# Patient Record
Sex: MALE | Race: WHITE | Employment: OTHER | URBAN - METROPOLITAN AREA
[De-identification: names, ages, dates, MRNs, and addresses within clinical notes are randomized per-mention and may not be internally consistent; named-entity substitution may affect disease eponyms.]

---

## 2017-01-23 ENCOUNTER — HOSPITAL ENCOUNTER (OUTPATIENT)
Dept: CT IMAGING | Age: 62
Discharge: HOME | End: 2017-01-23
Payer: MEDICARE

## 2017-01-23 DIAGNOSIS — R19.7 DIARRHEA: ICD-10-CM

## 2017-01-23 DIAGNOSIS — R10.84 ABDOMINAL PAIN, GENERALIZED: ICD-10-CM

## 2017-01-23 PROCEDURE — 74011000258 HC RX REV CODE- 258

## 2017-01-23 PROCEDURE — 74011636320 HC RX REV CODE- 636/320

## 2017-01-23 PROCEDURE — 74177 CT ABD & PELVIS W/CONTRAST: CPT

## 2017-01-23 RX ORDER — SODIUM CHLORIDE 0.9 % (FLUSH) 0.9 %
10 SYRINGE (ML) INJECTION
Status: COMPLETED | OUTPATIENT
Start: 2017-01-23 | End: 2017-01-23

## 2017-01-23 RX ADMIN — Medication 10 ML: at 16:07

## 2017-01-23 RX ADMIN — DIATRIZOATE MEGLUMINE AND DIATRIZOATE SODIUM 15 ML: 660; 100 LIQUID ORAL; RECTAL at 16:07

## 2017-01-23 RX ADMIN — IOPAMIDOL 100 ML: 755 INJECTION, SOLUTION INTRAVENOUS at 16:07

## 2017-01-23 RX ADMIN — SODIUM CHLORIDE 100 ML: 900 INJECTION, SOLUTION INTRAVENOUS at 16:07

## 2017-08-28 PROBLEM — J44.9 CHRONIC OBSTRUCTIVE PULMONARY DISEASE (HCC): Status: ACTIVE | Noted: 2017-08-28

## 2017-08-28 PROBLEM — G89.29 CHRONIC MIDLINE LOW BACK PAIN WITHOUT SCIATICA: Status: ACTIVE | Noted: 2017-08-28

## 2017-08-28 PROBLEM — M54.50 CHRONIC MIDLINE LOW BACK PAIN WITHOUT SCIATICA: Status: ACTIVE | Noted: 2017-08-28

## 2017-12-14 ENCOUNTER — HOSPITAL ENCOUNTER (OUTPATIENT)
Dept: LAB | Age: 62
Discharge: HOME | End: 2017-12-14
Payer: MEDICARE

## 2017-12-14 DIAGNOSIS — I25.118 ATHEROSCLEROSIS OF NATIVE CORONARY ARTERY OF NATIVE HEART WITH STABLE ANGINA PECTORIS (HCC): ICD-10-CM

## 2017-12-14 LAB
ANION GAP SERPL CALC-SCNC: 6 MMOL/L
BASOPHILS # BLD: 0.2 K/UL (ref 0–0.2)
BASOPHILS NFR BLD: 1 % (ref 0–2)
BUN SERPL-MCNC: 11 MG/DL (ref 8–23)
CALCIUM SERPL-MCNC: 9.2 MG/DL (ref 8.3–10.4)
CHLORIDE SERPL-SCNC: 106 MMOL/L (ref 98–107)
CO2 SERPL-SCNC: 29 MMOL/L (ref 21–32)
CREAT SERPL-MCNC: 0.9 MG/DL (ref 0.8–1.5)
DIFFERENTIAL METHOD BLD: ABNORMAL
EOSINOPHIL # BLD: 0.2 K/UL (ref 0–0.8)
EOSINOPHIL NFR BLD: 1 % (ref 0.5–7.8)
ERYTHROCYTE [DISTWIDTH] IN BLOOD BY AUTOMATED COUNT: 13 % (ref 11.9–14.6)
GLUCOSE SERPL-MCNC: 83 MG/DL (ref 65–100)
HCT VFR BLD AUTO: 46.6 % (ref 41.1–50.3)
HGB BLD-MCNC: 16.1 G/DL (ref 13.6–17.2)
INR PPP: 0.9
LYMPHOCYTES # BLD: 11.5 K/UL (ref 0.5–4.6)
LYMPHOCYTES NFR BLD: 67 % (ref 13–44)
MCH RBC QN AUTO: 31.1 PG (ref 26.1–32.9)
MCHC RBC AUTO-ENTMCNC: 34.5 G/DL (ref 31.4–35)
MCV RBC AUTO: 90.1 FL (ref 79.6–97.8)
MONOCYTES # BLD: 1.2 K/UL (ref 0.1–1.3)
MONOCYTES NFR BLD: 7 % (ref 4–12)
NEUTS SEG # BLD: 4.2 K/UL (ref 1.7–8.2)
NEUTS SEG NFR BLD: 24 % (ref 43–78)
PLATELET # BLD AUTO: 242 K/UL (ref 150–450)
PLATELET COMMENTS,PCOM: ADEQUATE
PMV BLD AUTO: 9.7 FL (ref 10.8–14.1)
POTASSIUM SERPL-SCNC: 3.9 MMOL/L (ref 3.5–5.1)
PROTHROMBIN TIME: 12.4 SEC (ref 11.5–14.5)
RBC # BLD AUTO: 5.17 M/UL (ref 4.23–5.67)
RBC MORPH BLD: ABNORMAL
SODIUM SERPL-SCNC: 141 MMOL/L (ref 136–145)
WBC # BLD AUTO: 17.3 K/UL (ref 4.3–11.1)
WBC MORPH BLD: ABNORMAL

## 2017-12-14 PROCEDURE — 85025 COMPLETE CBC W/AUTO DIFF WBC: CPT | Performed by: INTERNAL MEDICINE

## 2017-12-14 PROCEDURE — 80048 BASIC METABOLIC PNL TOTAL CA: CPT | Performed by: INTERNAL MEDICINE

## 2017-12-14 PROCEDURE — 36415 COLL VENOUS BLD VENIPUNCTURE: CPT | Performed by: INTERNAL MEDICINE

## 2017-12-14 PROCEDURE — 85610 PROTHROMBIN TIME: CPT | Performed by: INTERNAL MEDICINE

## 2017-12-19 RX ORDER — DICYCLOMINE HYDROCHLORIDE 10 MG/1
10 CAPSULE ORAL 2 TIMES DAILY
COMMUNITY
End: 2018-07-24

## 2017-12-20 ENCOUNTER — HOSPITAL ENCOUNTER (OUTPATIENT)
Dept: CARDIAC CATH/INVASIVE PROCEDURES | Age: 62
Discharge: HOME | End: 2017-12-21
Attending: INTERNAL MEDICINE | Admitting: INTERNAL MEDICINE
Payer: MEDICARE

## 2017-12-20 LAB
ATRIAL RATE: 65 BPM
CALCULATED P AXIS, ECG09: 77 DEGREES
CALCULATED R AXIS, ECG10: 52 DEGREES
CALCULATED T AXIS, ECG11: 72 DEGREES
DIAGNOSIS, 93000: NORMAL
P-R INTERVAL, ECG05: 164 MS
Q-T INTERVAL, ECG07: 466 MS
QRS DURATION, ECG06: 72 MS
QTC CALCULATION (BEZET), ECG08: 484 MS
VENTRICULAR RATE, ECG03: 65 BPM

## 2017-12-20 PROCEDURE — 74011250636 HC RX REV CODE- 250/636: Performed by: PHYSICIAN ASSISTANT

## 2017-12-20 PROCEDURE — 74011250636 HC RX REV CODE- 250/636

## 2017-12-20 PROCEDURE — C1769 GUIDE WIRE: HCPCS

## 2017-12-20 PROCEDURE — 77030027138 HC INCENT SPIROMETER -A

## 2017-12-20 PROCEDURE — 74011000250 HC RX REV CODE- 250: Performed by: INTERNAL MEDICINE

## 2017-12-20 PROCEDURE — 77030015766

## 2017-12-20 PROCEDURE — 74011250637 HC RX REV CODE- 250/637: Performed by: INTERNAL MEDICINE

## 2017-12-20 PROCEDURE — 74011000258 HC RX REV CODE- 258: Performed by: INTERNAL MEDICINE

## 2017-12-20 PROCEDURE — 92928 PRQ TCAT PLMT NTRAC ST 1 LES: CPT

## 2017-12-20 PROCEDURE — C1874 STENT, COATED/COV W/DEL SYS: HCPCS

## 2017-12-20 PROCEDURE — 99153 MOD SED SAME PHYS/QHP EA: CPT

## 2017-12-20 PROCEDURE — 99152 MOD SED SAME PHYS/QHP 5/>YRS: CPT

## 2017-12-20 PROCEDURE — 74011250636 HC RX REV CODE- 250/636: Performed by: INTERNAL MEDICINE

## 2017-12-20 PROCEDURE — 93458 L HRT ARTERY/VENTRICLE ANGIO: CPT

## 2017-12-20 PROCEDURE — C1894 INTRO/SHEATH, NON-LASER: HCPCS

## 2017-12-20 PROCEDURE — 74011636320 HC RX REV CODE- 636/320: Performed by: INTERNAL MEDICINE

## 2017-12-20 PROCEDURE — C1725 CATH, TRANSLUMIN NON-LASER: HCPCS

## 2017-12-20 PROCEDURE — 93005 ELECTROCARDIOGRAM TRACING: CPT | Performed by: INTERNAL MEDICINE

## 2017-12-20 PROCEDURE — 77030019569 HC BND COMPR RAD TERU -B

## 2017-12-20 PROCEDURE — C1887 CATHETER, GUIDING: HCPCS

## 2017-12-20 RX ORDER — LORAZEPAM 1 MG/1
1 TABLET ORAL
Status: DISCONTINUED | OUTPATIENT
Start: 2017-12-20 | End: 2017-12-21 | Stop reason: HOSPADM

## 2017-12-20 RX ORDER — SODIUM CHLORIDE 0.9 % (FLUSH) 0.9 %
5-10 SYRINGE (ML) INJECTION EVERY 8 HOURS
Status: DISCONTINUED | OUTPATIENT
Start: 2017-12-20 | End: 2017-12-21 | Stop reason: HOSPADM

## 2017-12-20 RX ORDER — SODIUM CHLORIDE 9 MG/ML
75 INJECTION, SOLUTION INTRAVENOUS CONTINUOUS
Status: DISCONTINUED | OUTPATIENT
Start: 2017-12-20 | End: 2017-12-21 | Stop reason: HOSPADM

## 2017-12-20 RX ORDER — ACETAMINOPHEN 325 MG/1
650 TABLET ORAL
Status: DISCONTINUED | OUTPATIENT
Start: 2017-12-20 | End: 2017-12-21 | Stop reason: HOSPADM

## 2017-12-20 RX ORDER — LIDOCAINE HYDROCHLORIDE 20 MG/ML
60 INJECTION, SOLUTION INFILTRATION; PERINEURAL ONCE
Status: COMPLETED | OUTPATIENT
Start: 2017-12-20 | End: 2017-12-20

## 2017-12-20 RX ORDER — GUAIFENESIN 100 MG/5ML
81-324 LIQUID (ML) ORAL
Status: DISCONTINUED | OUTPATIENT
Start: 2017-12-20 | End: 2017-12-20 | Stop reason: HOSPADM

## 2017-12-20 RX ORDER — GUAIFENESIN 100 MG/5ML
81 LIQUID (ML) ORAL DAILY
Status: DISCONTINUED | OUTPATIENT
Start: 2017-12-21 | End: 2017-12-21 | Stop reason: HOSPADM

## 2017-12-20 RX ORDER — SOTALOL HYDROCHLORIDE 80 MG/1
80 TABLET ORAL EVERY 12 HOURS
Status: DISCONTINUED | OUTPATIENT
Start: 2017-12-20 | End: 2017-12-21 | Stop reason: HOSPADM

## 2017-12-20 RX ORDER — SODIUM CHLORIDE 0.9 % (FLUSH) 0.9 %
5-10 SYRINGE (ML) INJECTION AS NEEDED
Status: DISCONTINUED | OUTPATIENT
Start: 2017-12-20 | End: 2017-12-21 | Stop reason: HOSPADM

## 2017-12-20 RX ORDER — HEPARIN SODIUM 200 [USP'U]/100ML
3 INJECTION, SOLUTION INTRAVENOUS CONTINUOUS
Status: DISCONTINUED | OUTPATIENT
Start: 2017-12-20 | End: 2017-12-20 | Stop reason: HOSPADM

## 2017-12-20 RX ORDER — MORPHINE SULFATE 10 MG/ML
2 INJECTION, SOLUTION INTRAMUSCULAR; INTRAVENOUS
Status: DISCONTINUED | OUTPATIENT
Start: 2017-12-20 | End: 2017-12-21 | Stop reason: HOSPADM

## 2017-12-20 RX ORDER — DIAZEPAM 5 MG/1
5 TABLET ORAL ONCE
Status: COMPLETED | OUTPATIENT
Start: 2017-12-20 | End: 2017-12-20

## 2017-12-20 RX ORDER — HYDRALAZINE HYDROCHLORIDE 20 MG/ML
10 INJECTION INTRAMUSCULAR; INTRAVENOUS
Status: DISCONTINUED | OUTPATIENT
Start: 2017-12-20 | End: 2017-12-21 | Stop reason: HOSPADM

## 2017-12-20 RX ORDER — NITROGLYCERIN 0.4 MG/1
0.4 TABLET SUBLINGUAL
Status: DISCONTINUED | OUTPATIENT
Start: 2017-12-20 | End: 2017-12-21 | Stop reason: HOSPADM

## 2017-12-20 RX ORDER — MIDAZOLAM HYDROCHLORIDE 1 MG/ML
.5-2 INJECTION, SOLUTION INTRAMUSCULAR; INTRAVENOUS
Status: DISCONTINUED | OUTPATIENT
Start: 2017-12-20 | End: 2017-12-20 | Stop reason: HOSPADM

## 2017-12-20 RX ADMIN — MIDAZOLAM HYDROCHLORIDE 1 MG: 1 INJECTION, SOLUTION INTRAMUSCULAR; INTRAVENOUS at 14:41

## 2017-12-20 RX ADMIN — HYDRALAZINE HYDROCHLORIDE 10 MG: 20 INJECTION INTRAMUSCULAR; INTRAVENOUS at 17:39

## 2017-12-20 RX ADMIN — SOTALOL HYDROCHLORIDE 80 MG: 80 TABLET ORAL at 22:13

## 2017-12-20 RX ADMIN — HEPARIN 3 ML/HR: 100 SYRINGE at 14:37

## 2017-12-20 RX ADMIN — HEPARIN SODIUM 2 ML: 10000 INJECTION, SOLUTION INTRAVENOUS; SUBCUTANEOUS at 14:28

## 2017-12-20 RX ADMIN — MIDAZOLAM HYDROCHLORIDE 2 MG: 1 INJECTION, SOLUTION INTRAMUSCULAR; INTRAVENOUS at 14:20

## 2017-12-20 RX ADMIN — DIAZEPAM 5 MG: 5 TABLET ORAL at 13:13

## 2017-12-20 RX ADMIN — TICAGRELOR 180 MG: 90 TABLET ORAL at 14:53

## 2017-12-20 RX ADMIN — IOPAMIDOL 135 ML: 755 INJECTION, SOLUTION INTRAVENOUS at 14:55

## 2017-12-20 RX ADMIN — BIVALIRUDIN 1.75 MG/KG/HR: 250 INJECTION, POWDER, LYOPHILIZED, FOR SOLUTION INTRAVENOUS at 14:34

## 2017-12-20 RX ADMIN — MIDAZOLAM HYDROCHLORIDE 2 MG: 1 INJECTION, SOLUTION INTRAMUSCULAR; INTRAVENOUS at 14:29

## 2017-12-20 RX ADMIN — SODIUM CHLORIDE 75 ML/HR: 900 INJECTION, SOLUTION INTRAVENOUS at 13:00

## 2017-12-20 RX ADMIN — LIDOCAINE HYDROCHLORIDE 60 MG: 20 INJECTION, SOLUTION INFILTRATION; PERINEURAL at 14:30

## 2017-12-20 NOTE — PROGRESS NOTES
Patient received to [address] [place] [address] room # 9  Ambulatory from [facility]. Patient scheduled for [facility] today with Dr Doretha Cuellar. Procedure reviewed & questions answered, voiced good understanding consent obtained & placed on chart. All medications and medical history reviewed. Will prep patient per orders. Patient & family updated on plan of care.

## 2017-12-20 NOTE — IP AVS SNAPSHOT
Jam Barnes 
 
 
 [address] [facility] [address] [facility] 
[phone] Patient: Luis Antonio Yee MRN: [id number] [facility]:[birth year] My Medications STOP taking these medications   
 aspirin 325 mg tablet Commonly known as:  ASPIRIN Replaced by:  aspirin 81 mg chewable tablet TAKE these medications as instructed Instructions Each Dose to Equal  
 Morning Noon Evening Bedtime  
 albuterol 90 mcg/actuation inhaler Commonly known as:  PROVENTIL HFA, VENTOLIN HFA, PROAIR HFA Take 2 Puffs by inhalation every four (4) hours as needed. 2 Puff  
    
   
   
   
  
 aspirin 81 mg chewable tablet Take 1 Tab by mouth daily. 81 mg  
    
  
   
   
   
  
 cetirizine 10 mg tablet Commonly known as:  ZYRTEC Take 1 Tab by mouth nightly. 10 mg  
    
   
   
   
  
  
 COLESTID 1 gram tablet Generic drug:  colestipol Take 1 g by mouth daily. 1 g  
    
  
   
   
   
  
 dicyclomine 10 mg capsule Commonly known as:  BENTYL Take 10 mg by mouth two (2) times a day. 10 mg  
    
  
   
   
  
   
  
 lisinopril 40 mg tablet Commonly known as:  Rafat Shutters Take 1 Tab by mouth daily. 40 mg  
    
  
   
   
   
  
 multivitamin tablet Commonly known as:  ONE A DAY Take 1 Tab by mouth daily. 1 Tab NIACIN PO Take 1,000 mg by mouth daily. 1000 mg  
    
  
   
   
   
  
 nitroglycerin 0.4 mg SL tablet Commonly known as:  NITROSTAT  
   
 1 Tab by SubLINGual route every five (5) minutes as needed for Chest Pain. 0.4 mg  
    
   
   
   
  
 pantoprazole 40 mg tablet Commonly known as:  PROTONIX Take 1 Tab by mouth daily. 40 mg  
    
  
   
   
   
  
 sertraline 100 mg tablet Commonly known as:  ZOLOFT  
   
 TAKE 1 TABLET EVERY DAY  
     
  
   
   
   
  
 sotalol 80 mg tablet Commonly known as:  Adrienne Tucker  
   
 TAKE 1 TABLET TWICE DAILY. ticagrelor 90 mg tablet Commonly known as:  [employer] Take 1 Tab by mouth every twelve (12) hours every twelve (12) hours. 90 mg  
    
  
   
   
  
   
  
 VITAMIN D3 1,000 unit tablet Generic drug:  cholecalciferol Take 1,000 Units by mouth daily. 1000 Units Where to Get Your Medications These medications were sent to [facility] Pamela Lopez, [address] [place] [address] [address] [facility]. [place] [id number] Phone:  [phone]  
  ticagrelor 90 mg tablet

## 2017-12-20 NOTE — PROGRESS NOTES
Applied TR-band to right wrist with 12 mL of air in band. Site without bleeding or hematoma. Capillary refill distal to the site was less than 2 seconds. Patient instructed to limit movement of affected wrist. Patient verbalized understanding.

## 2017-12-20 NOTE — PROGRESS NOTES
TRANSFER - IN REPORT:    [facility] being received from cath lab for routine progression of care      Report consisted of patients Situation, Background, Assessment and Recommendations(SBAR). Information from the following report(s) SBAR, Kardex and Procedure Summary was reviewed with the receiving nurse. Opportunity for questions and clarification was provided. Assessment completed upon patients arrival to unit and care assumed. Telemetry monitor removed. VS taken. Right radial site puncture site assessed and WDL. Pt verbalizes understanding of limited movement of RUE. Bed low and locked. Side rails x 2. Call light within reach. Pt verbalizes understanding to call for assistance.

## 2017-12-20 NOTE — PROCEDURES
Brief Cardiac Procedure Note    Patient: Wiliam Cortez MRN: [id number]  SSN: [id number]    YOB: 1955  Age: 60 y.o. Sex: male      Date of Procedure: 12/20/2017     Pre-procedure Diagnosis: Chest pain CCS Class IV    Post-procedure Diagnosis: Coronary Artery Disease    Procedure: Left Heart Catheterization with Percutaneous Coronary Intervention    Brief Description of Procedure: lhc via right radial, tr, pci om    Performed By: Chavez Ambrocio MD     Assistants: none    Anesthesia: Moderate Sedation    Estimated Blood Loss: Less than 10 mL      Specimens: None    Implants: None    Findings: synergy om, nl lvef    Complications: None    Recommendations: Continue medical therapy.     Signed By: Chavez Ambrocio MD     December 20, 2017

## 2017-12-20 NOTE — PROGRESS NOTES
TRANSFER - OUT REPORT:    Verbal report given to alexander(name) on Ardith Saint  being transferred to cpru(unit) for routine progression of care       Report consisted of patients Situation, Background, Assessment and   Recommendations(SBAR). Information from the following report(s) SBAR was reviewed with the receiving nurse. Opportunity for questions and clarification was provided. Procedure: [facility]   Finding Summary: stent x 1 to circ(cath/pci/pacer settings)  Location: rwrist    Closure Device: trband, 12 ml(yes/no/description)  Post Site Assessment: no bleeding no hematoma         Intra Procedure Meds:    Versed: 5mg    Angiomax Stop Time: 1501    Antiplatelet: 259PD Brilinta             Peripheral IV 12/20/17 Right Antecubital (Active)        Post-Procedure Site Assessment (1)  Wound Type: Catheter entry/exit  Location: Wrist  Orientation : Right  Hemostasis : TR Band (12ml)  Site Assessment: No bleeding, No hematoma                       is allergic to fentanyl; lovastatin; niacin; and advicor [niacin-lovastatin].     Past Medical History:   Diagnosis Date    Arrhythmia     A Fib twice since 7/2010-- ablation no problems since    Arthritis     CAD (coronary artery disease) 11/2009    stent x 1-- ablation 2010-- no problems since    Cancer [facility])     skin cancer    Chest pain     Chronic midline low back pain without sciatica 8/28/2017    COPD     prn inhalers    Coronary atherosclerosis of native coronary artery 12/30/2015    Depression     Dyspnea on exertion     with any activity; associated with nausea    GERD (gastroesophageal reflux disease) 7/22/2010    Heart failure ([address]     Hematochezia     HTN (hypertension) x 1 month    started on med--- controlled    Hypercholesteremia     controlled by medication    Other ill-defined conditions(799.89)     dyslipidemia/hyperlipidemia    Palpitations 12/30/2015    Paroxysmal atrial fibrillation ([address]     Psychiatric disorder depression    PUD (peptic ulcer disease) 2010    hx-- r/t coumadin--    Tobacco use disorder 12/30/2015    Unspecified adverse effect of anesthesia 1978    quit breathing     Visit Vitals    BP (!) 161/92 (BP Patient Position: Supine)    Pulse 70    Temp 98 °F (36.7 °C)    Resp 16    Ht 6' 1\" (1.854 m)    Wt 81.6 kg (180 lb)    SpO2 99%    BMI 23.75 kg/m2

## 2017-12-20 NOTE — PROGRESS NOTES
TRANSFER - OUT REPORT:    Verbal report given to Bri Hawk RN(name) on [facility]  being transferred to telemetry(unit) for routine progression of care       Report consisted of patients Situation, Background, Assessment and   Recommendations(SBAR). Information from the following report(s) Kardex and Procedure Summary was reviewed with the receiving nurse. Lines:   Peripheral IV 12/20/17 Left Antecubital (Active)        Opportunity for questions and clarification was provided.       Patient transported with:   Monitor  Registered Nurse

## 2017-12-20 NOTE — PROGRESS NOTES
Skin assessment completed with secondary RN. Right radial puncture site s/p heart cath. Site WDL. Sacrum and heels intact with no breakdown noted.

## 2017-12-20 NOTE — PROGRESS NOTES
Patient pre-assessment complete for [facility] poss with DR Rodney Brown scheduled for 17 at 12:30pm, arrival time 10:30am. Patient verified using . Patient instructed to bring all home medications in labeled bottles on the day of procedure. NPO status reinforced. Patient informed to take a full dose aspirin 325mg  or 81 mg x 4 on the day of procedure. Instructed they can take all other medications excluding vitamins & supplements. Patient verbalizes understanding of all instructions & denies any questions at this time.

## 2017-12-20 NOTE — IP AVS SNAPSHOT
[address] [facility], [place] 
 
 
 [address] [facility] [address] [facility] 
[phone] Patient: Marty Jason MRN: [id number] YLT:[id number] About your hospitalization You were admitted on:  December 20, 2017 You last received care in the:  [facility] 3 TELEMETRY You were discharged on:  December 21, 2017 Why you were hospitalized Your primary diagnosis was:  Coronary Atherosclerosis Of Native Coronary Artery Your diagnoses also included:  Palpitations, Htn (Hypertension), Dyslipidemia, Cad (Coronary Artery Disease) Things You Need To Do (next 8 weeks) Follow up with Luis Vergara DO Phone:  [phone] Where:  [address] [facility] [address] [facility] [address] Follow up with Ladi Morgan MD  
Jan 22 at [place] [address] office Phone:  [phone] Where:  [address] [place] [id number] Follow up with O CARDIOPULM REHAB  
you may call to schedule Orientation if you wish to participate in Cardiac Rehab. Phone:  [phone] Where:  [address] [facility], [address] Monday Jan 22, 2018 HOSPITAL FOLLOW-UP with Ladi Morgan MD at  8:00 AM  
Where:  [facility] OFFICE ([address] [facility]) Discharge Orders Procedure Order Date Status Priority Quantity Spec Type Associated Dx REFERRAL TO CARDIAC [facility] 12/21/17 0709 Normal Routine 1 A check kalina indicates which time of day the medication should be taken. My Medications STOP taking these medications   
 aspirin 325 mg tablet Commonly known as:  ASPIRIN Replaced by:  aspirin 81 mg chewable tablet TAKE these medications as instructed Instructions Each Dose to Equal  
 Morning Noon Evening Bedtime  
 albuterol 90 mcg/actuation inhaler Commonly known as:  PROVENTIL HFA, VENTOLIN HFA, PROAIR HFA Take 2 Puffs by inhalation every four (4) hours as needed. 2 Puff  
    
   
   
   
  
 aspirin 81 mg chewable tablet Take 1 Tab by mouth daily. 81 mg  
    
  
   
   
   
  
 cetirizine 10 mg tablet Commonly known as:  ZYRTEC Take 1 Tab by mouth nightly. 10 mg  
    
   
   
   
  
  
 COLESTID 1 gram tablet Generic drug:  colestipol Take 1 g by mouth daily. 1 g  
    
  
   
   
   
  
 dicyclomine 10 mg capsule Commonly known as:  BENTYL Take 10 mg by mouth two (2) times a day. 10 mg  
    
  
   
   
  
   
  
 lisinopril 40 mg tablet Commonly known as:  Jordan Edman Take 1 Tab by mouth daily. 40 mg  
    
  
   
   
   
  
 multivitamin tablet Commonly known as:  ONE A DAY Take 1 Tab by mouth daily. 1 Tab NIACIN PO Take 1,000 mg by mouth daily. 1000 mg  
    
  
   
   
   
  
 nitroglycerin 0.4 mg SL tablet Commonly known as:  NITROSTAT  
   
 1 Tab by SubLINGual route every five (5) minutes as needed for Chest Pain. 0.4 mg  
    
   
   
   
  
 pantoprazole 40 mg tablet Commonly known as:  PROTONIX Take 1 Tab by mouth daily. 40 mg  
    
  
   
   
   
  
 sertraline 100 mg tablet Commonly known as:  ZOLOFT  
   
 TAKE 1 TABLET EVERY DAY  
     
  
   
   
   
  
 sotalol 80 mg tablet Commonly known as:  BETAPACE  
   
 TAKE 1 TABLET TWICE DAILY. ticagrelor 90 mg tablet Commonly known as:  [employer] Take 1 Tab by mouth every twelve (12) hours every twelve (12) hours. 90 mg  
    
  
   
   
  
   
  
 VITAMIN D3 1,000 unit tablet Generic drug:  cholecalciferol Take 1,000 Units by mouth daily. 1000 Units Where to Get Your Medications These medications were sent to [facility] Pamela Lopez, [address] [place] [address] Drive [address] [facility]. [place] [id number] Phone:  [phone]  
  ticagrelor 90 mg tablet Discharge Instructions Cardiac Catheterization/Angiography Discharge Instructions *Check the puncture site frequently for swelling or bleeding. If you see any bleeding, lie down and apply pressure over the area with a clean town or washcloth. Notify your doctor for any redness, swelling, drainage or oozing from the puncture site. Notify your doctor for any fever or chills. *If the leg or arm with the puncture becomes cold, numb or painful, call [facility] Cardiology at  [phone]. *Activity should be limited for the next 48 hours. Climb stairs as little as possible and avoid any stooping, bending or strenuous activity for 48 hours. No heavy lifting (anything over 10 pounds) for three days. *Do not drive for 48 hours. *You may resume your usual diet. Drink more fluids than usual. 
 
*Have a responsible person drive you home and stay with you for at least 24 hours after your heart catheterization/angiography. *You may remove the bandage from your Right Arm in 24 hours. You may shower in 24 hours. No tub baths, hot tubs or swimming for one week. Do not place any lotions, creams, powders, ointments over the puncture site for one week. You may place a clean band-aid over the puncture site each day for 5 days. Change this daily. Percutaneous Coronary Intervention: What to Expect at [facility] Your Recovery Percutaneous coronary intervention (PCI) is the name for procedures that are used to open a narrowed or blocked coronary artery. The two most common PCI procedures are coronary angioplasty and coronary stent placement. Your groin or arm may have a bruise and feel sore for a day or two after a percutaneous coronary intervention (PCI). You can do light activities around the house, but nothing strenuous for several days. This care sheet gives you a general idea about how long it will take for you to recover. But each person recovers at a different pace.  Follow the steps below to get better as quickly as possible. How can you care for yourself at home? Activity ? · If the doctor gave you a sedative: ¨ For 24 hours, don't do anything that requires attention to detail. It takes time for the medicine's effects to completely wear off. ¨ For your safety, do not drive or operate any machinery that could be dangerous. Wait until the medicine wears off and you can think clearly and react easily. ? · Do not do strenuous exercise and do not lift, pull, or push anything heavy until your doctor says it is okay. This may be for a day or two. You can walk around the house and do light activity, such as cooking. ? · If the catheter was placed in your groin, try not to walk up stairs for the first couple of days. ? · If the catheter was placed in your arm near your wrist, do not bend your wrist deeply for the first couple of days. Be careful using your hand to get into and out of a chair or bed. ? · Carry your stent identification card with you at all times. ? · If your doctor recommends it, get more exercise. Walking is a good choice. Bit by bit, increase the amount you walk every day. Try for at least 30 minutes on most days of the week. Diet ? · Drink plenty of fluids to help your body flush out the dye. If you have kidney, heart, or liver disease and have to limit fluids, talk with your doctor before you increase the amount of fluids you drink. ? · Keep eating a heart-healthy diet that has lots of fruits, vegetables, and whole grains. If you have not been eating this way, talk to your doctor. You also may want to talk to a dietitian. This expert can help you to learn about healthy foods and plan meals. Medicines ? · Your doctor will tell you if and when you can restart your medicines. He or she will also give you instructions about taking any new medicines.   
? · If you take blood thinners, such as warfarin (Coumadin), clopidogrel (Plavix), or aspirin, be sure to talk to your doctor. He or she will tell you if and when to start taking those medicines again. Make sure that you understand exactly what your doctor wants you to do.  
? · Your doctor will prescribe blood-thinning medicines. You will likely take aspirin plus another antiplatelet, such as clopidogrel (Plavix). It is very important that you take these medicines exactly as directed. These medicines help keep the coronary artery open and reduce your risk of a heart attack. ? · Call your doctor if you think you are having a problem with your medicine. ?Care of the catheter site ? · For 1 or 2 days, keep a bandage over the spot where the catheter was inserted. The bandage probably will fall off in this time. ? · Put ice or a cold pack on the area for 10 to 20 minutes at a time to help with soreness or swelling. Put a thin cloth between the ice and your skin. ? · You may shower 24 to 48 hours after the procedure, if your doctor okays it. Pat the incision dry. ? · Do not soak the catheter site until it is healed. Don't take a bath for 1 week, or until your doctor tells you it isokay. Follow-up care is a key part of your treatment and safety. Be sure to make and go to all appointments, and call your doctor if you are having problems. It's also a good idea to know your test results and keep a list of the medicines you take. When should you call for help? Call 911 anytime you think you may need emergency care. For example, call if: 
? · You passed out (lost consciousness). ? · You have severe trouble breathing. ? · You have sudden chest pain and shortness of breath, or you cough up blood. ? · You have symptoms of a heart attack, such as: ¨ Chest pain or pressure. ¨ Sweating. ¨ Shortness of breath. ¨ Nausea or vomiting. ¨ Pain that spreads from the chest to the neck, jaw, or one or both shoulders or arms. ¨ Dizziness or lightheadedness. ¨ A fast or uneven pulse. After calling 911, chew 1 adult-strength aspirin. Wait for an ambulance. Do not try to drive yourself. ? · You have been diagnosed with angina, and you have angina symptoms that do not go away with rest or are not getting better within 5 minutes after you take one dose of nitroglycerin. ?Call your doctor now or seek immediate medical care if: 
? · You are bleeding from the area where the catheter was put in your artery. ? · You have a fast-growing, painful lump at the catheter site. ? · You have signs of infection, such as: 
¨ Increased pain, swelling, warmth, or redness. ¨ Red streaks leading from the catheter site. ¨ Pus draining from the catheter site. ¨ A fever. ? · Your leg or arm looks blue or feels cold, numb, or tingly. ? Watch closely for changes in your health, and be sure to contact your doctor if you have any problems. Where can you learn more? Go to http://mary kate-ramón.info/. Enter A592 in the search box to learn more about \"Percutaneous Coronary Intervention: What to Expect at Home. \" Current as of: September 21, 2016 Content Version: 11.4 © 5789-0525 [employer]. Care instructions adapted under license by [employer] (which disclaims liability or warranty for this information). If you have questions about a medical condition or this instruction, always ask your healthcare professional. [place] [address] any warranty or liability for your use of this information. DISCHARGE SUMMARY from Nurse PATIENT INSTRUCTIONS: 
 
 
F-face looks uneven A-arms unable to move or move unevenly S-speech slurred or non-existent T-time-call 911 as soon as signs and symptoms begin-DO NOT go Back to bed or wait to see if you get better-TIME IS BRAIN.  
 
Warning Signs of HEART ATTACK  
 
 Call 911 if you have these symptoms: 
? Chest discomfort. Most heart attacks involve discomfort in the center of the chest that lasts more than a few minutes, or that goes away and comes back. It can feel like uncomfortable pressure, squeezing, fullness, or pain. ? Discomfort in other areas of the upper body. Symptoms can include pain or discomfort in one or both arms, the back, neck, jaw, or stomach. ? Shortness of breath with or without chest discomfort. ? Other signs may include breaking out in a cold sweat, nausea, or lightheadedness. Don't wait more than five minutes to call [address]! Fast action can save your life. Calling 911 is almost always the fastest way to get lifesaving treatment. Emergency Medical Services staff can begin treatment when they arrive  up to an hour sooner than if someone gets to the hospital by car. The discharge information has been reviewed with the patient. The patient verbalized understanding. Discharge medications reviewed with the patient and appropriate educational materials and side effects teaching were provided. ___________________________________________________________________________________________________________________________________ [facility] Announcement We are excited to announce that we are making your provider's discharge notes available to you in [employer]k. You will see these notes when they are completed and signed by the physician that discharged you from your recent hospital stay. If you have any questions or concerns about any information you see in [facility], please call the Health Information Department where you were seen or reach out to your Primary Care Provider for more information about your plan of care. Introducing \[facility]\"" & HEALTH SERVICES! Dear Matt Hodges: Thank you for requesting a Nomadesk account. Our records indicate that you already have an active Nomadesk account.   You can access your account anytime at [email or web address] Did you know that you can access your hospital and ER discharge instructions at any time in LogicSource? You can also review all of your test results from your hospital stay or ER visit. Additional Information If you have questions, please visit the Frequently Asked Questions section of the LogicSource website at [email or web address] Remember, LogicSource is NOT to be used for urgent needs. For medical emergencies, dial 911. Now available from your iPhone and Android! Providers Seen During Your Hospitalization Provider Specialty Primary office phone Sunni Fsiher MD Cardiology [phone] Immunizations Administered for This Admission Name Date Influenza Vaccine (Quad) PF 12/21/2017 Your Primary Care Physician (PCP) Primary Care Physician Office Phone Office Fax Mikhail Crow [phone] You are allergic to the following Allergen Reactions Fentanyl Other (comments) States had through a PCA after shoulder surgery in 2000, and \"throat swelled shut\" and that he \"quit breathing\". Update 11/16/10--Patient thinks the PCA gave him to much and it wasn't an allergy to drug itself. Lovastatin Other (comments) Hullucinations Niacin Other (comments) Denies allery Advicor (Niacin-Lovastatin) Anaphylaxis Itching Recent Documentation Height Weight BMI Smoking Status 1.854 m 80.3 kg 23.35 kg/m2 Former Smoker Emergency Contacts Name Discharge Info Relation Home Work Mobile IsaiasLadi DISCHARGE CAREGIVER [3] Spouse [3] [phone] Patient Belongings The following personal items are in your possession at time of discharge: 
  Dental Appliances: Uppers, With patient  Visual Aid: Glasses, With patient      Home Medications: Locked   Jewelry: Ring  Clothing:  Footwear, Pajamas, Pants, Shirt, Undergarments, With patient    Other Valuables: Cell Phone, With patient Discharge Instructions Attachments/References CARDIAC REHABILITATION (ENGLISH) Patient Handouts Cardiac Rehabilitation: Care Instructions Your Care Instructions Cardiac rehabilitation is a program for people who have a heart problem, such as a heart attack, heart failure, or a heart valve disease. The program includes exercise, lifestyle changes, education, and emotional support. Cardiac rehab can help you improve the quality of your life through better overall health. It can help you lose weight and feel better about yourself. On your cardiac rehab team, you may have your doctor, a nurse specialist, a dietitian, and a physical therapist. They will design your cardiac rehab program specifically for you. You will learn how to reduce your risk for heart problems, how to manage stress, and how to eat a heart-healthy diet. By the end of the program, you will be ready to maintain a healthier lifestyle on your own. Follow-up care is a key part of your treatment and safety. Be sure to make and go to all appointments, and call your doctor if you are having problems. It's also a good idea to know your test results and keep a list of the medicines you take. How can you care for yourself at home? · Take your medicines exactly as prescribed. Call your doctor if you think you are having a problem with your medicine. You will get more details on the specific medicines your doctor prescribes. · Weigh yourself every day if your doctor tells you to. Watch for sudden weight gain. Weigh yourself on the same scale with the same amount of clothing at the same time of day. · Plan your meals so that you are eating heart-healthy foods. ¨ Eat a variety of foods daily. Fresh fruits and vegetables and whole-grains are good choices. ¨ Limit your fat intake, especially saturated and trans fat. ¨ Limit salt (sodium). ¨ Increase fiber in your diet. ¨ Limit alcohol. · Learn how to take your pulse so that you can track your heart rate during exercise. · Always check with your doctor before you begin a new exercise program. 
· Warm up before you exercise and cool down afterward for at least 15 minutes each. This will help your heart gradually prepare for and recover from exercise and avoid pushing your heart too hard. · Stop exercising if you have any unusual discomfort, such as chest pain. · Do not smoke. Smoking can make heart problems worse. If you need help quitting, talk to your doctor about stop-smoking programs and medicines. These can increase your chances of quitting for good. When should you call for help? Call 911 anytime you think you may need emergency care. For example, call if: 
? · You have severe trouble breathing. ? · You cough up pink, foamy mucus and you have trouble breathing. ? · You have symptoms of a heart attack. These may include: ¨ Chest pain or pressure, or a strange feeling in the chest. 
¨ Sweating. ¨ Shortness of breath. ¨ Nausea or vomiting. ¨ Pain, pressure, or a strange feeling in the back, neck, jaw, or upper belly or in one or both shoulders or arms. ¨ Lightheadedness or sudden weakness. ¨ A fast or irregular heartbeat. After you call 911, the  may tell you to chew 1 adult-strength or 2 to 4 low-dose aspirin. Wait for an ambulance. Do not try to drive yourself. ? · You have angina symptoms (such as chest pain or pressure) that do not go away with rest or are not getting better within 5 minutes after you take a dose of nitroglycerin. ? · You have symptoms of a stroke. These may include: 
¨ Sudden numbness, tingling, weakness, or loss of movement in your face, arm, or leg, especially on only one side of your body. ¨ Sudden vision changes. ¨ Sudden trouble speaking. ¨ Sudden confusion or trouble understanding simple statements. ¨ Sudden problems with walking or balance. ¨ A sudden, severe headache that is different from past headaches. ? · You passed out (lost consciousness). ?Call your doctor now or seek immediate medical care if: 
? · You have new or increased shortness of breath. ? · You are dizzy or lightheaded, or you feel like you may faint. ? · You gain weight suddenly, such as more than 2 to 3 pounds in a day or 5 pounds in a week. (Your doctor may suggest a different range of weight gain.) ? · You have increased swelling in your legs, ankles, or feet. ? Watch closely for changes in your health, and be sure to contact your doctor if you have any problems. Where can you learn more? Go to http://mary kate-ramón.info/. Enter W122 in the search box to learn more about \"Cardiac Rehabilitation: Care Instructions. \" Current as of: September 21, 2016 Content Version: 11.4 © 4714-9375 [employer]old[employer]. Care instructions adapted under license by [employer] (which disclaims liability or warranty for this information). If you have questions about a medical condition or this instruction, always ask your healthcare professional. [address] any warranty or liability for your use of this information. Please provide this summary of care documentation to your next provider. Signatures-by signing, you are acknowledging that this After Visit Summary has been reviewed with you and you have received a copy. Patient Signature:  ____________________________________________________________ Date:  ____________________________________________________________  
  
Heber Shelby Provider Signature:  ____________________________________________________________ Date:  ____________________________________________________________

## 2017-12-21 VITALS
HEART RATE: 77 BPM | BODY MASS INDEX: 23.46 KG/M2 | HEIGHT: 73 IN | TEMPERATURE: 98.8 F | WEIGHT: 177 LBS | DIASTOLIC BLOOD PRESSURE: 88 MMHG | OXYGEN SATURATION: 96 % | SYSTOLIC BLOOD PRESSURE: 168 MMHG | RESPIRATION RATE: 18 BRPM

## 2017-12-21 LAB
ANION GAP SERPL CALC-SCNC: 10 MMOL/L (ref 7–16)
ATRIAL RATE: 73 BPM
BASOPHILS # BLD: 0 K/UL (ref 0–0.2)
BASOPHILS NFR BLD: 0 % (ref 0–2)
BUN SERPL-MCNC: 12 MG/DL (ref 8–23)
CALCIUM SERPL-MCNC: 9.1 MG/DL (ref 8.3–10.4)
CALCULATED P AXIS, ECG09: 77 DEGREES
CALCULATED R AXIS, ECG10: 70 DEGREES
CALCULATED T AXIS, ECG11: 63 DEGREES
CHLORIDE SERPL-SCNC: 109 MMOL/L (ref 98–107)
CHOLEST SERPL-MCNC: 139 MG/DL
CO2 SERPL-SCNC: 26 MMOL/L (ref 21–32)
CREAT SERPL-MCNC: 0.75 MG/DL (ref 0.8–1.5)
DIAGNOSIS, 93000: NORMAL
DIFFERENTIAL METHOD BLD: ABNORMAL
EOSINOPHIL # BLD: 0.1 K/UL (ref 0–0.8)
EOSINOPHIL NFR BLD: 1 % (ref 0.5–7.8)
ERYTHROCYTE [DISTWIDTH] IN BLOOD BY AUTOMATED COUNT: 13.2 % (ref 11.9–14.6)
GLUCOSE SERPL-MCNC: 92 MG/DL (ref 65–100)
HCT VFR BLD AUTO: 42.8 % (ref 41.1–50.3)
HDLC SERPL-MCNC: 44 MG/DL (ref 40–60)
HDLC SERPL: 3.2 {RATIO}
HGB BLD-MCNC: 14.3 G/DL (ref 13.6–17.2)
IMM GRANULOCYTES # BLD: 0 K/UL (ref 0–0.5)
IMM GRANULOCYTES NFR BLD AUTO: 0 % (ref 0–5)
LDLC SERPL CALC-MCNC: 69.4 MG/DL
LIPID PROFILE,FLP: ABNORMAL
LYMPHOCYTES # BLD: 8.6 K/UL (ref 0.5–4.6)
LYMPHOCYTES NFR BLD: 60 % (ref 13–44)
MCH RBC QN AUTO: 30.6 PG (ref 26.1–32.9)
MCHC RBC AUTO-ENTMCNC: 33.4 G/DL (ref 31.4–35)
MCV RBC AUTO: 91.6 FL (ref 79.6–97.8)
MONOCYTES # BLD: 0.8 K/UL (ref 0.1–1.3)
MONOCYTES NFR BLD: 6 % (ref 4–12)
NEUTS SEG # BLD: 4.7 K/UL (ref 1.7–8.2)
NEUTS SEG NFR BLD: 33 % (ref 43–78)
P-R INTERVAL, ECG05: 160 MS
PLATELET # BLD AUTO: 211 K/UL (ref 150–450)
PMV BLD AUTO: 10.4 FL (ref 10.8–14.1)
POTASSIUM SERPL-SCNC: 3.4 MMOL/L (ref 3.5–5.1)
Q-T INTERVAL, ECG07: 450 MS
QRS DURATION, ECG06: 74 MS
QTC CALCULATION (BEZET), ECG08: 495 MS
RBC # BLD AUTO: 4.67 M/UL (ref 4.23–5.67)
SODIUM SERPL-SCNC: 145 MMOL/L (ref 136–145)
TRIGL SERPL-MCNC: 128 MG/DL (ref 35–150)
VENTRICULAR RATE, ECG03: 73 BPM
VLDLC SERPL CALC-MCNC: 25.6 MG/DL (ref 6–23)
WBC # BLD AUTO: 14.3 K/UL (ref 4.3–11.1)

## 2017-12-21 PROCEDURE — 74011250637 HC RX REV CODE- 250/637: Performed by: PHYSICIAN ASSISTANT

## 2017-12-21 PROCEDURE — 74011250637 HC RX REV CODE- 250/637: Performed by: INTERNAL MEDICINE

## 2017-12-21 PROCEDURE — 85025 COMPLETE CBC W/AUTO DIFF WBC: CPT | Performed by: INTERNAL MEDICINE

## 2017-12-21 PROCEDURE — 74011250636 HC RX REV CODE- 250/636: Performed by: INTERNAL MEDICINE

## 2017-12-21 PROCEDURE — 93005 ELECTROCARDIOGRAM TRACING: CPT | Performed by: INTERNAL MEDICINE

## 2017-12-21 PROCEDURE — 36415 COLL VENOUS BLD VENIPUNCTURE: CPT | Performed by: INTERNAL MEDICINE

## 2017-12-21 PROCEDURE — 80048 BASIC METABOLIC PNL TOTAL CA: CPT | Performed by: INTERNAL MEDICINE

## 2017-12-21 PROCEDURE — 80061 LIPID PANEL: CPT | Performed by: INTERNAL MEDICINE

## 2017-12-21 PROCEDURE — 90471 IMMUNIZATION ADMIN: CPT

## 2017-12-21 PROCEDURE — 90686 IIV4 VACC NO PRSV 0.5 ML IM: CPT | Performed by: INTERNAL MEDICINE

## 2017-12-21 RX ORDER — GUAIFENESIN 100 MG/5ML
81 LIQUID (ML) ORAL DAILY
Status: SHIPPED | COMMUNITY
Start: 2017-12-21

## 2017-12-21 RX ORDER — SERTRALINE HYDROCHLORIDE 100 MG/1
100 TABLET, FILM COATED ORAL DAILY
Status: DISCONTINUED | OUTPATIENT
Start: 2017-12-21 | End: 2017-12-21 | Stop reason: SDUPTHER

## 2017-12-21 RX ORDER — LISINOPRIL 40 MG/1
40 TABLET ORAL DAILY
Status: DISCONTINUED | OUTPATIENT
Start: 2017-12-21 | End: 2017-12-21 | Stop reason: HOSPADM

## 2017-12-21 RX ORDER — SERTRALINE HYDROCHLORIDE 100 MG/1
100 TABLET, FILM COATED ORAL DAILY
Status: DISCONTINUED | OUTPATIENT
Start: 2017-12-21 | End: 2017-12-21 | Stop reason: HOSPADM

## 2017-12-21 RX ORDER — LISINOPRIL 20 MG/1
40 TABLET ORAL DAILY
Status: DISCONTINUED | OUTPATIENT
Start: 2017-12-21 | End: 2017-12-21 | Stop reason: SDUPTHER

## 2017-12-21 RX ORDER — DICYCLOMINE HYDROCHLORIDE 10 MG/1
10 CAPSULE ORAL 2 TIMES DAILY
Status: DISCONTINUED | OUTPATIENT
Start: 2017-12-21 | End: 2017-12-21 | Stop reason: HOSPADM

## 2017-12-21 RX ORDER — POTASSIUM CHLORIDE 20 MEQ/1
40 TABLET, EXTENDED RELEASE ORAL
Status: COMPLETED | OUTPATIENT
Start: 2017-12-21 | End: 2017-12-21

## 2017-12-21 RX ORDER — PANTOPRAZOLE SODIUM 40 MG/1
40 TABLET, DELAYED RELEASE ORAL
Status: DISCONTINUED | OUTPATIENT
Start: 2017-12-21 | End: 2017-12-21 | Stop reason: HOSPADM

## 2017-12-21 RX ADMIN — TICAGRELOR 90 MG: 90 TABLET ORAL at 05:52

## 2017-12-21 RX ADMIN — SOTALOL HYDROCHLORIDE 80 MG: 80 TABLET ORAL at 08:18

## 2017-12-21 RX ADMIN — POTASSIUM CHLORIDE 40 MEQ: 20 TABLET, EXTENDED RELEASE ORAL at 08:16

## 2017-12-21 RX ADMIN — LISINOPRIL 40 MG: 40 TABLET ORAL at 08:14

## 2017-12-21 RX ADMIN — Medication 10 ML: at 05:52

## 2017-12-21 RX ADMIN — DICYCLOMINE HYDROCHLORIDE 10 MG: 10 CAPSULE ORAL at 08:13

## 2017-12-21 RX ADMIN — PANTOPRAZOLE SODIUM 40 MG: 40 TABLET, DELAYED RELEASE ORAL at 08:16

## 2017-12-21 RX ADMIN — ASPIRIN 81 MG 81 MG: 81 TABLET ORAL at 08:13

## 2017-12-21 RX ADMIN — SERTRALINE HYDROCHLORIDE 100 MG: 100 TABLET, FILM COATED ORAL at 08:17

## 2017-12-21 RX ADMIN — INFLUENZA VIRUS VACCINE 0.5 ML: 15; 15; 15; 15 SUSPENSION INTRAMUSCULAR at 09:12

## 2017-12-21 NOTE — PROGRESS NOTES
Bedside and Verbal shift change report given to Isak Carranza RN (oncoming nurse) by self Gato soto). Report included the following information SBAR, Kardex, MAR and Recent Results. Right radial site assessed and WDL. 2mL of air taken out of band.

## 2017-12-21 NOTE — PROGRESS NOTES
Cardiac Rehab:  Spoke with patient regarding referral to cardiac rehab. Patient meets admission criteria based on PCI(date12/20/17). Discussed lifestyle modifications to promote cardiac wellness. Patient indicated that he does not want to participate in cardiac rehab program.  We will not contact patient after discharge. Patient states he is retired and is not interested in exercise. Cardiologist is Dr Alejandro Ibanez.

## 2017-12-21 NOTE — PROGRESS NOTES
Discharge instructions reviewed with patient and spouse. Prescriptions given for brilinta and med info sheets provided for all new medications. Opportunity for questions provided. Patient and spouse voiced understanding of all discharge instructions. Telemetry and IV removed by primary RN. All home medications returned to patient.

## 2017-12-21 NOTE — PROGRESS NOTES
Verbal bedside report received from Lauren Dick [facility]. Patient's situation, background, assessment and recommendations were provided. Kardex, Mar, and recent results also reviewed. Opportunity for questions provided. Assumed care of patient.

## 2017-12-21 NOTE — PROGRESS NOTES
Radial compression band removed at 2113 after slowly reducing air to zero as per hospital protocol. No bleeding or hematoma noted. 2 x 2 gauze with tegaderm placed over puncture site. The affected extremity is warm and dry to the touch. Patient instructed to call if any bleeding noted on gauze. Patient verbalized understanding the nursing instructions.

## 2017-12-21 NOTE — PROGRESS NOTES
Bedside and Verbal shift change report given to Emil Lake RN (oncoming nurse) by self Jere [facility] ). Report included the following information SBAR, Kardex, Procedure Summary, MAR, Recent Results and Cardiac Rhythm SR. Right radial site visualized with on-coming RN.

## 2017-12-21 NOTE — PROGRESS NOTES
Problem: Cath Lab Procedures: Post-Cath Day of Procedure (Initiate SCIP Measures for Post-Op Care)  Goal: *Procedure site is without bleeding and signs of infection six hours post sheath removal  Outcome: Resolved/Met Date Met: 12/20/17  Right radial/groin site is covered with gauze and tegaderm which is clean, dry, and intact and without signs or symptoms of bleeding or infection. Problem: Falls - Risk of  Goal: *Absence of Falls  Document Shalom Fall Risk and appropriate interventions in the flowsheet. Outcome: Progressing Towards Goal  Fall Risk Interventions:            Medication Interventions: Patient to call before getting OOB, Teach patient to arise slowly       Patient progressing towards goal with no falls on current admission. Patient without confusion, agitation, or sensory perception deficits. Patient has steady gait on ambulation. Personal belongings are within reach. Bed is in the low and locked position with side rails up x2. Yellow gripper socks to bilateral feet. Call light within reach and patient verbalizes understanding of use.

## 2017-12-21 NOTE — PROCEDURES
[address] [facility] CATH    Yann Mendosa  MR#: [id number]  : 1955  ACCOUNT #: [de-identified]   DATE OF SERVICE: 2017    CLINICAL INFORMATION:  The patient with progressively increasing chest pain, shortness of breath, moderate size, moderate severity inferolateral defect that is ischemic ST. PROCEDURE DETAILS:  After informed consent was obtained, a 6-Swazi sheath was placed in the right radial artery. Radial cocktail was given by their protocol. A 5 Dilip Rad Brunswick catheter, angled pigtail were used for diagnostic angiography. Angioplasty and stenting of the obtuse marginal branch was performed with a 6-Swazi XB 3.0 guide, a Prowater wire and Angiomax for anticoagulation. Tr band placed at the end of the procedure. The patient was loaded with Brilinta at the end of the procedure. Conscious sedation was given under my supervision by Alexis Dickens RN with a total of 5 mg Versed beginning at 14:15 and concluding at 14:52. Vital signs, saturation was stable throughout. FINDINGS:  Left main coronary artery in a normal anatomic position. Mild tapering in the distal end to 10% to 20%, bifurcates LAD and circumflex system. The LAD has a stent in the proximal segment. There is mild in-stent restenosis up to 10% to 20% within the stented segment. There is 30% narrowing distal to the stent. The apical LAD is free of significant disease. The diagonal branch is a small vessel with a 90% focal narrowing in the mid portion of the vessel. This is jailed by the LAD and not felt to be an interventional target. Circumflex is a large proximal obtuse marginal branch that courses to the apex. This vessel is diffusely diseased in the proximal segment up to 95%. The AV groove circumflex gives rise to a distal left PDA that is free of any high-grade narrowing. Right coronary artery is a codominant vessel.   There is a 30% proximal narrowing and a 30% to 40% more distal narrowing. There is a small PDA. The RV marginal branch is diffusely diseased to 90% and this is unchanged from previous catheterization films. Left ventriculogram reveals normal systolic function, ejection fraction 60%. Left ventricular end diastolic pressure was 14 mmHg. Opening aortic pressure 128/71 with no gradient across the aortic valve. After Angiomax was given, a Prowater wire was placed in the distal obtuse marginal branch. The lesion was dilated with a 2.5 x 12 NC Trek stented with a 2.5 x 32 Synergy drug-eluting stent. The stent was then postdilated with 2.5 noncompliant balloon with inflations to 22 atmospheres. There is 0% residual MATT 3 flow. CONCLUSION:    1. Successful angioplasty and stenting of the first obtuse marginal branch. 2.  Moderate in-stent restenosis in the left anterior descending. 3.  Small vessel disease in the right coronary artery system. 4.  Preserved left ventricular systolic function.       Keke Hernadez MD       [facility] / Charli Rodney  D: 12/20/2017 16:51     T: 12/21/2017 07:42  JOB #: [id number]

## 2017-12-21 NOTE — DISCHARGE SUMMARY
[facility] Cardiology Discharge Summary     Patient ID:  Zaina Dick  [id number]  25 y.o.  1955    Admit date: 12/20/2017    Discharge date:  12/21/2017    Admitting Physician: Delphine Alba MD     Discharge Physician: TONE Soliman/Dr. Jeanie Rogers    Admission Diagnoses: Abnormal nuclear stress test [R94.39]    Discharge Diagnoses:    Diagnosis    Chronic midline low back pain without sciatica    Chronic obstructive pulmonary disease (HCC)    Duodenal ulcer    Recurrent major depressive disorder, in full remission ([facility] [address]    Dizziness    Tobacco use disorder    Palpitations    Coronary atherosclerosis of native coronary artery    Skin lesion    Atrial fibrillation ([facility] [address]    S/P coronary artery stent placement (2.75 x 28 Xience drug-eluting stent to mLAD 11/20/09)    HTN (hypertension)    GERD (gastroesophageal reflux disease)    CAD (coronary artery disease)    Dyslipidemia       Cardiology Procedures this admission:  Left heart catheterization with PCI  Consults: None    Hospital Course: Patient was seen at the office of [facility] Cardiology by Dr. Miya Bruno for complaints of CP w LEWIS and nuke showing inferior ischemia was subsequently scheduled for a LHC at [facility] on 12/20/17. Patient underwent cardiac catheterization by Dr. Miya Bruno. Patient was found to have a significant stenosis of the OM that was stented with a 2.5x 32 mm Synergy MAGEN with 0% residual stenosis. (Final cath dictation pending). Patient tolerated the procedure well and was taken to the telemetry floor for recovery. The following morning patient was up feeling well without any complaints of chest pain or shortness of breath. Patient's right radial cath site was clean, dry and intact without hematoma or bruit. Patient's labs were WNL (K 3.4 replaced prior to discharge). Patient was seen and examined by Dr. Jeanie Rogers and determined stable and ready for discharge.  Patient was instructed on the importance of medication compliance including taking aspirin and Brilinta everyday without missing a dose. After receiving drug eluting stents, the patient will remain on dual anti-platelet therapy for 1 year. Indication for treatment and risk of dual antiplatelet therapy was discussed with the patient and he understands to seek medical care immediately if any signs of bleeding. For maximized medical therapy for CAD, patient will continue BB, ACE-I but no STATIN due to intolerance. The patient will follow up with [facility] Dr. Dr Dave Estevez 22 at 8:00[facility] office  and has been referred to cardiac rehab. Discussed with the patient importance of diet and exercise to prevent further cardiovascular disease. He had some SOB w brilinta but agreed to try it for one week- he understands if he continues to have SOB that he needs to call us to switch to plavix and that he is not to stop brilinta without notifying us and switching to another antiplatelet. DISPOSITION: The patient is being discharged home in stable condition on a low saturated fat, low cholesterol and low salt diet. The patient is instructed to advance activities as tolerated to the limit of fatigue or shortness of breath. The patient is instructed to avoid all heavy lifting for 5 days. The patient is instructed to watch the cath site for bleeding/oozing; if seen, the patient is instructed to apply firm pressure with a clean cloth and call [facility] at [phone]. The patient is instructed to watch for signs of infection which include: increasing area of redness, fever/hot to touch or purulent drainage at the catheterization site. The patient is instructed not to soak in a bathtub for 7-10 days, but is cleared to shower. The patient is instructed to call the office or return to the ER for immediate evaluation for any shortness of breath or chest pain not relieved by NTG.         Discharge Exam:   Visit Vitals    /78 (BP 1 Location: Right arm, BP Patient Position: Supine)    Pulse 73    Temp 97.4 °F (36.3 °C)    Resp 19    Ht 6' 1\" (1.854 m)    Wt 80.3 kg (177 lb)    SpO2 96%    BMI 23.35 kg/m2     Patient has been seen by Dr. Salinas Number: see his progress note for exam details.     Recent Results (from the past 24 hour(s))   EKG, 12 LEAD, INITIAL    Collection Time: 12/20/17  3:21 PM   Result Value Ref Range    Ventricular Rate 65 BPM    Atrial Rate 65 BPM    P-R Interval 164 ms    QRS Duration 72 ms    Q-T Interval 466 ms    QTC Calculation (Bezet) 484 ms    Calculated P Axis 77 degrees    Calculated R Axis 52 degrees    Calculated T Axis 72 degrees    Diagnosis       Normal sinus rhythm  Prolonged QT  Abnormal ECG  When compared with ECG of 17-MAR-2016 10:24,  No significant change was found  Confirmed by ELE COOPER (), Erica Santoyo ([id number]) on 12/20/2017 0:82:00 PM     METABOLIC PANEL, BASIC    Collection Time: 12/21/17  4:22 AM   Result Value Ref Range    Sodium 145 136 - 145 mmol/L    Potassium 3.4 (L) 3.5 - 5.1 mmol/L    Chloride 109 (H) 98 - 107 mmol/L    CO2 26 21 - 32 mmol/L    Anion gap 10 7 - 16 mmol/L    Glucose 92 65 - 100 mg/dL    BUN 12 8 - 23 MG/DL    Creatinine 0.75 (L) 0.8 - 1.5 MG/DL    GFR est AA >60 >60 ml/min/1.73m2    GFR est non-AA >60 >60 ml/min/1.73m2    Calcium 9.1 8.3 - 10.4 MG/DL   LIPID PANEL    Collection Time: 12/21/17  4:22 AM   Result Value Ref Range    LIPID PROFILE          Cholesterol, total 139 <200 MG/DL    Triglyceride 128 35 - 150 MG/DL    HDL Cholesterol 44 40 - 60 MG/DL    LDL, calculated 69.4 <100 MG/DL    VLDL, calculated 25.6 (H) 6.0 - 23.0 MG/DL    CHOL/HDL Ratio 3.2     CBC WITH AUTOMATED DIFF    Collection Time: 12/21/17  4:22 AM   Result Value Ref Range    WBC 14.3 (H) 4.3 - 11.1 K/uL    RBC 4.67 4.23 - 5.67 M/uL    HGB 14.3 13.6 - 17.2 g/dL    HCT 42.8 41.1 - 50.3 %    MCV 91.6 79.6 - 97.8 FL    MCH 30.6 26.1 - 32.9 PG    MCHC 33.4 31.4 - 35.0 g/dL    RDW 13.2 11.9 - 14.6 %    PLATELET 929 336 - 806 K/uL    MPV 10.4 (L) 10.8 - 14.1 FL    DF AUTOMATED      NEUTROPHILS 33 (L) 43 - 78 %    LYMPHOCYTES 60 (H) 13 - 44 %    MONOCYTES 6 4.0 - 12.0 %    EOSINOPHILS 1 0.5 - 7.8 %    BASOPHILS 0 0.0 - 2.0 %    IMMATURE GRANULOCYTES 0 0.0 - 5.0 %    ABS. NEUTROPHILS 4.7 1.7 - 8.2 K/UL    ABS. LYMPHOCYTES 8.6 (H) 0.5 - 4.6 K/UL    ABS. MONOCYTES 0.8 0.1 - 1.3 K/UL    ABS. EOSINOPHILS 0.1 0.0 - 0.8 K/UL    ABS. BASOPHILS 0.0 0.0 - 0.2 K/UL    ABS. IMM. GRANS. 0.0 0.0 - 0.5 K/UL   EKG, 12 LEAD, INITIAL    Collection Time: 12/21/17  6:34 AM   Result Value Ref Range    Ventricular Rate 73 BPM    Atrial Rate 73 BPM    P-R Interval 160 ms    QRS Duration 74 ms    Q-T Interval 450 ms    QTC Calculation (Bezet) 495 ms    Calculated P Axis 77 degrees    Calculated R Axis 70 degrees    Calculated T Axis 63 degrees    Diagnosis       Normal sinus rhythm  Septal infarct , age undetermined  Abnormal ECG  When compared with ECG of 20-DEC-2017 15:21,  Septal infarct is now Present  Confirmed by Oksana Sifuentes MD (), KEYON AREVALO ([id number]) on 12/21/2017 6:52:11 AM           Patient Instructions:   Current Discharge Medication List      START taking these medications    Details   aspirin 81 mg chewable tablet Take 1 Tab by mouth daily. ticagrelor (BRILINTA) 90 mg tablet Take 1 Tab by mouth every twelve (12) hours every twelve (12) hours. Qty: 60 Tab, Refills: 11         CONTINUE these medications which have NOT CHANGED    Details   dicyclomine (BENTYL) 10 mg capsule Take 10 mg by mouth two (2) times a day. sotalol (BETAPACE) 80 mg tablet TAKE 1 TABLET TWICE DAILY. Qty: 90 Tab, Refills: 3      colestipol (COLESTID) 1 gram tablet Take 1 g by mouth daily. lisinopril (PRINIVIL, ZESTRIL) 40 mg tablet Take 1 Tab by mouth daily.   Qty: 90 Tab, Refills: 3    Associated Diagnoses: Essential hypertension      sertraline (ZOLOFT) 100 mg tablet TAKE 1 TABLET EVERY DAY  Qty: 90 Tab, Refills: 3    Associated Diagnoses: Recurrent major depressive disorder, in full remission (HCC)      pantoprazole (PROTONIX) 40 mg tablet Take 1 Tab by mouth daily. Qty: 90 Tab, Refills: 3    Associated Diagnoses: Gastroesophageal reflux disease with esophagitis      cholecalciferol (VITAMIN D3) 1,000 unit tablet Take 1,000 Units by mouth daily. multivitamin (ONE A DAY) tablet Take 1 Tab by mouth daily. NIACIN PO Take 1,000 mg by mouth daily. albuterol (PROVENTIL HFA, VENTOLIN HFA, PROAIR HFA) 90 mcg/actuation inhaler Take 2 Puffs by inhalation every four (4) hours as needed. cetirizine (ZYRTEC) 10 mg tablet Take 1 Tab by mouth nightly. Qty: 30 Tab, Refills: 2    Associated Diagnoses: Seasonal allergic rhinitis, unspecified allergic rhinitis trigger      nitroglycerin (NITROSTAT) 0.4 mg SL tablet 1 Tab by SubLINGual route every five (5) minutes as needed for Chest Pain.   Qty: 25 Tab, Refills: 11         STOP taking these medications       aspirin (ASPIRIN) 325 mg tablet Comments:   Reason for Stopping:                 Signed:  Rashmi Roman PA-C  12/21/2017  7:17 AM

## 2017-12-21 NOTE — PROGRESS NOTES
Bedside and Verbal shift change report given to self (oncoming nurse) by Hettie Kehr, RN (offgoing nurse). Report included the following information SBAR, Kardex, Procedure Summary, MAR, Recent Results and Cardiac Rhythm SR. Right radial site visualized and off-going RN removed 2ml air from TR band. No active bleeding and no hematoma.

## 2017-12-21 NOTE — DISCHARGE INSTRUCTIONS
Cardiac Catheterization/Angiography Discharge Instructions    *Check the puncture site frequently for swelling or bleeding. If you see any bleeding, lie down and apply pressure over the area with a clean town or washcloth. Notify your doctor for any redness, swelling, drainage or oozing from the puncture site. Notify your doctor for any fever or chills. *If the leg or arm with the puncture becomes cold, numb or painful, call [address] Cardiology at  [phone]. *Activity should be limited for the next 48 hours. Climb stairs as little as possible and avoid any stooping, bending or strenuous activity for 48 hours. No heavy lifting (anything over 10 pounds) for three days. *Do not drive for 48 hours. *You may resume your usual diet. Drink more fluids than usual.    *Have a responsible person drive you home and stay with you for at least 24 hours after your heart catheterization/angiography. *You may remove the bandage from your Right Arm in 24 hours. You may shower in 24 hours. No tub baths, hot tubs or swimming for one week. Do not place any lotions, creams, powders, ointments over the puncture site for one week. You may place a clean band-aid over the puncture site each day for 5 days. Change this daily. Percutaneous Coronary Intervention: What to Expect at [facility]    Percutaneous coronary intervention (PCI) is the name for procedures that are used to open a narrowed or blocked coronary artery. The two most common PCI procedures are coronary angioplasty and coronary stent placement. Your groin or arm may have a bruise and feel sore for a day or two after a percutaneous coronary intervention (PCI). You can do light activities around the house, but nothing strenuous for several days. This care sheet gives you a general idea about how long it will take for you to recover. But each person recovers at a different pace.  Follow the steps below to get better as quickly as possible. How can you care for yourself at home? Activity  ? · If the doctor gave you a sedative:  ¨ For 24 hours, don't do anything that requires attention to detail. It takes time for the medicine's effects to completely wear off. ¨ For your safety, do not drive or operate any machinery that could be dangerous. Wait until the medicine wears off and you can think clearly and react easily. ? · Do not do strenuous exercise and do not lift, pull, or push anything heavy until your doctor says it is okay. This may be for a day or two. You can walk around the house and do light activity, such as cooking. ? · If the catheter was placed in your groin, try not to walk up stairs for the first couple of days. ? · If the catheter was placed in your arm near your wrist, do not bend your wrist deeply for the first couple of days. Be careful using your hand to get into and out of a chair or bed. ? · Carry your stent identification card with you at all times. ? · If your doctor recommends it, get more exercise. Walking is a good choice. Bit by bit, increase the amount you walk every day. Try for at least 30 minutes on most days of the week. Diet  ? · Drink plenty of fluids to help your body flush out the dye. If you have kidney, heart, or liver disease and have to limit fluids, talk with your doctor before you increase the amount of fluids you drink. ? · Keep eating a heart-healthy diet that has lots of fruits, vegetables, and whole grains. If you have not been eating this way, talk to your doctor. You also may want to talk to a dietitian. This expert can help you to learn about healthy foods and plan meals. Medicines  ? · Your doctor will tell you if and when you can restart your medicines. He or she will also give you instructions about taking any new medicines. ? · If you take blood thinners, such as warfarin (Coumadin), clopidogrel (Plavix), or aspirin, be sure to talk to your doctor.  He or she will tell you if and when to start taking those medicines again. Make sure that you understand exactly what your doctor wants you to do.   ? · Your doctor will prescribe blood-thinning medicines. You will likely take aspirin plus another antiplatelet, such as clopidogrel (Plavix). It is very important that you take these medicines exactly as directed. These medicines help keep the coronary artery open and reduce your risk of a heart attack. ? · Call your doctor if you think you are having a problem with your medicine. ?Care of the catheter site  ? · For 1 or 2 days, keep a bandage over the spot where the catheter was inserted. The bandage probably will fall off in this time. ? · Put ice or a cold pack on the area for 10 to 20 minutes at a time to help with soreness or swelling. Put a thin cloth between the ice and your skin. ? · You may shower 24 to 48 hours after the procedure, if your doctor okays it. Pat the incision dry. ? · Do not soak the catheter site until it is healed. Don't take a bath for 1 week, or until your doctor tells you it isokay. Follow-up care is a key part of your treatment and safety. Be sure to make and go to all appointments, and call your doctor if you are having problems. It's also a good idea to know your test results and keep a list of the medicines you take. When should you call for help? Call 911 anytime you think you may need emergency care. For example, call if:  ? · You passed out (lost consciousness). ? · You have severe trouble breathing. ? · You have sudden chest pain and shortness of breath, or you cough up blood. ? · You have symptoms of a heart attack, such as:  ¨ Chest pain or pressure. ¨ Sweating. ¨ Shortness of breath. ¨ Nausea or vomiting. ¨ Pain that spreads from the chest to the neck, jaw, or one or both shoulders or arms. ¨ Dizziness or lightheadedness. ¨ A fast or uneven pulse. After calling 911, chew 1 adult-strength aspirin.  Wait for an ambulance. Do not try to drive yourself. ? · You have been diagnosed with angina, and you have angina symptoms that do not go away with rest or are not getting better within 5 minutes after you take one dose of nitroglycerin. ?Call your doctor now or seek immediate medical care if:  ? · You are bleeding from the area where the catheter was put in your artery. ? · You have a fast-growing, painful lump at the catheter site. ? · You have signs of infection, such as:  ¨ Increased pain, swelling, warmth, or redness. ¨ Red streaks leading from the catheter site. ¨ Pus draining from the catheter site. ¨ A fever. ? · Your leg or arm looks blue or feels cold, numb, or tingly. ? Watch closely for changes in your health, and be sure to contact your doctor if you have any problems. Where can you learn more? Go to http://mary kate-ramón.info/. Enter O482 in the search box to learn more about \"Percutaneous Coronary Intervention: What to Expect at Home. \"  Current as of: September 21, 2016  Content Version: 11.4  © 5564-3056 [employer]. Care instructions adapted under license by [employer] (which disclaims liability or warranty for this information). If you have questions about a medical condition or this instruction, always ask your healthcare professional. [place] [address] any warranty or liability for your use of this information. DISCHARGE SUMMARY from Nurse    PATIENT INSTRUCTIONS:    After general anesthesia or intravenous sedation, for 24 hours or while taking prescription Narcotics:  · Limit your activities  · Do not drive and operate hazardous machinery  · Do not make important personal or business decisions  · Do  not drink alcoholic beverages  · If you have not urinated within 8 hours after discharge, please contact your surgeon on call.     Report the following to your surgeon:  · Excessive pain, swelling, redness or odor of or around the surgical area  · Temperature over 100.5  · Nausea and vomiting lasting longer than 4 hours or if unable to take medications  · Any signs of decreased circulation or nerve impairment to extremity: change in color, persistent  numbness, tingling, coldness or increase pain  · Any questions    What to do at Home:  Recommended activity: No lifting, Driving, or Strenuous exercise for 3-5 days. *  Please give a list of your current medications to your Primary Care Provider. *  Please update this list whenever your medications are discontinued, doses are      changed, or new medications (including over-the-counter products) are added. *  Please carry medication information at all times in case of emergency situations. These are general instructions for a healthy lifestyle:    No smoking/ No tobacco products/ Avoid exposure to second hand smoke  Surgeon General's Warning:  Quitting smoking now greatly reduces serious risk to your health. Obesity, smoking, and sedentary lifestyle greatly increases your risk for illness    A healthy diet, regular physical exercise & weight monitoring are important for maintaining a healthy lifestyle    You may be retaining fluid if you have a history of heart failure or if you experience any of the following symptoms:  Weight gain of 3 pounds or more overnight or 5 pounds in a week, increased swelling in our hands or feet or shortness of breath while lying flat in bed. Please call your doctor as soon as you notice any of these symptoms; do not wait until your next office visit. Recognize signs and symptoms of STROKE:    F-face looks uneven    A-arms unable to move or move unevenly    S-speech slurred or non-existent    T-time-call 911 as soon as signs and symptoms begin-DO NOT go       Back to bed or wait to see if you get better-TIME IS BRAIN. Warning Signs of HEART ATTACK     Call 911 if you have these symptoms:   Chest discomfort.  Most heart attacks involve discomfort in the center of the chest that lasts more than a few minutes, or that goes away and comes back. It can feel like uncomfortable pressure, squeezing, fullness, or pain.  Discomfort in other areas of the upper body. Symptoms can include pain or discomfort in one or both arms, the back, neck, jaw, or stomach.  Shortness of breath with or without chest discomfort.  Other signs may include breaking out in a cold sweat, nausea, or lightheadedness. Don't wait more than five minutes to call 911 - MINUTES MATTER! Fast action can save your life. Calling 911 is almost always the fastest way to get lifesaving treatment. Emergency Medical Services staff can begin treatment when they arrive -- up to an hour sooner than if someone gets to the hospital by car. The discharge information has been reviewed with the patient. The patient verbalized understanding. Discharge medications reviewed with the patient and appropriate educational materials and side effects teaching were provided.   ___________________________________________________________________________________________________________________________________

## 2017-12-21 NOTE — PROGRESS NOTES
12/21/2017 6:24 AM    Admit Date: 12/20/2017    Admit Diagnosis: Abnormal nuclear stress test [R94.39]      Subjective:    Patient sp pci. Home today.  Will see if he tolerates brilinta    Objective:      Visit Vitals    /78 (BP 1 Location: Right arm, BP Patient Position: Supine)    Pulse 73    Temp 97.4 °F (36.3 °C)    Resp 19    Ht 6' 1\" (1.854 m)    Wt 80.3 kg (177 lb)    SpO2 96%    BMI 23.35 kg/m2       ROS:  General ROS: negative for - chills  Hematological and Lymphatic ROS: negative for - blood clots or jaundice  Respiratory ROS: no cough, shortness of breath, or wheezing  Cardiovascular ROS: no chest pain or dyspnea on exertion  Gastrointestinal ROS: no abdominal pain, change in bowel habits, or black or bloody stools  Neurological ROS: no TIA or stroke symptoms    Physical Exam:    Physical Examination: General appearance - alert, well appearing, and in no distress  Mental status - alert, oriented to person, place, and time  Eyes - pupils equal and reactive, extraocular eye movements intact  Neck/lymph - supple, no significant adenopathy  Chest/CV - clear to auscultation, no wheezes, rales or rhonchi, symmetric air entry  Heart - normal rate, regular rhythm, normal S1, S2, no murmurs, rubs, clicks or gallops  Abdomen/GI - soft, nontender, nondistended, no masses or organomegaly  Musculoskeletal - no joint tenderness, deformity or swelling  Extremities - peripheral pulses normal, no pedal edema, no clubbing or cyanosis  Skin - normal coloration and turgor, no rashes, no suspicious skin lesions noted    Current Facility-Administered Medications   Medication Dose Route Frequency    pantoprazole (PROTONIX) tablet 40 mg (Patient Supplied)  40 mg Oral ACB    dicyclomine (BENTYL) capsule 10 mg (Patient Supplied)  10 mg Oral BID    lisinopril (PRINIVIL, ZESTRIL) tablet 40 mg (Patient Supplied)  40 mg Oral DAILY    sertraline (ZOLOFT) tablet 100 mg (Patient Supplied)  100 mg Oral DAILY    0.9% sodium chloride infusion  75 mL/hr IntraVENous CONTINUOUS    bivalirudin (ANGIOMAX) 250 mg in 0.9% sodium chloride (MBP/ADV) 50 mL infusion  1.75 mg/kg/hr IntraVENous CONTINUOUS    0.9% sodium chloride infusion  75 mL/hr IntraVENous CONTINUOUS    sodium chloride (NS) flush 5-10 mL  5-10 mL IntraVENous Q8H    sodium chloride (NS) flush 5-10 mL  5-10 mL IntraVENous PRN    acetaminophen (TYLENOL) tablet 650 mg  650 mg Oral Q4H PRN    morphine 10 mg/mL injection 2 mg  2 mg IntraVENous Q4H PRN    nitroglycerin (NITROSTAT) tablet 0.4 mg  0.4 mg SubLINGual Q5MIN PRN    aspirin chewable tablet 81 mg  81 mg Oral DAILY    LORazepam (ATIVAN) tablet 1 mg  1 mg Oral Q6H PRN    ticagrelor (BRILINTA) tablet 90 mg  90 mg Oral Q12H    hydrALAZINE (APRESOLINE) 20 mg/mL injection 10 mg  10 mg IntraVENous Q6H PRN    influenza vaccine 2017-18 (3 yrs+)(PF) (FLUZONE QUAD/FLUARIX QUAD) injection 0.5 mL  0.5 mL IntraMUSCular PRIOR TO DISCHARGE    sotalol (BETAPACE) tablet 80 mg (Patient Supplied)  80 mg Oral Q12H       Data Review:   @LABRCNT(Na,K,BUN,CREA,WBC,HGB,HCT,PLT,INR,TRP,TCHOL*,Triglyceride*,LDL*,LDLCPOC HDL*,HDL])@    TELEMETRY: nsr    Assessment/Plan:     Principal Problem:    Coronary atherosclerosis of native coronary artery (12/30/2015) The current medical regimen is effective;  continue present plan and medications. Active Problems:    CAD (coronary artery disease) (12/1/2009)      Overview: pci to lad 11/19 2.75x28 Xience drug eluting stent. Dyslipidemia (12/1/2009)The current medical regimen is effective;  continue present plan and medications. HTN (hypertension) (7/22/2010)The current medical regimen is effective;  continue present plan and medications.         Palpitations (12/30/2015)          Wilbert Gilmore MD

## 2018-02-06 PROBLEM — D72.829 LEUKOCYTOSIS: Status: ACTIVE | Noted: 2018-02-06

## 2018-07-24 PROBLEM — C91.10 CLL (CHRONIC LYMPHOCYTIC LEUKEMIA) (HCC): Status: ACTIVE | Noted: 2018-07-24

## 2018-08-08 ENCOUNTER — HOSPITAL ENCOUNTER (OUTPATIENT)
Dept: LAB | Age: 63
Discharge: HOME | End: 2018-08-08
Payer: MEDICARE

## 2018-08-08 DIAGNOSIS — I48.0 PAROXYSMAL ATRIAL FIBRILLATION (HCC): ICD-10-CM

## 2018-08-08 LAB
ANION GAP SERPL CALC-SCNC: 7 MMOL/L
BASOPHILS # BLD: 0 K/UL
BASOPHILS NFR BLD: 0 % (ref 0–2)
BUN SERPL-MCNC: 10 MG/DL (ref 8–23)
CALCIUM SERPL-MCNC: 8.9 MG/DL (ref 8.3–10.4)
CHLORIDE SERPL-SCNC: 107 MMOL/L (ref 98–107)
CO2 SERPL-SCNC: 29 MMOL/L (ref 21–32)
CREAT SERPL-MCNC: 1 MG/DL (ref 0.8–1.5)
DIFFERENTIAL METHOD BLD: ABNORMAL
EOSINOPHIL # BLD: 0.2 K/UL
EOSINOPHIL NFR BLD: 1 % (ref 0.5–7.8)
ERYTHROCYTE [DISTWIDTH] IN BLOOD BY AUTOMATED COUNT: 13.1 %
GLUCOSE SERPL-MCNC: 97 MG/DL (ref 65–100)
HCT VFR BLD AUTO: 42.9 % (ref 41.1–50.3)
HGB BLD-MCNC: 14.6 G/DL (ref 13.6–17.2)
IMM GRANULOCYTES # BLD: 0 K/UL
IMM GRANULOCYTES NFR BLD AUTO: 0 % (ref 0–5)
LYMPHOCYTES # BLD: 9.5 K/UL
LYMPHOCYTES NFR BLD: 68 % (ref 13–44)
MAGNESIUM SERPL-MCNC: 2.1 MG/DL (ref 1.8–2.4)
MCH RBC QN AUTO: 30.2 PG (ref 26.1–32.9)
MCHC RBC AUTO-ENTMCNC: 34 G/DL (ref 31.4–35)
MCV RBC AUTO: 88.8 FL (ref 79.6–97.8)
MONOCYTES # BLD: 0.7 K/UL
MONOCYTES NFR BLD: 5 % (ref 4–12)
NEUTS SEG # BLD: 3.6 K/UL
NEUTS SEG NFR BLD: 25 % (ref 43–78)
NRBC # BLD: 0 K/UL (ref 0–0.2)
PLATELET # BLD AUTO: 237 K/UL (ref 150–450)
PMV BLD AUTO: 9.7 FL (ref 9.4–12.3)
POTASSIUM SERPL-SCNC: 3.6 MMOL/L (ref 3.5–5.1)
RBC # BLD AUTO: 4.83 M/UL (ref 4.23–5.6)
SODIUM SERPL-SCNC: 143 MMOL/L (ref 136–145)
WBC # BLD AUTO: 14 K/UL (ref 4.3–11.1)

## 2018-08-08 PROCEDURE — 83735 ASSAY OF MAGNESIUM: CPT

## 2018-08-08 PROCEDURE — 80048 BASIC METABOLIC PNL TOTAL CA: CPT

## 2018-08-08 PROCEDURE — 36415 COLL VENOUS BLD VENIPUNCTURE: CPT

## 2018-08-08 PROCEDURE — 85025 COMPLETE CBC W/AUTO DIFF WBC: CPT

## 2018-08-10 NOTE — PROGRESS NOTES
Patient pre-assessment complete for KEN with Dr Marquise Thakur scheduled for 18 at 10am, arrival time 8am. Patient verified using . Patient instructed to bring all home medications in labeled bottles on the day of procedure. NPO status reinforced. Patient instructed to HOLD lisinopril on Monday am. Instructed they can take all other medications excluding vitamins & supplements. Patient verbalizes understanding of all instructions & denies any questions at this time.

## 2018-08-13 ENCOUNTER — HOSPITAL ENCOUNTER (OUTPATIENT)
Age: 63
Setting detail: OUTPATIENT SURGERY
Discharge: HOME | End: 2018-08-13
Attending: INTERNAL MEDICINE | Admitting: INTERNAL MEDICINE
Payer: MEDICARE

## 2018-08-13 ENCOUNTER — HOSPITAL ENCOUNTER (OUTPATIENT)
Dept: CARDIAC CATH/INVASIVE PROCEDURES | Age: 63
Discharge: HOME | End: 2018-08-13
Payer: MEDICARE

## 2018-08-13 VITALS
WEIGHT: 182 LBS | SYSTOLIC BLOOD PRESSURE: 174 MMHG | DIASTOLIC BLOOD PRESSURE: 106 MMHG | HEIGHT: 73 IN | OXYGEN SATURATION: 95 % | HEART RATE: 57 BPM | BODY MASS INDEX: 24.12 KG/M2 | RESPIRATION RATE: 13 BRPM

## 2018-08-13 PROCEDURE — 74011000250 HC RX REV CODE- 250: Performed by: INTERNAL MEDICINE

## 2018-08-13 PROCEDURE — 99152 MOD SED SAME PHYS/QHP 5/>YRS: CPT

## 2018-08-13 PROCEDURE — 74011250636 HC RX REV CODE- 250/636

## 2018-08-13 PROCEDURE — 93312 ECHO TRANSESOPHAGEAL: CPT

## 2018-08-13 RX ORDER — OXYCODONE HYDROCHLORIDE 5 MG/1
5 TABLET ORAL
Status: CANCELLED | OUTPATIENT
Start: 2018-08-13 | End: 2018-08-14

## 2018-08-13 RX ORDER — LIDOCAINE HYDROCHLORIDE 20 MG/ML
15 SOLUTION OROPHARYNGEAL AS NEEDED
Status: DISCONTINUED | OUTPATIENT
Start: 2018-08-13 | End: 2018-08-13 | Stop reason: HOSPADM

## 2018-08-13 RX ORDER — SODIUM CHLORIDE 0.9 % (FLUSH) 0.9 %
5-10 SYRINGE (ML) INJECTION AS NEEDED
Status: CANCELLED | OUTPATIENT
Start: 2018-08-13

## 2018-08-13 RX ORDER — HYDROMORPHONE HYDROCHLORIDE 2 MG/ML
0.5 INJECTION, SOLUTION INTRAMUSCULAR; INTRAVENOUS; SUBCUTANEOUS
Status: CANCELLED | OUTPATIENT
Start: 2018-08-13

## 2018-08-13 RX ORDER — MIDAZOLAM HYDROCHLORIDE 1 MG/ML
2 INJECTION, SOLUTION INTRAMUSCULAR; INTRAVENOUS
Status: CANCELLED | OUTPATIENT
Start: 2018-08-13 | End: 2018-08-14

## 2018-08-13 RX ORDER — SODIUM CHLORIDE, SODIUM LACTATE, POTASSIUM CHLORIDE, CALCIUM CHLORIDE 600; 310; 30; 20 MG/100ML; MG/100ML; MG/100ML; MG/100ML
75 INJECTION, SOLUTION INTRAVENOUS CONTINUOUS
Status: CANCELLED | OUTPATIENT
Start: 2018-08-13 | End: 2018-08-14

## 2018-08-13 RX ORDER — OXYCODONE AND ACETAMINOPHEN 10; 325 MG/1; MG/1
1 TABLET ORAL AS NEEDED
Status: CANCELLED | OUTPATIENT
Start: 2018-08-13

## 2018-08-13 RX ORDER — MIDAZOLAM HYDROCHLORIDE 1 MG/ML
.5-2 INJECTION, SOLUTION INTRAMUSCULAR; INTRAVENOUS
Status: DISCONTINUED | OUTPATIENT
Start: 2018-08-13 | End: 2018-08-13 | Stop reason: HOSPADM

## 2018-08-13 RX ADMIN — MIDAZOLAM HYDROCHLORIDE 2 MG: 1 INJECTION, SOLUTION INTRAMUSCULAR; INTRAVENOUS at 11:15

## 2018-08-13 RX ADMIN — MIDAZOLAM HYDROCHLORIDE 2 MG: 1 INJECTION, SOLUTION INTRAMUSCULAR; INTRAVENOUS at 11:20

## 2018-08-13 RX ADMIN — LIDOCAINE HYDROCHLORIDE 15 ML: 20 SOLUTION ORAL; TOPICAL at 11:00

## 2018-08-13 NOTE — PROGRESS NOTES
TRANSFER - OUT REPORT:    Verbal report given to Jordy Burrows RN(name) on Mariam Saint  being transferred to CPRU(unit) for routine progression of care       Report consisted of patients Situation, Background, Assessment and   Recommendations(SBAR). Information from the following report(s) SBAR and Procedure Summary was reviewed with the receiving nurse. Lines:   Peripheral IV 08/13/18 Right Hand (Active)        Opportunity for questions and clarification was provided. Pt underwent KEN with Dr Venice Keyes. Pt received a total of 4 mg of Versed, tolerated well. Pt is alert to voice and denies any complaints. VSS.

## 2018-08-13 NOTE — PROGRESS NOTES
Patient received to [address] [place] [address] room # 10  Ambulatory from [facility]. Patient scheduled for KEN today with Dr Luci Kennedy. Procedure reviewed & questions answered, voiced good understanding consent obtained & placed on chart. All medications and medical history reviewed. Will prep patient per orders. Patient & family updated on plan of care. The patient has a fraility score of 3-MANAGING WELL, based on patient A&Ox3, patient able to ambulate to room without difficulty, patient able to perform most ADL's independently.

## 2018-08-13 NOTE — IP AVS SNAPSHOT
[address] 
[phone] Patient: Unique Bardales MRN: [id number] [facility]:[birth year]/90/[birth year]01 Discharge Summary 8/13/2018 Unique Bardales MRN[de-identified]  [id number] Admission Information Provider Pager Service Admission Date Expected D/C Date Ana Santiago MD  SURGERY 8/13/2018 8/13/2018 Actual LOS Patient Class 0 days OUTPATIENT SURGERY Follow-up Information Follow up With Details Comments Contact Info Ana Santiago MD  The office will call you with an appointment Joe  [address] [place] [address] 
[phone] My Medications ASK your physician about these medications Instructions Each Dose to Equal  
 Morning Noon Evening Bedtime  
 acetaminophen-codeine 300-30 mg per tablet Commonly known as:  TYLENOL #3 Your last dose was: Your next dose is: Take 1 Tab by mouth every four (4) hours as needed for Pain. Max Daily Amount: 6 Tabs. Indications: Pain 1 Tab * albuterol 90 mcg/actuation inhaler Commonly known as:  PROVENTIL HFA, VENTOLIN HFA, PROAIR HFA Your last dose was: Your next dose is: Take 2 Puffs by inhalation every four (4) hours as needed. 2 Puff * albuterol 2.5 mg /3 mL (0.083 %) nebulizer solution Commonly known as:  PROVENTIL VENTOLIN Your last dose was: Your next dose is:    
   
   
 3 mL by Nebulization route every four (4) hours as needed for Wheezing. 2.5 mg  
    
   
   
   
  
 ANORO ELLIPTA 62.5-25 mcg/actuation inhaler Generic drug:  umeclidinium-vilanterol Your last dose was: Your next dose is:    
   
   
 INHALE 1 PUFF DAILY  
     
   
   
   
  
 aspirin 81 mg chewable tablet Your last dose was: Your next dose is: Take 1 Tab by mouth daily.   
 81 mg  
    
   
 cetirizine 10 mg tablet Commonly known as:  ZYRTEC Your last dose was: Your next dose is: Take 1 Tab by mouth nightly. 10 mg  
    
   
   
   
  
 clopidogrel 75 mg Tab Commonly known as:  PLAVIX Your last dose was: Your next dose is: Take 1 Tab by mouth daily. 75 mg COLESTID 1 gram tablet Generic drug:  colestipol Your last dose was: Your next dose is: Take 1 g by mouth daily. 1 g  
    
   
   
   
  
 lisinopril 40 mg tablet Commonly known as:  Rafat Shutters Your last dose was: Your next dose is: Take 1 Tab by mouth daily. 40 mg  
    
   
   
   
  
 meclizine 25 mg tablet Commonly known as:  ANTIVERT Your last dose was: Your next dose is: Take 1 Tab by mouth three (3) times daily as needed. Indications: Vertigo 25 mg  
    
   
   
   
  
 multivitamin tablet Commonly known as:  ONE A DAY Your last dose was: Your next dose is: Take 1 Tab by mouth daily. 1 Tab  
    
   
   
   
  
 nitroglycerin 0.4 mg SL tablet Commonly known as:  NITROSTAT Your last dose was: Your next dose is:    
   
   
 1 Tab by SubLINGual route every five (5) minutes as needed for Chest Pain. 0.4 mg  
    
   
   
   
  
 pantoprazole 40 mg tablet Commonly known as:  PROTONIX Your last dose was: Your next dose is: Take 1 Tab by mouth daily. 40 mg  
    
   
   
   
  
 sertraline 100 mg tablet Commonly known as:  ZOLOFT Your last dose was: Your next dose is: TAKE 1 TABLET EVERY DAY  
     
   
   
   
  
 sotalol 80 mg tablet Commonly known as:  Adrienne Tucker Your last dose was: Your next dose is: TAKE 1 TABLET TWICE DAILY. * Notice: This list has 2 medication(s) that are the same as other medications prescribed for you. Read the directions carefully, and ask your doctor or other care provider to review them with you. General Information Please provide this summary of care documentation to your next provider. Allergies High:  Fentanyl; Lovastatin;  Niacin Unspecified:  Advicor [Niacin-lovastatin] Current Immunizations  Never Reviewed Name Date Influenza Vaccine (Quad) PF 12/21/2017 Influenza Vaccine PF 2/15/2017 Pneumococcal Polysaccharide (PPSV-23) 7/1/2010 Td 1/1/2003 ZZZ-RETIRED (DO NOT USE) Pneumococcal Vaccine (Unspecified Type) 7/23/2010 Discharge Instructions Discharge Instructions AFTER YOUR TRANSESOPHAGEAL ECHOCARDIOGRAM 
 
Be sure someone else drives you home. You may feel drowsy for several hours. Do not eat or drink for at least two hours after your procedure. Your throat will be numb and there is a risk you might have difficulty swallowing for a while. Be careful when you do eat or drink for the first time especially with hot fluids since you could easily burn your throat. Call your doctor if: 
 
· You are bleeding from your throat or mouth. · You have trouble breathing all of a sudden. · You have chest pain or any pain that spreads to your neck, jaw, or arms. · You have questions or concerns. · You have a fever greater than 101°F. 
 
Doctor: Luci Kennedy [phone] Special Instructions: 
 
No driving for 24 hours. Discharge Orders None  
  
` Patient Signature:  ____________________________________________________________ Date:  ____________________________________________________________  
  
 Madina Molina Provider Signature:  ____________________________________________________________ Date:  ____________________________________________________________

## 2018-08-13 NOTE — DISCHARGE INSTRUCTIONS
AFTER YOUR TRANSESOPHAGEAL ECHOCARDIOGRAM    Be sure someone else drives you home. You may feel drowsy for several hours. Do not eat or drink for at least two hours after your procedure. Your throat will be numb and there is a risk you might have difficulty swallowing for a while. Be careful when you do eat or drink for the first time especially with hot fluids since you could easily burn your throat. Call your doctor if:    · You are bleeding from your throat or mouth. · You have trouble breathing all of a sudden. · You have chest pain or any pain that spreads to your neck, jaw, or arms. · You have questions or concerns. · You have a fever greater than 101°F.    Doctor: France Carrizales [phone]    Special Instructions:    No driving for 24 hours.

## 2018-09-05 ENCOUNTER — HOSPITAL ENCOUNTER (INPATIENT)
Age: 63
LOS: 6 days | Discharge: HOME | DRG: 871 | End: 2018-09-11
Attending: EMERGENCY MEDICINE | Admitting: INTERNAL MEDICINE
Payer: MEDICARE

## 2018-09-05 ENCOUNTER — APPOINTMENT (OUTPATIENT)
Dept: GENERAL RADIOLOGY | Age: 63
DRG: 871 | End: 2018-09-05
Attending: EMERGENCY MEDICINE
Payer: MEDICARE

## 2018-09-05 ENCOUNTER — APPOINTMENT (OUTPATIENT)
Dept: CT IMAGING | Age: 63
DRG: 871 | End: 2018-09-05
Attending: EMERGENCY MEDICINE
Payer: MEDICARE

## 2018-09-05 DIAGNOSIS — K52.9 ACUTE COLITIS: Primary | ICD-10-CM

## 2018-09-05 DIAGNOSIS — I48.0 PAROXYSMAL ATRIAL FIBRILLATION (HCC): Chronic | ICD-10-CM

## 2018-09-05 DIAGNOSIS — J69.0 ASPIRATION PNEUMONIA OF RIGHT LOWER LOBE, UNSPECIFIED ASPIRATION PNEUMONIA TYPE (HCC): ICD-10-CM

## 2018-09-05 DIAGNOSIS — J96.01 ACUTE RESPIRATORY FAILURE WITH HYPOXIA (HCC): ICD-10-CM

## 2018-09-05 DIAGNOSIS — R11.2 NON-INTRACTABLE VOMITING WITH NAUSEA, UNSPECIFIED VOMITING TYPE: ICD-10-CM

## 2018-09-05 DIAGNOSIS — C91.10 CLL (CHRONIC LYMPHOCYTIC LEUKEMIA) (HCC): Chronic | ICD-10-CM

## 2018-09-05 DIAGNOSIS — K20.90 ACUTE ESOPHAGITIS: ICD-10-CM

## 2018-09-05 DIAGNOSIS — G89.29 CHRONIC MIDLINE LOW BACK PAIN WITHOUT SCIATICA: ICD-10-CM

## 2018-09-05 DIAGNOSIS — M54.50 CHRONIC MIDLINE LOW BACK PAIN WITHOUT SCIATICA: ICD-10-CM

## 2018-09-05 DIAGNOSIS — D72.829 LEUKOCYTOSIS, UNSPECIFIED TYPE: ICD-10-CM

## 2018-09-05 PROBLEM — E87.6 HYPOKALEMIA: Status: ACTIVE | Noted: 2018-09-05

## 2018-09-05 PROBLEM — B37.0 THRUSH: Chronic | Status: ACTIVE | Noted: 2018-09-05

## 2018-09-05 PROBLEM — A41.9 SEPSIS (HCC): Status: ACTIVE | Noted: 2018-09-05

## 2018-09-05 LAB
ALBUMIN SERPL-MCNC: 4.3 G/DL (ref 3.2–4.6)
ALBUMIN/GLOB SERPL: 1.2 {RATIO} (ref 1.2–3.5)
ALP SERPL-CCNC: 99 U/L (ref 50–136)
ALT SERPL-CCNC: 20 U/L (ref 12–65)
ANION GAP SERPL CALC-SCNC: 11 MMOL/L (ref 7–16)
ARTERIAL PATENCY WRIST A: POSITIVE
AST SERPL-CCNC: 22 U/L (ref 15–37)
ATRIAL RATE: 112 BPM
BASE DEFICIT BLDA-SCNC: 1.8 MMOL/L (ref 0–2)
BASOPHILS # BLD: 0 K/UL (ref 0–0.2)
BASOPHILS NFR BLD: 0 % (ref 0–2)
BDY SITE: ABNORMAL
BILIRUB SERPL-MCNC: 0.7 MG/DL (ref 0.2–1.1)
BUN SERPL-MCNC: 10 MG/DL (ref 8–23)
CALCIUM SERPL-MCNC: 9.6 MG/DL (ref 8.3–10.4)
CALCULATED P AXIS, ECG09: 72 DEGREES
CALCULATED R AXIS, ECG10: 11 DEGREES
CALCULATED T AXIS, ECG11: 49 DEGREES
CHLORIDE SERPL-SCNC: 106 MMOL/L (ref 98–107)
CO2 SERPL-SCNC: 24 MMOL/L (ref 21–32)
COHGB MFR BLD: 1.1 % (ref 0.5–1.5)
CREAT SERPL-MCNC: 1.12 MG/DL (ref 0.8–1.5)
DIAGNOSIS, 93000: NORMAL
DIFFERENTIAL METHOD BLD: ABNORMAL
DO-HGB BLD-MCNC: 46 % (ref 0–5)
EOSINOPHIL # BLD: 0 K/UL (ref 0–0.8)
EOSINOPHIL NFR BLD: 0 % (ref 0.5–7.8)
ERYTHROCYTE [DISTWIDTH] IN BLOOD BY AUTOMATED COUNT: 12.8 %
FIO2 ON VENT: 21 %
GLOBULIN SER CALC-MCNC: 3.6 G/DL (ref 2.3–3.5)
GLUCOSE SERPL-MCNC: 146 MG/DL (ref 65–100)
HCO3 BLDA-SCNC: 24 MMOL/L (ref 22–26)
HCT VFR BLD AUTO: 49.4 % (ref 41.1–50.3)
HGB BLD-MCNC: 16.4 G/DL (ref 13.6–17.2)
HGB BLDMV-MCNC: 15.9 GM/DL (ref 11.7–15)
IMM GRANULOCYTES # BLD: 0 K/UL (ref 0–0.5)
IMM GRANULOCYTES NFR BLD AUTO: 0 % (ref 0–5)
LACTATE BLD-SCNC: 1.2 MMOL/L (ref 0.5–1.9)
LACTATE BLD-SCNC: 2.4 MMOL/L (ref 0.5–1.9)
LIPASE SERPL-CCNC: 91 U/L (ref 73–393)
LYMPHOCYTES # BLD: 9.2 K/UL (ref 0.5–4.6)
LYMPHOCYTES NFR BLD: 37 % (ref 13–44)
MCH RBC QN AUTO: 30.1 PG (ref 26.1–32.9)
MCHC RBC AUTO-ENTMCNC: 33.2 G/DL (ref 31.4–35)
MCV RBC AUTO: 90.6 FL (ref 79.6–97.8)
METHGB MFR BLD: 0.4 % (ref 0–1.5)
MONOCYTES # BLD: 1.7 K/UL (ref 0.1–1.3)
MONOCYTES NFR BLD: 7 % (ref 4–12)
NEUTS SEG # BLD: 13.9 K/UL (ref 1.7–8.2)
NEUTS SEG NFR BLD: 56 % (ref 43–78)
NRBC # BLD: 0 K/UL (ref 0–0.2)
OXYHGB MFR BLDA: 52.9 % (ref 94–97)
P-R INTERVAL, ECG05: 134 MS
PCO2 BLDA: 46 MMHG (ref 35–45)
PH BLDA: 7.34 [PH] (ref 7.35–7.45)
PLATELET # BLD AUTO: 288 K/UL (ref 150–450)
PLATELET COMMENTS,PCOM: ADEQUATE
PMV BLD AUTO: 10.2 FL (ref 9.4–12.3)
PO2 BLDA: <34 MMHG (ref 80–105)
POTASSIUM SERPL-SCNC: 3.3 MMOL/L (ref 3.5–5.1)
PROT SERPL-MCNC: 7.9 G/DL (ref 6.3–8.2)
Q-T INTERVAL, ECG07: 346 MS
QRS DURATION, ECG06: 74 MS
QTC CALCULATION (BEZET), ECG08: 472 MS
RBC # BLD AUTO: 5.45 M/UL (ref 4.23–5.6)
RBC MORPH BLD: ABNORMAL
SAO2 % BLD: 54 % (ref 92–98.5)
SERVICE CMNT-IMP: ABNORMAL
SODIUM SERPL-SCNC: 141 MMOL/L (ref 136–145)
VENTILATION MODE VENT: ABNORMAL
VENTRICULAR RATE, ECG03: 112 BPM
WBC # BLD AUTO: 24.8 K/UL (ref 4.3–11.1)
WBC MORPH BLD: ABNORMAL

## 2018-09-05 PROCEDURE — 74011000258 HC RX REV CODE- 258: Performed by: EMERGENCY MEDICINE

## 2018-09-05 PROCEDURE — 74011636320 HC RX REV CODE- 636/320: Performed by: EMERGENCY MEDICINE

## 2018-09-05 PROCEDURE — 81003 URINALYSIS AUTO W/O SCOPE: CPT | Performed by: EMERGENCY MEDICINE

## 2018-09-05 PROCEDURE — 65270000029 HC RM PRIVATE

## 2018-09-05 PROCEDURE — 83735 ASSAY OF MAGNESIUM: CPT

## 2018-09-05 PROCEDURE — 83690 ASSAY OF LIPASE: CPT

## 2018-09-05 PROCEDURE — 96376 TX/PRO/DX INJ SAME DRUG ADON: CPT | Performed by: EMERGENCY MEDICINE

## 2018-09-05 PROCEDURE — 96375 TX/PRO/DX INJ NEW DRUG ADDON: CPT | Performed by: EMERGENCY MEDICINE

## 2018-09-05 PROCEDURE — 99285 EMERGENCY DEPT VISIT HI MDM: CPT | Performed by: EMERGENCY MEDICINE

## 2018-09-05 PROCEDURE — 85025 COMPLETE CBC W/AUTO DIFF WBC: CPT

## 2018-09-05 PROCEDURE — C9113 INJ PANTOPRAZOLE SODIUM, VIA: HCPCS | Performed by: INTERNAL MEDICINE

## 2018-09-05 PROCEDURE — 74011250636 HC RX REV CODE- 250/636: Performed by: EMERGENCY MEDICINE

## 2018-09-05 PROCEDURE — 74011000250 HC RX REV CODE- 250: Performed by: INTERNAL MEDICINE

## 2018-09-05 PROCEDURE — 74177 CT ABD & PELVIS W/CONTRAST: CPT

## 2018-09-05 PROCEDURE — 96365 THER/PROPH/DIAG IV INF INIT: CPT | Performed by: EMERGENCY MEDICINE

## 2018-09-05 PROCEDURE — 83605 ASSAY OF LACTIC ACID: CPT

## 2018-09-05 PROCEDURE — 71046 X-RAY EXAM CHEST 2 VIEWS: CPT

## 2018-09-05 PROCEDURE — 80053 COMPREHEN METABOLIC PANEL: CPT

## 2018-09-05 PROCEDURE — 82803 BLOOD GASES ANY COMBINATION: CPT

## 2018-09-05 PROCEDURE — 74011250636 HC RX REV CODE- 250/636: Performed by: INTERNAL MEDICINE

## 2018-09-05 PROCEDURE — 36600 WITHDRAWAL OF ARTERIAL BLOOD: CPT

## 2018-09-05 PROCEDURE — 87040 BLOOD CULTURE FOR BACTERIA: CPT

## 2018-09-05 PROCEDURE — 93005 ELECTROCARDIOGRAM TRACING: CPT | Performed by: EMERGENCY MEDICINE

## 2018-09-05 RX ORDER — VANCOMYCIN 2 GRAM/500 ML IN 0.9 % SODIUM CHLORIDE INTRAVENOUS
2000 ONCE
Status: COMPLETED | OUTPATIENT
Start: 2018-09-05 | End: 2018-09-05

## 2018-09-05 RX ORDER — PROMETHAZINE HYDROCHLORIDE 25 MG/1
25 SUPPOSITORY RECTAL
Qty: 11 SUPPOSITORY | Refills: 0 | Status: SHIPPED | OUTPATIENT
Start: 2018-09-05 | End: 2018-09-11

## 2018-09-05 RX ORDER — SODIUM CHLORIDE 9 MG/ML
500 INJECTION, SOLUTION INTRAVENOUS ONCE
Status: COMPLETED | OUTPATIENT
Start: 2018-09-05 | End: 2018-09-05

## 2018-09-05 RX ORDER — LEVOFLOXACIN 750 MG/1
750 TABLET ORAL DAILY
Qty: 10 TAB | Refills: 0 | Status: SHIPPED | OUTPATIENT
Start: 2018-09-05 | End: 2018-09-11

## 2018-09-05 RX ORDER — VANCOMYCIN/0.9 % SOD CHLORIDE 1.5G/250ML
1500 PLASTIC BAG, INJECTION (ML) INTRAVENOUS EVERY 12 HOURS
Status: DISCONTINUED | OUTPATIENT
Start: 2018-09-06 | End: 2018-09-06

## 2018-09-05 RX ORDER — SUCRALFATE 1 G/1
1 TABLET ORAL 4 TIMES DAILY
Qty: 40 TAB | Refills: 0 | Status: SHIPPED | OUTPATIENT
Start: 2018-09-05 | End: 2018-09-11

## 2018-09-05 RX ORDER — OXYCODONE HYDROCHLORIDE 5 MG/1
5 TABLET ORAL
Qty: 19 TAB | Refills: 0 | Status: SHIPPED | OUTPATIENT
Start: 2018-09-05 | End: 2018-09-11

## 2018-09-05 RX ORDER — SODIUM CHLORIDE 9 MG/ML
1000 INJECTION, SOLUTION INTRAVENOUS ONCE
Status: COMPLETED | OUTPATIENT
Start: 2018-09-05 | End: 2018-09-05

## 2018-09-05 RX ORDER — ONDANSETRON 2 MG/ML
8 INJECTION INTRAMUSCULAR; INTRAVENOUS
Status: COMPLETED | OUTPATIENT
Start: 2018-09-05 | End: 2018-09-05

## 2018-09-05 RX ORDER — ONDANSETRON 2 MG/ML
4 INJECTION INTRAMUSCULAR; INTRAVENOUS
Status: COMPLETED | OUTPATIENT
Start: 2018-09-05 | End: 2018-09-05

## 2018-09-05 RX ORDER — METRONIDAZOLE 500 MG/1
500 TABLET ORAL 2 TIMES DAILY
Qty: 20 TAB | Refills: 0 | Status: SHIPPED | OUTPATIENT
Start: 2018-09-05 | End: 2018-09-11

## 2018-09-05 RX ORDER — POTASSIUM CHLORIDE 14.9 MG/ML
20 INJECTION INTRAVENOUS
Status: COMPLETED | OUTPATIENT
Start: 2018-09-05 | End: 2018-09-06

## 2018-09-05 RX ORDER — PROMETHAZINE HYDROCHLORIDE 25 MG/1
25 TABLET ORAL
Qty: 11 TAB | Refills: 0 | Status: SHIPPED | OUTPATIENT
Start: 2018-09-05 | End: 2018-09-11

## 2018-09-05 RX ORDER — ONDANSETRON 2 MG/ML
4 INJECTION INTRAMUSCULAR; INTRAVENOUS
Status: DISCONTINUED | OUTPATIENT
Start: 2018-09-05 | End: 2018-09-11 | Stop reason: HOSPADM

## 2018-09-05 RX ORDER — NYSTATIN 100000 [USP'U]/ML
500000 SUSPENSION ORAL 4 TIMES DAILY
Status: DISCONTINUED | OUTPATIENT
Start: 2018-09-05 | End: 2018-09-11 | Stop reason: HOSPADM

## 2018-09-05 RX ORDER — SODIUM CHLORIDE 0.9 % (FLUSH) 0.9 %
10 SYRINGE (ML) INJECTION
Status: COMPLETED | OUTPATIENT
Start: 2018-09-05 | End: 2018-09-05

## 2018-09-05 RX ORDER — ACETAMINOPHEN 325 MG/1
650 TABLET ORAL
Status: DISCONTINUED | OUTPATIENT
Start: 2018-09-05 | End: 2018-09-11 | Stop reason: HOSPADM

## 2018-09-05 RX ADMIN — SODIUM CHLORIDE 500 ML: 900 INJECTION, SOLUTION INTRAVENOUS at 15:21

## 2018-09-05 RX ADMIN — SODIUM CHLORIDE 100 ML: 900 INJECTION, SOLUTION INTRAVENOUS at 17:24

## 2018-09-05 RX ADMIN — DIATRIZOATE MEGLUMINE AND DIATRIZOATE SODIUM 15 ML: 600; 100 SOLUTION ORAL; RECTAL at 14:46

## 2018-09-05 RX ADMIN — VANCOMYCIN HYDROCHLORIDE 2000 MG: 10 INJECTION, POWDER, LYOPHILIZED, FOR SOLUTION INTRAVENOUS at 15:24

## 2018-09-05 RX ADMIN — IOPAMIDOL 100 ML: 755 INJECTION, SOLUTION INTRAVENOUS at 17:24

## 2018-09-05 RX ADMIN — SODIUM CHLORIDE 40 MG: 9 INJECTION INTRAMUSCULAR; INTRAVENOUS; SUBCUTANEOUS at 22:16

## 2018-09-05 RX ADMIN — Medication 10 ML: at 17:24

## 2018-09-05 RX ADMIN — SODIUM CHLORIDE 1000 ML: 900 INJECTION, SOLUTION INTRAVENOUS at 15:27

## 2018-09-05 RX ADMIN — POTASSIUM CHLORIDE 20 MEQ: 14.9 INJECTION, SOLUTION INTRAVENOUS at 22:16

## 2018-09-05 RX ADMIN — POTASSIUM CHLORIDE 20 MEQ: 14.9 INJECTION, SOLUTION INTRAVENOUS at 00:30

## 2018-09-05 RX ADMIN — ONDANSETRON 8 MG: 2 INJECTION INTRAMUSCULAR; INTRAVENOUS at 18:27

## 2018-09-05 RX ADMIN — ONDANSETRON 4 MG: 2 INJECTION INTRAMUSCULAR; INTRAVENOUS at 11:55

## 2018-09-05 RX ADMIN — PIPERACILLIN SODIUM,TAZOBACTAM SODIUM 4.5 G: 4; .5 INJECTION, POWDER, FOR SOLUTION INTRAVENOUS at 19:29

## 2018-09-05 NOTE — ED TRIAGE NOTES
Pt sent from doctors office for further GI workup, ernst since Monday, reports dark stools. Pt takes plavix

## 2018-09-05 NOTE — ED PROVIDER NOTES
HPI: HPI Comments: 59-year-old male presents to the emergency department with several days of nausea vomiting diarrhea. Black stools Saw his primary care doctor today noted to have dark stools that were heme positive on exam 
 
Deferred to the ED for further evaluation Patient complains diffuse abdominal pain fever unable to tolerate oral  Liquids No alleviating. Worse with eating or drinking. Worse with time Review of Systems: 
Review of Systems Constitutional: Positive for chills, fever and malaise/fatigue. Respiratory: Negative. Cardiovascular: Negative. Gastrointestinal: Positive for abdominal pain, diarrhea, nausea and vomiting. Genitourinary: Negative. Musculoskeletal: Negative. Skin: Negative. Neurological: Negative. Negative for weakness. Psychiatric/Behavioral: Negative. Past Medical History:    
Primary Care Doctor: Gypsy Greer DO Past Medical History:  
Diagnosis Date  Arrhythmia A Fib twice since 7/2010-- ablation no problems since  Arthritis  CAD (coronary artery disease) 11/2009  
 stent x 1-- ablation 2010-- no problems since  Cancer ([address] skin cancer  Chest pain  Chronic midline low back pain without sciatica 8/28/2017  CLL (chronic lymphocytic leukemia) ([address] 7/24/2018  COPD   
 prn inhalers  Coronary atherosclerosis of native coronary artery 12/30/2015  Depression  Dyspnea on exertion   
 with any activity; associated with nausea  GERD (gastroesophageal reflux disease) 7/22/2010  
 Heart failure ([address]  Hematochezia   
 HTN (hypertension) x 1 month  
 started on med--- controlled  Hypercholesteremia   
 controlled by medication  Other ill-defined conditions(799.89)   
 dyslipidemia/hyperlipidemia  Palpitations 12/30/2015  Paroxysmal atrial fibrillation (HCC)  Psychiatric disorder   
 depression  PUD (peptic ulcer disease) 2010  
 hx-- r/t coumadin--  
  Recurrent depression ([facility] 8/8/2016  Tobacco use disorder 12/30/2015  Unspecified adverse effect of anesthesia 1978  
 quit breathing Past Surgical History:  
Procedure Laterality Date  HX CHOLECYSTECTOMY  HX COLONOSCOPY  2012  HX HEART CATHETERIZATION  11/2009 PCI x 1 to LAD  ----ablation 2010  HX HEART CATHETERIZATION  3/2016  HX HEENT  1977 and 1978  
 eardrum repair-- right [address] Right inguinal  
 HX LAP CHOLECYSTECTOMY  1995  HX ORTHOPAEDIC    
 left shoulder surg x1  
 HX ORTHOPAEDIC Left 2012  
 hand  HX OTHER SURGICAL    
 EGD  HX SHOULDER ARTHROSCOPY  2000  
 x 3 on right Social History Social History  Marital status:  Spouse name: N/A  
 Number of children: N/A  
 Years of education: N/A Social History Main Topics  Smoking status: Former Smoker Packs/day: 1.00 Years: 45.00 Types: Cigarettes Quit date: 3/4/2016  Smokeless tobacco: Never Used Comment: smoked more than 40 years  Alcohol use No  
 Drug use: No  
 Sexual activity: Not on file Other Topics Concern  Not on file Social History Narrative Previous Medications ACETAMINOPHEN-CODEINE (TYLENOL #3) 300-30 MG PER TABLET    Take 1 Tab by mouth every four (4) hours as needed for Pain. Max Daily Amount: 6 Tabs. Indications: Pain ALBUTEROL (PROVENTIL HFA, VENTOLIN HFA, PROAIR HFA) 90 MCG/ACTUATION INHALER    Take 2 Puffs by inhalation every four (4) hours as needed. ALBUTEROL (PROVENTIL VENTOLIN) 2.5 MG /3 ML (0.083 %) NEBULIZER SOLUTION    3 mL by Nebulization route every four (4) hours as needed for Wheezing. AMLODIPINE (NORVASC) 5 MG TABLET    Take 1 Tab by mouth every evening. ANORO ELLIPTA 62.5-25 MCG/ACTUATION INHALER    INHALE 1 PUFF DAILY  
 ASPIRIN 81 MG CHEWABLE TABLET    Take 1 Tab by mouth daily. CETIRIZINE (ZYRTEC) 10 MG TABLET    Take 1 Tab by mouth nightly. CLOPIDOGREL (PLAVIX) 75 MG TAB    Take 1 Tab by mouth daily. COLESTIPOL (COLESTID) 1 GRAM TABLET    Take 1 g by mouth daily. LISINOPRIL (PRINIVIL, ZESTRIL) 40 MG TABLET    Take 1 Tab by mouth daily. MECLIZINE (ANTIVERT) 25 MG TABLET    Take 1 Tab by mouth three (3) times daily as needed. Indications: Vertigo MULTIVITAMIN (ONE A DAY) TABLET    Take 1 Tab by mouth daily. NITROGLYCERIN (NITROSTAT) 0.4 MG SL TABLET    1 Tab by SubLINGual route every five (5) minutes as needed for Chest Pain. PANTOPRAZOLE (PROTONIX) 40 MG TABLET    Take 1 Tab by mouth daily. SERTRALINE (ZOLOFT) 100 MG TABLET    TAKE 1 TABLET EVERY DAY  
 SOTALOL (BETAPACE) 80 MG TABLET    TAKE 1 TABLET TWICE DAILY. Allergies Allergen Reactions  Fentanyl Other (comments) States had through a PCA after shoulder surgery in 2000, and \"throat swelled shut\" and that he \"quit breathing\". Update 11/16/10--Patient thinks the PCA gave him to much and it wasn't an allergy to drug itself.  Lovastatin Other (comments) Hullucinations  Niacin Other (comments) Denies allery  Advicor [Niacin-Lovastatin] Anaphylaxis and Itching Physical Exam:   
Vitals:  
 09/05/18 1152 BP: 117/83 Pulse: 79 Resp: 18 Temp: (!) 100.8 °F (38.2 °C) SpO2: 98% Vital signs were reviewed. Pulse oximetry interpretation: normal 
Nursing documentation reviewed. Physical Exam  
Constitutional: He is oriented to person, place, and time. He appears well-developed and well-nourished. He appears distressed. HENT:  
Head: Normocephalic and atraumatic. Mm dry Eyes: Pupils are equal, round, and reactive to light. Cardiovascular: Normal rate and regular rhythm. Pulmonary/Chest: Effort normal and breath sounds normal. No respiratory distress. He has no wheezes. He has no rales. Abdominal: Soft. He exhibits no distension. There is tenderness. There is no rebound and no guarding. Musculoskeletal: Normal range of motion. Neurological: He is alert and oriented to person, place, and time. Skin: Skin is warm and dry. Psychiatric: He has a normal mood and affect. His behavior is normal.  
Nursing note and vitals reviewed. ____________________________________________________________________ Medical Decision Making: MDM Number of Diagnoses or Management Options Acute colitis: new, needed workup Acute esophagitis: new, needed workup Acute respiratory failure with hypoxia [facility]): new, needed workup Leukocytosis, unspecified type: established, worsening Non-intractable vomiting with nausea, unspecified vomiting type: new, needed workup Diagnosis management comments: 67-year-old male presents to the emergency Department nauseavomiting diarrhea some abdominal pain and black stools. Fever. Decreased by mouth intake Abdomen is soft. Mild tenderness without rebound masses or guarding Given the patient's  Elevated white count fever and abdominal symptoms we'll proceed with CT of abdomen and pelvis Dilcia Ha MD; 9/5/2018 @2:49 PM=========================================== 
 
CT positive for esophagitis and colitis. No evidence of abscess or perforation Patient completed IV antibiotics The patientbe discharged on Carafate antibiotics and anti-medics Also start Roxicodone for better pain control. Signed By: Maynor Charles MD  
 September 5, 2018  
 
7:10 PM 
Patient found to be tachycardic and hypoxic. EKG was reviewed sinus rhythm with single PVCcame in changes. ABG is performed which revealed a pH of 7.34 a PCO2 of 46 and a PO2 of 27. Patient was placed on 5 L nasal Oxygen and maintaining a sat between 94 and 95% Chest x-ray pending Patient will be admitted 8:08 PM 
No acute findings on the chest x-ray Patient reports that he had a vomiting episode while laying flat and CAT scan which led to a choking spell. Suspicious for aspiration Amount and/or Complexity of Data Reviewed Clinical lab tests: ordered and reviewed Tests in the radiology section of CPT®: ordered and reviewed Tests in the medicine section of CPT®: ordered and reviewed Review and summarize past medical records: yes Risk of Complications, Morbidity, and/or Mortality Presenting problems: high Diagnostic procedures: high Management options: moderate General comments: Elements of this note have been dictated via voice recognition software. Text and phrases may be limited by the accuracy of the software. The chart has been reviewed, but errors may still be present. Patient Progress Patient progress: improved 
 
______________________________________________________________________ 
ED Evaluation Labs:  
Recent Results (from the past 12 hour(s)) CBC WITH AUTOMATED DIFF Collection Time: 09/05/18 11:58 AM  
Result Value Ref Range WBC 24.8 (H) 4.3 - 11.1 K/uL  
 RBC 5.45 4.23 - 5.6 M/uL  
 HGB 16.4 13.6 - 17.2 g/dL HCT 49.4 41.1 - 50.3 % MCV 90.6 79.6 - 97.8 FL  
 MCH 30.1 26.1 - 32.9 PG  
 MCHC 33.2 31.4 - 35.0 g/dL  
 RDW 12.8 % PLATELET 439 241 - 640 K/uL MPV 10.2 9.4 - 12.3 FL ABSOLUTE NRBC 0.00 0.0 - 0.2 K/uL NEUTROPHILS 56 43 - 78 % LYMPHOCYTES 37 13 - 44 % MONOCYTES 7 4.0 - 12.0 % EOSINOPHILS 0 (L) 0.5 - 7.8 % BASOPHILS 0 0.0 - 2.0 % IMMATURE GRANULOCYTES 0 0.0 - 5.0 %  
 ABS. NEUTROPHILS 13.9 (H) 1.7 - 8.2 K/UL  
 ABS. LYMPHOCYTES 9.2 (H) 0.5 - 4.6 K/UL  
 ABS. MONOCYTES 1.7 (H) 0.1 - 1.3 K/UL  
 ABS. EOSINOPHILS 0.0 0.0 - 0.8 K/UL  
 ABS. BASOPHILS 0.0 0.0 - 0.2 K/UL  
 ABS. IMM. GRANS. 0.0 0.0 - 0.5 K/UL  
 RBC COMMENTS NORMOCYTIC/NORMOCHROMIC    
 WBC COMMENTS ATYPICAL LYMPHOCYTES PRESENT    
 PLATELET COMMENTS ADEQUATE    
 DF AUTOMATED METABOLIC PANEL, COMPREHENSIVE Collection Time: 09/05/18 11:58 AM  
Result Value Ref Range Sodium 141 136 - 145 mmol/L Potassium 3.3 (L) 3.5 - 5.1 mmol/L  Chloride 106 98 - 107 mmol/L  
 CO2 24 21 - 32 mmol/L  
 Anion gap 11 7 - 16 mmol/L Glucose 146 (H) 65 - 100 mg/dL BUN 10 8 - 23 MG/DL Creatinine 1.12 0.8 - 1.5 MG/DL  
 GFR est AA >60 >60 ml/min/1.73m2 GFR est non-AA >60 >60 ml/min/1.73m2 Calcium 9.6 8.3 - 10.4 MG/DL Bilirubin, total 0.7 0.2 - 1.1 MG/DL  
 ALT (SGPT) 20 12 - 65 U/L  
 AST (SGOT) 22 15 - 37 U/L Alk. phosphatase 99 50 - 136 U/L Protein, total 7.9 6.3 - 8.2 g/dL Albumin 4.3 3.2 - 4.6 g/dL Globulin 3.6 (H) 2.3 - 3.5 g/dL A-G Ratio 1.2 1.2 - 3.5 LIPASE Collection Time: 09/05/18 11:58 AM  
Result Value Ref Range Lipase 91 73 - 393 U/L  
POC LACTIC ACID Collection Time: 09/05/18 12:00 PM  
Result Value Ref Range Lactic Acid (POC) 2.4 (H) 0.5 - 1.9 mmol/L Labs were reviewed and interpreted by me. Radiology studies performed: No orders to display Radiographs were visualized by me Current Facility-Administered Medications Medication Dose Route Frequency  piperacillin-tazobactam (ZOSYN) 4.5 g in 0.9% sodium chloride (MBP/ADV) 100 mL  4.5 g IntraVENous ONCE  
 0.9% sodium chloride infusion 500 mL  500 mL IntraVENous ONCE  
 vancomycin (VANCOCIN) 2000 mg in  ml infusion  2,000 mg IntraVENous ONCE Current Outpatient Prescriptions Medication Sig  
 meclizine (ANTIVERT) 25 mg tablet Take 1 Tab by mouth three (3) times daily as needed. Indications: Vertigo  pantoprazole (PROTONIX) 40 mg tablet Take 1 Tab by mouth daily.  amLODIPine (NORVASC) 5 mg tablet Take 1 Tab by mouth every evening.  acetaminophen-codeine (TYLENOL #3) 300-30 mg per tablet Take 1 Tab by mouth every four (4) hours as needed for Pain. Max Daily Amount: 6 Tabs. Indications: Pain  sertraline (ZOLOFT) 100 mg tablet TAKE 1 TABLET EVERY DAY  lisinopril (PRINIVIL, ZESTRIL) 40 mg tablet Take 1 Tab by mouth daily.  ANORO ELLIPTA 62.5-25 mcg/actuation inhaler INHALE 1 PUFF DAILY  sotalol (BETAPACE) 80 mg tablet TAKE 1 TABLET TWICE DAILY.  albuterol (PROVENTIL VENTOLIN) 2.5 mg /3 mL (0.083 %) nebulizer solution 3 mL by Nebulization route every four (4) hours as needed for Wheezing.  clopidogrel (PLAVIX) 75 mg tab Take 1 Tab by mouth daily.  aspirin 81 mg chewable tablet Take 1 Tab by mouth daily.  albuterol (PROVENTIL HFA, VENTOLIN HFA, PROAIR HFA) 90 mcg/actuation inhaler Take 2 Puffs by inhalation every four (4) hours as needed.  cetirizine (ZYRTEC) 10 mg tablet Take 1 Tab by mouth nightly.  colestipol (COLESTID) 1 gram tablet Take 1 g by mouth daily.  nitroglycerin (NITROSTAT) 0.4 mg SL tablet 1 Tab by SubLINGual route every five (5) minutes as needed for Chest Pain.  multivitamin (ONE A DAY) tablet Take 1 Tab by mouth daily.  
 
 
================================================================== 
ASSESSMENT: 59 yo male- with nausea, vomiting, diarrhea; mild lower abd pain PLAN: IVF, repeat lactate, abx, CT a/p 
_____________________________________________________________________ Condition:  improved Disposition:  pending Diagnosis:  Acute febrile illness, nausea, vomiting Marvin Rey MD; 9/5/2018 @2:17 PM=========================================== 
 
ED Course Value Comment By Time WBC: (!) 24.8 (Reviewed) Marvin Rey MD 09/05 9119 Lactic Acid (POC): (!) 2.4 (Reviewed) Marvin Rey MD 09/05 8055 Temp: (!) 100.8 °F (38.2 °C) (Reviewed) Marvin Rey MD 09/05 6007 CT pending Marvin Rey MD 09/05 2194

## 2018-09-05 NOTE — ED NOTES
Patient noted to have SpO2 75% on RA. Patient adjusted, probe changed. MD made aware, orders entered.  Patient to be placed on O2 after ABG obtained by RT

## 2018-09-05 NOTE — IP AVS SNAPSHOT
[address] 
[phone] Patient: Zulma Mckoy MRN: [id number] TQK:1/70/3603 A check kalina indicates which time of day the medication should be taken. My Medications START taking these medications Instructions Each Dose to Equal  
 Morning Noon Evening Bedtime  
 albuterol-ipratropium 2.5 mg-0.5 mg/3 ml Nebu Commonly known as:  Wyn Layer Your next dose is:  Per as needed schedule 3 mL by Nebulization route every four (4) hours as needed. 3 mL  
    
   
   
   
  
 dextromethorphan-guaiFENesin  mg/5 mL Liqd syrup Commonly known as:  Stevestad DM Your next dose is:  Per as needed schedule Take 5 mL by mouth every six (6) hours as needed for up to 7 days. 5 mL  
    
   
   
   
  
 guaiFENesin  mg ER tablet Commonly known as:  [address] Your next dose is:  Per as needed schedule Take 1 Tab by mouth two (2) times daily as needed for Congestion. 600 mg  
    
   
   
   
  
 levoFLOXacin 500 mg tablet Commonly known as:  Thersia Bring Start taking on:  9/12/2018 Your last dose was:  09/11/18 am  
Your next dose is:  09/12/18 am  
   
 Take 1 Tab by mouth every twenty-four (24) hours. 500 mg  
    
  
   
   
   
  
 loperamide 2 mg capsule Commonly known as:  IMODIUM Your next dose is:  Per as needed schedule Take 2 Caps by mouth every six (6) hours as needed for Diarrhea for up to 5 days. 4 mg CHANGE how you take these medications Instructions Each Dose to Equal  
 Morning Noon Evening Bedtime  
 albuterol 90 mcg/actuation inhaler Commonly known as:  PROVENTIL HFA, VENTOLIN HFA, PROAIR HFA What changed:  Another medication with the same name was removed. Continue taking this medication, and follow the directions you see here. Your next dose is:  Per as needed schedule Take 2 Puffs by inhalation every four (4) hours as needed. 2 Puff  
    
   
   
   
  
 amLODIPine 10 mg tablet Commonly known as:  Tyler Quijano What changed:   
- medication strength 
- how much to take Your last dose was:  9/10/2018 5:30pm  
Your next dose is:  09/11/18 5pm  
   
 Take 1 Tab by mouth every evening. 10 mg CONTINUE taking these medications Instructions Each Dose to Equal  
 Morning Noon Evening Bedtime ANORO ELLIPTA 62.5-25 mcg/actuation inhaler Generic drug:  umeclidinium-vilanterol Your next dose is:  Resume home schedule INHALE 1 PUFF DAILY  
     
  
   
   
   
  
 aspirin 81 mg chewable tablet Your next dose is:  09/12/18 am  
   
 Take 1 Tab by mouth daily. 81 mg  
    
  
   
   
   
  
 clopidogrel 75 mg Tab Commonly known as:  PLAVIX Your next dose is:  Resume home schedule Take 1 Tab by mouth daily. 75 mg  
    
  
   
   
   
  
 meclizine 25 mg tablet Commonly known as:  ANTIVERT Your next dose is:  Per as needed schedule Take 1 Tab by mouth three (3) times daily as needed. Indications: Vertigo 25 mg  
    
   
   
   
  
 multivitamin tablet Commonly known as:  ONE A DAY Your next dose is:  Resume home schedule Take 1 Tab by mouth daily. 1 Tab  
    
  
   
   
   
  
 nitroglycerin 0.4 mg SL tablet Commonly known as:  NITROSTAT Your next dose is:  Per as needed schedule 1 Tab by SubLINGual route every five (5) minutes as needed for Chest Pain. 0.4 mg  
    
   
   
   
  
 pantoprazole 40 mg tablet Commonly known as:  PROTONIX Your last dose was:  09/11/18 am  
Your next dose is:  09/12/18 am  
   
 Take 1 Tab by mouth daily. 40 mg  
    
  
   
   
   
  
 sertraline 100 mg tablet Commonly known as:  ZOLOFT Your last dose was:  09/11/18 am  
Your next dose is:  09/12/18 am  
   
 TAKE 1 TABLET EVERY DAY  
     
  
   
   
   
  
 sotalol 80 mg tablet Commonly known as:  Netta Elliott Your last dose was:  09/11/18 am  
Your next dose is:  09/11/18 pm  
   
 TAKE 1 TABLET TWICE DAILY. STOP taking these medications   
 acetaminophen-codeine 300-30 mg per tablet Commonly known as:  TYLENOL #3  
   
  
 cetirizine 10 mg tablet Commonly known as:  ZYRTEC  
   
  
 COLESTID 1 gram tablet Generic drug:  colestipol  
   
  
 lisinopril 40 mg tablet Commonly known as:  Mitra November Where to Get Your Medications These medications were sent to [facility] Lopez, [address] [facility]. [place] [address] Phone:  [phone]  
  albuterol-ipratropium 2.5 mg-0.5 mg/3 ml Nebu  
 amLODIPine 10 mg tablet  
 dextromethorphan-guaiFENesin  mg/5 mL Liqd syrup  
 guaiFENesin  mg ER tablet  
 levoFLOXacin 500 mg tablet  
 loperamide 2 mg capsule

## 2018-09-05 NOTE — PROGRESS NOTES
Please call [phone] when patient finishes drinking oral contrast so we may proceed with scan accordingly.

## 2018-09-05 NOTE — IP AVS SNAPSHOT
[address] 
[phone] Patient: Amber Rios MRN: [id number]:[id number] About your hospitalization You were admitted on:  September 5, 2018 You last received care in the:  [facility] 8 MED SURG You were discharged on:  September 11, 2018 Why you were hospitalized Your primary diagnosis was:  Sepsis (Hcc) Your diagnoses also included:  Colitis, Atrial Fibrillation (Hcc), Cad (Coronary Artery Disease), Chronic Obstructive Pulmonary Disease (Hcc), Cll (Chronic Lymphocytic Leukemia) (Hcc), Htn (Hypertension), Hypokalemia, Acute Respiratory Failure With Hypoxia (Hcc), Esophagitis, Thrush, Diarrhea Of Infectious Origin, Aspiration Pneumonia Of Right Lower Lobe (Hcc), Gastroenteritis, Hypoxia Follow-up Information Follow up With Details Comments Contact Info Chico Baca DO In 1 week Had to leave message, office will call patient at home with 1 week follow up appointment [address] [facility] [address] 
[phone] Jeronimo Meza NP On 10/23/2018 CXR 9:10 AM. Office 10:10 AM. [facility] [address] [facility], [place], operated [facility] [address] 
[phone] Your Scheduled Appointments Thursday September 20, 2018  9:30 AM EDT Office Visit with Cam Gallardo DO  
[address] ([address] [place] [address]) [address] [facility] [address] [address] [facility] [address] [phone] Thursday September 20, 2018  4:15 PM EDT Office Visit with Kamille Waddell, [address] [facility] ([address] [place] [address]) [address] 
[address]  
[phone] Thursday September 27, 2018  3:15 PM EDT Office Visit with Cam Gallardo DO  
[address] ([address] [place] [address]) [address] [facility] [address] [facility] [address] [phone] Tuesday October 23, 2018 10:40 AM EDT  
(Arrive by 10:10 AM) HOSPITAL with Jewel Stauffer NP [facility] ([facility]) [address] [place] [facility]  
[phone] Discharge Orders None A check kalina indicates which time of day the medication should be taken. My Medications START taking these medications Instructions Each Dose to Equal  
 Morning Noon Evening Bedtime  
 albuterol-ipratropium 2.5 mg-0.5 mg/3 ml Nebu Commonly known as:  Zoë Flatten Your next dose is:  Per as needed schedule 3 mL by Nebulization route every four (4) hours as needed. 3 mL  
    
   
   
   
  
 dextromethorphan-guaiFENesin  mg/5 mL Liqd syrup Commonly known as:  Stevestad DM Your next dose is:  Per as needed schedule Take 5 mL by mouth every six (6) hours as needed for up to 7 days. 5 mL  
    
   
   
   
  
 guaiFENesin  mg ER tablet Commonly known as:  Duncan [employer] Duncan Your next dose is:  Per as needed schedule Take 1 Tab by mouth two (2) times daily as needed for Congestion. 600 mg  
    
   
   
   
  
 levoFLOXacin 500 mg tablet Commonly known as:  Astrid Mcdonald Start taking on:  9/12/2018 Your last dose was:  09/11/18 am  
Your next dose is:  09/12/18 am  
   
 Take 1 Tab by mouth every twenty-four (24) hours. 500 mg  
    
  
   
   
   
  
 loperamide 2 mg capsule Commonly known as:  IMODIUM Your next dose is:  Per as needed schedule Take 2 Caps by mouth every six (6) hours as needed for Diarrhea for up to 5 days. 4 mg CHANGE how you take these medications Instructions Each Dose to Equal  
 Morning Noon Evening Bedtime  
 albuterol 90 mcg/actuation inhaler Commonly known as:  PROVENTIL HFA, VENTOLIN HFA, PROAIR HFA What changed:  Another medication with the same name was removed. Continue taking this medication, and follow the directions you see here. Your next dose is:  Per as needed schedule Take 2 Puffs by inhalation every four (4) hours as needed. 2 Puff  
    
   
   
   
  
 amLODIPine 10 mg tablet Commonly known as:  Raz Macdonald What changed:   
- medication strength 
- how much to take Your last dose was:  9/10/2018 5:30pm  
Your next dose is:  09/11/18 5pm  
   
 Take 1 Tab by mouth every evening. 10 mg CONTINUE taking these medications Instructions Each Dose to Equal  
 Morning Noon Evening Bedtime ANORO ELLIPTA 62.5-25 mcg/actuation inhaler Generic drug:  umeclidinium-vilanterol Your next dose is:  Resume home schedule INHALE 1 PUFF DAILY  
     
  
   
   
   
  
 aspirin 81 mg chewable tablet Your next dose is:  09/12/18 am  
   
 Take 1 Tab by mouth daily. 81 mg  
    
  
   
   
   
  
 clopidogrel 75 mg Tab Commonly known as:  PLAVIX Your next dose is:  Resume home schedule Take 1 Tab by mouth daily. 75 mg  
    
  
   
   
   
  
 meclizine 25 mg tablet Commonly known as:  ANTIVERT Your next dose is:  Per as needed schedule Take 1 Tab by mouth three (3) times daily as needed. Indications: Vertigo 25 mg  
    
   
   
   
  
 multivitamin tablet Commonly known as:  ONE A DAY Your next dose is:  Resume home schedule Take 1 Tab by mouth daily. 1 Tab  
    
  
   
   
   
  
 nitroglycerin 0.4 mg SL tablet Commonly known as:  NITROSTAT Your next dose is:  Per as needed schedule 1 Tab by SubLINGual route every five (5) minutes as needed for Chest Pain. 0.4 mg  
    
   
   
   
  
 pantoprazole 40 mg tablet Commonly known as:  PROTONIX Your last dose was:  09/11/18 am  
Your next dose is:  09/12/18 am  
   
 Take 1 Tab by mouth daily. 40 mg  
    
  
   
   
   
  
 sertraline 100 mg tablet Commonly known as:  ZOLOFT Your last dose was:  09/11/18 am  
Your next dose is:  09/12/18 am  
   
 TAKE 1 TABLET EVERY DAY  
 sotalol 80 mg tablet Commonly known as:  Mary Perkins Your last dose was:  09/11/18 am  
Your next dose is:  09/11/18 pm  
   
 TAKE 1 TABLET TWICE DAILY. STOP taking these medications   
 acetaminophen-codeine 300-30 mg per tablet Commonly known as:  TYLENOL #3  
   
  
 cetirizine 10 mg tablet Commonly known as:  ZYRTEC  
   
  
 COLESTID 1 gram tablet Generic drug:  colestipol  
   
  
 lisinopril 40 mg tablet Commonly known as:  Sergio Mejia Where to Get Your Medications These medications were sent to [address], [address] [facility]. [place] [address] [address] Phone:  [phone]  
  albuterol-ipratropium 2.5 mg-0.5 mg/3 ml Nebu  
 amLODIPine 10 mg tablet  
 dextromethorphan-guaiFENesin  mg/5 mL Liqd syrup  
 guaiFENesin  mg ER tablet  
 levoFLOXacin 500 mg tablet  
 loperamide 2 mg capsule Discharge Instructions DISCHARGE SUMMARY from Nurse PATIENT INSTRUCTIONS: 
 
After general anesthesia or intravenous sedation, for 24 hours or while taking prescription Narcotics: · Limit your activities · Do not drive and operate hazardous machinery · Do not make important personal or business decisions · Do  not drink alcoholic beverages · If you have not urinated within 8 hours after discharge, please contact your surgeon on call. What to do at Home: 
Recommended activity: Activity as tolerated. If you experience any of the following symptoms temp > 101.5, unrelieved pain, nausea or vomiting, shortness of breath or fatigue not relieved with rest, please follow up with MD. 
 
*  Please give a list of your current medications to your Primary Care Provider. *  Please update this list whenever your medications are discontinued, doses are 
    changed, or new medications (including over-the-counter products) are added. *  Please carry medication information at all times in case of emergency situations. These are general instructions for a healthy lifestyle: No smoking/ No tobacco products/ Avoid exposure to second hand smoke Surgeon General's Warning:  Quitting smoking now greatly reduces serious risk to your health. Obesity, smoking, and sedentary lifestyle greatly increases your risk for illness A healthy diet, regular physical exercise & weight monitoring are important for maintaining a healthy lifestyle You may be retaining fluid if you have a history of heart failure or if you experience any of the following symptoms:  Weight gain of 3 pounds or more overnight or 5 pounds in a week, increased swelling in our hands or feet or shortness of breath while lying flat in bed. Please call your doctor as soon as you notice any of these symptoms; do not wait until your next office visit. Recognize signs and symptoms of STROKE: 
 
F-face looks uneven A-arms unable to move or move unevenly S-speech slurred or non-existent T-time-call 911 as soon as signs and symptoms begin-DO NOT go Back to bed or wait to see if you get better-TIME IS BRAIN. Warning Signs of HEART ATTACK Call 911 if you have these symptoms: 
? Chest discomfort. Most heart attacks involve discomfort in the center of the chest that lasts more than a few minutes, or that goes away and comes back. It can feel like uncomfortable pressure, squeezing, fullness, or pain. ? Discomfort in other areas of the upper body. Symptoms can include pain or discomfort in one or both arms, the back, neck, jaw, or stomach. ? Shortness of breath with or without chest discomfort. ? Other signs may include breaking out in a cold sweat, nausea, or lightheadedness. Don't wait more than five minutes to call [address]! Fast action can save your life.  Calling 911 is almost always the fastest way to get lifesaving treatment. Emergency Medical Services staff can begin treatment when they arrive  up to an hour sooner than if someone gets to the hospital by car. The discharge information has been reviewed with the patient. The patient verbalized understanding. Discharge medications reviewed with the patient and appropriate educational materials and side effects teaching were provided. ___________________________________________________________________________________________________________________________________ Colitis: Care Instructions Your Care Instructions Colitis is the medical term for swelling (inflammation) of the intestine. It can be caused by different things, such as an infection or loss of blood flow in the intestine. Other causes are problems like Crohn's disease or ulcerative colitis. Symptoms may include fever, diarrhea that may be bloody, or belly pain. Sometimes symptoms go away without treatment. But you may need treatment or more tests, such as blood tests or a stool test. Or you may need imaging tests like a CT scan or a colonoscopy. In some cases, the doctor may want to test a sample of tissue from the intestine. This test is called a biopsy. The doctor has checked you carefully, but problems can develop later. If you notice any problems or new symptoms, get medical treatment right away. Follow-up care is a key part of your treatment and safety. Be sure to make and go to all appointments, and call your doctor if you are having problems. It's also a good idea to know your test results and keep a list of the medicines you take. How can you care for yourself at home? · Rest until you feel better. · Your doctor may recommend that you eat bland foods. These include rice, dry toast or crackers, bananas, and applesauce. · To prevent dehydration, drink plenty of fluids. Choose water and other caffeine-free clear liquids until you feel better.  If you have kidney, heart, or liver disease and have to limit fluids, talk with your doctor before you increase the amount of fluids you drink. · Be safe with medicines. Take your medicines exactly as prescribed. Call your doctor if you think you are having a problem with your medicine. You will get more details on the specific medicines your doctor prescribes. When should you call for help? Call 911 anytime you think you may need emergency care. For example, call if: 
· You passed out (lost consciousness). · You vomit blood or what looks like coffee grounds. · Your stools are maroon or very bloody. Call your doctor now or seek immediate medical care if: 
· You have new or worse pain. · You have a new or higher fever. · You have new or worse symptoms. · You cannot keep fluids or medicines down. Watch closely for changes in your health, and be sure to contact your doctor if: 
· You do not get better as expected. Where can you learn more? Go to Nukona.be Samantha Rafat in the search box to learn more about \"Colitis: Care Instructions. \"  
© 3436-9032 Healthwise, Incorporated. Care instructions adapted under license by [employer] (which disclaims liability or warranty for this information). This care instruction is for use with your licensed healthcare professional. If you have questions about a medical condition or this instruction, always ask your healthcare professional. [address] any warranty or liability for your use of this information. Content Version: 50.6.006662; Current as of: November 20, 2015 Esophagitis: Care Instructions Your Care Instructions Esophagitis (say \"ih-sof-uh-JY-tus\") is irritation of the esophagus, the tube that carries food from your throat to your stomach. Acid reflux is the most common cause of this condition. When you have reflux, stomach acid and juices flow upward.  This can cause pain or a burning feeling in your chest. You may have a sore throat. It may be hard to swallow. Other causes of this condition include some medicines and supplements. Allergies or an infection can also cause it. Your doctor will ask about your symptoms and past health. He or she might do tests to find the cause of your symptoms. Treatment depends on what is causing the problem. Treatment might include changing your diet or taking medicine to relieve your symptoms. It might also include changing a medicine that is causing your symptoms. If you have reflux, medicine that reduces the stomach acid helps your body heal. It might take 1 to 3 weeks to heal. 
Follow-up care is a key part of your treatment and safety. Be sure to make and go to all appointments, and call your doctor if you are having problems. It's also a good idea to know your test results and keep a list of the medicines you take. How can you care for yourself at home? · If you have acid reflux, your doctor may recommend that you: 
¨ Eat several small meals instead of two or three large meals. After you eat, wait 2 to 3 hours before you lie down. ¨ Avoid chocolate, mint, alcohol, and spicy foods. ¨ Don't smoke or use smokeless tobacco. Smoking can make this condition worse. If you need help quitting, talk to your doctor about stop-smoking programs and medicines. These can increase your chances of quitting for good. ¨ Raise the head of your bed 6 to 8 inches if you have symptoms at night. ¨ Lose weight if you are overweight. ¨ Take an over-the-counter antacid, such as Maalox, Mylanta, or Tums. Be careful when you take over-the-counter antacid medicines. Many of these medicines have aspirin in them. Read the label to make sure that you are not taking more than the recommended dose. Too much aspirin can be harmful. ¨ Take stronger acid reducers. Examples are famotidine (such as Pepcid), omeprazole (such as Prilosec), and ranitidine (such as Zantac). · If your condition is caused by infection, allergy, or other problems, use the medicine or treatments that your doctor recommends. · Be safe with medicines. Take your medicines exactly as prescribed. Call your doctor if you think you are having a problem with your medicine. When should you call for help? Call your doctor now or seek immediate medical care if: 
  · You have new or worse belly pain.  
  · You are vomiting.  
 Watch closely for changes in your health, and be sure to contact your doctor if: 
  · You have new or worse symptoms of reflux.  
  · You have trouble or pain swallowing.  
  · You are losing weight.  
  · You do not get better as expected. Where can you learn more? Go to http://mary kate-ramón.info/. Enter T649 in the search box to learn more about \"Esophagitis: Care Instructions. \" Current as of: May 12, 2017 Content Version: 11.7 © 4353-2642 [employer]. Care instructions adapted under license by [employer] (which disclaims liability or warranty for this information). If you have questions about a medical condition or this instruction, always ask your healthcare professional. [address] any warranty or liability for your use of this information. Pneumonia: Care Instructions Your Care Instructions Pneumonia is an infection of the lungs. Most cases are caused by infections from bacteria or viruses. Pneumonia may be mild or very severe. If it is caused by bacteria, you will be treated with antibiotics. It may take a few weeks to a few months to recover fully from pneumonia, depending on how sick you were and whether your overall health is good. Follow-up care is a key part of your treatment and safety. Be sure to make and go to all appointments, and call your doctor if you are having problems. It's also a good idea to know your test results and keep a list of the medicines you take. How can you care for yourself at home? · Take your antibiotics exactly as directed. Do not stop taking the medicine just because you are feeling better. You need to take the full course of antibiotics. · Take your medicines exactly as prescribed. Call your doctor if you think you are having a problem with your medicine. · Get plenty of rest and sleep. You may feel weak and tired for a while, but your energy level will improve with time. · To prevent dehydration, drink plenty of fluids, enough so that your urine is light yellow or clear like water. Choose water and other caffeine-free clear liquids until you feel better. If you have kidney, heart, or liver disease and have to limit fluids, talk with your doctor before you increase the amount of fluids you drink. · Take care of your cough so you can rest. A cough that brings up mucus from your lungs is common with pneumonia. It is one way your body gets rid of the infection. But if coughing keeps you from resting or causes severe fatigue and chest-wall pain, talk to your doctor. He or she may suggest that you take a medicine to reduce the cough. · Use a vaporizer or humidifier to add moisture to your bedroom. Follow the directions for cleaning the machine. · Do not smoke or allow others to smoke around you. Smoke will make your cough last longer. If you need help quitting, talk to your doctor about stop-smoking programs and medicines. These can increase your chances of quitting for good. · Take an over-the-counter pain medicine, such as acetaminophen (Tylenol), ibuprofen (Advil, Motrin), or naproxen (Aleve). Read and follow all instructions on the label. · Do not take two or more pain medicines at the same time unless the doctor told you to. Many pain medicines have acetaminophen, which is Tylenol. Too much acetaminophen (Tylenol) can be harmful.  
· If you were given a spirometer to measure how well your lungs are working, use it as instructed. This can help your doctor tell how your recovery is going. · To prevent pneumonia in the future, talk to your doctor about getting a flu vaccine (once a year) and a pneumococcal vaccine (one time only for most people). When should you call for help? Call 911 anytime you think you may need emergency care. For example, call if: 
  · You have severe trouble breathing.  
 Call your doctor now or seek immediate medical care if: 
  · You cough up dark brown or bloody mucus (sputum).  
  · You have new or worse trouble breathing.  
  · You are dizzy or lightheaded, or you feel like you may faint.  
 Watch closely for changes in your health, and be sure to contact your doctor if: 
  · You have a new or higher fever.  
  · You are coughing more deeply or more often.  
  · You are not getting better after 2 days (48 hours).  
  · You do not get better as expected. Where can you learn more? Go to http://mary kate-ramón.info/. Enter [id number] in the search box to learn more about \"Pneumonia: Care Instructions. \" Current as of: December 6, 2017 Content Version: 11.7 © 5744-2591 [employer]. Care instructions adapted under license by [employer] (which disclaims liability or warranty for this information). If you have questions about a medical condition or this instruction, always ask your healthcare professional. [place] [address] any warranty or liability for your use of this information. [employer] Announcement We are excited to announce that we are making your provider's discharge notes available to you in [employer]. You will see these notes when they are completed and signed by the physician that discharged you from your recent hospital stay.   If you have any questions or concerns about any information you see in [employer], please call the PS[facility] Department where you were seen or reach out to your Primary Care Provider for more information about your plan of care. Introducing [facility]! Dear Pauline Apgar: Thank you for requesting a Chumen Wenwen account. Our records indicate that you already have an active Chumen Wenwen account. You can access your account anytime at [email or web address] Did you know that you can access your hospital and ER discharge instructions at any time in Chumen Wenwen? You can also review all of your test results from your hospital stay or ER visit. Additional Information If you have questions, please visit the Frequently Asked Questions section of the [employer] website at [email or web address] Remember, Chumen Wenwen is NOT to be used for urgent needs. For medical emergencies, dial 911. Now available from your iPhone and Android! Introducing Sorin Frausto As a [facility] patient, I wanted to make you aware of our electronic visit tool called Sorin Frausto. [facility]  allows you to connect within minutes with a medical provider 24 hours a day, seven days a week via a mobile device or tablet or logging into a secure website from your computer. You can access Sorni Chrisfin from anywhere in the [place]. A virtual visit might be right for you when you have a simple condition and feel like you just dont want to get out of bed, or cant get away from work for an appointment, when your regular [facility] provider is not available (evenings, weekends or holidays), or when youre out of town and need minor care. Electronic visits cost only $49 and if the [facility]  provider determines a prescription is needed to treat your condition, one can be electronically transmitted to a nearby pharmacy*. Please take a moment to enroll today if you have not already done so. The enrollment process is free and takes just a few minutes.   To enroll, please download the Kenton Rodríguez 24/7 sandoval to your tablet or phone, or visit [email or web address] to enroll on your computer. And, as an [address] patient with a [employer] account, the results of your visits will be scanned into your electronic medical record and your primary care provider will be able to view the scanned results. We urge you to continue to see your regular Kenton Rodríguez provider for your ongoing medical care. And while your primary care provider may not be the one available when you seek a Sorin "[employer]delilahfin virtual visit, the peace of mind you get from getting a real diagnosis real time can be priceless. For more information on [facility], view our Frequently Asked Questions (FAQs) at [email or web address]. Sincerely, 
 
Evan Romero MD 
Chief Medical Officer Bret Hanley *:  certain medications cannot be prescribed via [facility] Unresulted Labs-Please follow up with your PCP about these lab tests Order Current Status CULTURE, RESPIRATORY/SPUTUM/BRONCH W GRAM STAIN Preliminary result CULTURE, STOOL Preliminary result Providers Seen During Your Hospitalization Provider Specialty Primary office phone Gordy Pena MD Emergency Medicine [phone] Oksana Jolly MD Emergency Medicine [phone] Lennox Aye, MD Internal Medicine [phone] Immunizations Administered for This Admission Name Date Influenza Vaccine (Quad) PF 9/11/2018 Your Primary Care Physician (PCP) Primary Care Physician Office Phone Office Fax [address], [address] [facility] [phone] You are allergic to the following Allergen Reactions Fentanyl Other (comments) States had through a PCA after shoulder surgery in 2000, and \"throat swelled shut\" and that he \"quit breathing\". Update 11/16/10--Patient thinks the PCA gave him to much and it wasn't an allergy to drug itself. Lovastatin Other (comments) Hullucinations Niacin Other (comments) Denies allery Advicor (Niacin-Lovastatin) Anaphylaxis Itching Recent Documentation Height Weight BMI Smoking Status 1.854 m 81.4 kg 23.67 kg/m2 Former Smoker Emergency Contacts Name Discharge Info Relation Home Work Mobile Ladi Esparza DISCHARGE CAREGIVER [3] Spouse [3] [phone] Patient Belongings The following personal items are in your possession at time of discharge: 
  Dental Appliances: Uppers         Home Medications: None      Clothing: Footwear, Shirt, Belt, Pants    Other Valuables: [employer] Please provide this summary of care documentation to your next provider. Signatures-by signing, you are acknowledging that this After Visit Summary has been reviewed with you and you have received a copy. Patient Signature:  ____________________________________________________________ Date:  ____________________________________________________________  
  
Alfonzo José Provider Signature:  ____________________________________________________________ Date:  ____________________________________________________________

## 2018-09-06 ENCOUNTER — APPOINTMENT (OUTPATIENT)
Dept: GENERAL RADIOLOGY | Age: 63
DRG: 871 | End: 2018-09-06
Attending: INTERNAL MEDICINE
Payer: MEDICARE

## 2018-09-06 PROBLEM — J69.0 ASPIRATION PNEUMONIA OF RIGHT LOWER LOBE (HCC): Status: ACTIVE | Noted: 2018-09-06

## 2018-09-06 PROBLEM — J18.9 PNEUMONIA OF RIGHT LOWER LOBE DUE TO INFECTIOUS ORGANISM: Status: ACTIVE | Noted: 2018-09-06

## 2018-09-06 PROBLEM — A09 DIARRHEA OF INFECTIOUS ORIGIN: Status: ACTIVE | Noted: 2018-09-06

## 2018-09-06 PROBLEM — J96.01 ACUTE RESPIRATORY FAILURE WITH HYPOXIA (HCC): Status: RESOLVED | Noted: 2018-09-05 | Resolved: 2018-09-06

## 2018-09-06 PROBLEM — K52.9 GASTROENTERITIS: Status: ACTIVE | Noted: 2018-09-06

## 2018-09-06 PROBLEM — J44.9 CHRONIC OBSTRUCTIVE PULMONARY DISEASE (HCC): Chronic | Status: ACTIVE | Noted: 2017-08-28

## 2018-09-06 PROBLEM — C91.10 CLL (CHRONIC LYMPHOCYTIC LEUKEMIA) (HCC): Chronic | Status: ACTIVE | Noted: 2018-07-24

## 2018-09-06 LAB
ALBUMIN SERPL-MCNC: 3.3 G/DL (ref 3.2–4.6)
ALBUMIN/GLOB SERPL: 1.2 {RATIO} (ref 1.2–3.5)
ALP SERPL-CCNC: 68 U/L (ref 50–136)
ALT SERPL-CCNC: 19 U/L (ref 12–65)
ANION GAP SERPL CALC-SCNC: 9 MMOL/L (ref 7–16)
AST SERPL-CCNC: 27 U/L (ref 15–37)
BACTERIA URNS QL MICRO: 0 /HPF
BASOPHILS # BLD: 0 K/UL (ref 0–0.2)
BASOPHILS NFR BLD: 0 % (ref 0–2)
BILIRUB SERPL-MCNC: 0.5 MG/DL (ref 0.2–1.1)
BUN SERPL-MCNC: 6 MG/DL (ref 8–23)
CALCIUM SERPL-MCNC: 8 MG/DL (ref 8.3–10.4)
CASTS URNS QL MICRO: NORMAL /LPF
CHLORIDE SERPL-SCNC: 112 MMOL/L (ref 98–107)
CO2 SERPL-SCNC: 23 MMOL/L (ref 21–32)
CREAT SERPL-MCNC: 0.74 MG/DL (ref 0.8–1.5)
DIFFERENTIAL METHOD BLD: ABNORMAL
EOSINOPHIL # BLD: 0 K/UL (ref 0–0.8)
EOSINOPHIL NFR BLD: 0 % (ref 0.5–7.8)
EPI CELLS #/AREA URNS HPF: NORMAL /HPF
ERYTHROCYTE [DISTWIDTH] IN BLOOD BY AUTOMATED COUNT: 13 %
GLOBULIN SER CALC-MCNC: 2.8 G/DL (ref 2.3–3.5)
GLUCOSE SERPL-MCNC: 109 MG/DL (ref 65–100)
HCT VFR BLD AUTO: 42 % (ref 41.1–50.3)
HGB BLD-MCNC: 13.8 G/DL (ref 13.6–17.2)
IMM GRANULOCYTES # BLD: 0 K/UL (ref 0–0.5)
IMM GRANULOCYTES NFR BLD AUTO: 0 % (ref 0–5)
LYMPHOCYTES # BLD: 8.6 K/UL (ref 0.5–4.6)
LYMPHOCYTES NFR BLD: 50 % (ref 13–44)
MAGNESIUM SERPL-MCNC: 2.1 MG/DL (ref 1.8–2.4)
MCH RBC QN AUTO: 30.5 PG (ref 26.1–32.9)
MCHC RBC AUTO-ENTMCNC: 32.9 G/DL (ref 31.4–35)
MCV RBC AUTO: 92.7 FL (ref 79.6–97.8)
MONOCYTES # BLD: 1.7 K/UL (ref 0.1–1.3)
MONOCYTES NFR BLD: 10 % (ref 4–12)
NEUTS SEG # BLD: 6.9 K/UL (ref 1.7–8.2)
NEUTS SEG NFR BLD: 40 % (ref 43–78)
NRBC # BLD: 0.02 K/UL (ref 0–0.2)
PLATELET # BLD AUTO: 204 K/UL (ref 150–450)
PLATELET COMMENTS,PCOM: ADEQUATE
PMV BLD AUTO: 9.7 FL (ref 9.4–12.3)
POTASSIUM SERPL-SCNC: 3.1 MMOL/L (ref 3.5–5.1)
PROT SERPL-MCNC: 6.1 G/DL (ref 6.3–8.2)
RBC # BLD AUTO: 4.53 M/UL (ref 4.23–5.6)
RBC #/AREA URNS HPF: NORMAL /HPF
SODIUM SERPL-SCNC: 144 MMOL/L (ref 136–145)
TROPONIN I SERPL-MCNC: 0.02 NG/ML (ref 0.02–0.05)
TROPONIN I SERPL-MCNC: 0.02 NG/ML (ref 0.02–0.05)
TROPONIN I SERPL-MCNC: 0.03 NG/ML (ref 0.02–0.05)
WBC # BLD AUTO: 17.2 K/UL (ref 4.3–11.1)
WBC MORPH BLD: ABNORMAL
WBC URNS QL MICRO: NORMAL /HPF

## 2018-09-06 PROCEDURE — 36415 COLL VENOUS BLD VENIPUNCTURE: CPT

## 2018-09-06 PROCEDURE — 65270000029 HC RM PRIVATE

## 2018-09-06 PROCEDURE — 87186 SC STD MICRODIL/AGAR DIL: CPT

## 2018-09-06 PROCEDURE — 85025 COMPLETE CBC W/AUTO DIFF WBC: CPT

## 2018-09-06 PROCEDURE — 74011000258 HC RX REV CODE- 258: Performed by: INTERNAL MEDICINE

## 2018-09-06 PROCEDURE — 87449 NOS EACH ORGANISM AG IA: CPT

## 2018-09-06 PROCEDURE — 77030013140 HC MSK NEB VYRM -A

## 2018-09-06 PROCEDURE — 94640 AIRWAY INHALATION TREATMENT: CPT

## 2018-09-06 PROCEDURE — 74011250637 HC RX REV CODE- 250/637: Performed by: INTERNAL MEDICINE

## 2018-09-06 PROCEDURE — 77030021668 HC NEB PREFIL KT VYRM -A

## 2018-09-06 PROCEDURE — 94760 N-INVAS EAR/PLS OXIMETRY 1: CPT

## 2018-09-06 PROCEDURE — 74011000250 HC RX REV CODE- 250: Performed by: INTERNAL MEDICINE

## 2018-09-06 PROCEDURE — 77010033678 HC OXYGEN DAILY

## 2018-09-06 PROCEDURE — 74011250636 HC RX REV CODE- 250/636: Performed by: EMERGENCY MEDICINE

## 2018-09-06 PROCEDURE — 81015 MICROSCOPIC EXAM OF URINE: CPT

## 2018-09-06 PROCEDURE — C9113 INJ PANTOPRAZOLE SODIUM, VIA: HCPCS | Performed by: INTERNAL MEDICINE

## 2018-09-06 PROCEDURE — 84484 ASSAY OF TROPONIN QUANT: CPT

## 2018-09-06 PROCEDURE — 74011250636 HC RX REV CODE- 250/636: Performed by: INTERNAL MEDICINE

## 2018-09-06 PROCEDURE — 87086 URINE CULTURE/COLONY COUNT: CPT

## 2018-09-06 PROCEDURE — 71045 X-RAY EXAM CHEST 1 VIEW: CPT

## 2018-09-06 PROCEDURE — 87045 FECES CULTURE AEROBIC BACT: CPT

## 2018-09-06 PROCEDURE — 80053 COMPREHEN METABOLIC PANEL: CPT

## 2018-09-06 PROCEDURE — 77030020263 HC SOL INJ SOD CL0.9% LFCR 1000ML

## 2018-09-06 PROCEDURE — 74011000250 HC RX REV CODE- 250: Performed by: HOSPITALIST

## 2018-09-06 RX ORDER — SERTRALINE HYDROCHLORIDE 100 MG/1
100 TABLET, FILM COATED ORAL DAILY
Status: DISCONTINUED | OUTPATIENT
Start: 2018-09-06 | End: 2018-09-11 | Stop reason: HOSPADM

## 2018-09-06 RX ORDER — ALBUTEROL SULFATE 0.83 MG/ML
2.5 SOLUTION RESPIRATORY (INHALATION)
Status: DISCONTINUED | OUTPATIENT
Start: 2018-09-06 | End: 2018-09-09

## 2018-09-06 RX ORDER — AMLODIPINE BESYLATE 5 MG/1
5 TABLET ORAL EVERY EVENING
Status: DISCONTINUED | OUTPATIENT
Start: 2018-09-06 | End: 2018-09-09

## 2018-09-06 RX ORDER — SOTALOL HYDROCHLORIDE 80 MG/1
80 TABLET ORAL EVERY 12 HOURS
Status: DISCONTINUED | OUTPATIENT
Start: 2018-09-06 | End: 2018-09-11 | Stop reason: HOSPADM

## 2018-09-06 RX ORDER — POTASSIUM CHLORIDE 14.9 MG/ML
20 INJECTION INTRAVENOUS
Status: COMPLETED | OUTPATIENT
Start: 2018-09-06 | End: 2018-09-06

## 2018-09-06 RX ORDER — ZOLPIDEM TARTRATE 5 MG/1
5 TABLET ORAL
Status: DISCONTINUED | OUTPATIENT
Start: 2018-09-06 | End: 2018-09-11 | Stop reason: HOSPADM

## 2018-09-06 RX ORDER — SODIUM CHLORIDE 0.9 % (FLUSH) 0.9 %
5-10 SYRINGE (ML) INJECTION AS NEEDED
Status: DISCONTINUED | OUTPATIENT
Start: 2018-09-06 | End: 2018-09-11 | Stop reason: HOSPADM

## 2018-09-06 RX ORDER — HYDROCODONE BITARTRATE AND ACETAMINOPHEN 5; 325 MG/1; MG/1
1 TABLET ORAL
Status: DISCONTINUED | OUTPATIENT
Start: 2018-09-06 | End: 2018-09-11 | Stop reason: HOSPADM

## 2018-09-06 RX ORDER — HYDRALAZINE HYDROCHLORIDE 20 MG/ML
20 INJECTION INTRAMUSCULAR; INTRAVENOUS
Status: DISCONTINUED | OUTPATIENT
Start: 2018-09-06 | End: 2018-09-11 | Stop reason: HOSPADM

## 2018-09-06 RX ORDER — MAG HYDROX/ALUMINUM HYD/SIMETH 200-200-20
30 SUSPENSION, ORAL (FINAL DOSE FORM) ORAL
Status: DISCONTINUED | OUTPATIENT
Start: 2018-09-06 | End: 2018-09-11 | Stop reason: HOSPADM

## 2018-09-06 RX ORDER — POTASSIUM CHLORIDE 14.9 MG/ML
20 INJECTION INTRAVENOUS
Status: DISPENSED | OUTPATIENT
Start: 2018-09-06 | End: 2018-09-06

## 2018-09-06 RX ORDER — GUAIFENESIN/DEXTROMETHORPHAN 100-10MG/5
10 SYRUP ORAL
Status: DISCONTINUED | OUTPATIENT
Start: 2018-09-06 | End: 2018-09-11 | Stop reason: HOSPADM

## 2018-09-06 RX ORDER — SODIUM CHLORIDE 9 MG/ML
75 INJECTION, SOLUTION INTRAVENOUS CONTINUOUS
Status: DISCONTINUED | OUTPATIENT
Start: 2018-09-06 | End: 2018-09-07

## 2018-09-06 RX ORDER — ALBUTEROL SULFATE 0.83 MG/ML
2.5 SOLUTION RESPIRATORY (INHALATION)
Status: DISCONTINUED | OUTPATIENT
Start: 2018-09-06 | End: 2018-09-06 | Stop reason: SDUPTHER

## 2018-09-06 RX ADMIN — SOTALOL HYDROCHLORIDE 80 MG: 80 TABLET ORAL at 12:50

## 2018-09-06 RX ADMIN — NYSTATIN 500000 UNITS: 100000 SUSPENSION ORAL at 22:11

## 2018-09-06 RX ADMIN — NYSTATIN 500000 UNITS: 100000 SUSPENSION ORAL at 15:53

## 2018-09-06 RX ADMIN — SODIUM CHLORIDE 125 ML/HR: 900 INJECTION, SOLUTION INTRAVENOUS at 17:27

## 2018-09-06 RX ADMIN — SOTALOL HYDROCHLORIDE 80 MG: 80 TABLET ORAL at 22:11

## 2018-09-06 RX ADMIN — NYSTATIN 500000 UNITS: 100000 SUSPENSION ORAL at 11:15

## 2018-09-06 RX ADMIN — POTASSIUM CHLORIDE 20 MEQ: 14.9 INJECTION, SOLUTION INTRAVENOUS at 17:30

## 2018-09-06 RX ADMIN — POTASSIUM CHLORIDE 20 MEQ: 14.9 INJECTION, SOLUTION INTRAVENOUS at 15:54

## 2018-09-06 RX ADMIN — VANCOMYCIN HYDROCHLORIDE 1500 MG: 10 INJECTION, POWDER, LYOPHILIZED, FOR SOLUTION INTRAVENOUS at 03:15

## 2018-09-06 RX ADMIN — AMLODIPINE BESYLATE 5 MG: 5 TABLET ORAL at 17:39

## 2018-09-06 RX ADMIN — POTASSIUM CHLORIDE 20 MEQ: 14.9 INJECTION, SOLUTION INTRAVENOUS at 20:40

## 2018-09-06 RX ADMIN — SODIUM CHLORIDE 40 MG: 9 INJECTION INTRAMUSCULAR; INTRAVENOUS; SUBCUTANEOUS at 20:39

## 2018-09-06 RX ADMIN — PIPERACILLIN SODIUM,TAZOBACTAM SODIUM 3.38 G: 3; .375 INJECTION, POWDER, FOR SOLUTION INTRAVENOUS at 16:26

## 2018-09-06 RX ADMIN — SODIUM CHLORIDE 40 MG: 9 INJECTION INTRAMUSCULAR; INTRAVENOUS; SUBCUTANEOUS at 09:00

## 2018-09-06 RX ADMIN — ALBUTEROL SULFATE 2.5 MG: 2.5 SOLUTION RESPIRATORY (INHALATION) at 14:44

## 2018-09-06 RX ADMIN — ALBUTEROL SULFATE 2.5 MG: 2.5 SOLUTION RESPIRATORY (INHALATION) at 20:55

## 2018-09-06 RX ADMIN — Medication 5 ML: at 13:34

## 2018-09-06 RX ADMIN — SODIUM CHLORIDE 125 ML/HR: 900 INJECTION, SOLUTION INTRAVENOUS at 06:57

## 2018-09-06 RX ADMIN — SERTRALINE HYDROCHLORIDE 100 MG: 25 TABLET ORAL at 11:16

## 2018-09-06 RX ADMIN — ALBUTEROL SULFATE 2.5 MG: 2.5 SOLUTION RESPIRATORY (INHALATION) at 08:11

## 2018-09-06 RX ADMIN — POTASSIUM CHLORIDE 20 MEQ: 14.9 INJECTION, SOLUTION INTRAVENOUS at 00:30

## 2018-09-06 RX ADMIN — NYSTATIN 500000 UNITS: 100000 SUSPENSION ORAL at 17:37

## 2018-09-06 NOTE — PROGRESS NOTES
TRANSFER - IN REPORT: 
 
Verbal report received from [place], RN(name) on [facility]  being received from ER(unit) for routine progression of care Report consisted of patients Situation, Background, Assessment and  
Recommendations(SBAR). Information from the following report(s) SBAR, Kardex, [place] and Recent Results was reviewed with the receiving nurse. Opportunity for questions and clarification was provided. Report given to Darby Fox RN, who will be assuming primary care on arrival to floor.

## 2018-09-06 NOTE — PROGRESS NOTES
Hospitalist Progress Note Admit Date:  2018  2:13 PM  
Name:  Stoney Nick Age:  58 y.o. 
:  1955 MRN:  [id number] PCP:  Koko Carson DO Treatment Team: Consulting Provider: Malina Zabala MD; Primary Nurse: Carlito Gil RN; Utilization Review: Hilda Silva RN Subjective:  
Mr. Antwan Sarkar is a 57 yo male with PMH of AFIB (no anticoagulation), CAD on asa/ plavix, COPD, HTN,  CLL 9 followed [facility])  Evaluated with 3 days of nausea/ emesis, loose BM with dark stools. He was seen by PCP, told had blood in stool and recommended ED workup. Denied NSIAD or ETOH use. Endorses anorexia, diffuse ABD pain, fever. pending WATCHMAN device [facility] [place] and is on asa/ plavix and will need further anticoagulation. Was thought to possible aspirate with emesis while in xray and developed hypoxia afterwards. Follow up CXR showed RLL PNA/aspiration. Met sepsis criteria. CTAP showed distal esophageal wall thickening and descending colon wall thickening. Admitted and started on broad abx, IVF. GI was consulted for assistance with GI issues.  - pt says he is still having loose stools. No blood. Feeling better overall than he was yesterday. No fevers/chills/n/v. Off oxygen Objective:  
 
Patient Vitals for the past 24 hrs: 
 Temp Pulse Resp BP SpO2  
18 0915 - (!) 105 16 135/79 93 % 18 0811 - - - - 98 % 18 0348 98.9 °F (37.2 °C) 95 16 120/59 97 % 18 2227 - (!) 101 18 (!) 160/109 94 % 18 2157 - (!) 108 18 (!) 158/93 96 % 18 2027 - (!) 104 - 163/87 96 % 18 1927 - (!) 117 - 161/86 96 % 18 1857 - (!) 113 - 145/88 96 % 18 1833 - (!) 118 18 139/75 (!) 74 % 18 1827 - - - 139/75 -  
09/05/18 1152 (!) 100.8 °F (38.2 °C) 79 18 117/83 98 % Oxygen Therapy O2 Sat (%): 93 % (18) Pulse via Oximetry: 87 beats per minute (18) O2 Device: Room air (18) O2 Flow Rate (L/min): 4 l/min (decreased to 2L) (09/06/18 7043) No intake or output data in the 24 hours ending 09/06/18 1113 General:    Well nourished. Alert. CV:   Tachy, reg. No murmur, rub, or gallop. Lungs:   CTAB. No wheezing, rhonchi, or rales. Abdomen:   Soft, mild TTP left sided, nondistended. Extremities: Warm and dry. No cyanosis or edema. Skin:     No rashes or jaundice. Neuro:  No gross focal deficits Data Review: 
I have reviewed all labs, meds, telemetry events, and studies from the last 24 hours: 
 
Recent Results (from the past 24 hour(s)) CBC WITH AUTOMATED DIFF Collection Time: 09/05/18 11:58 AM  
Result Value Ref Range WBC 24.8 (H) 4.3 - 11.1 K/uL  
 RBC 5.45 4.23 - 5.6 M/uL  
 HGB 16.4 13.6 - 17.2 g/dL HCT 49.4 41.1 - 50.3 % MCV 90.6 79.6 - 97.8 FL  
 MCH 30.1 26.1 - 32.9 PG  
 MCHC 33.2 31.4 - 35.0 g/dL  
 RDW 12.8 % PLATELET 901 364 - 886 K/uL MPV 10.2 9.4 - 12.3 FL ABSOLUTE NRBC 0.00 0.0 - 0.2 K/uL NEUTROPHILS 56 43 - 78 % LYMPHOCYTES 37 13 - 44 % MONOCYTES 7 4.0 - 12.0 % EOSINOPHILS 0 (L) 0.5 - 7.8 % BASOPHILS 0 0.0 - 2.0 % IMMATURE GRANULOCYTES 0 0.0 - 5.0 %  
 ABS. NEUTROPHILS 13.9 (H) 1.7 - 8.2 K/UL  
 ABS. LYMPHOCYTES 9.2 (H) 0.5 - 4.6 K/UL  
 ABS. MONOCYTES 1.7 (H) 0.1 - 1.3 K/UL  
 ABS. EOSINOPHILS 0.0 0.0 - 0.8 K/UL  
 ABS. BASOPHILS 0.0 0.0 - 0.2 K/UL  
 ABS. IMM. GRANS. 0.0 0.0 - 0.5 K/UL  
 RBC COMMENTS NORMOCYTIC/NORMOCHROMIC    
 WBC COMMENTS ATYPICAL LYMPHOCYTES PRESENT    
 PLATELET COMMENTS ADEQUATE    
 DF AUTOMATED METABOLIC PANEL, COMPREHENSIVE Collection Time: 09/05/18 11:58 AM  
Result Value Ref Range Sodium 141 136 - 145 mmol/L Potassium 3.3 (L) 3.5 - 5.1 mmol/L Chloride 106 98 - 107 mmol/L  
 CO2 24 21 - 32 mmol/L Anion gap 11 7 - 16 mmol/L Glucose 146 (H) 65 - 100 mg/dL BUN 10 8 - 23 MG/DL Creatinine 1.12 0.8 - 1.5 MG/DL  
 GFR est AA >60 >60 ml/min/1.73m2 GFR est non-AA >60 >60 ml/min/1.73m2 Calcium 9.6 8.3 - 10.4 MG/DL Bilirubin, total 0.7 0.2 - 1.1 MG/DL  
 ALT (SGPT) 20 12 - 65 U/L  
 AST (SGOT) 22 15 - 37 U/L Alk. phosphatase 99 50 - 136 U/L Protein, total 7.9 6.3 - 8.2 g/dL Albumin 4.3 3.2 - 4.6 g/dL Globulin 3.6 (H) 2.3 - 3.5 g/dL A-G Ratio 1.2 1.2 - 3.5 LIPASE Collection Time: 09/05/18 11:58 AM  
Result Value Ref Range Lipase 91 73 - 393 U/L  
POC LACTIC ACID Collection Time: 09/05/18 12:00 PM  
Result Value Ref Range Lactic Acid (POC) 2.4 (H) 0.5 - 1.9 mmol/L POC LACTIC ACID Collection Time: 09/05/18  2:56 PM  
Result Value Ref Range Lactic Acid (POC) 1.2 0.5 - 1.9 mmol/L  
CULTURE, BLOOD Collection Time: 09/05/18  3:05 PM  
Result Value Ref Range Special Requests: LEFT 
HAND Culture result: NO GROWTH AFTER 18 HOURS    
CULTURE, BLOOD Collection Time: 09/05/18  3:06 PM  
Result Value Ref Range Special Requests: RIGHT Antecubital 
    
 Culture result: NO GROWTH AFTER 18 HOURS    
BLOOD GAS, ARTERIAL Collection Time: 09/05/18  6:42 PM  
Result Value Ref Range pH 7.34 (L) 7.35 - 7.45    
 PCO2 46 (H) 35 - 45 mmHg PO2 <34 (L) 80 - 105 mmHg BICARBONATE 24 22 - 26 mmol/L  
 BASE DEFICIT 1.8 0 - 2 mmol/L  
 TOTAL HEMOGLOBIN 15.9 (H) 11.7 - 15.0 GM/DL  
 O2 SAT 54 (L) 92 - 98.5 % Arterial O2 Hgb 52.9 (L) 94 - 97 % CARBOXYHEMOGLOBIN 1.1 0.5 - 1.5 % METHEMOGLOBIN 0.4 0.0 - 1.5 % DEOXYHEMOGLOBIN 46 (H) 0.0 - 5.0 % SITE RR   
 ALLENS TEST POSITIVE    
 MODE RA   
 FIO2 21.0 % Respiratory comment:    
  Margaux COOPER at 9 5 2018 6 48 03 PM. Read back. O2 saturation 55% on monitor. EKG, 12 LEAD, INITIAL Collection Time: 09/05/18  6:57 PM  
Result Value Ref Range Ventricular Rate 112 BPM  
 Atrial Rate 112 BPM  
 P-R Interval 134 ms QRS Duration 74 ms Q-T Interval 346 ms  
 QTC Calculation (Bezet) 472 ms Calculated P Axis 72 degrees Calculated R Axis 11 degrees Calculated T Axis 49 degrees Diagnosis    
  !! AGE AND GENDER SPECIFIC ECG ANALYSIS !! Sinus tachycardia with occasional Premature ventricular complexes Nonspecific ST and T wave abnormality Abnormal ECG When compared with ECG of 05-SEP-2018 18:51, 
Premature ventricular complexes are now Present Confirmed by SINA COOPER (), MARK DUMAS ([id number]) on 9/5/2018 7:57:26 PM 
  
MAGNESIUM Collection Time: 09/05/18 11:55 PM  
Result Value Ref Range Magnesium 2.1 1.8 - 2.4 mg/dL TROPONIN I Collection Time: 09/05/18 11:55 PM  
Result Value Ref Range Troponin-I, Qt. 0.03 0.02 - 0.05 NG/ML  
TROPONIN I Collection Time: 09/06/18  8:35 AM  
Result Value Ref Range Troponin-I, Qt. 0.02 0.02 - 1.73 NG/ML  
METABOLIC PANEL, COMPREHENSIVE Collection Time: 09/06/18  8:35 AM  
Result Value Ref Range Sodium 144 136 - 145 mmol/L Potassium 3.1 (L) 3.5 - 5.1 mmol/L Chloride 112 (H) 98 - 107 mmol/L  
 CO2 23 21 - 32 mmol/L Anion gap 9 7 - 16 mmol/L Glucose 109 (H) 65 - 100 mg/dL BUN 6 (L) 8 - 23 MG/DL Creatinine 0.74 (L) 0.8 - 1.5 MG/DL  
 GFR est AA >60 >60 ml/min/1.73m2 GFR est non-AA >60 >60 ml/min/1.73m2 Calcium 8.0 (L) 8.3 - 10.4 MG/DL Bilirubin, total 0.5 0.2 - 1.1 MG/DL  
 ALT (SGPT) 19 12 - 65 U/L  
 AST (SGOT) 27 15 - 37 U/L Alk. phosphatase 68 50 - 136 U/L Protein, total 6.1 (L) 6.3 - 8.2 g/dL Albumin 3.3 3.2 - 4.6 g/dL Globulin 2.8 2.3 - 3.5 g/dL A-G Ratio 1.2 1.2 - 3.5    
CBC WITH AUTOMATED DIFF Collection Time: 09/06/18  8:35 AM  
Result Value Ref Range WBC 17.2 (H) 4.3 - 11.1 K/uL  
 RBC 4.53 4.23 - 5.6 M/uL  
 HGB 13.8 13.6 - 17.2 g/dL HCT 42.0 41.1 - 50.3 % MCV 92.7 79.6 - 97.8 FL  
 MCH 30.5 26.1 - 32.9 PG  
 MCHC 32.9 31.4 - 35.0 g/dL  
 RDW 13.0 % PLATELET 529 321 - 863 K/uL MPV 9.7 9.4 - 12.3 FL ABSOLUTE NRBC 0.02 0.0 - 0.2 K/uL  
 DF AUTOMATED NEUTROPHILS 40 (L) 43 - 78 % LYMPHOCYTES 50 (H) 13 - 44 % MONOCYTES 10 4.0 - 12.0 % EOSINOPHILS 0 (L) 0.5 - 7.8 % BASOPHILS 0 0.0 - 2.0 % IMMATURE GRANULOCYTES 0 0.0 - 5.0 %  
 ABS. NEUTROPHILS 6.9 1.7 - 8.2 K/UL  
 ABS. LYMPHOCYTES 8.6 (H) 0.5 - 4.6 K/UL  
 ABS. MONOCYTES 1.7 (H) 0.1 - 1.3 K/UL  
 ABS. EOSINOPHILS 0.0 0.0 - 0.8 K/UL  
 ABS. BASOPHILS 0.0 0.0 - 0.2 K/UL  
 ABS. IMM. GRANS. 0.0 0.0 - 0.5 K/UL  
 WBC COMMENTS Result Confirmed By Smear PLATELET COMMENTS ADEQUATE All Micro Results Procedure Component Value Units Date/Time CULTURE, BLOOD [[id number]] Collected:  09/05/18 1505 Order Status:  Completed Specimen:  Blood from Blood Updated:  09/06/18 1030 Special Requests: --     
  LEFT 
HAND Culture result: NO GROWTH AFTER 18 HOURS     
 CULTURE, BLOOD [[id number]] Collected:  09/05/18 1506 Order Status:  Completed Specimen:  Blood from Blood Updated:  09/06/18 1030 Special Requests: --     
  RIGHT Antecubital 
  
  Culture result: NO GROWTH AFTER 18 HOURS     
 CULTURE, URINE [[id number]] Order Status:  Sent Specimen:  Urine from Clean catch Hal Hanley [[id number]] Order Status:  Sent Specimen:  Stool C. DIFFICILE AG & TOXIN A/B [[id number]] Order Status:  Sent Specimen:  Stool from Stool No results found for this visit on 09/05/18. Current Meds: 
Current Facility-Administered Medications Medication Dose Route Frequency  amLODIPine (NORVASC) tablet 5 mg  5 mg Oral QPM  
 sertraline (ZOLOFT) tablet 100 mg  100 mg Oral DAILY  sotalol (BETAPACE) tablet 80 mg  80 mg Oral Q12H  
 sodium chloride (NS) flush 5-10 mL  5-10 mL IntraVENous PRN  piperacillin-tazobactam (ZOSYN) 3.375 g in 0.9% sodium chloride (MBP/ADV) 100 mL  3.375 g IntraVENous Q8H  
 0.9% sodium chloride infusion  125 mL/hr IntraVENous CONTINUOUS  
 tiotropium (SPIRIVA) inhalation capsule 18 mcg  1 Cap Inhalation DAILY  And  
  albuterol (PROVENTIL VENTOLIN) nebulizer solution 2.5 mg  2.5 mg Nebulization Q6HWA RT  
 [START ON 9/7/2018] vanc trough reminder   Other ONCE  potassium chloride 20 mEq in 100 ml IVPB  20 mEq IntraVENous Q2H  
 alum-mag hydroxide-simeth (MYLANTA) oral suspension 30 mL  30 mL Oral Q4H PRN  
 guaiFENesin-dextromethorphan (ROBITUSSIN DM) 100-10 mg/5 mL syrup 10 mL  10 mL Oral Q4H PRN  
 hydrALAZINE (APRESOLINE) 20 mg/mL injection 20 mg  20 mg IntraVENous Q4H PRN  
 HYDROcodone-acetaminophen (NORCO) 5-325 mg per tablet 1 Tab  1 Tab Oral Q4H PRN  
 zolpidem (AMBIEN) tablet 5 mg  5 mg Oral QHS PRN  pantoprazole (PROTONIX) 40 mg in sodium chloride 0.9% 10 mL injection  40 mg IntraVENous Q12H  nystatin (MYCOSTATIN) 100,000 unit/mL oral suspension 500,000 Units  500,000 Units Oral QID  acetaminophen (TYLENOL) tablet 650 mg  650 mg Oral Q6H PRN  
 ondansetron (ZOFRAN) injection 4 mg  4 mg IntraVENous Q6H PRN Current Outpatient Prescriptions Medication Sig  
 sucralfate (CARAFATE) 1 gram tablet Take 1 Tab by mouth four (4) times daily.  promethazine (PHENERGAN) 25 mg tablet Take 1 Tab by mouth every six (6) hours as needed for Nausea.  promethazine (PHENERGAN) 25 mg suppository Insert 1 Suppository into rectum every six (6) hours as needed for Nausea for up to 7 days.  metroNIDAZOLE (FLAGYL) 500 mg tablet Take 1 Tab by mouth two (2) times a day for 10 days.  oxyCODONE IR (ROXICODONE) 5 mg immediate release tablet Take 1 Tab by mouth every four (4) hours as needed for Pain. Max Daily Amount: 30 mg.  
 levoFLOXacin (LEVAQUIN) 750 mg tablet Take 1 Tab by mouth daily.  meclizine (ANTIVERT) 25 mg tablet Take 1 Tab by mouth three (3) times daily as needed. Indications: Vertigo  pantoprazole (PROTONIX) 40 mg tablet Take 1 Tab by mouth daily.  amLODIPine (NORVASC) 5 mg tablet Take 1 Tab by mouth every evening.   
 sertraline (ZOLOFT) 100 mg tablet TAKE 1 TABLET EVERY DAY  
  lisinopril (PRINIVIL, ZESTRIL) 40 mg tablet Take 1 Tab by mouth daily.  ANORO ELLIPTA 62.5-25 mcg/actuation inhaler INHALE 1 PUFF DAILY  sotalol (BETAPACE) 80 mg tablet TAKE 1 TABLET TWICE DAILY.  albuterol (PROVENTIL VENTOLIN) 2.5 mg /3 mL (0.083 %) nebulizer solution 3 mL by Nebulization route every four (4) hours as needed for Wheezing.  clopidogrel (PLAVIX) 75 mg tab Take 1 Tab by mouth daily.  aspirin 81 mg chewable tablet Take 1 Tab by mouth daily.  albuterol (PROVENTIL HFA, VENTOLIN HFA, PROAIR HFA) 90 mcg/actuation inhaler Take 2 Puffs by inhalation every four (4) hours as needed.  cetirizine (ZYRTEC) 10 mg tablet Take 1 Tab by mouth nightly.  colestipol (COLESTID) 1 gram tablet Take 1 g by mouth daily.  nitroglycerin (NITROSTAT) 0.4 mg SL tablet 1 Tab by SubLINGual route every five (5) minutes as needed for Chest Pain.  multivitamin (ONE A DAY) tablet Take 1 Tab by mouth daily. Other Studies (last 24 hours): Xr Chest Pa Lat Result Date: 9/5/2018 Two view chest History: Chest Pain. Fever Comparison: 03/07/2013 Findings: The heart and mediastinal silhouette are normal in size and configuration. The lungs and pleural spaces are clear aside from minor scarring at the lung bases. The pulmonary vascularity is within normal limits. The visualized osseous structures are unremarkable. Impression: No active disease in the chest.  
 
Ct Abd Pelv W Cont Result Date: 9/5/2018 CT abdomen and pelvis with contrast: 09/05/2018 History: Nausea, vomiting and diarrhea x2 days. Dark stools. Technique: Helically acquired images were obtained from the domes of the diaphragms to the ischial tuberosities reconstructed at 5mm intervals after the uneventful administration of oral contrast for bowel opacification and 100 cc's of intravenous Isovue-370 to evaluate the solid abdominal viscera and vascular structures.  Coronal reformatted images were submitted. Radiation dose reduction techniques were used for this study:  Our CT scanners use one or all of the following: Automated exposure control, adjustment of the mA and/or kVp according to patient's size, iterative reconstruction. Comparison: 01/23/2017 CT ABDOMEN: There is circumferential wall thickening of the distal esophagus which represents an interval change. The liver is unremarkable. Few scattered splenic calcifications suggest prior granulomatous disease. No adenopathy or ascites is present. The pancreas, adrenal glands and kidneys are unremarkable. The patient status post cholecystectomy. There is suspicion for mild circumferential wall thickening of the descending colon versus collapse. CT PELVIS: There are scattered sigmoid diverticula without inflammatory change. No adenopathy or ascites is present. There are postoperative changes of right herniorrhaphy. IMPRESSION: 1. Partially imaged circumferential wall thickening of the distal esophagus. Consider direct inspection for further evaluation as this could be infectious/inflammatory versus neoplasm. 2. Mild circumferential thickening suspected of the descending colon concerning for a nonspecific colitis. 3. Sigmoid diverticula. Xr Chest [facility] Result Date: 9/6/2018 EXAM:  XR CHEST PORT INDICATION:  Sepsis COMPARISON:  9/5/2018 FINDINGS: A portable AP radiograph of the chest was obtained at 0617 hours. The patient is on a cardiac monitor. Right lower lobe airspace disease. The cardiac and mediastinal contours and pulmonary vascularity are normal.  The bones and soft tissues are grossly within normal limits. IMPRESSION: Right lower lobe atelectasis or pneumonia. Assessment and Plan:  
 
Hospital Problems as of 9/6/2018  Date Reviewed: 9/5/2018 Codes Class Noted - Resolved POA Diarrhea of infectious origin ICD-10-CM: A09 ICD-9-CM: 009.3  9/6/2018 - Present Yes Aspiration pneumonia of right lower lobe (HCC) ICD-10-CM: J69.0 ICD-9-CM: 507.0  9/6/2018 - Present Yes Gastroenteritis ICD-10-CM: K52.9 ICD-9-CM: 558.9  9/6/2018 - Present Yes Colitis ICD-10-CM: K52.9 ICD-9-CM: 558.9  9/5/2018 - Present Yes * (Principal)Sepsis ([facility] ICD-10-CM: A41.9 ICD-9-CM: 038.9, 995.91  9/5/2018 - Present Yes Hypokalemia ICD-10-CM: E87.6 ICD-9-CM: 276.8  9/5/2018 - Present Yes Esophagitis ICD-10-CM: K20.9 ICD-9-CM: 530.10  9/5/2018 - Present Yes Thrush (Chronic) ICD-10-CM: B37.0 ICD-9-CM: 112.0  9/5/2018 - Present Yes CLL (chronic lymphocytic leukemia) (HCC) (Chronic) ICD-10-CM: C91.90 ICD-9-CM: 204.10  7/24/2018 - Present Yes Chronic obstructive pulmonary disease (HCC) (Chronic) ICD-10-CM: J44.9 ICD-9-CM: 445  8/28/2017 - Present Yes Atrial fibrillation (HCC) (Chronic) ICD-10-CM: I48.91 
ICD-9-CM: 427.31  7/22/2010 - Present Yes HTN (hypertension) (Chronic) ICD-10-CM: I10 
ICD-9-CM: 401.9  7/22/2010 - Present Yes CAD (coronary artery disease) (Chronic) ICD-10-CM: I25.10 ICD-9-CM: 414.00  12/1/2009 - Present Yes Overview Signed 12/1/2009  8:28 AM by Nikko Montalvo  
  pci to lad 11/19 2.75x28 Xience drug eluting stent. RESOLVED: Acute respiratory failure with hypoxia ([facility] ICD-10-CM: J96.01 
ICD-9-CM: 518.81  9/5/2018 - 9/6/2018 Yes Plan: · Diarrhea, RLL PNA, Sepsis - Agree with zosyn. Will stop vanc. Suspect aspiration PNA related to vomiting, as well as enteritis. WBC improved. Lactate now wnl. Stool studies and pancultures pending. Cont NS IVF. GI following, appreciate, considering EGD. · Hypoxia - resolved. Was on 4L NC last night · HypoK - Replace K 60meq. Check Mg. Probably GI losses · HTN- Cont norvasc. PRN hydralazine DC planning/Dispo:  Home when improved Diet:  DIET CLEAR LIQUID 
DVT ppx:  SCDs Signed: 
Claudio Gomez MD

## 2018-09-06 NOTE — PROGRESS NOTES
Patient remains in stable condition with respirations even/unlabored. No acute distress noted. No needs noted or voiced at this time. Safety measures in place. Call light remains within reach. Preparing to give report to oncoming shift.

## 2018-09-06 NOTE — PROGRESS NOTES
Patient arrived to floor in stable condition. No acute distress noted. Respirations even/unlabored. Patient ambulated to the bed x 1 assist with no incident noted. VSS. Temp: 98.3 °F (36.8 °C) (09/06/18 1323) Pulse (Heart Rate): 84 (09/06/18 1323) Resp Rate: 18 (09/06/18 1323) BP: 139/81 (09/06/18 1323) O2 Sat (%): 92 % (09/06/18 1323) Weight: 81.6 kg (180 lb) (09/06/18 1331) Skin assessment completed with Los Roper RN. Blisters noted to left shoulder blade. Patient states, \"I have shingles. \" Blisters appear to be dry will institute precautions as necessary. All other skin remains within defined limits. Will continue to monitor.

## 2018-09-06 NOTE — ROUTINE PROCESS
TRANSFER - OUT REPORT: 
 
Verbal report given to Antonio Hayward Faster  being transferred to room 23-14-20-09 for routine progression of care Report consisted of patients Situation, Background, Assessment and  
Recommendations(SBAR). Information from the following report(s) ED Summary was reviewed with the receiving nurse. Lines:  
Peripheral IV 09/05/18 Left Hand (Active) Site Assessment Clean, dry, & intact 9/5/2018 11:59 AM  
Phlebitis Assessment 0 9/5/2018 11:59 AM  
Infiltration Assessment 0 9/5/2018 11:59 AM  
Dressing Status Clean, dry, & intact 9/5/2018 11:59 AM  
   
Peripheral IV 09/05/18 Right Forearm (Active) Opportunity for questions and clarification was provided. Patient transported with: 
 [place] O2 at 4 liters

## 2018-09-06 NOTE — PROGRESS NOTES
Pharmacokinetic Consult to Pharmacist 
 
Jr Saldana is a 58 y.o. male being treated for sepsis with vancomycin. Height: 6' 1\" (185.4 cm)  Weight: 80.7 kg (178 lb) Lab Results Component Value Date/Time BUN 10 09/05/2018 11:58 AM  
 Creatinine 1.12 09/05/2018 11:58 AM  
 WBC 24.8 (H) 09/05/2018 11:58 AM  
 Lactic Acid (POC) 1.2 09/05/2018 02:56 PM  
  
Estimated Creatinine Clearance: 75.3 mL/min (based on Cr of 1.12). CULTURES: 
All Micro Results Procedure Component Value Units Date/Time Crow Harper [[id number]] Order Status:  Sent Specimen:  Stool C. DIFFICILE AG & TOXIN A/B [[id number]] Order Status:  Sent Specimen:  Stool from Stool CULTURE, BLOOD [[id number]] Collected:  09/05/18 1506 Order Status:  Completed Specimen:  Blood Updated:  09/05/18 1559 CULTURE, BLOOD [[id number]] Collected:  09/05/18 1505 Order Status:  Completed Specimen:  Blood Updated:  09/05/18 1559 Day 1 of vancomycin. Goal trough is 15-20. Vancomycin dose initiated at 2,000mg load, followed by 1,500 mg q12h. Predicted trough of 16.0. Will continue to follow patient. Thank you, Jake Ponce, PharmD

## 2018-09-06 NOTE — CONSULTS
[facility] Consult Note       Primary GI Physician: Dr. Hadley Montgomery    Referring Physician:  Dr. Mahogany Reese    Consult Date:  9/6/2018    Admit Date:  9/5/2018    Chief Complaint:  Nausea, vomiting, abdominal pain, diarrhea; abnormal CT    Subjective:     History of Present Illness:  Patient is a 58 y.o. male with PMH of a. Fib, CAD s/p stent most recently 12/2017 (on asa/plavix), COPD, HTN, CLL, who is seen in consultation at the request of Dr. Mahogany Reese for nausea, vomitnig, abdominal pain, diarrhea, abnormal CT. Three days ago, patient developed chills, vomiting followed by onset of diarrhea and abdominal pain the following day. Initially, the emesis was yellow and diarrhea was brown. However, two days ago, both the emesis and stool became black in color. He saw his PCP yesterday and was referred to the ED where he was found to have hgb 16.4, white count 24.8K with left shift, platelet count 536J, BUN 10, creatinine 1.12, normal LFTs and lipase. CT of the abdomen/pelvis with contrast 9/5/18 revealed circumferential wall thickening of the distal esophagus, s/p tayo and mild circumferential wall thickening of the descending colon versus collapse and sigmoid diverticulosis. He is being admitted by hospitalists with blood cultures pending and stool for c. Diff and c&s ordered. He is on Protonix 40 mg IV q12 hours, zosyn and vanc. He has been having multiple liquid stools, most recently about an hour ago. Last episode of vomiting was last night. He denies any recent abx use, nor travel. His daughter was recently diagnosed with c. Diff two weeks ago and he has been in contact with her while she was on abx. No other exposures to sick contacts. He takes Protonix once daily at home and denies reflux and dysphagia. Reports 8 pound weight loss with this acute illness. His abdominal pain has radiated from the upper abdomen down the left side.  BMs are typically eratic since his cholecystectomy--may just have 1 BM one day and then may have multiple stools the next day. His last EGD was 4/2017 by Dr. Christ Garnica showing only [facility]. His last colo was 5/2016 with adenomatous colon polyps removed and IH and diverticulosis noted. His last aspirin and plavix was September 3. He is scheduled to have WATCHMAN device October 16. Cardiologist is Dr. Yvette Contreras. KEN on 8/13/18 revealed normal LV systolic function.     PMH:  Past Medical History:   Diagnosis Date    Arrhythmia     A Fib twice since 7/2010-- ablation no problems since    Arthritis     CAD (coronary artery disease) 11/2009    stent x 2-- last stent 12/2017    Cancer ([address]     skin cancer    Chest pain     Chronic midline low back pain without sciatica 8/28/2017    CLL (chronic lymphocytic leukemia) ([address] 7/24/2018    COPD     prn inhalers    Coronary atherosclerosis of native coronary artery 12/30/2015    Depression     Dyspnea on exertion     with any activity; associated with nausea    GERD (gastroesophageal reflux disease) 7/22/2010    Heart failure ([address]     Hematochezia     HTN (hypertension) x 1 month    started on med--- controlled    Hypercholesteremia     controlled by medication    Other ill-defined conditions(799.89)     dyslipidemia/hyperlipidemia    Palpitations 12/30/2015    Paroxysmal atrial fibrillation ([address]     Psychiatric disorder     depression    PUD (peptic ulcer disease) 2010    hx-- r/t coumadin--    Recurrent depression ([address] 8/8/2016    Tobacco use disorder 12/30/2015    Unspecified adverse effect of anesthesia 1978    quit breathing       PSH:  Past Surgical History:   Procedure Laterality Date    HX CHOLECYSTECTOMY      HX COLONOSCOPY  2012    HX HEART CATHETERIZATION  11/2009    PCI x 1 to LAD  ----ablation 2010    HX HEART CATHETERIZATION  3/2016    HX HEENT  1977 and 1978    eardrum repair-- right    HX HERNIA REPAIR  1990    Right inguinal    HX LAP CHOLECYSTECTOMY  1995  HX ORTHOPAEDIC      left shoulder surg x1    HX ORTHOPAEDIC Left 2012    hand    HX OTHER SURGICAL      EGD    HX SHOULDER ARTHROSCOPY  2000    x 3 on right       Allergies: Allergies   Allergen Reactions    Fentanyl Other (comments)     States had through a PCA after shoulder surgery in 2000, and \"throat swelled shut\" and that he \"quit breathing\". Update 11/16/10--Patient thinks the PCA gave him to much and it wasn't an allergy to drug itself.  Lovastatin Other (comments)     Hullucinations    Niacin Other (comments)     Denies allery    Advicor [Niacin-Lovastatin] Anaphylaxis and Itching       Home Medications:  Prior to Admission medications    Medication Sig Start Date End Date Taking? Authorizing Provider   sucralfate (CARAFATE) 1 gram tablet Take 1 Tab by mouth four (4) times daily. 9/5/18  Yes Annette Saravia MD   promethazine (PHENERGAN) 25 mg tablet Take 1 Tab by mouth every six (6) hours as needed for Nausea. 9/5/18  Yes Annette Saravia MD   promethazine (PHENERGAN) 25 mg suppository Insert 1 Suppository into rectum every six (6) hours as needed for Nausea for up to 7 days. 9/5/18 9/12/18 Yes Annette Saravia MD   metroNIDAZOLE (FLAGYL) 500 mg tablet Take 1 Tab by mouth two (2) times a day for 10 days. 9/5/18 9/15/18 Yes Annette Saravia MD   oxyCODONE IR (ROXICODONE) 5 mg immediate release tablet Take 1 Tab by mouth every four (4) hours as needed for Pain. Max Daily Amount: 30 mg. 9/5/18  Yes Annette Saravia MD   levoFLOXacin (LEVAQUIN) 750 mg tablet Take 1 Tab by mouth daily. 9/5/18  Yes Annette Saravia MD   meclizine (ANTIVERT) 25 mg tablet Take 1 Tab by mouth three (3) times daily as needed. Indications: Vertigo 8/27/18   Casandra Baca, DO   pantoprazole (PROTONIX) 40 mg tablet Take 1 Tab by mouth daily.  8/27/18   Casandra Baca, DO   amLODIPine (NORVASC) 5 mg tablet Take 1 Tab by mouth every evening. 8/27/18   Casandra Baca, DO   sertraline (ZOLOFT) 100 mg tablet TAKE 1 TABLET EVERY DAY 6/25/18   Martha Baca DO   lisinopril (PRINIVIL, ZESTRIL) 40 mg tablet Take 1 Tab by mouth daily. 6/25/18   Merritt Ding DO   ANORO ELLIPTA 62.5-25 mcg/actuation inhaler INHALE 1 PUFF DAILY 6/25/18   Martha Baca DO   sotalol (BETAPACE) 80 mg tablet TAKE 1 TABLET TWICE DAILY. 5/18/18   Adalgisa Linares MD   albuterol (PROVENTIL VENTOLIN) 2.5 mg /3 mL (0.083 %) nebulizer solution 3 mL by Nebulization route every four (4) hours as needed for Wheezing. 2/14/18   Radha Myers PA-C   clopidogrel (PLAVIX) 75 mg tab Take 1 Tab by mouth daily. 2/7/18   Adalgisa Linares MD   aspirin 81 mg chewable tablet Take 1 Tab by mouth daily. 12/21/17   TONE Whalen   albuterol (PROVENTIL HFA, VENTOLIN HFA, PROAIR HFA) 90 mcg/actuation inhaler Take 2 Puffs by inhalation every four (4) hours as needed. Historical Provider   cetirizine (ZYRTEC) 10 mg tablet Take 1 Tab by mouth nightly. 9/18/17   Radha Myers PA-C   colestipol (COLESTID) 1 gram tablet Take 1 g by mouth daily. Historical Provider   nitroglycerin (NITROSTAT) 0.4 mg SL tablet 1 Tab by SubLINGual route every five (5) minutes as needed for Chest Pain. 3/10/16   Adalgisa Linares MD   multivitamin (ONE A DAY) tablet Take 1 Tab by mouth daily.     Historical Provider       Hospital Medications:  Current Facility-Administered Medications   Medication Dose Route Frequency    amLODIPine (NORVASC) tablet 5 mg  5 mg Oral QPM    sertraline (ZOLOFT) tablet 100 mg  100 mg Oral DAILY    sotalol (BETAPACE) tablet 80 mg  80 mg Oral Q12H    sodium chloride (NS) flush 5-10 mL  5-10 mL IntraVENous PRN    piperacillin-tazobactam (ZOSYN) 3.375 g in 0.9% sodium chloride (MBP/ADV) 100 mL  3.375 g IntraVENous Q8H    0.9% sodium chloride infusion  125 mL/hr IntraVENous CONTINUOUS    tiotropium (SPIRIVA) inhalation capsule 18 mcg  1 Cap Inhalation DAILY    And    albuterol (PROVENTIL VENTOLIN) nebulizer solution 2.5 mg  2.5 mg Nebulization Q6HWA RT    pantoprazole (PROTONIX) 40 mg in sodium chloride 0.9% 10 mL injection  40 mg IntraVENous Q12H    nystatin (MYCOSTATIN) 100,000 unit/mL oral suspension 500,000 Units  500,000 Units Oral QID    acetaminophen (TYLENOL) tablet 650 mg  650 mg Oral Q6H PRN    ondansetron (ZOFRAN) injection 4 mg  4 mg IntraVENous Q6H PRN    vancomycin (VANCOCIN) 1500 mg in  ml infusion  1,500 mg IntraVENous Q12H     Current Outpatient Prescriptions   Medication Sig    sucralfate (CARAFATE) 1 gram tablet Take 1 Tab by mouth four (4) times daily.  promethazine (PHENERGAN) 25 mg tablet Take 1 Tab by mouth every six (6) hours as needed for Nausea.  promethazine (PHENERGAN) 25 mg suppository Insert 1 Suppository into rectum every six (6) hours as needed for Nausea for up to 7 days.  metroNIDAZOLE (FLAGYL) 500 mg tablet Take 1 Tab by mouth two (2) times a day for 10 days.  oxyCODONE IR (ROXICODONE) 5 mg immediate release tablet Take 1 Tab by mouth every four (4) hours as needed for Pain. Max Daily Amount: 30 mg.    levoFLOXacin (LEVAQUIN) 750 mg tablet Take 1 Tab by mouth daily.  meclizine (ANTIVERT) 25 mg tablet Take 1 Tab by mouth three (3) times daily as needed. Indications: Vertigo    pantoprazole (PROTONIX) 40 mg tablet Take 1 Tab by mouth daily.  amLODIPine (NORVASC) 5 mg tablet Take 1 Tab by mouth every evening.  sertraline (ZOLOFT) 100 mg tablet TAKE 1 TABLET EVERY DAY    lisinopril (PRINIVIL, ZESTRIL) 40 mg tablet Take 1 Tab by mouth daily.  ANORO ELLIPTA 62.5-25 mcg/actuation inhaler INHALE 1 PUFF DAILY    sotalol (BETAPACE) 80 mg tablet TAKE 1 TABLET TWICE DAILY.  albuterol (PROVENTIL VENTOLIN) 2.5 mg /3 mL (0.083 %) nebulizer solution 3 mL by Nebulization route every four (4) hours as needed for Wheezing.  clopidogrel (PLAVIX) 75 mg tab Take 1 Tab by mouth daily.  aspirin 81 mg chewable tablet Take 1 Tab by mouth daily.     albuterol (PROVENTIL HFA, VENTOLIN HFA, PROAIR HFA) 90 mcg/actuation inhaler Take 2 Puffs by inhalation every four (4) hours as needed.  cetirizine (ZYRTEC) 10 mg tablet Take 1 Tab by mouth nightly.  colestipol (COLESTID) 1 gram tablet Take 1 g by mouth daily.  nitroglycerin (NITROSTAT) 0.4 mg SL tablet 1 Tab by SubLINGual route every five (5) minutes as needed for Chest Pain.  multivitamin (ONE A DAY) tablet Take 1 Tab by mouth daily. Social History:  Social History   Substance Use Topics    Smoking status: Former Smoker     Packs/day: 1.00     Years: 45.00     Types: Cigarettes     Quit date: 3/4/2016    Smokeless tobacco: Never Used      Comment: smoked more than 40 years    Alcohol use No       Family History:  Family History   Problem Relation Age of Onset    Heart Disease Father     Heart Attack Father     Hypertension Father     Bleeding Prob Father     Heart Surgery Father      multiple PCIs, first in his 46s    Heart Disease Brother      PCI x 2    Diabetes Mother     Diabetes Brother     Diabetes Brother    negative for GI malignancy. Review of Systems:  A detailed 10 system ROS is obtained, with pertinent positives as listed above and in admission H&P. All others are negative. Diet:  npo    Objective:     Physical Exam:  Vitals:  Visit Vitals    /79 (BP 1 Location: Right arm, BP Patient Position: At rest;Lying right side)    Pulse (!) 105    Temp 98.9 °F (37.2 °C)    Resp 16    Ht 6' 1\" (1.854 m)    Wt 80.7 kg (178 lb)    SpO2 93%    BMI 23.48 kg/m2     Gen:  Pt is alert, cooperative, no acute distress  Skin:  Extremities and face reveal no rashes. HEENT: Sclerae anicteric. Extra-occular muscles are intact. No oral ulcers. No abnormal pigmentation of the lips. The neck is supple. Cardiovascular: Regular rate and rhythm. No murmurs, gallops, or rubs. Respiratory:  Comfortable breathing with no accessory muscle use. Clear breath sounds anteriorly with no wheezes, rales, or rhonchi. On 2 L O2 via NC  GI:  Abdomen nondistended, soft, and nontender. Normal active bowel sounds. No enlargement of the liver or spleen. No masses palpable. Rectal:  Deferred  Musculoskeletal:  No pitting edema of the lower legs. Neurological:  Gross memory appears intact. Patient is alert and oriented. Psychiatric:  Mood appears appropriate with judgement intact. Laboratory:    Recent Labs      09/06/18   0835  09/05/18   2355  09/05/18   1158   WBC  17.2*   --   24.8*   HGB  13.8   --   16.4   HCT  42.0   --   49.4   PLT  204   --   288   MCV  92.7   --   90.6   NA   --    --   141   K   --    --   3.3*   CL   --    --   106   CO2   --    --   24   BUN   --    --   10   CREA   --    --   1.12   CA   --    --   9.6   MG   --   2.1   --    GLU   --    --   146*   AP   --    --   99   SGOT   --    --   22   ALT   --    --   20   TBILI   --    --   0.7   ALB   --    --   4.3   TP   --    --   7.9   LPSE   --    --   91          Assessment:     Principal Problem:    Sepsis ([address] (9/5/2018)    Active Problems:    CAD (coronary artery disease) (12/1/2009)      Overview: pci to lad 11/19 2.75x28 Xience drug eluting stent. Atrial fibrillation ([address] (7/22/2010)      HTN (hypertension) (7/22/2010)      Chronic obstructive pulmonary disease ([facility] (8/28/2017)      CLL (chronic lymphocytic leukemia) ([facility] (7/24/2018)      Colitis (9/5/2018)      Hypokalemia (9/5/2018)      Acute respiratory failure with hypoxia ([address] (9/5/2018)      Esophagitis (9/5/2018)      Kyrie Garsia (9/5/2018)      59 y/o male with h/o a. Fib s/p ablation, CAD s/p stents (most recently 12/2017--last aspirin plavix 9/3/18), COPD, HTN, CLL is admitted with sepsis after presenting with three day history of nausea, vomiting, abdominal pain, diarrhea, ultimately black emesis and stools, and CT showing evidence of esophagitis and colitis.   Suspect an infectious gastroenteritis with esophageal wall thickening secondary to esophagitis related to vomiting. Fortunately, he had unremarkable EGD last year and colonoscopy 2 years ago revealing adenomatous polyps. Plan:   1) agree with stool studies. He was in contact with his daughter who had c. Diff two weeks ago  2) empiric abx, IVFs, supportive care  3) IV PPI  4) consider adding Carafate suspension when vomiting resolves  5) Not sure whether EGD indicated in this setting, but will discuss with Dr. Jaydon Monge    Patient is seen and examined in collaboration with Dr. Caitlin Clark. Assessment and plan as per Dr. Jaydon Monge.   TONE Han

## 2018-09-06 NOTE — H&P
Hospitalist H&P Note Admit Date:  2018  2:13 PM  
Name:  Ayush Pantoja Age:  58 y.o. 
:  1955 MRN:  [id number] PCP:  Haile Rod DO Treatment Team: Attending Provider: Christen Roberson MD; Primary Nurse: Tayo Guzmán RN 
 
HPI:  
 
CC: nausea/ emesis/ dark loose stools Mr. Shakila Leija is a 57 yo male with PMH of AFIB (no anticoagulation), CAD on asa/ plavix, COPD, HTN,  CLL 9 followed [facility])  Evaluated with 3 days of nausea/ emesis, loose BM with dark stools. He was seen by PCP today, told had blood in stool and recommended ED workup. Denies NSIAD or ETOH use. Endorses anorexia, diffuse ABD pain, fever. States pending WATCHMAN device per [facility] cardiology and is on asa/ plavix and will need further anticoagulation. Found to meet sepsis criteria due to leukocytosis, tachycardia, lactic acidosis. Was thought to possible aspirate with emesis while in xray and developed hypoxia with negative CXR. Denies chest pain but has cough. CTAP showed distal esophageal wall thickening and descending colon wall thickening. He has received vancomycin and zosyn with IVF. 10 systems reviewed and negative except as noted in HPI. - has throat and chest discomfort due to emesis, has cough, has headache, had recent shingles left upper back now healing , has easy bruising Past Medical History:  
Diagnosis Date  Arrhythmia A Fib twice since 2010-- ablation no problems since  Arthritis  CAD (coronary artery disease) 2009  
 stent x 1-- ablation -- no problems since  Cancer ([address] skin cancer  Chest pain  Chronic midline low back pain without sciatica 2017  CLL (chronic lymphocytic leukemia) ([address] 2018  COPD   
 prn inhalers  Coronary atherosclerosis of native coronary artery 2015  Depression  Dyspnea on exertion   
 with any activity; associated with nausea  GERD (gastroesophageal reflux disease) 2010  Heart failure ([facility]  Hematochezia   
 HTN (hypertension) x 1 month  
 started on med--- controlled  Hypercholesteremia   
 controlled by medication  Other ill-defined conditions(799.89)   
 dyslipidemia/hyperlipidemia  Palpitations 12/30/2015  Paroxysmal atrial fibrillation (HCC)  Psychiatric disorder   
 depression  PUD (peptic ulcer disease) 2010  
 hx-- r/t coumadin--  
 Recurrent depression ([facility] 8/8/2016  Tobacco use disorder 12/30/2015  Unspecified adverse effect of anesthesia 1978  
 quit breathing Past Surgical History:  
Procedure Laterality Date  HX CHOLECYSTECTOMY  HX COLONOSCOPY  2012  HX HEART CATHETERIZATION  11/2009 PCI x 1 to LAD  ----ablation 2010  HX HEART CATHETERIZATION  3/2016  HX HEENT  1977 and 1978  
 eardrum repair-- right [address] Right inguinal  
 HX LAP CHOLECYSTECTOMY  1995  HX ORTHOPAEDIC    
 left shoulder surg x1  
 HX ORTHOPAEDIC Left 2012  
 hand  HX OTHER SURGICAL    
 EGD  HX SHOULDER ARTHROSCOPY  2000  
 x 3 on right Allergies Allergen Reactions  Fentanyl Other (comments) States had through a PCA after shoulder surgery in 2000, and \"throat swelled shut\" and that he \"quit breathing\". Update 11/16/10--Patient thinks the PCA gave him to much and it wasn't an allergy to drug itself.  Lovastatin Other (comments) Hullucinations  Niacin Other (comments) Denies allery  Advicor [Niacin-Lovastatin] Anaphylaxis and Itching Social History Substance Use Topics  Smoking status: Former Smoker Packs/day: 1.00 Years: 45.00 Types: Cigarettes Quit date: 3/4/2016  Smokeless tobacco: Never Used Comment: smoked more than 40 years  Alcohol use No  
  
Family History Problem Relation Age of Onset  Heart Disease Father  Heart Attack Father  Hypertension Father  Bleeding Prob Father  Heart Surgery Father multiple PCIs, first in his 46s  Heart Disease Brother PCI x 2  
 Diabetes Mother  Diabetes Brother  Diabetes Brother Immunization History Administered Date(s) Administered  Influenza Vaccine (Quad) PF 2017  Influenza Vaccine PF 02/15/2017  Pneumococcal Polysaccharide (PPSV-23) 2010  Td 2003  ZZZ-RETIRED (DO NOT USE) Pneumococcal Vaccine (Unspecified Type) 2010 PTA Medications: 
Prior to Admission Medications Prescriptions Last Dose Informant Patient Reported? Taking? ANORO ELLIPTA 62.5-25 mcg/actuation inhaler   No No  
Sig: INHALE 1 PUFF DAILY  
albuterol (PROVENTIL HFA, VENTOLIN HFA, PROAIR HFA) 90 mcg/actuation inhaler   Yes No  
Sig: Take 2 Puffs by inhalation every four (4) hours as needed. albuterol (PROVENTIL VENTOLIN) 2.5 mg /3 mL (0.083 %) nebulizer solution   No No  
Sig: 3 mL by Nebulization route every four (4) hours as needed for Wheezing. amLODIPine (NORVASC) 5 mg tablet   No No  
Sig: Take 1 Tab by mouth every evening. aspirin 81 mg chewable tablet   Yes No  
Sig: Take 1 Tab by mouth daily. cetirizine (ZYRTEC) 10 mg tablet   No No  
Sig: Take 1 Tab by mouth nightly. clopidogrel (PLAVIX) 75 mg tab   No No  
Sig: Take 1 Tab by mouth daily. colestipol (COLESTID) 1 gram tablet   Yes No  
Sig: Take 1 g by mouth daily. lisinopril (PRINIVIL, ZESTRIL) 40 mg tablet   No No  
Sig: Take 1 Tab by mouth daily. meclizine (ANTIVERT) 25 mg tablet   No No  
Sig: Take 1 Tab by mouth three (3) times daily as needed. Indications: Vertigo  
multivitamin (ONE A DAY) tablet   Yes No  
Sig: Take 1 Tab by mouth daily. nitroglycerin (NITROSTAT) 0.4 mg SL tablet   No No  
Si Tab by SubLINGual route every five (5) minutes as needed for Chest Pain.  
pantoprazole (PROTONIX) 40 mg tablet   No No  
Sig: Take 1 Tab by mouth daily.   
sertraline (ZOLOFT) 100 mg tablet   No No  
Sig: TAKE 1 TABLET EVERY DAY  
 sotalol (BETAPACE) 80 mg tablet   No No  
Sig: TAKE 1 TABLET TWICE DAILY. Facility-Administered Medications: None Objective:  
Patient Vitals for the past 24 hrs: 
 Temp Pulse Resp BP SpO2  
09/05/18 2027 - (!) 104 - 163/87 96 % 09/05/18 1927 - (!) 117 - 161/86 96 % 09/05/18 1857 - (!) 113 - 145/88 96 % 09/05/18 1833 - (!) 118 18 139/75 (!) 74 % 09/05/18 1827 - - - 139/75 -  
09/05/18 1152 (!) 100.8 °F (38.2 °C) 79 18 117/83 98 % Oxygen Therapy O2 Sat (%): 96 % (09/05/18 2027) Pulse via Oximetry: 105 beats per minute (09/05/18 2027) O2 Device: Room air (09/05/18 1833) No intake or output data in the 24 hours ending 09/05/18 2044 Physical Exam: 
General:    Alert. No distress, pleasant Eyes:   Normal sclera. Extraocular movements intact. PERRLA 
ENT:  Normocephalic, atraumatic. Dry mucous membranes with oral thrush CV:   RRR. No m/r/g. No edema Lungs:  Crackles LLL Abdomen: Soft, tender LLQ, nondistended. decreased BS Extremities: Warm and dry. . 
Neurologic:  grossly intact. Skin:     Healed shingles left scapular region Psych:  Normal mood and affect. I reviewed the labs, imaging, EKGs, telemetry, and other studies done this admission. EKG: NSR with PVC, lateral ST segment depression Data Review:  
Recent Results (from the past 24 hour(s)) CBC WITH AUTOMATED DIFF Collection Time: 09/05/18 11:58 AM  
Result Value Ref Range WBC 24.8 (H) 4.3 - 11.1 K/uL  
 RBC 5.45 4.23 - 5.6 M/uL  
 HGB 16.4 13.6 - 17.2 g/dL HCT 49.4 41.1 - 50.3 % MCV 90.6 79.6 - 97.8 FL  
 MCH 30.1 26.1 - 32.9 PG  
 MCHC 33.2 31.4 - 35.0 g/dL  
 RDW 12.8 % PLATELET 890 955 - 550 K/uL MPV 10.2 9.4 - 12.3 FL ABSOLUTE NRBC 0.00 0.0 - 0.2 K/uL NEUTROPHILS 56 43 - 78 % LYMPHOCYTES 37 13 - 44 % MONOCYTES 7 4.0 - 12.0 % EOSINOPHILS 0 (L) 0.5 - 7.8 % BASOPHILS 0 0.0 - 2.0 % IMMATURE GRANULOCYTES 0 0.0 - 5.0 %  
 ABS. NEUTROPHILS 13.9 (H) 1.7 - 8.2 K/UL ABS. LYMPHOCYTES 9.2 (H) 0.5 - 4.6 K/UL  
 ABS. MONOCYTES 1.7 (H) 0.1 - 1.3 K/UL  
 ABS. EOSINOPHILS 0.0 0.0 - 0.8 K/UL  
 ABS. BASOPHILS 0.0 0.0 - 0.2 K/UL  
 ABS. IMM. GRANS. 0.0 0.0 - 0.5 K/UL  
 RBC COMMENTS NORMOCYTIC/NORMOCHROMIC    
 WBC COMMENTS ATYPICAL LYMPHOCYTES PRESENT    
 PLATELET COMMENTS ADEQUATE    
 DF AUTOMATED METABOLIC PANEL, COMPREHENSIVE Collection Time: 09/05/18 11:58 AM  
Result Value Ref Range Sodium 141 136 - 145 mmol/L Potassium 3.3 (L) 3.5 - 5.1 mmol/L Chloride 106 98 - 107 mmol/L  
 CO2 24 21 - 32 mmol/L Anion gap 11 7 - 16 mmol/L Glucose 146 (H) 65 - 100 mg/dL BUN 10 8 - 23 MG/DL Creatinine 1.12 0.8 - 1.5 MG/DL  
 GFR est AA >60 >60 ml/min/1.73m2 GFR est non-AA >60 >60 ml/min/1.73m2 Calcium 9.6 8.3 - 10.4 MG/DL Bilirubin, total 0.7 0.2 - 1.1 MG/DL  
 ALT (SGPT) 20 12 - 65 U/L  
 AST (SGOT) 22 15 - 37 U/L Alk. phosphatase 99 50 - 136 U/L Protein, total 7.9 6.3 - 8.2 g/dL Albumin 4.3 3.2 - 4.6 g/dL Globulin 3.6 (H) 2.3 - 3.5 g/dL A-G Ratio 1.2 1.2 - 3.5 LIPASE Collection Time: 09/05/18 11:58 AM  
Result Value Ref Range Lipase 91 73 - 393 U/L  
POC LACTIC ACID Collection Time: 09/05/18 12:00 PM  
Result Value Ref Range Lactic Acid (POC) 2.4 (H) 0.5 - 1.9 mmol/L POC LACTIC ACID Collection Time: 09/05/18  2:56 PM  
Result Value Ref Range Lactic Acid (POC) 1.2 0.5 - 1.9 mmol/L  
BLOOD GAS, ARTERIAL Collection Time: 09/05/18  6:42 PM  
Result Value Ref Range pH 7.34 (L) 7.35 - 7.45    
 PCO2 46 (H) 35 - 45 mmHg PO2 <34 (L) 80 - 105 mmHg BICARBONATE 24 22 - 26 mmol/L  
 BASE DEFICIT 1.8 0 - 2 mmol/L  
 TOTAL HEMOGLOBIN 15.9 (H) 11.7 - 15.0 GM/DL  
 O2 SAT 54 (L) 92 - 98.5 % Arterial O2 Hgb 52.9 (L) 94 - 97 % CARBOXYHEMOGLOBIN 1.1 0.5 - 1.5 % METHEMOGLOBIN 0.4 0.0 - 1.5 % DEOXYHEMOGLOBIN 46 (H) 0.0 - 5.0 %  SITE RR   
 ALLENS TEST POSITIVE    
 MODE RA   
 FIO2 21.0 %  
 Respiratory comment:    
  Margaux COOPER at 9 5 2018 6 48 03 PM. Read back. O2 saturation 55% on monitor. EKG, 12 LEAD, INITIAL Collection Time: 09/05/18  6:57 PM  
Result Value Ref Range Ventricular Rate 112 BPM  
 Atrial Rate 112 BPM  
 P-R Interval 134 ms QRS Duration 74 ms Q-T Interval 346 ms  
 QTC Calculation (Bezet) 472 ms Calculated P Axis 72 degrees Calculated R Axis 11 degrees Calculated T Axis 49 degrees Diagnosis    
  !! AGE AND GENDER SPECIFIC ECG ANALYSIS !! Sinus tachycardia with occasional Premature ventricular complexes Nonspecific ST and T wave abnormality Abnormal ECG When compared with ECG of 05-SEP-2018 18:51, 
Premature ventricular complexes are now Present Confirmed by SINA COOPER (), Donovan Daniels ([id number]) on 9/5/2018 7:57:26 PM 
  
 
 
All Micro Results Procedure Component Value Units Date/Time Odalys Gallagher [[id number]] Order Status:  Sent Specimen:  Stool C. DIFFICILE AG & TOXIN A/B [[id number]] Order Status:  Sent Specimen:  Stool from Stool CULTURE, BLOOD [[id number]] Collected:  09/05/18 1506 Order Status:  Completed Specimen:  Blood Updated:  09/05/18 1559 CULTURE, BLOOD [[id number]] Collected:  09/05/18 1505 Order Status:  Completed Specimen:  Blood Updated:  09/05/18 1559 Other Studies: Xr Chest Pa Lat Result Date: 9/5/2018 Two view chest History: Chest Pain. Fever Comparison: 03/07/2013 Findings: The heart and mediastinal silhouette are normal in size and configuration. The lungs and pleural spaces are clear aside from minor scarring at the lung bases. The pulmonary vascularity is within normal limits. The visualized osseous structures are unremarkable. Impression: No active disease in the chest.  
 
Ct Abd Pelv W Cont Result Date: 9/5/2018 CT abdomen and pelvis with contrast: 09/05/2018 History: Nausea, vomiting and diarrhea x2 days. Dark stools.  Technique: Helically acquired images were obtained from the domes of the diaphragms to the ischial tuberosities reconstructed at 5mm intervals after the uneventful administration of oral contrast for bowel opacification and 100 cc's of intravenous Isovue-370 to evaluate the solid abdominal viscera and vascular structures. Coronal reformatted images were submitted. Radiation dose reduction techniques were used for this study:  Our CT scanners use one or all of the following: Automated exposure control, adjustment of the mA and/or kVp according to patient's size, iterative reconstruction. Comparison: 01/23/2017 CT ABDOMEN: There is circumferential wall thickening of the distal esophagus which represents an interval change. The liver is unremarkable. Few scattered splenic calcifications suggest prior granulomatous disease. No adenopathy or ascites is present. The pancreas, adrenal glands and kidneys are unremarkable. The patient status post cholecystectomy. There is suspicion for mild circumferential wall thickening of the descending colon versus collapse. CT PELVIS: There are scattered sigmoid diverticula without inflammatory change. No adenopathy or ascites is present. There are postoperative changes of right herniorrhaphy. IMPRESSION: 1. Partially imaged circumferential wall thickening of the distal esophagus. Consider direct inspection for further evaluation as this could be infectious/inflammatory versus neoplasm. 2. Mild circumferential thickening suspected of the descending colon concerning for a nonspecific colitis. 3. Sigmoid diverticula. Assessment and Plan:  
 
Hospital Problems as of 9/5/2018  Date Reviewed: 9/5/2018 Codes Class Noted - Resolved POA Colitis ICD-10-CM: K52.9 ICD-9-CM: 558.9  9/5/2018 - Present Yes * (Principal)Sepsis ([facility] [address] [address] ICD-10-CM: A41.9 ICD-9-CM: 038.9, 995.91  9/5/2018 - Present Yes Hypokalemia ICD-10-CM: E87.6 ICD-9-CM: 276.8  9/5/2018 - Present Yes Acute respiratory failure with hypoxia [facility]) ICD-10-CM: J96.01 
ICD-9-CM: 518.81  9/5/2018 - Present Yes Esophagitis ICD-10-CM: K20.9 ICD-9-CM: 530.10  9/5/2018 - Present Yes Thrush (Chronic) ICD-10-CM: B37.0 ICD-9-CM: 112.0  9/5/2018 - Present Yes CLL (chronic lymphocytic leukemia) (HCC) ICD-10-CM: C91.90 ICD-9-CM: 204.10  7/24/2018 - Present Yes Chronic obstructive pulmonary disease (HCC) ICD-10-CM: J44.9 ICD-9-CM: 658  8/28/2017 - Present Yes Atrial fibrillation ([facility] [address] [address] ICD-10-CM: I48.91 
ICD-9-CM: 427.31  7/22/2010 - Present Yes HTN (hypertension) (Chronic) ICD-10-CM: I10 
ICD-9-CM: 401.9  7/22/2010 - Present Yes CAD (coronary artery disease) (Chronic) ICD-10-CM: I25.10 ICD-9-CM: 414.00  12/1/2009 - Present Yes Overview Signed 12/1/2009  8:28 AM by Camille Henderson  
  pci to lad 11/19 2.75x28 Xience drug eluting stent. · Sepsis: due to GI illness/ esophagitis and colitis , hydrate and continue vancomycin/zosyn, NPO, GI consult, IV protonix, followup CBC · SARA: creatinine increased above baseline due to dehydration, followup BMP after IVF · Hypokalemia: supplement · CAD: holding asa/ plavix with GI complaints , trend troponin with ST segment depression , had low risk nuclear scan 8,2727 · AFIB: not anticoagulated and in NSR , continue sotalol, pending Watchman workup · HTN: continue norvasc, hold lisinopril · Acute hypoxic respiratory failure with known COPD: supplemental O2, likely due to aspiration event, followup CXR tomorrow, scheduled nebs · CLL: noted · Thrush: nystatin Discharge planning:  Pending course DVT ppx: SCD Code status:  Full Estimated LOS:  Greater than 2 midnights Risk:  high Care plan: wife Malissa potter [phone] Signed: Avis Palomares MD

## 2018-09-07 ENCOUNTER — APPOINTMENT (OUTPATIENT)
Dept: GENERAL RADIOLOGY | Age: 63
DRG: 871 | End: 2018-09-07
Attending: INTERNAL MEDICINE
Payer: MEDICARE

## 2018-09-07 LAB
ANION GAP SERPL CALC-SCNC: 9 MMOL/L (ref 7–16)
BASOPHILS # BLD: 0.1 K/UL (ref 0–0.2)
BASOPHILS NFR BLD: 0 % (ref 0–2)
BUN SERPL-MCNC: 4 MG/DL (ref 8–23)
C DIFF GDH STL QL: NORMAL
C DIFF TOX A+B STL QL IA: NORMAL
CALCIUM SERPL-MCNC: 8.7 MG/DL (ref 8.3–10.4)
CHLORIDE SERPL-SCNC: 110 MMOL/L (ref 98–107)
CLINICAL CONSIDERATION: NORMAL
CO2 SERPL-SCNC: 25 MMOL/L (ref 21–32)
CREAT SERPL-MCNC: 0.79 MG/DL (ref 0.8–1.5)
DIFFERENTIAL METHOD BLD: ABNORMAL
EOSINOPHIL # BLD: 0.2 K/UL (ref 0–0.8)
EOSINOPHIL NFR BLD: 1 % (ref 0.5–7.8)
ERYTHROCYTE [DISTWIDTH] IN BLOOD BY AUTOMATED COUNT: 13.1 %
GLUCOSE SERPL-MCNC: 108 MG/DL (ref 65–100)
HCT VFR BLD AUTO: 43.1 % (ref 41.1–50.3)
HGB BLD-MCNC: 13.7 G/DL (ref 13.6–17.2)
IMM GRANULOCYTES # BLD: 0.1 K/UL (ref 0–0.5)
IMM GRANULOCYTES NFR BLD AUTO: 0 % (ref 0–5)
INTERPRETATION: NORMAL
LYMPHOCYTES # BLD: 12.5 K/UL (ref 0.5–4.6)
LYMPHOCYTES NFR BLD: 53 % (ref 13–44)
MAGNESIUM SERPL-MCNC: 2.1 MG/DL (ref 1.8–2.4)
MCH RBC QN AUTO: 30.2 PG (ref 26.1–32.9)
MCHC RBC AUTO-ENTMCNC: 31.8 G/DL (ref 31.4–35)
MCV RBC AUTO: 94.9 FL (ref 79.6–97.8)
MONOCYTES # BLD: 1.8 K/UL (ref 0.1–1.3)
MONOCYTES NFR BLD: 8 % (ref 4–12)
NEUTS SEG # BLD: 8.8 K/UL (ref 1.7–8.2)
NEUTS SEG NFR BLD: 38 % (ref 43–78)
NRBC # BLD: 0 K/UL (ref 0–0.2)
PCR REFLEX: NORMAL
PLATELET # BLD AUTO: 303 K/UL (ref 150–450)
PMV BLD AUTO: 10.3 FL (ref 9.4–12.3)
POTASSIUM SERPL-SCNC: 3.5 MMOL/L (ref 3.5–5.1)
RBC # BLD AUTO: 4.54 M/UL (ref 4.23–5.6)
SODIUM SERPL-SCNC: 144 MMOL/L (ref 136–145)
WBC # BLD AUTO: 23.5 K/UL (ref 4.3–11.1)

## 2018-09-07 PROCEDURE — 65270000029 HC RM PRIVATE

## 2018-09-07 PROCEDURE — 77030012341 HC CHMB SPCR OPTC MDI VYRM -A

## 2018-09-07 PROCEDURE — 80048 BASIC METABOLIC PNL TOTAL CA: CPT

## 2018-09-07 PROCEDURE — 71045 X-RAY EXAM CHEST 1 VIEW: CPT

## 2018-09-07 PROCEDURE — 85025 COMPLETE CBC W/AUTO DIFF WBC: CPT

## 2018-09-07 PROCEDURE — 74011250637 HC RX REV CODE- 250/637: Performed by: INTERNAL MEDICINE

## 2018-09-07 PROCEDURE — 74011000258 HC RX REV CODE- 258: Performed by: INTERNAL MEDICINE

## 2018-09-07 PROCEDURE — 94760 N-INVAS EAR/PLS OXIMETRY 1: CPT

## 2018-09-07 PROCEDURE — 36415 COLL VENOUS BLD VENIPUNCTURE: CPT

## 2018-09-07 PROCEDURE — 94640 AIRWAY INHALATION TREATMENT: CPT

## 2018-09-07 PROCEDURE — 83735 ASSAY OF MAGNESIUM: CPT

## 2018-09-07 PROCEDURE — 74011000250 HC RX REV CODE- 250: Performed by: HOSPITALIST

## 2018-09-07 PROCEDURE — 74011250636 HC RX REV CODE- 250/636: Performed by: INTERNAL MEDICINE

## 2018-09-07 PROCEDURE — 77010033678 HC OXYGEN DAILY

## 2018-09-07 PROCEDURE — 77030020263 HC SOL INJ SOD CL0.9% LFCR 1000ML

## 2018-09-07 PROCEDURE — 87070 CULTURE OTHR SPECIMN AEROBIC: CPT

## 2018-09-07 PROCEDURE — 74011250637 HC RX REV CODE- 250/637: Performed by: HOSPITALIST

## 2018-09-07 RX ORDER — PANTOPRAZOLE SODIUM 40 MG/1
40 TABLET, DELAYED RELEASE ORAL
Status: DISCONTINUED | OUTPATIENT
Start: 2018-09-07 | End: 2018-09-11 | Stop reason: HOSPADM

## 2018-09-07 RX ORDER — AMOXICILLIN 500 MG/1
500 CAPSULE ORAL EVERY 8 HOURS
Status: DISCONTINUED | OUTPATIENT
Start: 2018-09-07 | End: 2018-09-07

## 2018-09-07 RX ORDER — METRONIDAZOLE 500 MG/1
500 TABLET ORAL EVERY 8 HOURS
Status: DISCONTINUED | OUTPATIENT
Start: 2018-09-07 | End: 2018-09-07

## 2018-09-07 RX ORDER — POTASSIUM CHLORIDE 14.9 MG/ML
20 INJECTION INTRAVENOUS
Status: COMPLETED | OUTPATIENT
Start: 2018-09-07 | End: 2018-09-07

## 2018-09-07 RX ORDER — METRONIDAZOLE 500 MG/100ML
500 INJECTION, SOLUTION INTRAVENOUS EVERY 8 HOURS
Status: DISCONTINUED | OUTPATIENT
Start: 2018-09-07 | End: 2018-09-07

## 2018-09-07 RX ADMIN — SERTRALINE HYDROCHLORIDE 100 MG: 25 TABLET ORAL at 09:05

## 2018-09-07 RX ADMIN — TIOTROPIUM BROMIDE 18 MCG: 18 CAPSULE ORAL; RESPIRATORY (INHALATION) at 08:19

## 2018-09-07 RX ADMIN — NYSTATIN 500000 UNITS: 100000 SUSPENSION ORAL at 09:05

## 2018-09-07 RX ADMIN — NYSTATIN 500000 UNITS: 100000 SUSPENSION ORAL at 13:24

## 2018-09-07 RX ADMIN — NYSTATIN 500000 UNITS: 100000 SUSPENSION ORAL at 21:25

## 2018-09-07 RX ADMIN — POTASSIUM CHLORIDE 20 MEQ: 200 INJECTION, SOLUTION INTRAVENOUS at 09:25

## 2018-09-07 RX ADMIN — SOTALOL HYDROCHLORIDE 80 MG: 80 TABLET ORAL at 09:05

## 2018-09-07 RX ADMIN — SOTALOL HYDROCHLORIDE 80 MG: 80 TABLET ORAL at 21:25

## 2018-09-07 RX ADMIN — ALBUTEROL SULFATE 2.5 MG: 2.5 SOLUTION RESPIRATORY (INHALATION) at 08:18

## 2018-09-07 RX ADMIN — PIPERACILLIN SODIUM,TAZOBACTAM SODIUM 3.38 G: 3; .375 INJECTION, POWDER, FOR SOLUTION INTRAVENOUS at 00:06

## 2018-09-07 RX ADMIN — METRONIDAZOLE 500 MG: 500 TABLET ORAL at 09:25

## 2018-09-07 RX ADMIN — ALBUTEROL SULFATE 2.5 MG: 2.5 SOLUTION RESPIRATORY (INHALATION) at 20:15

## 2018-09-07 RX ADMIN — PANTOPRAZOLE SODIUM 40 MG: 40 TABLET, DELAYED RELEASE ORAL at 09:25

## 2018-09-07 RX ADMIN — ALBUTEROL SULFATE 2.5 MG: 2.5 SOLUTION RESPIRATORY (INHALATION) at 13:50

## 2018-09-07 RX ADMIN — Medication 10 ML: at 13:24

## 2018-09-07 RX ADMIN — HYDROCODONE BITARTRATE AND ACETAMINOPHEN 1 TABLET: 5; 325 TABLET ORAL at 09:25

## 2018-09-07 RX ADMIN — ACETAMINOPHEN 650 MG: 325 TABLET ORAL at 19:29

## 2018-09-07 RX ADMIN — METRONIDAZOLE 500 MG: 500 TABLET ORAL at 13:24

## 2018-09-07 RX ADMIN — PIPERACILLIN SODIUM,TAZOBACTAM SODIUM 4.5 G: 4; .5 INJECTION, POWDER, FOR SOLUTION INTRAVENOUS at 17:04

## 2018-09-07 RX ADMIN — SODIUM CHLORIDE 125 ML/HR: 900 INJECTION, SOLUTION INTRAVENOUS at 06:06

## 2018-09-07 RX ADMIN — AMLODIPINE BESYLATE 5 MG: 5 TABLET ORAL at 17:04

## 2018-09-07 RX ADMIN — NYSTATIN 500000 UNITS: 100000 SUSPENSION ORAL at 17:04

## 2018-09-07 RX ADMIN — ONDANSETRON 4 MG: 2 INJECTION INTRAMUSCULAR; INTRAVENOUS at 13:24

## 2018-09-07 RX ADMIN — AMOXICILLIN 500 MG: 500 CAPSULE ORAL at 13:24

## 2018-09-07 RX ADMIN — POTASSIUM CHLORIDE 20 MEQ: 200 INJECTION, SOLUTION INTRAVENOUS at 12:00

## 2018-09-07 NOTE — PROGRESS NOTES
Addendum:  cdiff neg, vanco stopped. Informed pt more hypoxic. Switch abx back to zosyn for aspiration PNA.   Recheck CXR

## 2018-09-07 NOTE — PROGRESS NOTES
Pt resting in bed alert and oriented, cooperative with care, denies pain or distress, family at bedside for support, IV in place infusing, 125 ml/hr normal saline, safety measures in place, call light within reach.

## 2018-09-07 NOTE — PROGRESS NOTES
GI DAILY PROGRESS NOTE Admit Date:  9/5/2018 Today's Date:  9/7/2018 CC:  Esophagitis; colitis Subjective:  
 
Patient denies nausea and vomiting. Reports having had 7 brown-green liquid stools last night and this morning. Mild left sided abdominal pain.  +cough Medications:  
Current Facility-Administered Medications Medication Dose Route Frequency  amLODIPine (NORVASC) tablet 5 mg  5 mg Oral QPM  
 sertraline (ZOLOFT) tablet 100 mg  100 mg Oral DAILY  sotalol (BETAPACE) tablet 80 mg  80 mg Oral Q12H  
 sodium chloride (NS) flush 5-10 mL  5-10 mL IntraVENous PRN  piperacillin-tazobactam (ZOSYN) 3.375 g in 0.9% sodium chloride (MBP/ADV) 100 mL  3.375 g IntraVENous Q8H  
 0.9% sodium chloride infusion  125 mL/hr IntraVENous CONTINUOUS  
 tiotropium (SPIRIVA) inhalation capsule 18 mcg  1 Cap Inhalation DAILY And  
 albuterol (PROVENTIL VENTOLIN) nebulizer solution 2.5 mg  2.5 mg Nebulization Q6HWA RT  
 alum-mag hydroxide-simeth (MYLANTA) oral suspension 30 mL  30 mL Oral Q4H PRN  
 guaiFENesin-dextromethorphan (ROBITUSSIN DM) 100-10 mg/5 mL syrup 10 mL  10 mL Oral Q4H PRN  
 hydrALAZINE (APRESOLINE) 20 mg/mL injection 20 mg  20 mg IntraVENous Q4H PRN  
 HYDROcodone-acetaminophen (NORCO) 5-325 mg per tablet 1 Tab  1 Tab Oral Q4H PRN  
 zolpidem (AMBIEN) tablet 5 mg  5 mg Oral QHS PRN  
 influenza vaccine 2018-19 (6 mos+)(PF) (FLUARIX QUAD/FLULAVAL QUAD) injection 0.5 mL  0.5 mL IntraMUSCular PRIOR TO DISCHARGE  pantoprazole (PROTONIX) 40 mg in sodium chloride 0.9% 10 mL injection  40 mg IntraVENous Q12H  nystatin (MYCOSTATIN) 100,000 unit/mL oral suspension 500,000 Units  500,000 Units Oral QID  acetaminophen (TYLENOL) tablet 650 mg  650 mg Oral Q6H PRN  
 ondansetron (ZOFRAN) injection 4 mg  4 mg IntraVENous Q6H PRN Review of Systems: ROS was obtained, with pertinent positives as listed above. No chest pain or SOB. Diet:  clears Objective:  
Vitals: Visit Vitals  /76 (BP 1 Location: Right arm, BP Patient Position: At rest)  Pulse 79  Temp 99 °F (37.2 °C)  Resp 20  
 Ht 6' 1\" (1.854 m)  Wt 82.9 kg (182 lb 11.2 oz)  SpO2 91%  BMI 24.1 kg/m2 Intake/Output: 
  
09/05 1901 - 09/07 0700 In: 2873.3 [P.O.:240; I.V.:2633.3] Out: - Exam: 
General appearance: alert, cooperative, no distress Lungs: clear to auscultation bilaterally anteriorly; on O2 via NC Heart: regular rate and rhythm Abdomen: soft, mildly distended with mild RUQ tenderness; no rebound; +Bowel sounds Extremities: extremities normal, atraumatic, no cyanosis or edema Neuro:  alert and oriented Data Review (Labs):   
Recent Labs  
   09/07/18 
 0612  09/06/18 
 0835  09/05/18 
 2355  09/05/18 
 1158 WBC  23.5*  17.2*   --   24.8* HGB  13.7  13.8   --   16.4 HCT  43.1  42.0   --   49.4 PLT  303  204   --   288 MCV  94.9  92.7   --   90.6 NA  144  144   --   141  
K  3.5  3.1*   --   3.3*  
CL  110*  112*   --   106 CO2  25  23   --   24 BUN  4*  6*   --   10  
CREA  0.79*  0.74*   --   1.12  
CA  8.7  8.0*   --   9.6 MG  2.1   --   2.1   --   
GLU  108*  109*   --   146* AP   --   68   --   99 SGOT   --   27   --   22 ALT   --   19   --   20  
TBILI   --   0.5   --   0.7 ALB   --   3.3   --   4.3 TP   --   6.1*   --   7.9 LPSE   --    --    --   91 Assessment:  
 
Principal Problem: 
  Sepsis ([facility] (9/5/2018) Active Problems: 
  CAD (coronary artery disease) (12/1/2009) Overview: pci to lad 11/19 2.75x28 Xience drug eluting stent. Atrial fibrillation ([facility] (7/22/2010) HTN (hypertension) (7/22/2010) Chronic obstructive pulmonary disease ([facility] (8/28/2017) CLL (chronic lymphocytic leukemia) ([facility] (7/24/2018) Colitis (9/5/2018) Hypokalemia (9/5/2018) Esophagitis (9/5/2018) Thrush (9/5/2018) Diarrhea of infectious origin (9/6/2018) Aspiration pneumonia of right lower lobe ([facility] [address] (9/6/2018) Gastroenteritis (9/6/2018) 59 y/o male with h/o a. Fib s/p ablation, CAD s/p stents (most recently 12/2017--last aspirin plavix 9/3/18), COPD, HTN, CLL is admitted with sepsis after presenting with three day history of nausea, vomiting, abdominal pain, diarrhea, ultimately black emesis and stools, and CT showing evidence of esophagitis and colitis. Vomited and aspirated in ED and now with RLL pneumonia. Suspect an infectious gastroenteritis with esophageal wall thickening secondary to esophagitis related to vomiting. Fortunately, he had unremarkable EGD last year and colonoscopy 2 years ago revealing adenomatous polyps. He has had recent contact with a daughter with c. Diff. Nausea/vomiting resolved but still diarrhea Plan:  
1) await results of stool studies and blood cultures 2) if his diarrhea improves today, can try advancing diet 3) no plans for EGD at this time given clinical picture (esophagitis on CT likely secondary to vomiting), unremarkable EGD last year, and current pneumonia Patient is seen and examined in collaboration with Dr. Hira Bledsoe. Assessment and plan as per Dr. Marcelle Del Angel.  
TONE Mao

## 2018-09-07 NOTE — PROGRESS NOTES
Care Management Interventions PCP Verified by CM: Yes Physical Therapy Consult: No 
Occupational Therapy Consult: No 
Current Support Network: Lives with Spouse Confirm Follow Up Transport: Self Plan discussed with Pt/Family/Caregiver: Yes Freedom of Choice Offered: Yes Discharge Location Discharge Placement: Home Patient lives with wife. Independent with ambulation and ADLs. No home 02. Drives. CM didn't identify any discharge needs but remains available.

## 2018-09-07 NOTE — PROGRESS NOTES
Spiritual Care Visit, initial visit. Visited with patient at bedside. Prayed for patient's healing and health. Visit by Linda Dumont, Staff .  Iza., Mini.B., B.A.

## 2018-09-07 NOTE — PROGRESS NOTES
Hospitalist Progress Note Admit Date:  2018  2:13 PM  
Name:  Jd Keating Age:  58 y.o. 
:  1955 MRN:  [id number] PCP:  Jory Bobby DO Treatment Team: Attending Provider: Bautista Bowers MD; Consulting Provider: Anil Escalera MD; Utilization Review: Mirlande George RN Subjective:  
Mr. Gurpreet Graff is a 59 yo male with PMH of AFIB (no anticoagulation), CAD on asa/ plavix, COPD, HTN,  CLL 9 followed S)  Evaluated with 3 days of nausea/ emesis, loose BM with dark stools. He was seen by PCP, told had blood in stool and recommended ED workup. Denied NSIAD or ETOH use. Endorses anorexia, diffuse ABD pain, fever. pending WATCHMAN device per [facility] cardiology and is on asa/ plavix and will need further anticoagulation. Was thought to possible aspirate with emesis while in xray and developed hypoxia afterwards. Follow up CXR showed RLL PNA/aspiration. Met sepsis criteria. CTAP showed distal esophageal wall thickening and descending colon wall thickening. Admitted and started on broad abx, IVF. GI was consulted for assistance with GI issues.  - pt feeling better overall but still has 6-7 liquid stools daily. No abd pain currently. No SOB, cough. Objective:  
 
Patient Vitals for the past 24 hrs: 
 Temp Pulse Resp BP SpO2  
18 0820 - - - - 91 % 18 0706 99 °F (37.2 °C) 79 20 130/76 93 % 18 0334 98.6 °F (37 °C) 80 20 123/75 90 % 18 2354 98.6 °F (37 °C) 81 20 121/77 91 % 18 2055 - - - - 90 % 18 2042 98.6 °F (37 °C) 82 20 134/83 94 % 18 1510 99 °F (37.2 °C) 75 18 137/88 96 % 18 1444 - - - - 91 % 18 1323 98.3 °F (36.8 °C) 84 18 139/81 92 % 18 0915 - (!) 105 16 135/79 93 % Oxygen Therapy O2 Sat (%): 91 % (18) Pulse via Oximetry: 88 beats per minute (18) O2 Device: Nasal cannula (18) O2 Flow Rate (L/min): 2 l/min (18) Intake/Output Summary (Last 24 hours) at 09/07/18 2651 Last data filed at 09/07/18 9620 Gross per 24 hour Intake          2873.33 ml Output                0 ml Net          2873.33 ml General:    Well nourished. Alert. CV:   RRR. No murmur, rub, or gallop. Lungs:   CTAB. No wheezing, rhonchi, or rales. Abdomen:   Soft, nt, nondistended. Extremities: Warm and dry. No cyanosis or edema. Skin:     No rashes or jaundice. Neuro:  No gross focal deficits Data Review: 
I have reviewed all labs, meds, telemetry events, and studies from the last 24 hours: 
 
Recent Results (from the past 24 hour(s)) URINE MICROSCOPIC Collection Time: 09/06/18 12:27 PM  
Result Value Ref Range WBC 0-3 0 /hpf  
 RBC 0-3 0 /hpf Epithelial cells 0-3 0 /hpf Bacteria 0 0 /hpf Casts 3-5 0 /lpf  
TROPONIN I Collection Time: 09/06/18  3:45 PM  
Result Value Ref Range Troponin-I, Qt. 0.02 0.02 - 0.05 NG/ML  
CULTURE, STOOL Collection Time: 09/06/18  6:28 PM  
Result Value Ref Range Special Requests: NO SPECIAL REQUESTS Culture result:     
  NO GROWTH AFTER SHORT PERIOD OF INCUBATION. FURTHER RESULTS TO FOLLOW AFTER OVERNIGHT INCUBATION. METABOLIC PANEL, BASIC Collection Time: 09/07/18  6:12 AM  
Result Value Ref Range Sodium 144 136 - 145 mmol/L Potassium 3.5 3.5 - 5.1 mmol/L Chloride 110 (H) 98 - 107 mmol/L  
 CO2 25 21 - 32 mmol/L Anion gap 9 7 - 16 mmol/L Glucose 108 (H) 65 - 100 mg/dL BUN 4 (L) 8 - 23 MG/DL Creatinine 0.79 (L) 0.8 - 1.5 MG/DL  
 GFR est AA >60 >60 ml/min/1.73m2 GFR est non-AA >60 >60 ml/min/1.73m2 Calcium 8.7 8.3 - 10.4 MG/DL  
CBC WITH AUTOMATED DIFF Collection Time: 09/07/18  6:12 AM  
Result Value Ref Range WBC 23.5 (H) 4.3 - 11.1 K/uL  
 RBC 4.54 4.23 - 5.6 M/uL  
 HGB 13.7 13.6 - 17.2 g/dL HCT 43.1 41.1 - 50.3 % MCV 94.9 79.6 - 97.8 FL  
 MCH 30.2 26.1 - 32.9 PG  
 MCHC 31.8 31.4 - 35.0 g/dL  
 RDW 13.1 % PLATELET 580 756 - 274 K/uL MPV 10.3 9.4 - 12.3 FL ABSOLUTE NRBC 0.00 0.0 - 0.2 K/uL  
 DF AUTOMATED NEUTROPHILS 38 (L) 43 - 78 % LYMPHOCYTES 53 (H) 13 - 44 % MONOCYTES 8 4.0 - 12.0 % EOSINOPHILS 1 0.5 - 7.8 % BASOPHILS 0 0.0 - 2.0 % IMMATURE GRANULOCYTES 0 0.0 - 5.0 %  
 ABS. NEUTROPHILS 8.8 (H) 1.7 - 8.2 K/UL  
 ABS. LYMPHOCYTES 12.5 (H) 0.5 - 4.6 K/UL  
 ABS. MONOCYTES 1.8 (H) 0.1 - 1.3 K/UL  
 ABS. EOSINOPHILS 0.2 0.0 - 0.8 K/UL  
 ABS. BASOPHILS 0.1 0.0 - 0.2 K/UL  
 ABS. IMM. GRANS. 0.1 0.0 - 0.5 K/UL MAGNESIUM Collection Time: 09/07/18  6:12 AM  
Result Value Ref Range Magnesium 2.1 1.8 - 2.4 mg/dL All Micro Results Procedure Component Value Units Date/Time Sammy Castillo [[id number]] Collected:  09/06/18 1828 Order Status:  Completed Specimen:  Stool Updated:  09/07/18 1551 Special Requests: NO SPECIAL REQUESTS Culture result:      
  NO GROWTH AFTER SHORT PERIOD OF INCUBATION. FURTHER RESULTS TO FOLLOW AFTER OVERNIGHT INCUBATION. CULTURE, BLOOD [[id number]] Collected:  09/05/18 1505 Order Status:  Completed Specimen:  Blood from Blood Updated:  09/07/18 1219 Special Requests: --     
  LEFT 
HAND Culture result: NO GROWTH 2 DAYS     
 CULTURE, BLOOD [[id number]] Collected:  09/05/18 1506 Order Status:  Completed Specimen:  Blood from Blood Updated:  09/07/18 4647 Special Requests: --     
  RIGHT Antecubital 
  
  Culture result: NO GROWTH 2 DAYS C. DIFFICILE AG & TOXIN A/B [[id number]] Collected:  09/06/18 1828 Order Status:  Completed Specimen:  Stool from Stool Updated:  09/06/18 2006 CULTURE, URINE [[id number]] Collected:  09/06/18 0615 Order Status:  Completed Specimen:  Urine from Clean catch Updated:  09/06/18 1458 No results found for this visit on 09/05/18. Current Meds: 
Current Facility-Administered Medications Medication Dose Route Frequency  vancomycin 50 mg/mL oral solution (compounded) 250 mg  250 mg Oral Q6H  
 amoxicillin (AMOXIL) capsule 500 mg  500 mg Oral Q8H  
 metroNIDAZOLE (FLAGYL) tablet 500 mg  500 mg Oral Q8H  potassium chloride 20 mEq in 100 ml IVPB  20 mEq IntraVENous Q2H  
 pantoprazole (PROTONIX) tablet 40 mg  40 mg Oral ACB  amLODIPine (NORVASC) tablet 5 mg  5 mg Oral QPM  
 sertraline (ZOLOFT) tablet 100 mg  100 mg Oral DAILY  sotalol (BETAPACE) tablet 80 mg  80 mg Oral Q12H  
 sodium chloride (NS) flush 5-10 mL  5-10 mL IntraVENous PRN  
 0.9% sodium chloride infusion  75 mL/hr IntraVENous CONTINUOUS  
 tiotropium (SPIRIVA) inhalation capsule 18 mcg  1 Cap Inhalation DAILY And  
 albuterol (PROVENTIL VENTOLIN) nebulizer solution 2.5 mg  2.5 mg Nebulization Q6HWA RT  
 alum-mag hydroxide-simeth (MYLANTA) oral suspension 30 mL  30 mL Oral Q4H PRN  
 guaiFENesin-dextromethorphan (ROBITUSSIN DM) 100-10 mg/5 mL syrup 10 mL  10 mL Oral Q4H PRN  
 hydrALAZINE (APRESOLINE) 20 mg/mL injection 20 mg  20 mg IntraVENous Q4H PRN  
 HYDROcodone-acetaminophen (NORCO) 5-325 mg per tablet 1 Tab  1 Tab Oral Q4H PRN  
 zolpidem (AMBIEN) tablet 5 mg  5 mg Oral QHS PRN  
 influenza vaccine 2018-19 (6 mos+)(PF) (FLUARIX QUAD/FLULAVAL QUAD) injection 0.5 mL  0.5 mL IntraMUSCular PRIOR TO DISCHARGE  nystatin (MYCOSTATIN) 100,000 unit/mL oral suspension 500,000 Units  500,000 Units Oral QID  acetaminophen (TYLENOL) tablet 650 mg  650 mg Oral Q6H PRN  
 ondansetron (ZOFRAN) injection 4 mg  4 mg IntraVENous Q6H PRN Other Studies (last 24 hours): No results found. Assessment and Plan:  
 
Hospital Problems as of 9/7/2018  Date Reviewed: 9/5/2018 Codes Class Noted - Resolved POA Diarrhea of infectious origin ICD-10-CM: A09 ICD-9-CM: 009.3  9/6/2018 - Present Yes Aspiration pneumonia of right lower lobe (HCC) ICD-10-CM: J69.0 ICD-9-CM: 507.0  9/6/2018 - Present Yes Gastroenteritis ICD-10-CM: K52.9 ICD-9-CM: 558.9  9/6/2018 - Present Yes Colitis ICD-10-CM: K52.9 ICD-9-CM: 558.9  9/5/2018 - Present Yes * (Principal)Sepsis ([facility] ICD-10-CM: A41.9 ICD-9-CM: 038.9, 995.91  9/5/2018 - Present Yes Hypokalemia ICD-10-CM: E87.6 ICD-9-CM: 276.8  9/5/2018 - Present Yes Esophagitis ICD-10-CM: K20.9 ICD-9-CM: 530.10  9/5/2018 - Present Yes Thrush (Chronic) ICD-10-CM: B37.0 ICD-9-CM: 112.0  9/5/2018 - Present Yes CLL (chronic lymphocytic leukemia) (HCC) (Chronic) ICD-10-CM: C91.90 ICD-9-CM: 204.10  7/24/2018 - Present Yes Chronic obstructive pulmonary disease (HCC) (Chronic) ICD-10-CM: J44.9 ICD-9-CM: 735  8/28/2017 - Present Yes Atrial fibrillation (HCC) (Chronic) ICD-10-CM: I48.91 
ICD-9-CM: 427.31  7/22/2010 - Present Yes HTN (hypertension) (Chronic) ICD-10-CM: I10 
ICD-9-CM: 401.9  7/22/2010 - Present Yes CAD (coronary artery disease) (Chronic) ICD-10-CM: I25.10 ICD-9-CM: 414.00  12/1/2009 - Present Yes Overview Signed 12/1/2009  8:28 AM by Aron Ennis  
  pci to lad 11/19 2.75x28 Xience drug eluting stent. RESOLVED: Acute respiratory failure with hypoxia ([facility] ICD-10-CM: J96.01 
ICD-9-CM: 518.81  9/5/2018 - 9/6/2018 Yes Plan: · Diarrhea, RLL PNA, Sepsis - I strongly suspect Cdiff. Stool has been sent, pending. Will start PO vanc. Stop zosyn and put on amoxicillin and flagyl for narrower coverage of aspiration PNA. I'm ok with diet as tolerated · Hypoxia - improved. · HypoK - improved. Give another 40meq K today, IV due to ongoing diarrhea. · HTN- Cont norvasc. PRN hydralazine DC planning/Dispo:  Home when improved Diet:  DIET REGULAR 
DVT ppx:  SCDs Signed: 
Cr De Santiago MD

## 2018-09-07 NOTE — PROGRESS NOTES
Paged Dr. Louis Steward to inform of patients decreased O2 sat of 79% on 2l NC. Increase O2 NC to 5L. O2 sat improved to 88%.

## 2018-09-08 LAB
ANION GAP SERPL CALC-SCNC: 9 MMOL/L (ref 7–16)
BACTERIA SPEC CULT: NORMAL
BASOPHILS # BLD: 0 K/UL (ref 0–0.2)
BASOPHILS NFR BLD: 0 % (ref 0–2)
BNP SERPL-MCNC: 288 PG/ML
BUN SERPL-MCNC: 5 MG/DL (ref 8–23)
CALCIUM SERPL-MCNC: 8.2 MG/DL (ref 8.3–10.4)
CHLORIDE SERPL-SCNC: 106 MMOL/L (ref 98–107)
CO2 SERPL-SCNC: 26 MMOL/L (ref 21–32)
CREAT SERPL-MCNC: 0.72 MG/DL (ref 0.8–1.5)
DIFFERENTIAL METHOD BLD: ABNORMAL
EOSINOPHIL # BLD: 0 K/UL (ref 0–0.8)
EOSINOPHIL NFR BLD: 0 % (ref 0.5–7.8)
ERYTHROCYTE [DISTWIDTH] IN BLOOD BY AUTOMATED COUNT: 12.9 %
GLUCOSE SERPL-MCNC: 109 MG/DL (ref 65–100)
HCT VFR BLD AUTO: 37.2 % (ref 41.1–50.3)
HGB BLD-MCNC: 12 G/DL (ref 13.6–17.2)
IMM GRANULOCYTES # BLD: 0.1 K/UL (ref 0–0.5)
IMM GRANULOCYTES NFR BLD AUTO: 0 % (ref 0–5)
LYMPHOCYTES # BLD: 7.2 K/UL (ref 0.5–4.6)
LYMPHOCYTES NFR BLD: 46 % (ref 13–44)
MCH RBC QN AUTO: 30.3 PG (ref 26.1–32.9)
MCHC RBC AUTO-ENTMCNC: 32.3 G/DL (ref 31.4–35)
MCV RBC AUTO: 93.9 FL (ref 79.6–97.8)
MONOCYTES # BLD: 1.3 K/UL (ref 0.1–1.3)
MONOCYTES NFR BLD: 8 % (ref 4–12)
NEUTS SEG # BLD: 7.2 K/UL (ref 1.7–8.2)
NEUTS SEG NFR BLD: 45 % (ref 43–78)
NRBC # BLD: 0 K/UL (ref 0–0.2)
PLATELET # BLD AUTO: 217 K/UL (ref 150–450)
PMV BLD AUTO: 10.6 FL (ref 9.4–12.3)
POTASSIUM SERPL-SCNC: 3.4 MMOL/L (ref 3.5–5.1)
RBC # BLD AUTO: 3.96 M/UL (ref 4.23–5.6)
SERVICE CMNT-IMP: NORMAL
SODIUM SERPL-SCNC: 141 MMOL/L (ref 136–145)
WBC # BLD AUTO: 15.7 K/UL (ref 4.3–11.1)

## 2018-09-08 PROCEDURE — 74011250637 HC RX REV CODE- 250/637: Performed by: INTERNAL MEDICINE

## 2018-09-08 PROCEDURE — 85025 COMPLETE CBC W/AUTO DIFF WBC: CPT

## 2018-09-08 PROCEDURE — 97161 PT EVAL LOW COMPLEX 20 MIN: CPT

## 2018-09-08 PROCEDURE — 80048 BASIC METABOLIC PNL TOTAL CA: CPT

## 2018-09-08 PROCEDURE — 83880 ASSAY OF NATRIURETIC PEPTIDE: CPT

## 2018-09-08 PROCEDURE — 77010033678 HC OXYGEN DAILY

## 2018-09-08 PROCEDURE — 94640 AIRWAY INHALATION TREATMENT: CPT

## 2018-09-08 PROCEDURE — 74011000250 HC RX REV CODE- 250: Performed by: HOSPITALIST

## 2018-09-08 PROCEDURE — 74011250636 HC RX REV CODE- 250/636: Performed by: INTERNAL MEDICINE

## 2018-09-08 PROCEDURE — 94760 N-INVAS EAR/PLS OXIMETRY 1: CPT

## 2018-09-08 PROCEDURE — 77030020263 HC SOL INJ SOD CL0.9% LFCR 1000ML

## 2018-09-08 PROCEDURE — 65270000029 HC RM PRIVATE

## 2018-09-08 PROCEDURE — 74011000258 HC RX REV CODE- 258: Performed by: INTERNAL MEDICINE

## 2018-09-08 PROCEDURE — 36415 COLL VENOUS BLD VENIPUNCTURE: CPT

## 2018-09-08 RX ORDER — VANCOMYCIN HYDROCHLORIDE
1250 EVERY 12 HOURS
Status: DISCONTINUED | OUTPATIENT
Start: 2018-09-08 | End: 2018-09-09

## 2018-09-08 RX ORDER — POTASSIUM CHLORIDE 14.9 MG/ML
20 INJECTION INTRAVENOUS
Status: COMPLETED | OUTPATIENT
Start: 2018-09-08 | End: 2018-09-08

## 2018-09-08 RX ORDER — FUROSEMIDE 10 MG/ML
40 INJECTION INTRAMUSCULAR; INTRAVENOUS ONCE
Status: COMPLETED | OUTPATIENT
Start: 2018-09-08 | End: 2018-09-08

## 2018-09-08 RX ORDER — LEVOFLOXACIN 5 MG/ML
750 INJECTION, SOLUTION INTRAVENOUS EVERY 24 HOURS
Status: DISCONTINUED | OUTPATIENT
Start: 2018-09-08 | End: 2018-09-10

## 2018-09-08 RX ADMIN — SOTALOL HYDROCHLORIDE 80 MG: 80 TABLET ORAL at 22:10

## 2018-09-08 RX ADMIN — FUROSEMIDE 40 MG: 10 INJECTION, SOLUTION INTRAMUSCULAR; INTRAVENOUS at 09:31

## 2018-09-08 RX ADMIN — TIOTROPIUM BROMIDE 18 MCG: 18 CAPSULE ORAL; RESPIRATORY (INHALATION) at 07:36

## 2018-09-08 RX ADMIN — SERTRALINE HYDROCHLORIDE 100 MG: 25 TABLET ORAL at 08:12

## 2018-09-08 RX ADMIN — SOTALOL HYDROCHLORIDE 80 MG: 80 TABLET ORAL at 08:12

## 2018-09-08 RX ADMIN — NYSTATIN 500000 UNITS: 100000 SUSPENSION ORAL at 08:12

## 2018-09-08 RX ADMIN — PIPERACILLIN SODIUM,TAZOBACTAM SODIUM 4.5 G: 4; .5 INJECTION, POWDER, FOR SOLUTION INTRAVENOUS at 08:11

## 2018-09-08 RX ADMIN — ALBUTEROL SULFATE 2.5 MG: 2.5 SOLUTION RESPIRATORY (INHALATION) at 19:25

## 2018-09-08 RX ADMIN — ALBUTEROL SULFATE 2.5 MG: 2.5 SOLUTION RESPIRATORY (INHALATION) at 14:50

## 2018-09-08 RX ADMIN — POTASSIUM CHLORIDE 20 MEQ: 14.9 INJECTION, SOLUTION INTRAVENOUS at 11:49

## 2018-09-08 RX ADMIN — POTASSIUM CHLORIDE 20 MEQ: 14.9 INJECTION, SOLUTION INTRAVENOUS at 14:23

## 2018-09-08 RX ADMIN — Medication 10 ML: at 22:10

## 2018-09-08 RX ADMIN — NYSTATIN 500000 UNITS: 100000 SUSPENSION ORAL at 12:06

## 2018-09-08 RX ADMIN — PIPERACILLIN SODIUM,TAZOBACTAM SODIUM 4.5 G: 4; .5 INJECTION, POWDER, FOR SOLUTION INTRAVENOUS at 16:24

## 2018-09-08 RX ADMIN — HYDROCODONE BITARTRATE AND ACETAMINOPHEN 1 TABLET: 5; 325 TABLET ORAL at 22:18

## 2018-09-08 RX ADMIN — AMLODIPINE BESYLATE 5 MG: 5 TABLET ORAL at 17:15

## 2018-09-08 RX ADMIN — PIPERACILLIN SODIUM,TAZOBACTAM SODIUM 4.5 G: 4; .5 INJECTION, POWDER, FOR SOLUTION INTRAVENOUS at 00:29

## 2018-09-08 RX ADMIN — ALBUTEROL SULFATE 2.5 MG: 2.5 SOLUTION RESPIRATORY (INHALATION) at 07:36

## 2018-09-08 RX ADMIN — VANCOMYCIN HYDROCHLORIDE 1250 MG: 10 INJECTION, POWDER, LYOPHILIZED, FOR SOLUTION INTRAVENOUS at 09:49

## 2018-09-08 RX ADMIN — LEVOFLOXACIN 750 MG: 5 INJECTION, SOLUTION INTRAVENOUS at 09:31

## 2018-09-08 RX ADMIN — NYSTATIN 500000 UNITS: 100000 SUSPENSION ORAL at 17:15

## 2018-09-08 RX ADMIN — VANCOMYCIN HYDROCHLORIDE 1250 MG: 10 INJECTION, POWDER, LYOPHILIZED, FOR SOLUTION INTRAVENOUS at 22:10

## 2018-09-08 RX ADMIN — PANTOPRAZOLE SODIUM 40 MG: 40 TABLET, DELAYED RELEASE ORAL at 06:11

## 2018-09-08 RX ADMIN — NYSTATIN 500000 UNITS: 100000 SUSPENSION ORAL at 22:10

## 2018-09-08 NOTE — PROGRESS NOTES
Pharmacokinetic Consult to Pharmacist 
 
Anirudh Hanley is a 58 y.o. male being treated for HAP with vancomycin. Height: 6' 1\" (185.4 cm)  Weight: 84.3 kg (185 lb 12.8 oz) Lab Results Component Value Date/Time BUN 5 (L) 09/08/2018 06:36 AM  
 Creatinine 0.72 (L) 09/08/2018 06:36 AM  
 WBC 15.7 (H) 09/08/2018 06:36 AM  
 Lactic Acid (POC) 1.2 09/05/2018 02:56 PM  
  
Estimated Creatinine Clearance: 117.1 mL/min (based on Cr of 0.72). CULTURES: 
  
All Micro Results Procedure Component Value Units Date/Time CULTURE, RESPIRATORY/SPUTUM/BRONCH Willistine Fam STAIN [[id number]] Order Status:  Sent Specimen:  Sputum from Sputum C. DIFFICILE AG & TOXIN A/B [[id number]] Collected:  09/06/18 1828 Order Status:  Completed Specimen:  Stool from Stool Updated:  09/07/18 1220 [id number] John Dumas ANTIGEN      
  C. DIFFICILE GDH ANTIGEN-NEGATIVE  
  C. difficile toxin C. DIFFICILE TOXIN-NEGATIVE  
  PCR Reflex NOT APPLICABLE INTERPRETATION      
  NEGATIVE FOR TOXIGENIC C. DIFFICILE Clinical Consideration NEGATIVE FOR TOXIGENIC C. DIFFICILE  
 CULTURE, URINE [[id number]] Collected:  09/06/18 0615 Order Status:  Completed Specimen:  Urine from Clean catch Updated:  09/07/18 5988 Special Requests: NO SPECIAL REQUESTS Culture result: NO GROWTH 1 DAY     
 CULTURE, STOOL [[id number]] Collected:  09/06/18 1828 Order Status:  Completed Specimen:  Stool Updated:  09/07/18 3599 Special Requests: NO SPECIAL REQUESTS Culture result:      
  NO GROWTH AFTER SHORT PERIOD OF INCUBATION. FURTHER RESULTS TO FOLLOW AFTER OVERNIGHT INCUBATION. CULTURE, BLOOD [[id number]] Collected:  09/05/18 150 Order Status:  Completed Specimen:  Blood from Blood Updated:  09/07/18 1781 Special Requests: --     
  LEFT 
HAND Culture result: NO GROWTH 2 DAYS     
 CULTURE, BLOOD [[id number]] Collected:  09/05/18 1501 Order Status:  Completed Specimen:  Blood from Blood Updated:  09/07/18 8665 Special Requests: --     
  RIGHT Antecubital 
  
  Culture result: NO GROWTH 2 DAYS No results found for: [place] Day 1 of vancomycin. Goal trough is 15-20. Patient previously treated with vancomycin during current admission (last dose 9/6). Will hold off on ordering a loading dose and will instead start a maintenance regimen of 1250 mg q12. Will plan to obtain a trough prior to the fourth dose. Will continue to follow patient. Thank you, Adela Ramirez, PharmD, BCPS Clinical Pharmacist 
[phone]

## 2018-09-08 NOTE — PROGRESS NOTES
GI DAILY PROGRESS NOTE Admit Date:  9/5/2018 Today's Date:  9/8/2018 CC:  Esophagitis; colitis Subjective:  
 
Patient denies nausea and vomiting. Still having diarrhea--had 5 mushy dark-green to \"coffee ground\" stools since 11 pm.  Still with Mild left sided abdominal pain.  +cough Medications:  
Current Facility-Administered Medications Medication Dose Route Frequency  levoFLOXacin (LEVAQUIN) 750 mg in D5W IVPB  750 mg IntraVENous Q24H  
 furosemide (LASIX) injection 40 mg  40 mg IntraVENous ONCE  potassium chloride 20 mEq in 100 ml IVPB  20 mEq IntraVENous Q2H  
 vancomycin (VANCOCIN) 1250 mg in  ml infusion  1,250 mg IntraVENous Q12H  pantoprazole (PROTONIX) tablet 40 mg  40 mg Oral ACB  piperacillin-tazobactam (ZOSYN) 4.5 g in 0.9% sodium chloride (MBP/ADV) 100 mL  4.5 g IntraVENous Q8H  
 amLODIPine (NORVASC) tablet 5 mg  5 mg Oral QPM  
 sertraline (ZOLOFT) tablet 100 mg  100 mg Oral DAILY  sotalol (BETAPACE) tablet 80 mg  80 mg Oral Q12H  
 sodium chloride (NS) flush 5-10 mL  5-10 mL IntraVENous PRN  
 tiotropium (SPIRIVA) inhalation capsule 18 mcg  1 Cap Inhalation DAILY And  
 albuterol (PROVENTIL VENTOLIN) nebulizer solution 2.5 mg  2.5 mg Nebulization Q6HWA RT  
 alum-mag hydroxide-simeth (MYLANTA) oral suspension 30 mL  30 mL Oral Q4H PRN  
 guaiFENesin-dextromethorphan (ROBITUSSIN DM) 100-10 mg/5 mL syrup 10 mL  10 mL Oral Q4H PRN  
 hydrALAZINE (APRESOLINE) 20 mg/mL injection 20 mg  20 mg IntraVENous Q4H PRN  
 HYDROcodone-acetaminophen (NORCO) 5-325 mg per tablet 1 Tab  1 Tab Oral Q4H PRN  
 zolpidem (AMBIEN) tablet 5 mg  5 mg Oral QHS PRN  
 influenza vaccine 2018-19 (6 mos+)(PF) (FLUARIX QUAD/FLULAVAL QUAD) injection 0.5 mL  0.5 mL IntraMUSCular PRIOR TO DISCHARGE  nystatin (MYCOSTATIN) 100,000 unit/mL oral suspension 500,000 Units  500,000 Units Oral QID  acetaminophen (TYLENOL) tablet 650 mg  650 mg Oral Q6H PRN  
  ondansetron (ZOFRAN) injection 4 mg  4 mg IntraVENous Q6H PRN Review of Systems: 
No chest pain.  +cough Diet:  Full liquids Objective:  
Vitals: 
Visit Vitals  /85 (BP 1 Location: Right arm, BP Patient Position: At rest)  Pulse (!) 117  Temp 99 °F (37.2 °C)  Resp 20  
 Ht 6' 1\" (1.854 m)  Wt 84.3 kg (185 lb 12.8 oz)  SpO2 94%  BMI 24.51 kg/m2 Intake/Output: 
  
09/06 1901 - 09/08 0700 In: 1860 [P.O.:360; I.V.:1500] Out: 300 [Urine:300] Exam: 
General appearance: alert, cooperative, no distress Lungs: decreased BS bilaterally at the bases; on O2 via NC Heart: mild sinus tachycardia Extremities: extremities normal, atraumatic, no cyanosis or edema Neuro:  alert and oriented Data Review (Labs):   
Recent Labs  
   09/08/18 
 0636  09/07/18 
 0612  09/06/18 
 9532  09/05/18 
 2355  09/05/18 
 1158 WBC  15.7*  23.5*  17.2*   --   24.8* HGB  12.0*  13.7  13.8   --   16.4 HCT  37.2*  43.1  42.0   --   49.4 PLT  217  303  204   --   288 MCV  93.9  94.9  92.7   --   90.6 NA  141  144  144   --   141  
K  3.4*  3.5  3.1*   --   3.3*  
CL  106  110*  112*   --   106 CO2  26  25  23   --   24 BUN  5*  4*  6*   --   10  
CREA  0.72*  0.79*  0.74*   --   1.12  
CA  8.2*  8.7  8.0*   --   9.6 MG   --   2.1   --   2.1   --   
GLU  109*  108*  109*   --   146* AP   --    --   68   --   99 SGOT   --    --   27   --   22 ALT   --    --   19   --   20  
TBILI   --    --   0.5   --   0.7 ALB   --    --   3.3   --   4.3 TP   --    --   6.1*   --   7.9 LPSE   --    --    --    --   91 Assessment:  
 
Principal Problem: 
  Sepsis ([facility] (9/5/2018) Active Problems: 
  CAD (coronary artery disease) (12/1/2009) Overview: pci to lad 11/19 2.75x28 Xience drug eluting stent. Atrial fibrillation ([facility] (7/22/2010) HTN (hypertension) (7/22/2010) Chronic obstructive pulmonary disease ([facility] (8/28/2017) CLL (chronic lymphocytic leukemia) ([facility] (7/24/2018) Colitis (9/5/2018) Hypokalemia (9/5/2018) Esophagitis (9/5/2018) Thrush (9/5/2018) Diarrhea of infectious origin (9/6/2018) Aspiration pneumonia of right lower lobe ([facility] (9/6/2018) Gastroenteritis (9/6/2018) 59 y/o male with h/o a. Fib s/p ablation, CAD s/p stents (most recently 12/2017--last aspirin plavix 9/3/18), COPD, HTN, CLL is admitted with sepsis after presenting with three day history of nausea, vomiting, abdominal pain, diarrhea, ultimately black emesis and stools, and CT showing evidence of esophagitis and colitis. Vomited and aspirated in ED and now with RLL pneumonia. Suspect an infectious gastroenteritis with esophageal wall thickening secondary to esophagitis related to vomiting. Fortunately, he had unremarkable EGD last year and colonoscopy 2 years ago revealing adenomatous polyps. C. Diff negative and culture pending. Nausea/vomiting resolved but still diarrhea and fever (the latter likely related to PNA) Plan:  
1) await results of stool culture. If negative, can begin antidiarrheal prn 
2) no plans for EGD at this time given clinical picture (esophagitis on CT likely secondary to vomiting), unremarkable EGD last year, and current pneumonia Patient is seen and examined in collaboration with Dr. Genoveva Tapia. Assessment and plan as per Dr. Erika Casillas.  
TONE [place]

## 2018-09-08 NOTE — PROGRESS NOTES
Hospitalist Progress Note Admit Date:  2018  2:13 PM  
Name:  Leandro Jurado Age:  58 y.o. 
:  1955 MRN:  [id number] PCP:  Merritt Ding DO Treatment Team: Attending Provider: Jessica Rowell MD; Consulting Provider: Cleveland Carroll MD; Utilization Review: Tiarra Moreno RN; Care Manager: Karolina Cowart. Jorgito Chadwick Subjective:  
Mr. Gauri Dawn is a 57 yo male with PMH of AFIB (no anticoagulation), CAD on asa/ plavix, COPD, HTN,  CLL 9 followed [facility])  Evaluated with 3 days of nausea/ emesis, loose BM with dark stools. He was seen by PCP, told had blood in stool and recommended ED workup. Denied NSIAD or ETOH use. Endorses anorexia, diffuse ABD pain, fever. pending WATCHMAN device per [facility] cardiology and is on asa/ plavix and will need further anticoagulation. Was thought to possible aspirate with emesis while in xray and developed hypoxia afterwards. Follow up CXR showed RLL PNA/aspiration. Met sepsis criteria. CTAP showed distal esophageal wall thickening and descending colon wall thickening. Admitted and started on broad abx, IVF. GI was consulted for assistance with GI issues but stool studies negative. Zosyn was restarted, PO vanc stopped. IV vanc and levaquin added when hypoxia worsened. No CHF on KEN . 
 
 - says more SOB today. No significant cough. Still feels ill. Diarrhea the same, liquid stools numerous times daily. Denies pain. Objective:  
 
Patient Vitals for the past 24 hrs: 
 Temp Pulse Resp BP SpO2  
18 0727 99 °F (37.2 °C) (!) 117 20 150/85 94 % 18 0355 99.3 °F (37.4 °C) 82 20 114/79 93 % 18 2337 99.1 °F (37.3 °C) 76 20 142/88 90 % 18 99.8 °F (37.7 °C) - - - -  
18 - - - - (!) 88 % 18 1918 (!) 101.6 °F (38.7 °C) 86 20 122/72 93 % 18 1512 99.5 °F (37.5 °C) 95 22 (!) 167/92 90 % 18 1351 - - - - 90 % 18 1123 99.9 °F (37.7 °C) 75 20 112/72 90 % 09/07/18 0820 - - - - 91 % Oxygen Therapy O2 Sat (%): 94 % (09/08/18 0727) Pulse via Oximetry: 93 beats per minute (09/07/18 2015) O2 Device: Nasal cannula (09/07/18 2015) O2 Flow Rate (L/min): 5 l/min (09/07/18 2015) Intake/Output Summary (Last 24 hours) at 09/08/18 8209 Last data filed at 09/07/18 2338 Gross per 24 hour Intake              360 ml Output              300 ml Net               60 ml General:    Well nourished. Alert. CV:   Tachy, reg. No murmur, rub, or gallop. Lungs:   Faint crackles bilat Abdomen:   Soft, nt, nondistended. Extremities: Warm and dry. No cyanosis or edema. Skin:     No rashes or jaundice. Neuro:  No gross focal deficits Data Review: 
I have reviewed all labs, meds, telemetry events, and studies from the last 24 hours: 
 
Recent Results (from the past 24 hour(s)) METABOLIC PANEL, BASIC Collection Time: 09/08/18  6:36 AM  
Result Value Ref Range Sodium 141 136 - 145 mmol/L Potassium 3.4 (L) 3.5 - 5.1 mmol/L Chloride 106 98 - 107 mmol/L  
 CO2 26 21 - 32 mmol/L Anion gap 9 7 - 16 mmol/L Glucose 109 (H) 65 - 100 mg/dL BUN 5 (L) 8 - 23 MG/DL Creatinine 0.72 (L) 0.8 - 1.5 MG/DL  
 GFR est AA >60 >60 ml/min/1.73m2 GFR est non-AA >60 >60 ml/min/1.73m2 Calcium 8.2 (L) 8.3 - 10.4 MG/DL  
CBC WITH AUTOMATED DIFF Collection Time: 09/08/18  6:36 AM  
Result Value Ref Range WBC PENDING K/uL  
 RBC PENDING M/uL HGB PENDING g/dL HCT PENDING % MCV PENDING FL  
 MCH PENDING PG  
 MCHC PENDING g/dL RDW PENDING % PLATELET PENDING K/uL MPV PENDING FL  
 ABSOLUTE NRBC PENDING K/uL DF PENDING All Micro Results Procedure Component Value Units Date/Time CULTURE, RESPIRATORY/SPUTUM/BRONCH [place] STAIN [[id number]] Order Status:  Sent Specimen:  Sputum from Sputum C. DIFFICILE AG & TOXIN A/B [[id number]] Collected:  09/06/18 1829 Order Status:  Completed Specimen:  Stool from Stool Updated:  09/07/18 1220 [id number] [place] ANTIGEN      
  C. DIFFICILE GDH ANTIGEN-NEGATIVE  
  C. difficile toxin C. DIFFICILE TOXIN-NEGATIVE  
  PCR Reflex NOT APPLICABLE INTERPRETATION      
  NEGATIVE FOR TOXIGENIC C. DIFFICILE Clinical Consideration NEGATIVE FOR TOXIGENIC C. DIFFICILE  
 CULTURE, URINE [[id number]] Collected:  09/06/18 0615 Order Status:  Completed Specimen:  Urine from Clean catch Updated:  09/07/18 1282 Special Requests: NO SPECIAL REQUESTS Culture result: NO GROWTH 1 DAY     
 CULTURE, STOOL [[id number]] Collected:  09/06/18 1828 Order Status:  Completed Specimen:  Stool Updated:  09/07/18 8977 Special Requests: NO SPECIAL REQUESTS Culture result:      
  NO GROWTH AFTER SHORT PERIOD OF INCUBATION. FURTHER RESULTS TO FOLLOW AFTER OVERNIGHT INCUBATION. CULTURE, BLOOD [[id number]] Collected:  09/05/18 1505 Order Status:  Completed Specimen:  Blood from Blood Updated:  09/07/18 2755 Special Requests: --     
  LEFT 
HAND Culture result: NO GROWTH 2 DAYS     
 CULTURE, BLOOD [[id number]] Collected:  09/05/18 1506 Order Status:  Completed Specimen:  Blood from Blood Updated:  09/07/18 8594 Special Requests: --     
  RIGHT Antecubital 
  
  Culture result: NO GROWTH 2 DAYS No results found for this visit on 09/05/18. Current Meds: 
Current Facility-Administered Medications Medication Dose Route Frequency  levoFLOXacin (LEVAQUIN) 750 mg in D5W IVPB  750 mg IntraVENous Q24H  pantoprazole (PROTONIX) tablet 40 mg  40 mg Oral ACB  piperacillin-tazobactam (ZOSYN) 4.5 g in 0.9% sodium chloride (MBP/ADV) 100 mL  4.5 g IntraVENous Q8H  
 amLODIPine (NORVASC) tablet 5 mg  5 mg Oral QPM  
 sertraline (ZOLOFT) tablet 100 mg  100 mg Oral DAILY  sotalol (BETAPACE) tablet 80 mg  80 mg Oral Q12H  
 sodium chloride (NS) flush 5-10 mL  5-10 mL IntraVENous PRN  
  tiotropium (SPIRIVA) inhalation capsule 18 mcg  1 Cap Inhalation DAILY And  
 albuterol (PROVENTIL VENTOLIN) nebulizer solution 2.5 mg  2.5 mg Nebulization Q6HWA RT  
 alum-mag hydroxide-simeth (MYLANTA) oral suspension 30 mL  30 mL Oral Q4H PRN  
 guaiFENesin-dextromethorphan (ROBITUSSIN DM) 100-10 mg/5 mL syrup 10 mL  10 mL Oral Q4H PRN  
 hydrALAZINE (APRESOLINE) 20 mg/mL injection 20 mg  20 mg IntraVENous Q4H PRN  
 HYDROcodone-acetaminophen (NORCO) 5-325 mg per tablet 1 Tab  1 Tab Oral Q4H PRN  
 zolpidem (AMBIEN) tablet 5 mg  5 mg Oral QHS PRN  
 influenza vaccine 2018-19 (6 mos+)(PF) (FLUARIX QUAD/FLULAVAL QUAD) injection 0.5 mL  0.5 mL IntraMUSCular PRIOR TO DISCHARGE  nystatin (MYCOSTATIN) 100,000 unit/mL oral suspension 500,000 Units  500,000 Units Oral QID  acetaminophen (TYLENOL) tablet 650 mg  650 mg Oral Q6H PRN  
 ondansetron (ZOFRAN) injection 4 mg  4 mg IntraVENous Q6H PRN Other Studies (last 24 hours): Xr Chest [facility] Result Date: 9/7/2018 Chest portable CLINICAL INDICATION: Worsening hypoxia with recent aspiration pneumonia, evaluate for ARDS. COMPARISON: 9/6/2018, 9/5/2018 TECHNIQUE: single AP portable view chest at 3:55 PM upright FINDINGS:  Since prior there is mildly increased interstitial opacities throughout both lungs, nonspecific although most suggestive of pulmonary edema. There is no evidence of dense consolidation, pneumothorax, or enlarging pleural effusion. The mediastinal and hilar contours are stable given technique. IMPRESSION: Increasing interstitial opacities, most likely pulmonary edema. No consolidation. Assessment and Plan:  
 
Hospital Problems as of 9/8/2018  Date Reviewed: 9/5/2018 Codes Class Noted - Resolved POA Diarrhea of infectious origin ICD-10-CM: A09 ICD-9-CM: 009.3  9/6/2018 - Present Yes Aspiration pneumonia of right lower lobe (HCC) ICD-10-CM: J69.0 ICD-9-CM: 507.0  9/6/2018 - Present Yes Gastroenteritis ICD-10-CM: K52.9 ICD-9-CM: 558.9  9/6/2018 - Present Yes Colitis ICD-10-CM: K52.9 ICD-9-CM: 558.9  9/5/2018 - Present Yes * (Principal)Sepsis ([facility] ICD-10-CM: A41.9 ICD-9-CM: 038.9, 995.91  9/5/2018 - Present Yes Hypokalemia ICD-10-CM: E87.6 ICD-9-CM: 276.8  9/5/2018 - Present Yes Esophagitis ICD-10-CM: K20.9 ICD-9-CM: 530.10  9/5/2018 - Present Yes Thrush (Chronic) ICD-10-CM: B37.0 ICD-9-CM: 112.0  9/5/2018 - Present Yes CLL (chronic lymphocytic leukemia) (HCC) (Chronic) ICD-10-CM: C91.90 ICD-9-CM: 204.10  7/24/2018 - Present Yes Chronic obstructive pulmonary disease (HCC) (Chronic) ICD-10-CM: J44.9 ICD-9-CM: 780  8/28/2017 - Present Yes Atrial fibrillation (HCC) (Chronic) ICD-10-CM: I48.91 
ICD-9-CM: 427.31  7/22/2010 - Present Yes HTN (hypertension) (Chronic) ICD-10-CM: I10 
ICD-9-CM: 401.9  7/22/2010 - Present Yes CAD (coronary artery disease) (Chronic) ICD-10-CM: I25.10 ICD-9-CM: 414.00  12/1/2009 - Present Yes Overview Signed 12/1/2009  8:28 AM by Jonel Mosqueda  
  pci to lad 11/19 2.75x28 Xience drug eluting stent. RESOLVED: Acute respiratory failure with hypoxia ([facility] ICD-10-CM: J96.01 
ICD-9-CM: 518.81  9/5/2018 - 9/6/2018 Yes Plan: 
· RLL PNA Sepsis -  On zosyn for aspiration. Add vanco/levaquin today given persistent fevers. Could just be chemical pneumonitis from aspiration rather than infection per se. Will continue to monitor. RT following. Cultures NGTD. Unable to provide sputum. · Diarrhea- stool studies negative so far. ?viral or manifestation of underlying illness/sepsis · Hypoxia - worsened yest afternoon. CXR with interstitial opacities but not ARDS. Will check BNP. KEN recently with no CHF. trops neg this admission · HypoK - improved. Give another 40meq K today, IV due to ongoing diarrhea. · HTN- Cont norvasc. PRN hydralazine DC planning/Dispo:  Home when improved Diet:  DIET FULL LIQUID 
DVT ppx:  SCDs Signed: 
Nani Sosa MD

## 2018-09-08 NOTE — PROGRESS NOTES
Pt resting in bed alert and oriented, denies pain or distress, breathing even and unlabored, pt 5 liters of Oxygen, safety measures in place, call light within reach.

## 2018-09-08 NOTE — PROGRESS NOTES
Problem: Mobility Impaired (Adult and Pediatric) Goal: *Acute Goals and Plan of Care (Insert Text) (1.)Mr. CRUZ  will ascend/descend 6 steps with use of railing with SUPERVISION within 7 treatment day(s). (2.)Mr. CRUZ  will ambulate with INDEPENDENT for 500+ feet with the least restrictive/no  device and SpO2 >90% within 7 treatment day(s). (3.)Mr. CRUZ  will tolerate 30 minutes of therapeutic exercises/activities for improved CV endurance for carryover to functional activities within 7 treatment days PHYSICAL THERAPY: Initial Assessment, PM 9/8/2018 INPATIENT: Hospital Day: 4 Payor: Lilli Justice / Plan: [address] [place] [address] HMO / Product Type: Managed Care Medicare /  
  
NAME/AGE/GENDER: Wiliam Cortez is a 58 y.o. male PRIMARY DIAGNOSIS: Colitis Sepsis ([facility] Sepsis ([facility] ICD-10: Treatment Diagnosis:  
 · Other abnormalities of gait and mobility (R26.89) Precaution/Allergies: 
Fentanyl; Lovastatin; Niacin; and Advicor [niacin-lovastatin] ASSESSMENT:  
 
Mr. CRUZ presents supine in bed and agreeable for PT. Patient demonstrated independence with transfers and gait in room. Patient has declined in activity tolerance following acute illness and several days in acute care. States he has been up a lot going to the bathroom and declines further treatment today. Mr. CRUZ would benefit from skilled physical therapy (medically necessary) to address his deficits and maximize his function. This section established at most recent assessment PROBLEM LIST (Impairments causing functional limitations): 1. Decreased Activity Tolerance 2. Decreased Work Simplification/Energy Conservation Techniques INTERVENTIONS PLANNED: (Benefits and precautions of physical therapy have been discussed with the patient.) 1. Gait Training 2. Therapeutic Activites 3. Therapeutic Exercise/Strengthening 4. education TREATMENT PLAN: Frequency/Duration: 2 times a week for 1 week Rehabilitation Potential For Stated Goals: Good RECOMMENDED REHABILITATION/EQUIPMENT: (at time of discharge pending progress): Due to the probability of continued deficits (see above) this patient will not likely need continued skilled physical therapy after discharge. Equipment:  
? None at this time HISTORY:  
History of Present Injury/Illness (Reason for Referral): 
Per MD note, \"Mr. Mimi Katz is a 57 yo male with PMH of AFIB (no anticoagulation), CAD on asa/ plavix, COPD, HTN,  CLL 9 followed S)  Evaluated with 3 days of nausea/ emesis, loose BM with dark stools. He was seen by PCP today, told had blood in stool and recommended ED workup. Denies NSIAD or ETOH use. Endorses anorexia, diffuse ABD pain, fever. States pending WATCHMAN device per [facility] cardiology and is on asa/ plavix and will need further anticoagulation. Found to meet sepsis criteria due to leukocytosis, tachycardia, lactic acidosis. Was thought to possible aspirate with emesis while in xray and developed hypoxia with negative CXR. Denies chest pain but has cough. CTAP showed distal esophageal wall thickening and descending colon wall thickening. He has received vancomycin and zosyn with IVF. \" 
Past Medical History/Comorbidities:  
Mr. Mimi Katz  has a past medical history of Arrhythmia; Arthritis; CAD (coronary artery disease) (11/2009); Cancer ([address] Chest pain; Chronic midline low back pain without sciatica (8/28/2017); CLL (chronic lymphocytic leukemia) ([address] (7/24/2018); COPD; Coronary atherosclerosis of native coronary artery (12/30/2015); Depression; Dyspnea on exertion; GERD (gastroesophageal reflux disease) (7/22/2010); Heart failure ([address] Hematochezia; HTN (hypertension) (x 1 month); Hypercholesteremia; Other ill-defined conditions(799.89); Palpitations (12/30/2015); Paroxysmal atrial fibrillation ([address] Psychiatric disorder; PUD (peptic ulcer disease) (2010); Recurrent depression ([address] (8/8/2016);  Tobacco use disorder (12/30/2015); and Unspecified adverse effect of anesthesia (1978). He also has no past medical history of Aneurysm ([facility] Asthma; Autoimmune disease ([facility] Chronic kidney disease; Chronic pain; Coagulation disorder ([facility] Diabetes ([facility] Difficult intubation; Endocarditis; Ill-defined condition; Liver disease; Malignant hyperthermia due to anesthesia; Morbid obesity ([facility] Nausea & vomiting; Pseudocholinesterase deficiency; Rheumatic fever; Seizures ([facility] Sleep apnea; Stroke [facility]); Thromboembolus ([facility] or Thyroid disease. Mr. Prasanna Ambrose  has a past surgical history that includes hx lap cholecystectomy (1995); hx hernia repair (1990); hx other surgical; hx heent (1977 and 1978); hx colonoscopy (2012); hx heart catheterization (11/2009); hx heart catheterization (3/2016); hx cholecystectomy; hx shoulder arthroscopy (2000); hx orthopaedic; and hx orthopaedic (Left, 2012). Social History/Living Environment:  
Home Environment: Private residence # Steps to Enter: 6 One/Two Story Residence: One story Living Alone: No 
Support Systems: Spouse/Significant Other/Partner Patient Expects to be Discharged to[de-identified] Private residence Current DME Used/Available at Home: Nebulizer Prior Level of Function/Work/Activity: 
Lives at home with spouse, independent in home and community. Number of Personal Factors/Comorbidities that affect the Plan of Care: 3+: HIGH COMPLEXITY EXAMINATION:  
Most Recent Physical Functioning:  
Gross Assessment: 
AROM: Within functional limits Strength: Within functional limits Posture: 
  
Balance: 
Sitting: Intact Standing: Intact Bed Mobility: 
Supine to Sit: Modified independent Sit to Supine: Modified independent Wheelchair Mobility: 
  
Transfers: 
Sit to Stand: Independent Stand to Sit: Independent Gait: 
  
Speed/Unique: Slow Step Length: Right shortened;Left shortened Distance (ft): 50 Feet (ft) Ambulation - Level of Assistance: Supervision Interventions: Safety awareness training Body Structures Involved: 1. Lungs 2. Digestive Structures Body Functions Affected: 1. Respiratory 2. Movement Related 3. Metobolic/Endocrine Activities and Participation Affected: 1. Mobility 2. Self Care 3. Community, Social and Rankin Stollings Number of elements that affect the Plan of Care: 4+: HIGH COMPLEXITY CLINICAL PRESENTATION:  
Presentation: Stable and uncomplicated: LOW COMPLEXITY CLINICAL DECISION MAKIN[address] [place] [address] AM-PAC 6 Clicks Basic Mobility Inpatient Short Form How much difficulty does the patient currently have. .. Unable A Lot A Little None 1. Turning over in bed (including adjusting bedclothes, sheets and blankets)? [] 1   [] 2   [] 3   [x] 4  
2. Sitting down on and standing up from a chair with arms ( e.g., wheelchair, bedside commode, etc.)   [] 1   [] 2   [] 3   [x] 4  
3. Moving from lying on back to sitting on the side of the bed? [] 1   [] 2   [] 3   [x] 4 How much help from another person does the patient currently need. .. Total A Lot A Little None 4. Moving to and from a bed to a chair (including a wheelchair)? [] 1   [] 2   [] 3   [x] 4  
5. Need to walk in hospital room? [] 1   [] 2   [] 3   [x] 4  
6. Climbing 3-5 steps with a railing? [] 1   [] 2   [x] 3   [] 4  
© , Trustees of [address] [place] [address], under license to [employer]. All rights reserved Score:  Initial: 23 Most Recent: X (Date: -- ) Interpretation of Tool:  Represents activities that are increasingly more difficult (i.e. Bed mobility, Transfers, Gait). Score 24 23 22-20 19-15 14-10 9-7 6 Modifier CH CI CJ CK CL CM CN   
 
? Mobility - Walking and Moving Around:  
  - CURRENT STATUS: CI - 1%-19% impaired, limited or restricted  - GOAL STATUS: CH - 0% impaired, limited or restricted   - D/C STATUS:  ---------------To be determined--------------- 
 Payor: [employer] MEDICARE / Plan: [address] HMO / Product Type: Managed Care Medicare /   
 
Medical Necessity:    
· Patient is expected to demonstrate progress in activity tolerance to improve safety during functional mobility. Reason for Services/Other Comments: 
· Patient continues to require skilled intervention due to decline in activity tolerance. Use of outcome tool(s) and clinical judgement create a POC that gives a: Clear prediction of patient's progress: LOW COMPLEXITY  
  
 
 
 
TREATMENT:  
(In addition to Assessment/Re-Assessment sessions the following treatments were rendered) Pre-treatment Symptoms/Complaints:   
Pain: Initial:  
Pain Intensity 1: 0  Post Session:  0 Assessment/Reassessment only, no treatment provided today Braces/Orthotics/Lines/Etc:  
· IV 
· O2 via nasal cannula Treatment/Session Assessment:   
· Response to Treatment:  Cooperative. · Interdisciplinary Collaboration:  
o Physical Therapist 
o Registered Nurse 
o Certified Nursing Assistant/Patient Care Technician · After treatment position/precautions:  
o Supine in bed 
o Bed/Chair-wheels locked 
o Bed in low position 
o Call light within reach · Compliance with Program/Exercises: Will assess as treatment progresses. · Recommendations/Intent for next treatment session: \"Next visit will focus on advancements to more challenging activities and reduction in assistance provided\". Total Treatment Duration: PT Patient Time In/Time Out Time In: 1421 Time Out: 15[address] [place], PT, DPT

## 2018-09-09 ENCOUNTER — APPOINTMENT (OUTPATIENT)
Dept: GENERAL RADIOLOGY | Age: 63
DRG: 871 | End: 2018-09-09
Attending: HOSPITALIST
Payer: MEDICARE

## 2018-09-09 LAB
ANION GAP SERPL CALC-SCNC: 8 MMOL/L (ref 7–16)
BASOPHILS # BLD: 0 K/UL (ref 0–0.2)
BASOPHILS NFR BLD: 0 % (ref 0–2)
BUN SERPL-MCNC: 5 MG/DL (ref 8–23)
CALCIUM SERPL-MCNC: 8.8 MG/DL (ref 8.3–10.4)
CHLORIDE SERPL-SCNC: 104 MMOL/L (ref 98–107)
CO2 SERPL-SCNC: 30 MMOL/L (ref 21–32)
CREAT SERPL-MCNC: 0.68 MG/DL (ref 0.8–1.5)
DIFFERENTIAL METHOD BLD: ABNORMAL
EOSINOPHIL # BLD: 0.3 K/UL (ref 0–0.8)
EOSINOPHIL NFR BLD: 2 % (ref 0.5–7.8)
ERYTHROCYTE [DISTWIDTH] IN BLOOD BY AUTOMATED COUNT: 12.7 %
GLUCOSE SERPL-MCNC: 98 MG/DL (ref 65–100)
HCT VFR BLD AUTO: 38.8 % (ref 41.1–50.3)
HGB BLD-MCNC: 12.9 G/DL (ref 13.6–17.2)
IMM GRANULOCYTES # BLD: 0 K/UL (ref 0–0.5)
IMM GRANULOCYTES NFR BLD AUTO: 0 % (ref 0–5)
LYMPHOCYTES # BLD: 8.2 K/UL (ref 0.5–4.6)
LYMPHOCYTES NFR BLD: 57 % (ref 13–44)
MCH RBC QN AUTO: 30.4 PG (ref 26.1–32.9)
MCHC RBC AUTO-ENTMCNC: 33.2 G/DL (ref 31.4–35)
MCV RBC AUTO: 91.5 FL (ref 79.6–97.8)
MONOCYTES # BLD: 1.1 K/UL (ref 0.1–1.3)
MONOCYTES NFR BLD: 8 % (ref 4–12)
NEUTS SEG # BLD: 4.7 K/UL (ref 1.7–8.2)
NEUTS SEG NFR BLD: 33 % (ref 43–78)
NRBC # BLD: 0 K/UL (ref 0–0.2)
PLATELET # BLD AUTO: 233 K/UL (ref 150–450)
PLATELET COMMENTS,PCOM: ADEQUATE
PMV BLD AUTO: 10.1 FL (ref 9.4–12.3)
POTASSIUM SERPL-SCNC: 2.7 MMOL/L (ref 3.5–5.1)
PROCALCITONIN SERPL-MCNC: 0.1 NG/ML
RBC # BLD AUTO: 4.24 M/UL (ref 4.23–5.6)
RBC MORPH BLD: ABNORMAL
SODIUM SERPL-SCNC: 142 MMOL/L (ref 136–145)
WBC # BLD AUTO: 14.3 K/UL (ref 4.3–11.1)
WBC MORPH BLD: ABNORMAL

## 2018-09-09 PROCEDURE — 74011000250 HC RX REV CODE- 250: Performed by: HOSPITALIST

## 2018-09-09 PROCEDURE — 71045 X-RAY EXAM CHEST 1 VIEW: CPT

## 2018-09-09 PROCEDURE — 94640 AIRWAY INHALATION TREATMENT: CPT

## 2018-09-09 PROCEDURE — 77030039270 HC TU BLD FLTR CARD -A

## 2018-09-09 PROCEDURE — 77010033678 HC OXYGEN DAILY

## 2018-09-09 PROCEDURE — 74011250636 HC RX REV CODE- 250/636: Performed by: HOSPITALIST

## 2018-09-09 PROCEDURE — 80048 BASIC METABOLIC PNL TOTAL CA: CPT

## 2018-09-09 PROCEDURE — 94760 N-INVAS EAR/PLS OXIMETRY 1: CPT

## 2018-09-09 PROCEDURE — 84145 PROCALCITONIN (PCT): CPT

## 2018-09-09 PROCEDURE — 74011000258 HC RX REV CODE- 258: Performed by: INTERNAL MEDICINE

## 2018-09-09 PROCEDURE — 74011250637 HC RX REV CODE- 250/637: Performed by: INTERNAL MEDICINE

## 2018-09-09 PROCEDURE — 74011250637 HC RX REV CODE- 250/637: Performed by: HOSPITALIST

## 2018-09-09 PROCEDURE — 65270000029 HC RM PRIVATE

## 2018-09-09 PROCEDURE — 77030020263 HC SOL INJ SOD CL0.9% LFCR 1000ML

## 2018-09-09 PROCEDURE — 85025 COMPLETE CBC W/AUTO DIFF WBC: CPT

## 2018-09-09 PROCEDURE — 74011250636 HC RX REV CODE- 250/636: Performed by: INTERNAL MEDICINE

## 2018-09-09 PROCEDURE — 36415 COLL VENOUS BLD VENIPUNCTURE: CPT

## 2018-09-09 RX ORDER — POTASSIUM CHLORIDE 20 MEQ/1
20 TABLET, EXTENDED RELEASE ORAL 3 TIMES DAILY
Status: DISCONTINUED | OUTPATIENT
Start: 2018-09-09 | End: 2018-09-11 | Stop reason: HOSPADM

## 2018-09-09 RX ORDER — IPRATROPIUM BROMIDE AND ALBUTEROL SULFATE 2.5; .5 MG/3ML; MG/3ML
3 SOLUTION RESPIRATORY (INHALATION)
Status: DISCONTINUED | OUTPATIENT
Start: 2018-09-09 | End: 2018-09-11 | Stop reason: HOSPADM

## 2018-09-09 RX ORDER — METRONIDAZOLE 500 MG/100ML
500 INJECTION, SOLUTION INTRAVENOUS EVERY 8 HOURS
Status: DISCONTINUED | OUTPATIENT
Start: 2018-09-09 | End: 2018-09-10

## 2018-09-09 RX ORDER — LOPERAMIDE HYDROCHLORIDE 2 MG/1
4 CAPSULE ORAL
Status: DISCONTINUED | OUTPATIENT
Start: 2018-09-09 | End: 2018-09-11 | Stop reason: HOSPADM

## 2018-09-09 RX ORDER — LOPERAMIDE HYDROCHLORIDE 2 MG/1
2 CAPSULE ORAL DAILY
Status: DISCONTINUED | OUTPATIENT
Start: 2018-09-09 | End: 2018-09-10

## 2018-09-09 RX ORDER — POTASSIUM CHLORIDE 14.9 MG/ML
20 INJECTION INTRAVENOUS
Status: COMPLETED | OUTPATIENT
Start: 2018-09-09 | End: 2018-09-09

## 2018-09-09 RX ORDER — AMLODIPINE BESYLATE 10 MG/1
10 TABLET ORAL EVERY EVENING
Status: DISCONTINUED | OUTPATIENT
Start: 2018-09-09 | End: 2018-09-11 | Stop reason: HOSPADM

## 2018-09-09 RX ADMIN — HYDROCODONE BITARTRATE AND ACETAMINOPHEN 1 TABLET: 5; 325 TABLET ORAL at 13:11

## 2018-09-09 RX ADMIN — POTASSIUM CHLORIDE 20 MEQ: 14.9 INJECTION, SOLUTION INTRAVENOUS at 11:16

## 2018-09-09 RX ADMIN — SERTRALINE HYDROCHLORIDE 100 MG: 25 TABLET ORAL at 08:29

## 2018-09-09 RX ADMIN — Medication 10 ML: at 05:47

## 2018-09-09 RX ADMIN — PANTOPRAZOLE SODIUM 40 MG: 40 TABLET, DELAYED RELEASE ORAL at 05:47

## 2018-09-09 RX ADMIN — NYSTATIN 500000 UNITS: 100000 SUSPENSION ORAL at 12:28

## 2018-09-09 RX ADMIN — Medication 10 ML: at 13:12

## 2018-09-09 RX ADMIN — POTASSIUM CHLORIDE 20 MEQ: 14.9 INJECTION, SOLUTION INTRAVENOUS at 13:12

## 2018-09-09 RX ADMIN — AMLODIPINE BESYLATE 10 MG: 10 TABLET ORAL at 17:20

## 2018-09-09 RX ADMIN — PIPERACILLIN SODIUM,TAZOBACTAM SODIUM 4.5 G: 4; .5 INJECTION, POWDER, FOR SOLUTION INTRAVENOUS at 08:28

## 2018-09-09 RX ADMIN — POTASSIUM CHLORIDE 20 MEQ: 20 TABLET, EXTENDED RELEASE ORAL at 21:36

## 2018-09-09 RX ADMIN — NYSTATIN 500000 UNITS: 100000 SUSPENSION ORAL at 08:28

## 2018-09-09 RX ADMIN — LEVOFLOXACIN 750 MG: 5 INJECTION, SOLUTION INTRAVENOUS at 08:28

## 2018-09-09 RX ADMIN — METRONIDAZOLE 500 MG: 500 INJECTION, SOLUTION INTRAVENOUS at 15:50

## 2018-09-09 RX ADMIN — SOTALOL HYDROCHLORIDE 80 MG: 80 TABLET ORAL at 08:29

## 2018-09-09 RX ADMIN — PIPERACILLIN SODIUM,TAZOBACTAM SODIUM 4.5 G: 4; .5 INJECTION, POWDER, FOR SOLUTION INTRAVENOUS at 00:27

## 2018-09-09 RX ADMIN — HYDROCODONE BITARTRATE AND ACETAMINOPHEN 1 TABLET: 5; 325 TABLET ORAL at 21:41

## 2018-09-09 RX ADMIN — SOTALOL HYDROCHLORIDE 80 MG: 80 TABLET ORAL at 21:37

## 2018-09-09 RX ADMIN — NYSTATIN 500000 UNITS: 100000 SUSPENSION ORAL at 21:37

## 2018-09-09 RX ADMIN — IPRATROPIUM BROMIDE AND ALBUTEROL SULFATE 3 ML: .5; 3 SOLUTION RESPIRATORY (INHALATION) at 15:41

## 2018-09-09 RX ADMIN — IPRATROPIUM BROMIDE AND ALBUTEROL SULFATE 3 ML: .5; 3 SOLUTION RESPIRATORY (INHALATION) at 20:13

## 2018-09-09 RX ADMIN — POTASSIUM CHLORIDE 20 MEQ: 20 TABLET, EXTENDED RELEASE ORAL at 15:50

## 2018-09-09 RX ADMIN — LOPERAMIDE HYDROCHLORIDE 2 MG: 2 CAPSULE ORAL at 12:28

## 2018-09-09 RX ADMIN — POTASSIUM CHLORIDE 20 MEQ: 14.9 INJECTION, SOLUTION INTRAVENOUS at 15:50

## 2018-09-09 RX ADMIN — POTASSIUM CHLORIDE 20 MEQ: 20 TABLET, EXTENDED RELEASE ORAL at 11:16

## 2018-09-09 RX ADMIN — TIOTROPIUM BROMIDE 18 MCG: 18 CAPSULE ORAL; RESPIRATORY (INHALATION) at 07:21

## 2018-09-09 RX ADMIN — METRONIDAZOLE 500 MG: 500 INJECTION, SOLUTION INTRAVENOUS at 21:34

## 2018-09-09 RX ADMIN — VANCOMYCIN HYDROCHLORIDE 1250 MG: 10 INJECTION, POWDER, LYOPHILIZED, FOR SOLUTION INTRAVENOUS at 08:41

## 2018-09-09 RX ADMIN — ALBUTEROL SULFATE 2.5 MG: 2.5 SOLUTION RESPIRATORY (INHALATION) at 07:21

## 2018-09-09 RX ADMIN — NYSTATIN 500000 UNITS: 100000 SUSPENSION ORAL at 17:20

## 2018-09-09 NOTE — PROGRESS NOTES
Bedside and Verbal shift change report given to [facility] (oncoming nurse) by self, Iron Dean (offgoing nurse). Report included the following information SBAR, Kardex, MAR and Recent Results. Oncoming nurse assumed care.

## 2018-09-09 NOTE — PROGRESS NOTES
Bedside and Verbal shift change report received from [facility]. Report included the following information SBAR, Kardex, MAR and Recent Results. assumed care of patient.

## 2018-09-09 NOTE — PROGRESS NOTES
GI DAILY PROGRESS NOTE Admit Date:  9/5/2018 Today's Date:  9/9/2018 CC:  Pneumounia, nausea, vomiting, diarrhea Subjective:  
 
Patient states he actually feels improved today - only c/o cough. Had 5-6 bms over 24 hours period which is not more post-prandial.  C difficile negative and abx continuing for pneumonia Hypokalemia being supplemented Diet advanced to low fiber low lactose yesterday Medications:  
Current Facility-Administered Medications Medication Dose Route Frequency  potassium chloride 20 mEq in 100 ml IVPB  20 mEq IntraVENous Q2H  
 potassium chloride (K-DUR, KLOR-CON) SR tablet 20 mEq  20 mEq Oral TID  albuterol-ipratropium (DUO-NEB) 2.5 MG-0.5 MG/3 ML  3 mL Nebulization Q4H PRN  
 loperamide (IMODIUM) capsule 2 mg  2 mg Oral DAILY  levoFLOXacin (LEVAQUIN) 750 mg in D5W IVPB  750 mg IntraVENous Q24H  
 vancomycin (VANCOCIN) 1250 mg in  ml infusion  1,250 mg IntraVENous Q12H  pantoprazole (PROTONIX) tablet 40 mg  40 mg Oral ACB  piperacillin-tazobactam (ZOSYN) 4.5 g in 0.9% sodium chloride (MBP/ADV) 100 mL  4.5 g IntraVENous Q8H  
 amLODIPine (NORVASC) tablet 5 mg  5 mg Oral QPM  
 sertraline (ZOLOFT) tablet 100 mg  100 mg Oral DAILY  sotalol (BETAPACE) tablet 80 mg  80 mg Oral Q12H  
 sodium chloride (NS) flush 5-10 mL  5-10 mL IntraVENous PRN  
 tiotropium (SPIRIVA) inhalation capsule 18 mcg  1 Cap Inhalation DAILY  alum-mag hydroxide-simeth (MYLANTA) oral suspension 30 mL  30 mL Oral Q4H PRN  
 guaiFENesin-dextromethorphan (ROBITUSSIN DM) 100-10 mg/5 mL syrup 10 mL  10 mL Oral Q4H PRN  
 hydrALAZINE (APRESOLINE) 20 mg/mL injection 20 mg  20 mg IntraVENous Q4H PRN  
 HYDROcodone-acetaminophen (NORCO) 5-325 mg per tablet 1 Tab  1 Tab Oral Q4H PRN  
 zolpidem (AMBIEN) tablet 5 mg  5 mg Oral QHS PRN  
 influenza vaccine 2018-19 (6 mos+)(PF) (FLUARIX QUAD/FLULAVAL QUAD) injection 0.5 mL  0.5 mL IntraMUSCular PRIOR TO DISCHARGE  
  nystatin (MYCOSTATIN) 100,000 unit/mL oral suspension 500,000 Units  500,000 Units Oral QID  acetaminophen (TYLENOL) tablet 650 mg  650 mg Oral Q6H PRN  
 ondansetron (ZOFRAN) injection 4 mg  4 mg IntraVENous Q6H PRN Review of Systems: ROS was obtained, with pertinent positives as listed above. No chest pain or SOB. Diet:   
 
Objective:  
Vitals: 
Visit Vitals  /77 (BP 1 Location: Right arm, BP Patient Position: At rest)  Pulse (!) 120  Temp 98.7 °F (37.1 °C)  Resp 17  Ht 6' 1\" (1.854 m)  Wt 81.1 kg (178 lb 14.4 oz)  SpO2 92%  BMI 23.6 kg/m2 Intake/Output: 
09/09 0701 - 09/09 1900 In: 118 [P.O.:118] Out: -  
09/07 1901 - 09/09 0700 In: 0035 [P.O.:1015] Out: [facility] [[facility]:[id number]] Exam: 
General appearance: alert, cooperative, no distress Lungs: clear to auscultation bilaterally anteriorly Heart: regular rate and rhythm Abdomen: soft, non-tender. Bowel sounds normal. No masses, no organomegaly Extremities: extremities normal, atraumatic, no cyanosis or edema Neuro:  alert and oriented Data Review (Labs):   
Recent Labs  
   09/09/18 
 0730  09/08/18 
 0636  09/07/18 
 8459 WBC  14.3*  15.7*  23.5* HGB  12.9*  12.0*  13.7 HCT  38.8*  37.2*  43.1 PLT  233  217  303 MCV  91.5  93.9  94.9 NA  142  141  144  
K  2.7*  3.4*  3.5 CL  104  106  110* CO2  30  26  25 BUN  5*  5*  4*  
CREA  0.68*  0.72*  0.79* CA  8.8  8.2*  8.7 MG   --    --   2.1 GLU  98  109*  108* Assessment:  
 
Principal Problem: 
  Sepsis ([facility] (9/5/2018) Active Problems: 
  CAD (coronary artery disease) (12/1/2009) Overview: pci to lad 11/19 2.75x28 Xience drug eluting stent. Atrial fibrillation ([facility] (7/22/2010) HTN (hypertension) (7/22/2010) Chronic obstructive pulmonary disease ([facility] (8/28/2017) CLL (chronic lymphocytic leukemia) ([facility] (7/24/2018) Colitis (9/5/2018) Hypokalemia (9/5/2018) Esophagitis (9/5/2018) Thrush (9/5/2018) Diarrhea of infectious origin (9/6/2018) Aspiration pneumonia of right lower lobe ([address] (9/6/2018) Gastroenteritis (9/6/2018) 59 y/o male with h/o a. Fib s/p ablation, CAD s/p stents (most recently 12/2017--last aspirin plavix 9/3/18), COPD, HTN, CLL is admitted with sepsis after presenting with three day history of nausea, vomiting, abdominal pain, diarrhea, ultimately black emesis and stools, and CT showing evidence of esophagitis and colitis. Vomited and aspirated in ED and now with RLL pneumonia. Suspect an infectious gastroenteritis with esophageal wall thickening secondary to esophagitis related to vomiting.  Fortunately, he had unremarkable EGD last year and colonoscopy 2 years ago revealing adenomatous polyps. C. Diff negative and culture pending. Nausea/vomiting resolved but still diarrhea and fever (the latter likely related to PNA) 
  
 
Plan:  
 
Patient improving clinically and tolerating lactose free low fiber diet Will start 2mg imodium daily  
Will set up pt for office visit in 2 wks Supplement K orally and IV if needed Will sign off and please call back if needed.  
 
Antonio Poon MD 
[facility], [place]

## 2018-09-09 NOTE — PROGRESS NOTES
Progress Note Patient: Ayush Pantoja               Sex: male          MRN: [id number] YOB: 1955      Age:  58 y.o. PCP:  Hiale Rod DO Treatment Team: Attending Provider: Blanchie Schwab, MD; Consulting Provider: Jose Alonso MD; Utilization Review: Bess Betancur RN; Care Manager: Ekaterina Melgar. Zelpha Lundborg Subjective:  
 
New patient for me today. Abdominal pain is improving. Cough and shortness of breath improving. No fevers overnight. No chest pain. Diarrhea is improving. No nausea or vomiting's. Objective:  
Physical Exam:  
Visit Vitals  /85  Pulse 83  Temp 98.2 °F (36.8 °C)  Resp 18  Ht 6' 1\" (1.854 m)  Wt 81.1 kg (178 lb 14.4 oz)  SpO2 91%  BMI 23.6 kg/m2 Temp (24hrs), Av.8 °F (37.1 °C), Min:98.2 °F (36.8 °C), Max:99.5 °F (37.5 °C) Oxygen Therapy O2 Sat (%): 91 % (18 1143) Pulse via Oximetry: 90 beats per minute (18 1925) O2 Device: Nasal cannula (18) O2 Flow Rate (L/min): 3 l/min (18) Intake/Output Summary (Last 24 hours) at 18 1418 Last data filed at 18 2311 Gross per 24 hour Intake             1133 ml Output             2125 ml Net             -992 ml General: Conscious, No acute distress Eyes:  MALIA, No pallor/icterus HENT:             Oral Mucosa is Moist, No sinus tenderness Neck:               Supple, No JVD Lungs:  CTA Bilaterally, occasional expiratory wheezing+ Heart:  S1 S2 regular Abdomen: Soft, normal bowel sounds, ND, mild left lower abdominal tenderness+, No guarding/rigidity/rebound tend. Extremities: No pedal edema Neurologic:  AAOX3, No acute FND, Motor:  LUE: 5/5, LLE: 5/5, RUE: 5/5, RLE: 5/5 Skin:                No acute rashes Musculoskeletal: No Acute findings Psych:             Appropriate mood, Thought process is normal 
 
LAB Recent Results (from the past 24 hour(s)) CBC WITH AUTOMATED DIFF Collection Time: 09/09/18  7:30 AM  
Result Value Ref Range WBC 14.3 (H) 4.3 - 11.1 K/uL  
 RBC 4.24 4.23 - 5.6 M/uL  
 HGB 12.9 (L) 13.6 - 17.2 g/dL HCT 38.8 (L) 41.1 - 50.3 % MCV 91.5 79.6 - 97.8 FL  
 MCH 30.4 26.1 - 32.9 PG  
 MCHC 33.2 31.4 - 35.0 g/dL  
 RDW 12.7 % PLATELET 535 092 - 236 K/uL MPV 10.1 9.4 - 12.3 FL ABSOLUTE NRBC 0.00 0.0 - 0.2 K/uL NEUTROPHILS 33 (L) 43 - 78 % LYMPHOCYTES 57 (H) 13 - 44 % MONOCYTES 8 4.0 - 12.0 % EOSINOPHILS 2 0.5 - 7.8 % BASOPHILS 0 0.0 - 2.0 % IMMATURE GRANULOCYTES 0 0.0 - 5.0 %  
 ABS. NEUTROPHILS 4.7 1.7 - 8.2 K/UL  
 ABS. LYMPHOCYTES 8.2 (H) 0.5 - 4.6 K/UL  
 ABS. MONOCYTES 1.1 0.1 - 1.3 K/UL  
 ABS. EOSINOPHILS 0.3 0.0 - 0.8 K/UL  
 ABS. BASOPHILS 0.0 0.0 - 0.2 K/UL  
 ABS. IMM. GRANS. 0.0 0.0 - 0.5 K/UL  
 RBC COMMENTS NORMOCYTIC/NORMOCHROMIC    
 WBC COMMENTS OCCASIONAL    
 PLATELET COMMENTS ADEQUATE    
 DF MANUAL METABOLIC PANEL, BASIC Collection Time: 09/09/18  7:30 AM  
Result Value Ref Range Sodium 142 136 - 145 mmol/L Potassium 2.7 (LL) 3.5 - 5.1 mmol/L Chloride 104 98 - 107 mmol/L  
 CO2 30 21 - 32 mmol/L Anion gap 8 7 - 16 mmol/L Glucose 98 65 - 100 mg/dL BUN 5 (L) 8 - 23 MG/DL Creatinine 0.68 (L) 0.8 - 1.5 MG/DL  
 GFR est AA >60 >60 ml/min/1.73m2 GFR est non-AA >60 >60 ml/min/1.73m2 Calcium 8.8 8.3 - 10.4 MG/DL Xr Chest Sngl V Result Date: 9/9/2018 Chest X-ray INDICATION:   Shortness of breath A portable AP view of the chest was obtained. FINDINGS: There is stable interstitial prominence in both lungs, basilar predominant. There is no developing focal infiltrate. The heart size is normal.  The bony thorax is intact. IMPRESSION: Stable interstitial prominence, mild chronic lung disease versus mild pulmonary edema. Xr Chest Sngl V Result Date: 9/9/2018 IMPRESSION: Stable interstitial prominence, mild chronic lung disease versus mild pulmonary edema. All Micro Results Procedure Component Value Units Date/Time CULTURE, BLOOD [[id number]] Collected:  09/05/18 1505 Order Status:  Completed Specimen:  Blood from Blood Updated:  09/09/18 1032 Special Requests: --     
  LEFT 
HAND Culture result: NO GROWTH 4 DAYS     
 CULTURE, BLOOD [[id number]] Collected:  09/05/18 1506 Order Status:  Completed Specimen:  Blood from Blood Updated:  09/09/18 1032 Special Requests: --     
  RIGHT Antecubital 
  
  Culture result: NO GROWTH 4 DAYS     
 CULTURE, STOOL [[id number]] Collected:  09/06/18 1828 Order Status:  Completed Specimen:  Stool Updated:  09/08/18 7904 Special Requests: NO SPECIAL REQUESTS Culture result:      
  GROWTH OF HYDROGEN SULFIDE  THIS ORGANISM MUST BE ISOLATED AND IDENTIFIED TO RULE OUT SALMONELLA ID AND FINAL REPORT TO FOLLOW CULTURE, URINE [[id number]] Collected:  09/06/18 0615 Order Status:  Completed Specimen:  Urine from Clean catch Updated:  09/08/18 0126 Special Requests: NO SPECIAL REQUESTS Culture result: NO GROWTH 2 DAYS     
 CULTURE, RESPIRATORY/SPUTUM/BRONCH Margrette Rival STAIN [[id number]] Order Status:  Sent Specimen:  Sputum from Sputum C. DIFFICILE AG & TOXIN A/B [[id number]] Collected:  09/06/18 1828 Order Status:  Completed Specimen:  Stool from Stool Updated:  09/07/18 1220 [id number] [place] ANTIGEN      
  C. DIFFICILE GDH ANTIGEN-NEGATIVE  
  C. difficile toxin C. DIFFICILE TOXIN-NEGATIVE  
  PCR Reflex NOT APPLICABLE INTERPRETATION      
  NEGATIVE FOR TOXIGENIC C. DIFFICILE Clinical Consideration NEGATIVE FOR TOXIGENIC C. DIFFICILE Current Medications Reviewed Assessment/Plan 1. Sepsis, secondary to possible aspiration pneumonia and acute colitis. 2.  Acute colitis 3. Hypoxemia, likely secondary to pneumonia, try to wean off oxygen as possible. 4.  Severe hypokalemia, replace both by oral and IV route. 5.  Hypertension continue blood pressure medications and use hydralazine as needed. 6.  History of A. fib, continue sotalol 7. Chronic COPD 8. CLL, currently not on any medications. 9.  Oropharyngeal thrush, continue nystatin. Patient is clinically improving, diarrhea is also improving. Will switch antibiotics to IV Levaquin and Flagyl and discontinue Zosyn and vancomycin. If continues to do well then may switch antibiotics to p.o. tomorrow. Appreciate GI recommendations. Use as needed loperamide. SCDs for DVT prophylaxis. High-risk patient secondary to multiple comorbidities and above-mentioned medical problems. Lin Coyle MD 
September 9, 2018

## 2018-09-09 NOTE — PROGRESS NOTES
Pt alert and oriented, breathing even and unlabored, safety measures in place, call light within reach.

## 2018-09-10 LAB
ALBUMIN SERPL-MCNC: 2.9 G/DL (ref 3.2–4.6)
ALBUMIN/GLOB SERPL: 0.8 {RATIO} (ref 1.2–3.5)
ALP SERPL-CCNC: 61 U/L (ref 50–136)
ALT SERPL-CCNC: 24 U/L (ref 12–65)
ANION GAP SERPL CALC-SCNC: 10 MMOL/L (ref 7–16)
AST SERPL-CCNC: 18 U/L (ref 15–37)
BACTERIA SPEC CULT: NORMAL
BACTERIA SPEC CULT: NORMAL
BASOPHILS # BLD: 0.1 K/UL (ref 0–0.2)
BASOPHILS NFR BLD: 1 % (ref 0–2)
BILIRUB SERPL-MCNC: 0.7 MG/DL (ref 0.2–1.1)
BUN SERPL-MCNC: 4 MG/DL (ref 8–23)
CALCIUM SERPL-MCNC: 9.2 MG/DL (ref 8.3–10.4)
CHLORIDE SERPL-SCNC: 109 MMOL/L (ref 98–107)
CO2 SERPL-SCNC: 28 MMOL/L (ref 21–32)
CREAT SERPL-MCNC: 0.63 MG/DL (ref 0.8–1.5)
DIFFERENTIAL METHOD BLD: ABNORMAL
EOSINOPHIL # BLD: 0.4 K/UL (ref 0–0.8)
EOSINOPHIL NFR BLD: 2 % (ref 0.5–7.8)
ERYTHROCYTE [DISTWIDTH] IN BLOOD BY AUTOMATED COUNT: 12.8 %
GLOBULIN SER CALC-MCNC: 3.6 G/DL (ref 2.3–3.5)
GLUCOSE SERPL-MCNC: 99 MG/DL (ref 65–100)
HCT VFR BLD AUTO: 40.8 % (ref 41.1–50.3)
HGB BLD-MCNC: 13.6 G/DL (ref 13.6–17.2)
IMM GRANULOCYTES # BLD: 0.1 K/UL (ref 0–0.5)
IMM GRANULOCYTES NFR BLD AUTO: 0 % (ref 0–5)
LYMPHOCYTES # BLD: 9.8 K/UL (ref 0.5–4.6)
LYMPHOCYTES NFR BLD: 59 % (ref 13–44)
MAGNESIUM SERPL-MCNC: 2.1 MG/DL (ref 1.8–2.4)
MCH RBC QN AUTO: 30.6 PG (ref 26.1–32.9)
MCHC RBC AUTO-ENTMCNC: 33.3 G/DL (ref 31.4–35)
MCV RBC AUTO: 91.7 FL (ref 79.6–97.8)
MONOCYTES # BLD: 1 K/UL (ref 0.1–1.3)
MONOCYTES NFR BLD: 6 % (ref 4–12)
NEUTS SEG # BLD: 5.2 K/UL (ref 1.7–8.2)
NEUTS SEG NFR BLD: 31 % (ref 43–78)
NRBC # BLD: 0 K/UL (ref 0–0.2)
PHOSPHATE SERPL-MCNC: 2.3 MG/DL (ref 2.3–3.7)
PLATELET # BLD AUTO: 322 K/UL (ref 150–450)
PMV BLD AUTO: 10.7 FL (ref 9.4–12.3)
POTASSIUM SERPL-SCNC: 3.3 MMOL/L (ref 3.5–5.1)
PROT SERPL-MCNC: 6.5 G/DL (ref 6.3–8.2)
RBC # BLD AUTO: 4.45 M/UL (ref 4.23–5.6)
SERVICE CMNT-IMP: NORMAL
SERVICE CMNT-IMP: NORMAL
SODIUM SERPL-SCNC: 147 MMOL/L (ref 136–145)
WBC # BLD AUTO: 16.5 K/UL (ref 4.3–11.1)

## 2018-09-10 PROCEDURE — 84100 ASSAY OF PHOSPHORUS: CPT

## 2018-09-10 PROCEDURE — 74011000250 HC RX REV CODE- 250: Performed by: HOSPITALIST

## 2018-09-10 PROCEDURE — 65270000029 HC RM PRIVATE

## 2018-09-10 PROCEDURE — 94760 N-INVAS EAR/PLS OXIMETRY 1: CPT

## 2018-09-10 PROCEDURE — 94640 AIRWAY INHALATION TREATMENT: CPT

## 2018-09-10 PROCEDURE — 74011250636 HC RX REV CODE- 250/636: Performed by: INTERNAL MEDICINE

## 2018-09-10 PROCEDURE — 74011250637 HC RX REV CODE- 250/637: Performed by: INTERNAL MEDICINE

## 2018-09-10 PROCEDURE — 36415 COLL VENOUS BLD VENIPUNCTURE: CPT

## 2018-09-10 PROCEDURE — 74011250636 HC RX REV CODE- 250/636: Performed by: HOSPITALIST

## 2018-09-10 PROCEDURE — 77010033678 HC OXYGEN DAILY

## 2018-09-10 PROCEDURE — 80053 COMPREHEN METABOLIC PANEL: CPT

## 2018-09-10 PROCEDURE — 83735 ASSAY OF MAGNESIUM: CPT

## 2018-09-10 PROCEDURE — 85025 COMPLETE CBC W/AUTO DIFF WBC: CPT

## 2018-09-10 PROCEDURE — 74011250637 HC RX REV CODE- 250/637: Performed by: HOSPITALIST

## 2018-09-10 RX ORDER — SODIUM,POTASSIUM PHOSPHATES 280-250MG
1 POWDER IN PACKET (EA) ORAL ONCE
Status: COMPLETED | OUTPATIENT
Start: 2018-09-10 | End: 2018-09-10

## 2018-09-10 RX ORDER — POTASSIUM CHLORIDE 20MEQ/15ML
40 LIQUID (ML) ORAL
Status: COMPLETED | OUTPATIENT
Start: 2018-09-10 | End: 2018-09-10

## 2018-09-10 RX ORDER — LOPERAMIDE HYDROCHLORIDE 2 MG/1
4 CAPSULE ORAL DAILY
Status: DISCONTINUED | OUTPATIENT
Start: 2018-09-11 | End: 2018-09-11 | Stop reason: HOSPADM

## 2018-09-10 RX ORDER — METRONIDAZOLE 500 MG/1
500 TABLET ORAL 3 TIMES DAILY
Status: DISCONTINUED | OUTPATIENT
Start: 2018-09-10 | End: 2018-09-10

## 2018-09-10 RX ORDER — LEVOFLOXACIN 500 MG/1
500 TABLET, FILM COATED ORAL EVERY 24 HOURS
Status: DISCONTINUED | OUTPATIENT
Start: 2018-09-11 | End: 2018-09-11 | Stop reason: HOSPADM

## 2018-09-10 RX ORDER — METRONIDAZOLE 500 MG/1
500 TABLET ORAL EVERY 12 HOURS
Status: DISCONTINUED | OUTPATIENT
Start: 2018-09-10 | End: 2018-09-11

## 2018-09-10 RX ADMIN — IPRATROPIUM BROMIDE AND ALBUTEROL SULFATE 3 ML: .5; 3 SOLUTION RESPIRATORY (INHALATION) at 20:49

## 2018-09-10 RX ADMIN — HYDROCODONE BITARTRATE AND ACETAMINOPHEN 1 TABLET: 5; 325 TABLET ORAL at 17:35

## 2018-09-10 RX ADMIN — POTASSIUM CHLORIDE 20 MEQ: 20 TABLET, EXTENDED RELEASE ORAL at 21:55

## 2018-09-10 RX ADMIN — SOTALOL HYDROCHLORIDE 80 MG: 80 TABLET ORAL at 21:55

## 2018-09-10 RX ADMIN — METRONIDAZOLE 500 MG: 500 INJECTION, SOLUTION INTRAVENOUS at 06:05

## 2018-09-10 RX ADMIN — NYSTATIN 500000 UNITS: 100000 SUSPENSION ORAL at 14:06

## 2018-09-10 RX ADMIN — IPRATROPIUM BROMIDE AND ALBUTEROL SULFATE 3 ML: .5; 3 SOLUTION RESPIRATORY (INHALATION) at 07:40

## 2018-09-10 RX ADMIN — AMLODIPINE BESYLATE 10 MG: 10 TABLET ORAL at 17:30

## 2018-09-10 RX ADMIN — NYSTATIN 500000 UNITS: 100000 SUSPENSION ORAL at 09:00

## 2018-09-10 RX ADMIN — SOTALOL HYDROCHLORIDE 80 MG: 80 TABLET ORAL at 09:01

## 2018-09-10 RX ADMIN — POTASSIUM CHLORIDE 20 MEQ: 20 TABLET, EXTENDED RELEASE ORAL at 09:01

## 2018-09-10 RX ADMIN — SERTRALINE HYDROCHLORIDE 100 MG: 25 TABLET ORAL at 09:00

## 2018-09-10 RX ADMIN — METRONIDAZOLE 500 MG: 500 TABLET ORAL at 17:30

## 2018-09-10 RX ADMIN — POTASSIUM & SODIUM PHOSPHATES POWDER PACK 280-160-250 MG 1 PACKET: 280-160-250 PACK at 10:57

## 2018-09-10 RX ADMIN — LOPERAMIDE HYDROCHLORIDE 2 MG: 2 CAPSULE ORAL at 09:01

## 2018-09-10 RX ADMIN — LEVOFLOXACIN 750 MG: 5 INJECTION, SOLUTION INTRAVENOUS at 09:00

## 2018-09-10 RX ADMIN — NYSTATIN 500000 UNITS: 100000 SUSPENSION ORAL at 21:55

## 2018-09-10 RX ADMIN — IPRATROPIUM BROMIDE AND ALBUTEROL SULFATE 3 ML: .5; 3 SOLUTION RESPIRATORY (INHALATION) at 15:56

## 2018-09-10 RX ADMIN — POTASSIUM CHLORIDE 20 MEQ: 20 TABLET, EXTENDED RELEASE ORAL at 17:30

## 2018-09-10 RX ADMIN — POTASSIUM CHLORIDE 40 MEQ: 20 SOLUTION ORAL at 10:57

## 2018-09-10 RX ADMIN — NYSTATIN 500000 UNITS: 100000 SUSPENSION ORAL at 17:30

## 2018-09-10 RX ADMIN — PANTOPRAZOLE SODIUM 40 MG: 40 TABLET, DELAYED RELEASE ORAL at 06:08

## 2018-09-10 RX ADMIN — TIOTROPIUM BROMIDE 18 MCG: 18 CAPSULE ORAL; RESPIRATORY (INHALATION) at 07:41

## 2018-09-10 RX ADMIN — IPRATROPIUM BROMIDE AND ALBUTEROL SULFATE 3 ML: .5; 3 SOLUTION RESPIRATORY (INHALATION) at 11:02

## 2018-09-10 RX ADMIN — LOPERAMIDE HYDROCHLORIDE 4 MG: 2 CAPSULE ORAL at 17:35

## 2018-09-10 NOTE — PROGRESS NOTES
BSR from [place], [address] [place] [address] Patient alert an oriented times four O2 3L NC, Resp even an unlabored. Patient denies discomfort , denies dyspnea. Call light in place. Safety measures in place. No distress noted.  Patient encouraged to call for assist.

## 2018-09-10 NOTE — PROGRESS NOTES
Progress Note Patient: Jr Saldana               Sex: male          MRN: [id number] YOB: 1955      Age:  58 y.o. PCP:  Gabriel Gill DO Treatment Team: Attending Provider: Elana House MD; Consulting Provider: Ronnell Espinosa MD; Utilization Review: Tanna Chaves, SOTO; Care Manager: Aiden Aleman. Kiki Esparza Subjective:  
 
Abdominal pain is improving. Cough and shortness of breath improving. No fevers overnight. No chest pain. Diarrhea is improving with loperamide. No nausea or vomiting's. Objective:  
Physical Exam:  
Visit Vitals  BP (!) 156/92 (BP 1 Location: Right arm, BP Patient Position: Sitting)  Pulse 77  Temp 97.8 °F (36.6 °C)  Resp 20  
 Ht 6' 1\" (1.854 m)  Wt 82.4 kg (181 lb 9.6 oz)  SpO2 98%  BMI 23.96 kg/m2 Temp (24hrs), Av.3 °F (36.8 °C), Min:97.8 °F (36.6 °C), Max:98.6 °F (37 °C) Oxygen Therapy O2 Sat (%): 98 % (09/10/18 1108) Pulse via Oximetry: 86 beats per minute (09/10/18 1103) O2 Device: Nasal cannula (09/10/18 1103) O2 Flow Rate (L/min): 2 l/min (09/10/18 1103) Intake/Output Summary (Last 24 hours) at 09/10/18 1427 Last data filed at 09/10/18 1230 Gross per 24 hour Intake              935 ml Output                0 ml Net              935 ml General: Conscious, No acute distress Eyes:  MALIA, No pallor/icterus HENT:             Oral Mucosa is Moist, No sinus tenderness Neck:               Supple, No JVD Lungs:  Diminished air entry, occasional expiratory wheezing+ Heart:  S1 S2 regular Abdomen: Soft, normal bowel sounds, ND, mild left lower abdominal tenderness+, No guarding/rigidity/rebound tend. Extremities: No pedal edema Neurologic:  AAOX3, No acute FND, Motor:  LUE: 5/5, LLE: 5/5, RUE: 5/5, RLE: 5/5 Skin:                No acute rashes Musculoskeletal: No Acute findings Psych:             Appropriate mood, Thought process is normal 
 
LAB Recent Results (from the past 24 hour(s)) PROCALCITONIN Collection Time: 09/09/18  2:50 PM  
Result Value Ref Range Procalcitonin 0.1 ng/mL CBC WITH AUTOMATED DIFF Collection Time: 09/10/18  6:52 AM  
Result Value Ref Range WBC 16.5 (H) 4.3 - 11.1 K/uL  
 RBC 4.45 4.23 - 5.6 M/uL  
 HGB 13.6 13.6 - 17.2 g/dL HCT 40.8 (L) 41.1 - 50.3 % MCV 91.7 79.6 - 97.8 FL  
 MCH 30.6 26.1 - 32.9 PG  
 MCHC 33.3 31.4 - 35.0 g/dL  
 RDW 12.8 % PLATELET 483 760 - 553 K/uL MPV 10.7 9.4 - 12.3 FL ABSOLUTE NRBC 0.00 0.0 - 0.2 K/uL  
 DF AUTOMATED NEUTROPHILS 31 (L) 43 - 78 % LYMPHOCYTES 59 (H) 13 - 44 % MONOCYTES 6 4.0 - 12.0 % EOSINOPHILS 2 0.5 - 7.8 % BASOPHILS 1 0.0 - 2.0 % IMMATURE GRANULOCYTES 0 0.0 - 5.0 %  
 ABS. NEUTROPHILS 5.2 1.7 - 8.2 K/UL  
 ABS. LYMPHOCYTES 9.8 (H) 0.5 - 4.6 K/UL  
 ABS. MONOCYTES 1.0 0.1 - 1.3 K/UL  
 ABS. EOSINOPHILS 0.4 0.0 - 0.8 K/UL  
 ABS. BASOPHILS 0.1 0.0 - 0.2 K/UL  
 ABS. IMM. GRANS. 0.1 0.0 - 0.5 K/UL METABOLIC PANEL, COMPREHENSIVE Collection Time: 09/10/18  6:52 AM  
Result Value Ref Range Sodium 147 (H) 136 - 145 mmol/L Potassium 3.3 (L) 3.5 - 5.1 mmol/L Chloride 109 (H) 98 - 107 mmol/L  
 CO2 28 21 - 32 mmol/L Anion gap 10 7 - 16 mmol/L Glucose 99 65 - 100 mg/dL BUN 4 (L) 8 - 23 MG/DL Creatinine 0.63 (L) 0.8 - 1.5 MG/DL  
 GFR est AA >60 >60 ml/min/1.73m2 GFR est non-AA >60 >60 ml/min/1.73m2 Calcium 9.2 8.3 - 10.4 MG/DL Bilirubin, total 0.7 0.2 - 1.1 MG/DL  
 ALT (SGPT) 24 12 - 65 U/L  
 AST (SGOT) 18 15 - 37 U/L Alk. phosphatase 61 50 - 136 U/L Protein, total 6.5 6.3 - 8.2 g/dL Albumin 2.9 (L) 3.2 - 4.6 g/dL Globulin 3.6 (H) 2.3 - 3.5 g/dL A-G Ratio 0.8 (L) 1.2 - 3.5 MAGNESIUM Collection Time: 09/10/18  6:52 AM  
Result Value Ref Range Magnesium 2.1 1.8 - 2.4 mg/dL PHOSPHORUS Collection Time: 09/10/18  6:52 AM  
Result Value Ref Range  Phosphorus 2.3 2.3 - 3.7 MG/DL  
 
 
 No results found. No results found. All Micro Results Procedure Component Value Units Date/Time CULTURE, RESPIRATORY/SPUTUM/BRONCH Noreene Christoph [[id number]] Collected:  09/07/18 1822 Order Status:  Completed Specimen:  Sputum from Sputum Updated:  09/10/18 1145 Special Requests: NO SPECIAL REQUESTS     
  GRAM STAIN 0 TO 9 WBC'S/OIF  
   NO EPITHELIAL CELLS SEEN     
   FEW YEAST 4+ MUCUS PRESENT Culture result:      
  NO GROWTH AFTER SHORT PERIOD OF INCUBATION. FURTHER RESULTS TO FOLLOW AFTER OVERNIGHT INCUBATION. Raman Jairon [[id number]] Collected:  09/06/18 1828 Order Status:  Completed Specimen:  Stool Updated:  09/10/18 0913 Special Requests: NO SPECIAL REQUESTS Culture result:      
  GROWTH OF HYDROGEN SULFIDE  THIS ORGANISM MUST BE ISOLATED AND IDENTIFIED TO RULE OUT SALMONELLA ID AND FINAL REPORT TO FOLLOW REPEATING ID AND SUSCEPTIBILITY 9.10.18 CULTURE, BLOOD [[id number]] Collected:  09/05/18 1505 Order Status:  Completed Specimen:  Blood from Blood Updated:  09/10/18 8966 Special Requests: --     
  LEFT 
HAND Culture result: NO GROWTH 5 DAYS     
 CULTURE, BLOOD [[id number]] Collected:  09/05/18 1506 Order Status:  Completed Specimen:  Blood from Blood Updated:  09/10/18 1558 Special Requests: --     
  RIGHT Antecubital 
  
  Culture result: NO GROWTH 5 DAYS     
 CULTURE, URINE [[id number]] Collected:  09/06/18 0615 Order Status:  Completed Specimen:  Urine from Clean catch Updated:  09/08/18 0696 Special Requests: NO SPECIAL REQUESTS Culture result: NO GROWTH 2 DAYS C. DIFFICILE AG & TOXIN A/B [[id number]] Collected:  09/06/18 1828 Order Status:  Completed Specimen:  Stool from Stool Updated:  09/07/18 1220 [id number] [place] ANTIGEN      
  C. DIFFICILE GDH ANTIGEN-NEGATIVE  
  C. difficile toxin C. DIFFICILE TOXIN-NEGATIVE  
  PCR Reflex NOT APPLICABLE   INTERPRETATION      
 NEGATIVE FOR TOXIGENIC C. DIFFICILE Clinical Consideration NEGATIVE FOR TOXIGENIC C. DIFFICILE Current Medications Reviewed Assessment/Plan 1. Sepsis, secondary to possible aspiration pneumonia and acute colitis. improving 2. Acute colitis 3. Hypoxemia, likely secondary to pneumonia, try to wean off oxygen as possible. 4.  Hypokalemia, being replaced 5. Hypertension continue blood pressure medications and use hydralazine as needed. 6.  History of A. fib, continue sotalol 7. Chronic COPD 8. CLL, currently not on any medications. 9.  Oropharyngeal thrush, continue nystatin. 10. Mild hypernatremia - ctn oral hydration Patient is clinically improving, diarrhea is also improving. Will switch antibiotics to PO Levaquin and Flagyl and monitor. Appreciate GI recommendations. Use as needed loperamide. SCDs for DVT prophylaxis. Moderate-risk patient secondary to multiple comorbidities and above-mentioned medical problems. Elias Cano MD 
September 10, 2018

## 2018-09-10 NOTE — PROGRESS NOTES
Received bedside shift report from [place], [address] [place] [address]. Patient in bed, in its low and locked position with call bell within reach. Patient alert and oriented x 4 with respirations regular and non-labored.

## 2018-09-10 NOTE — PROGRESS NOTES
Problem: Interdisciplinary Rounds Goal: Interdisciplinary Rounds Interdisciplinary team rounds were held 9/10/2018 with the following team members:Care Management, Physical Therapy, Physician and Clinical Coordinator and the patient. Plan of care discussed. See clinical pathway and/or care plan for interventions and desired outcomes.

## 2018-09-11 VITALS
HEART RATE: 61 BPM | WEIGHT: 179.4 LBS | HEIGHT: 73 IN | BODY MASS INDEX: 23.78 KG/M2 | DIASTOLIC BLOOD PRESSURE: 86 MMHG | OXYGEN SATURATION: 95 % | RESPIRATION RATE: 21 BRPM | SYSTOLIC BLOOD PRESSURE: 144 MMHG | TEMPERATURE: 98.6 F

## 2018-09-11 PROBLEM — R09.02 HYPOXIA: Status: ACTIVE | Noted: 2018-09-11

## 2018-09-11 LAB
ALBUMIN SERPL-MCNC: 3.3 G/DL (ref 3.2–4.6)
ALBUMIN/GLOB SERPL: 1 {RATIO} (ref 1.2–3.5)
ALP SERPL-CCNC: 62 U/L (ref 50–136)
ALT SERPL-CCNC: 23 U/L (ref 12–65)
ANION GAP SERPL CALC-SCNC: 11 MMOL/L (ref 7–16)
ANION GAP SERPL CALC-SCNC: 9 MMOL/L (ref 7–16)
AST SERPL-CCNC: 16 U/L (ref 15–37)
BASOPHILS # BLD: 0.1 K/UL (ref 0–0.2)
BASOPHILS NFR BLD: 1 % (ref 0–2)
BILIRUB SERPL-MCNC: 0.5 MG/DL (ref 0.2–1.1)
BUN SERPL-MCNC: 6 MG/DL (ref 8–23)
BUN SERPL-MCNC: 6 MG/DL (ref 8–23)
CALCIUM SERPL-MCNC: 9.3 MG/DL (ref 8.3–10.4)
CALCIUM SERPL-MCNC: 9.4 MG/DL (ref 8.3–10.4)
CHLORIDE SERPL-SCNC: 104 MMOL/L (ref 98–107)
CHLORIDE SERPL-SCNC: 104 MMOL/L (ref 98–107)
CO2 SERPL-SCNC: 28 MMOL/L (ref 21–32)
CO2 SERPL-SCNC: 29 MMOL/L (ref 21–32)
CREAT SERPL-MCNC: 0.65 MG/DL (ref 0.8–1.5)
CREAT SERPL-MCNC: 0.71 MG/DL (ref 0.8–1.5)
DIFFERENTIAL METHOD BLD: ABNORMAL
EOSINOPHIL # BLD: 0.3 K/UL (ref 0–0.8)
EOSINOPHIL NFR BLD: 2 % (ref 0.5–7.8)
ERYTHROCYTE [DISTWIDTH] IN BLOOD BY AUTOMATED COUNT: 12.8 %
GLOBULIN SER CALC-MCNC: 3.3 G/DL (ref 2.3–3.5)
GLUCOSE SERPL-MCNC: 100 MG/DL (ref 65–100)
GLUCOSE SERPL-MCNC: 108 MG/DL (ref 65–100)
HCT VFR BLD AUTO: 41.1 % (ref 41.1–50.3)
HGB BLD-MCNC: 13.6 G/DL (ref 13.6–17.2)
IMM GRANULOCYTES # BLD: 0 K/UL (ref 0–0.5)
IMM GRANULOCYTES NFR BLD AUTO: 0 % (ref 0–5)
LYMPHOCYTES # BLD: 8.5 K/UL (ref 0.5–4.6)
LYMPHOCYTES NFR BLD: 56 % (ref 13–44)
MAGNESIUM SERPL-MCNC: 2 MG/DL (ref 1.8–2.4)
MCH RBC QN AUTO: 30.2 PG (ref 26.1–32.9)
MCHC RBC AUTO-ENTMCNC: 33.1 G/DL (ref 31.4–35)
MCV RBC AUTO: 91.1 FL (ref 79.6–97.8)
MONOCYTES # BLD: 0.9 K/UL (ref 0.1–1.3)
MONOCYTES NFR BLD: 6 % (ref 4–12)
NEUTS SEG # BLD: 5.2 K/UL (ref 1.7–8.2)
NEUTS SEG NFR BLD: 35 % (ref 43–78)
NRBC # BLD: 0 K/UL (ref 0–0.2)
PHOSPHATE SERPL-MCNC: 3.2 MG/DL (ref 2.3–3.7)
PLATELET # BLD AUTO: 338 K/UL (ref 150–450)
PMV BLD AUTO: 10.3 FL (ref 9.4–12.3)
POTASSIUM SERPL-SCNC: 3.5 MMOL/L (ref 3.5–5.1)
POTASSIUM SERPL-SCNC: 4 MMOL/L (ref 3.5–5.1)
PROT SERPL-MCNC: 6.6 G/DL (ref 6.3–8.2)
RBC # BLD AUTO: 4.51 M/UL (ref 4.23–5.6)
SODIUM SERPL-SCNC: 142 MMOL/L (ref 136–145)
SODIUM SERPL-SCNC: 143 MMOL/L (ref 136–145)
WBC # BLD AUTO: 15.1 K/UL (ref 4.3–11.1)

## 2018-09-11 PROCEDURE — 80053 COMPREHEN METABOLIC PANEL: CPT

## 2018-09-11 PROCEDURE — 83735 ASSAY OF MAGNESIUM: CPT

## 2018-09-11 PROCEDURE — 94760 N-INVAS EAR/PLS OXIMETRY 1: CPT

## 2018-09-11 PROCEDURE — 74011250636 HC RX REV CODE- 250/636: Performed by: INTERNAL MEDICINE

## 2018-09-11 PROCEDURE — 74011250637 HC RX REV CODE- 250/637: Performed by: HOSPITALIST

## 2018-09-11 PROCEDURE — 74011250637 HC RX REV CODE- 250/637: Performed by: INTERNAL MEDICINE

## 2018-09-11 PROCEDURE — 85025 COMPLETE CBC W/AUTO DIFF WBC: CPT

## 2018-09-11 PROCEDURE — 77010033678 HC OXYGEN DAILY

## 2018-09-11 PROCEDURE — 94761 N-INVAS EAR/PLS OXIMETRY MLT: CPT

## 2018-09-11 PROCEDURE — 36415 COLL VENOUS BLD VENIPUNCTURE: CPT

## 2018-09-11 PROCEDURE — 84100 ASSAY OF PHOSPHORUS: CPT

## 2018-09-11 PROCEDURE — 74011000250 HC RX REV CODE- 250: Performed by: HOSPITALIST

## 2018-09-11 PROCEDURE — 94640 AIRWAY INHALATION TREATMENT: CPT

## 2018-09-11 PROCEDURE — 99223 1ST HOSP IP/OBS HIGH 75: CPT | Performed by: INTERNAL MEDICINE

## 2018-09-11 PROCEDURE — 90686 IIV4 VACC NO PRSV 0.5 ML IM: CPT | Performed by: INTERNAL MEDICINE

## 2018-09-11 RX ORDER — LEVOFLOXACIN 500 MG/1
500 TABLET, FILM COATED ORAL EVERY 24 HOURS
Qty: 2 TAB | Refills: 0 | Status: SHIPPED | OUTPATIENT
Start: 2018-09-12 | End: 2018-09-20 | Stop reason: ALTCHOICE

## 2018-09-11 RX ORDER — AMLODIPINE BESYLATE 10 MG/1
10 TABLET ORAL EVERY EVENING
Qty: 30 TAB | Refills: 0 | Status: SHIPPED | OUTPATIENT
Start: 2018-09-11 | End: 2018-09-20 | Stop reason: SDUPTHER

## 2018-09-11 RX ORDER — GUAIFENESIN 600 MG/1
600 TABLET, EXTENDED RELEASE ORAL
Qty: 20 TAB | Refills: 0 | Status: SHIPPED | OUTPATIENT
Start: 2018-09-11 | End: 2019-08-28

## 2018-09-11 RX ORDER — IPRATROPIUM BROMIDE AND ALBUTEROL SULFATE 2.5; .5 MG/3ML; MG/3ML
3 SOLUTION RESPIRATORY (INHALATION)
Qty: 30 NEBULE | Refills: 0 | Status: SHIPPED | OUTPATIENT
Start: 2018-09-11 | End: 2020-07-01 | Stop reason: SDUPTHER

## 2018-09-11 RX ORDER — LOPERAMIDE HYDROCHLORIDE 2 MG/1
4 CAPSULE ORAL
Qty: 20 CAP | Refills: 0 | Status: SHIPPED | OUTPATIENT
Start: 2018-09-11 | End: 2018-09-16

## 2018-09-11 RX ADMIN — POTASSIUM CHLORIDE 20 MEQ: 20 TABLET, EXTENDED RELEASE ORAL at 09:47

## 2018-09-11 RX ADMIN — TIOTROPIUM BROMIDE 18 MCG: 18 CAPSULE ORAL; RESPIRATORY (INHALATION) at 07:57

## 2018-09-11 RX ADMIN — NYSTATIN 500000 UNITS: 100000 SUSPENSION ORAL at 12:57

## 2018-09-11 RX ADMIN — IPRATROPIUM BROMIDE AND ALBUTEROL SULFATE 3 ML: .5; 3 SOLUTION RESPIRATORY (INHALATION) at 11:14

## 2018-09-11 RX ADMIN — NYSTATIN 500000 UNITS: 100000 SUSPENSION ORAL at 09:46

## 2018-09-11 RX ADMIN — LOPERAMIDE HYDROCHLORIDE 4 MG: 2 CAPSULE ORAL at 05:48

## 2018-09-11 RX ADMIN — PANTOPRAZOLE SODIUM 40 MG: 40 TABLET, DELAYED RELEASE ORAL at 05:34

## 2018-09-11 RX ADMIN — LEVOFLOXACIN 500 MG: 500 TABLET, FILM COATED ORAL at 09:46

## 2018-09-11 RX ADMIN — SERTRALINE HYDROCHLORIDE 100 MG: 25 TABLET ORAL at 09:46

## 2018-09-11 RX ADMIN — INFLUENZA VIRUS VACCINE 0.5 ML: 15; 15; 15; 15 SUSPENSION INTRAMUSCULAR at 13:54

## 2018-09-11 RX ADMIN — METRONIDAZOLE 500 MG: 500 TABLET ORAL at 05:35

## 2018-09-11 RX ADMIN — SOTALOL HYDROCHLORIDE 80 MG: 80 TABLET ORAL at 09:46

## 2018-09-11 RX ADMIN — IPRATROPIUM BROMIDE AND ALBUTEROL SULFATE 3 ML: .5; 3 SOLUTION RESPIRATORY (INHALATION) at 07:57

## 2018-09-11 RX ADMIN — LOPERAMIDE HYDROCHLORIDE 4 MG: 2 CAPSULE ORAL at 12:57

## 2018-09-11 NOTE — DISCHARGE INSTRUCTIONS
DISCHARGE SUMMARY from Nurse    PATIENT INSTRUCTIONS:    After general anesthesia or intravenous sedation, for 24 hours or while taking prescription Narcotics:  · Limit your activities  · Do not drive and operate hazardous machinery  · Do not make important personal or business decisions  · Do  not drink alcoholic beverages  · If you have not urinated within 8 hours after discharge, please contact your surgeon on call. What to do at Home:  Recommended activity: Activity as tolerated. If you experience any of the following symptoms temp > 101.5, unrelieved pain, nausea or vomiting, shortness of breath or fatigue not relieved with rest, please follow up with MD.    *  Please give a list of your current medications to your Primary Care Provider. *  Please update this list whenever your medications are discontinued, doses are      changed, or new medications (including over-the-counter products) are added. *  Please carry medication information at all times in case of emergency situations. These are general instructions for a healthy lifestyle:    No smoking/ No tobacco products/ Avoid exposure to second hand smoke  Surgeon General's Warning:  Quitting smoking now greatly reduces serious risk to your health. Obesity, smoking, and sedentary lifestyle greatly increases your risk for illness    A healthy diet, regular physical exercise & weight monitoring are important for maintaining a healthy lifestyle    You may be retaining fluid if you have a history of heart failure or if you experience any of the following symptoms:  Weight gain of 3 pounds or more overnight or 5 pounds in a week, increased swelling in our hands or feet or shortness of breath while lying flat in bed. Please call your doctor as soon as you notice any of these symptoms; do not wait until your next office visit.     Recognize signs and symptoms of STROKE:    F-face looks uneven    A-arms unable to move or move unevenly    S-speech slurred or non-existent    T-time-call 911 as soon as signs and symptoms begin-DO NOT go       Back to bed or wait to see if you get better-TIME IS BRAIN. Warning Signs of HEART ATTACK     Call 911 if you have these symptoms:   Chest discomfort. Most heart attacks involve discomfort in the center of the chest that lasts more than a few minutes, or that goes away and comes back. It can feel like uncomfortable pressure, squeezing, fullness, or pain.  Discomfort in other areas of the upper body. Symptoms can include pain or discomfort in one or both arms, the back, neck, jaw, or stomach.  Shortness of breath with or without chest discomfort.  Other signs may include breaking out in a cold sweat, nausea, or lightheadedness. Don't wait more than five minutes to call 911 - MINUTES MATTER! Fast action can save your life. Calling 911 is almost always the fastest way to get lifesaving treatment. Emergency Medical Services staff can begin treatment when they arrive -- up to an hour sooner than if someone gets to the hospital by car. The discharge information has been reviewed with the patient. The patient verbalized understanding. Discharge medications reviewed with the patient and appropriate educational materials and side effects teaching were provided. ___________________________________________________________________________________________________________________________________  Colitis: Care Instructions  Your Care Instructions  Colitis is the medical term for swelling (inflammation) of the intestine. It can be caused by different things, such as an infection or loss of blood flow in the intestine. Other causes are problems like Crohn's disease or ulcerative colitis. Symptoms may include fever, diarrhea that may be bloody, or belly pain. Sometimes symptoms go away without treatment.  But you may need treatment or more tests, such as blood tests or a stool test. Or you may need imaging tests like a CT scan or a colonoscopy. In some cases, the doctor may want to test a sample of tissue from the intestine. This test is called a biopsy. The doctor has checked you carefully, but problems can develop later. If you notice any problems or new symptoms, get medical treatment right away. Follow-up care is a key part of your treatment and safety. Be sure to make and go to all appointments, and call your doctor if you are having problems. It's also a good idea to know your test results and keep a list of the medicines you take. How can you care for yourself at home? · Rest until you feel better. · Your doctor may recommend that you eat bland foods. These include rice, dry toast or crackers, bananas, and applesauce. · To prevent dehydration, drink plenty of fluids. Choose water and other caffeine-free clear liquids until you feel better. If you have kidney, heart, or liver disease and have to limit fluids, talk with your doctor before you increase the amount of fluids you drink. · Be safe with medicines. Take your medicines exactly as prescribed. Call your doctor if you think you are having a problem with your medicine. You will get more details on the specific medicines your doctor prescribes. When should you call for help? Call 911 anytime you think you may need emergency care. For example, call if:  · You passed out (lost consciousness). · You vomit blood or what looks like coffee grounds. · Your stools are maroon or very bloody. Call your doctor now or seek immediate medical care if:  · You have new or worse pain. · You have a new or higher fever. · You have new or worse symptoms. · You cannot keep fluids or medicines down. Watch closely for changes in your health, and be sure to contact your doctor if:  · You do not get better as expected. Where can you learn more? Go to [employer]  Enter [id number] in the search box to learn more about \"Colitis: Care Instructions. \"   © 1087-9709 [employer], [employer]. Care instructions adapted under license by [employer] (which disclaims liability or warranty for this information). This care instruction is for use with your licensed healthcare professional. If you have questions about a medical condition or this instruction, always ask your healthcare professional. [address] any warranty or liability for your use of this information. Content Version: 42.0.953955; Current as of: November 20, 2015             Esophagitis: Care Instructions  Your Care Instructions    Esophagitis (say \"ih-sof-uh-JY-tus\") is irritation of the esophagus, the tube that carries food from your throat to your stomach. Acid reflux is the most common cause of this condition. When you have reflux, stomach acid and juices flow upward. This can cause pain or a burning feeling in your chest. You may have a sore throat. It may be hard to swallow. Other causes of this condition include some medicines and supplements. Allergies or an infection can also cause it. Your doctor will ask about your symptoms and past health. He or she might do tests to find the cause of your symptoms. Treatment depends on what is causing the problem. Treatment might include changing your diet or taking medicine to relieve your symptoms. It might also include changing a medicine that is causing your symptoms. If you have reflux, medicine that reduces the stomach acid helps your body heal. It might take 1 to 3 weeks to heal.  Follow-up care is a key part of your treatment and safety. Be sure to make and go to all appointments, and call your doctor if you are having problems. It's also a good idea to know your test results and keep a list of the medicines you take. How can you care for yourself at home? · If you have acid reflux, your doctor may recommend that you:  ¨ Eat several small meals instead of two or three large meals.  After you eat, wait 2 to 3 hours before you lie down.  ¨ Avoid chocolate, mint, alcohol, and spicy foods. ¨ Don't smoke or use smokeless tobacco. Smoking can make this condition worse. If you need help quitting, talk to your doctor about stop-smoking programs and medicines. These can increase your chances of quitting for good. ¨ Raise the head of your bed 6 to 8 inches if you have symptoms at night. ¨ Lose weight if you are overweight. ¨ Take an over-the-counter antacid, such as Maalox, Mylanta, or Tums. Be careful when you take over-the-counter antacid medicines. Many of these medicines have aspirin in them. Read the label to make sure that you are not taking more than the recommended dose. Too much aspirin can be harmful. ¨ Take stronger acid reducers. Examples are famotidine (such as Pepcid), omeprazole (such as Prilosec), and ranitidine (such as Zantac). · If your condition is caused by infection, allergy, or other problems, use the medicine or treatments that your doctor recommends. · Be safe with medicines. Take your medicines exactly as prescribed. Call your doctor if you think you are having a problem with your medicine. When should you call for help? Call your doctor now or seek immediate medical care if:    · You have new or worse belly pain.     · You are vomiting.    Watch closely for changes in your health, and be sure to contact your doctor if:    · You have new or worse symptoms of reflux.     · You have trouble or pain swallowing.     · You are losing weight.     · You do not get better as expected. Where can you learn more? Go to http://mary kate-ramón.info/. Enter Z662 in the search box to learn more about \"Esophagitis: Care Instructions. \"  Current as of: May 12, 2017  Content Version: 11.7  © 1957-8352 [employer]. Care instructions adapted under license by [employer] (which disclaims liability or warranty for this information).  If you have questions about a medical condition or this instruction, always ask your healthcare professional. [place] [address] any warranty or liability for your use of this information. Pneumonia: Care Instructions  Your Care Instructions    Pneumonia is an infection of the lungs. Most cases are caused by infections from bacteria or viruses. Pneumonia may be mild or very severe. If it is caused by bacteria, you will be treated with antibiotics. It may take a few weeks to a few months to recover fully from pneumonia, depending on how sick you were and whether your overall health is good. Follow-up care is a key part of your treatment and safety. Be sure to make and go to all appointments, and call your doctor if you are having problems. It's also a good idea to know your test results and keep a list of the medicines you take. How can you care for yourself at home? · Take your antibiotics exactly as directed. Do not stop taking the medicine just because you are feeling better. You need to take the full course of antibiotics. · Take your medicines exactly as prescribed. Call your doctor if you think you are having a problem with your medicine. · Get plenty of rest and sleep. You may feel weak and tired for a while, but your energy level will improve with time. · To prevent dehydration, drink plenty of fluids, enough so that your urine is light yellow or clear like water. Choose water and other caffeine-free clear liquids until you feel better. If you have kidney, heart, or liver disease and have to limit fluids, talk with your doctor before you increase the amount of fluids you drink. · Take care of your cough so you can rest. A cough that brings up mucus from your lungs is common with pneumonia. It is one way your body gets rid of the infection. But if coughing keeps you from resting or causes severe fatigue and chest-wall pain, talk to your doctor. He or she may suggest that you take a medicine to reduce the cough.   · Use a vaporizer or humidifier to add moisture to your bedroom. Follow the directions for cleaning the machine. · Do not smoke or allow others to smoke around you. Smoke will make your cough last longer. If you need help quitting, talk to your doctor about stop-smoking programs and medicines. These can increase your chances of quitting for good. · Take an over-the-counter pain medicine, such as acetaminophen (Tylenol), ibuprofen (Advil, Motrin), or naproxen (Aleve). Read and follow all instructions on the label. · Do not take two or more pain medicines at the same time unless the doctor told you to. Many pain medicines have acetaminophen, which is Tylenol. Too much acetaminophen (Tylenol) can be harmful. · If you were given a spirometer to measure how well your lungs are working, use it as instructed. This can help your doctor tell how your recovery is going. · To prevent pneumonia in the future, talk to your doctor about getting a flu vaccine (once a year) and a pneumococcal vaccine (one time only for most people). When should you call for help? Call 911 anytime you think you may need emergency care. For example, call if:    · You have severe trouble breathing.    Call your doctor now or seek immediate medical care if:    · You cough up dark brown or bloody mucus (sputum).     · You have new or worse trouble breathing.     · You are dizzy or lightheaded, or you feel like you may faint.    Watch closely for changes in your health, and be sure to contact your doctor if:    · You have a new or higher fever.     · You are coughing more deeply or more often.     · You are not getting better after 2 days (48 hours).     · You do not get better as expected. Where can you learn more? Go to http://mary kate-ramón.info/. Enter [id number] in the search box to learn more about \"Pneumonia: Care Instructions. \"  Current as of: December 6, 2017  Content Version: 11.7  © 8885-2770 [employer].  Care instructions adapted under license by [employer] (which disclaims liability or warranty for this information). If you have questions about a medical condition or this instruction, always ask your healthcare professional. [address] any warranty or liability for your use of this information.

## 2018-09-11 NOTE — PROGRESS NOTES
BSR from [place] [id number], RN patient resting in bed watching TV. Alert an oriented times four. O2 1L NC resp even an unlabored. Denies discomfort denies dyspnea. Per report patient has an area of \"shingles\" to left upper back. Patient voices that this area has been there for 3wks. Irregular shaped red area with three small blister like areas that are closed. (no drainage present) Patient encouraged to call for assist with needs. No acute distress noted.

## 2018-09-11 NOTE — PROGRESS NOTES
Received bedside shift report from [facility]. Patient in bed, in its low and locked position with call bell within reach. Patient alert and oriented x 4 with respirations regular and non-labored.

## 2018-09-11 NOTE — PROGRESS NOTES
[facility]. Faxed order for overnight oximetry study to [facility].

## 2018-09-11 NOTE — PROGRESS NOTES
Oxygen Qualifier Room air: SpO2 with O2 and liter flow Resting SpO2 94% Ambulating SpO2  92% Completed by: 
 
Ardith Members, RT

## 2018-09-11 NOTE — PROGRESS NOTES
Discharge instructions, follow up information, medication list, and prescription information provided and explained to the pt. IV removed from left [facility], remote telemetry removed. Opportunity for questions provided. PCT notified patient is ready to leave.

## 2018-09-11 NOTE — CONSULTS
CONSULT NOTE    Patt Mccann    9/11/2018    Date of Admission:  9/5/2018    The patient's chart is reviewed and the patient is discussed with the staff. Subjective:     Patient is a 58 y.o.  male seen and evaluated at the request of Dr. Tara Andres, hospitalist service for possible COPD exacerbation and hypoxia. He presented to the ER with several days of nausea, vomiting, abdominal pain, anorexia, fever and diarrhea with black stools. In the ER O2 sat on room air was 60% and placed on O2. Chronic medical problems:  AFIB, CAD with hx PCI, COPD, HTN, CLL followed at [facility], hx tobacco abuse. He is followed by cardiology and planning Watchman device. In radiology was concerned he may have had aspiration from emesis. CXR with right lower lobe atelectasis vs pneumonia. CT abdomen/pelvis performed: 1. Partially imaged circumferential wall thickening of the distal esophagus. Consider direct inspection for further evaluation as this could be infectious/inflammatory versus neoplasm. 2. Mild circumferential thickening suspected of the descending colon concerning for a nonspecific colitis. 3. Sigmoid diverticula. Was treated with hydration, antibiotics and GI consulted. Stool resulted with salmonella. Was treated with antibiotics for infectious diarrhea and aspiration pneumonia and transitioned to Imodium. He is still on O2 and was not on prior to admission. He is followed by PCP for emphysema and on Anora Ellipta and PRN Albuterol. Has never seen a pulmonologist.  States his shortness of breath is sporadic and sometimes cannot walk to the bathroom without significant symptoms. He quit smoking 2 years ago.     Review of Systems  A comprehensive review of systems was negative except for: Constitutional: positive for fevers, fatigue, malaise, anorexia and weight loss  Respiratory: positive for cough or dyspnea on exertion  Cardiovascular: positive for dyspnea, fatigue, CAD/PCI, AFib, dyspnea on exertion  Gastrointestinal: positive for nausea, vomiting, diarrhea and hx PUD with GI bleed--healed  Behvioral/Psych: positive for hx of heavy tobacco abuse--quit smoking 2 years ago and depression    Patient Active Problem List   Diagnosis Code    CAD (coronary artery disease) I25.10    Dyslipidemia E78.5    Atrial fibrillation ([facility] I48.91    S/P coronary artery stent placement (2.75 x 28 Xience drug-eluting stent to mLAD 11/20/09) Z95.5    HTN (hypertension) I10    GERD (gastroesophageal reflux disease) K21.9    Skin lesion L98.9    Tobacco use disorder F17.200    Palpitations R00.2    Coronary atherosclerosis of native coronary artery I25.10    Dizziness R42    Duodenal ulcer K26.9    Recurrent depression ([facility]) F33.9    Chronic midline low back pain without sciatica M54.5, G89.29    Chronic obstructive pulmonary disease ([facility]) J44.9    Leukocytosis D72.829    CLL (chronic lymphocytic leukemia) ([facility]) C91.90    Colitis K52.9    Sepsis ([facility]) A41.9    Hypokalemia E87.6    Esophagitis K20.9    Thrush B37.0    Diarrhea of infectious origin A09    Aspiration pneumonia of right lower lobe ([facility]) J69.0    Gastroenteritis K52.9    Hypoxia R09.02       Home DME company none. Prior to Admission Medications   Prescriptions Last Dose Informant Patient Reported? Taking? ANORO ELLIPTA 62.5-25 mcg/actuation inhaler   No No   Sig: INHALE 1 PUFF DAILY   albuterol (PROVENTIL HFA, VENTOLIN HFA, PROAIR HFA) 90 mcg/actuation inhaler   Yes No   Sig: Take 2 Puffs by inhalation every four (4) hours as needed. albuterol (PROVENTIL VENTOLIN) 2.5 mg /3 mL (0.083 %) nebulizer solution   No No   Sig: 3 mL by Nebulization route every four (4) hours as needed for Wheezing. amLODIPine (NORVASC) 5 mg tablet   No No   Sig: Take 1 Tab by mouth every evening. aspirin 81 mg chewable tablet   Yes No   Sig: Take 1 Tab by mouth daily. cetirizine (ZYRTEC) 10 mg tablet   No No   Sig: Take 1 Tab by mouth nightly. clopidogrel (PLAVIX) 75 mg tab   No No   Sig: Take 1 Tab by mouth daily. colestipol (COLESTID) 1 gram tablet   Yes No   Sig: Take 1 g by mouth daily. lisinopril (PRINIVIL, ZESTRIL) 40 mg tablet   No No   Sig: Take 1 Tab by mouth daily. meclizine (ANTIVERT) 25 mg tablet   No No   Sig: Take 1 Tab by mouth three (3) times daily as needed. Indications: Vertigo   multivitamin (ONE A DAY) tablet   Yes No   Sig: Take 1 Tab by mouth daily. nitroglycerin (NITROSTAT) 0.4 mg SL tablet   No No   Si Tab by SubLINGual route every five (5) minutes as needed for Chest Pain.   pantoprazole (PROTONIX) 40 mg tablet   No No   Sig: Take 1 Tab by mouth daily. sertraline (ZOLOFT) 100 mg tablet   No No   Sig: TAKE 1 TABLET EVERY DAY   sotalol (BETAPACE) 80 mg tablet   No No   Sig: TAKE 1 TABLET TWICE DAILY.       Facility-Administered Medications: None       Past Medical History:   Diagnosis Date    Arrhythmia     A Fib twice since 2010-- ablation no problems since    Arthritis     CAD (coronary artery disease) 2009    stent x 1-- ablation -- no problems since    Cancer [facility])     skin cancer    Chest pain     Chronic midline low back pain without sciatica 2017    CLL (chronic lymphocytic leukemia) ([facility] 2018    COPD     prn inhalers    Coronary atherosclerosis of native coronary artery 2015    Depression     Dyspnea on exertion     with any activity; associated with nausea    GERD (gastroesophageal reflux disease) 2010    Heart failure ([address]     Hematochezia     HTN (hypertension) x 1 month    started on med--- controlled    Hypercholesteremia     controlled by medication    Other ill-defined conditions(799.89)     dyslipidemia/hyperlipidemia    Palpitations 2015    Paroxysmal atrial fibrillation ([address]     Psychiatric disorder     depression    PUD (peptic ulcer disease)     hx-- r/t coumadin--    Recurrent depression ([facility] 2016    Tobacco use disorder 12/30/2015    Unspecified adverse effect of anesthesia 1978    quit breathing     Past Surgical History:   Procedure Laterality Date    HX CHOLECYSTECTOMY      HX COLONOSCOPY  2012    HX HEART CATHETERIZATION  11/2009    PCI x 1 to LAD  ----ablation 2010    HX HEART CATHETERIZATION  3/2016    HX HEENT  1977 and 1978    eardrum repair-- right    HX HERNIA REPAIR  1990    Right inguinal    HX LAP CHOLECYSTECTOMY  1995    HX ORTHOPAEDIC      left shoulder surg x1    HX ORTHOPAEDIC Left 2012    hand    HX OTHER SURGICAL      EGD    HX SHOULDER ARTHROSCOPY  2000    x 3 on right     Social History     Social History    Marital status:      Spouse name: N/A    Number of children: N/A    Years of education: N/A     Occupational History    Not on file. Social History Main Topics    Smoking status: Former Smoker     Packs/day: 1.00     Years: 45.00     Types: Cigarettes     Quit date: 3/4/2016    Smokeless tobacco: Never Used      Comment: smoked more than 40 years    Alcohol use No    Drug use: No    Sexual activity: Not on file     Other Topics Concern    Not on file     Social History Narrative     Family History   Problem Relation Age of Onset    Heart Disease Father     Heart Attack Father     Hypertension Father     Bleeding Prob Father     Heart Surgery Father      multiple PCIs, first in his 46s    Heart Disease Brother      PCI x 2    Diabetes Mother     Diabetes Brother     Diabetes Brother      Allergies   Allergen Reactions    Fentanyl Other (comments)     States had through a PCA after shoulder surgery in 2000, and \"throat swelled shut\" and that he \"quit breathing\". Update 11/16/10--Patient thinks the PCA gave him to much and it wasn't an allergy to drug itself.     Lovastatin Other (comments)     Hullucinations    Niacin Other (comments)     Denies allery    Advicor [Niacin-Lovastatin] Anaphylaxis and Itching       Current Facility-Administered Medications Medication Dose Route Frequency    levoFLOXacin (LEVAQUIN) tablet 500 mg  500 mg Oral Q24H    loperamide (IMODIUM) capsule 4 mg  4 mg Oral DAILY    potassium chloride (K-DUR, KLOR-CON) SR tablet 20 mEq  20 mEq Oral TID    albuterol-ipratropium (DUO-NEB) 2.5 MG-0.5 MG/3 ML  3 mL Nebulization Q4H PRN    loperamide (IMODIUM) capsule 4 mg  4 mg Oral Q6H PRN    amLODIPine (NORVASC) tablet 10 mg  10 mg Oral QPM    albuterol-ipratropium (DUO-NEB) 2.5 MG-0.5 MG/3 ML  3 mL Nebulization QID RT    pantoprazole (PROTONIX) tablet 40 mg  40 mg Oral ACB    sertraline (ZOLOFT) tablet 100 mg  100 mg Oral DAILY    sotalol (BETAPACE) tablet 80 mg  80 mg Oral Q12H    sodium chloride (NS) flush 5-10 mL  5-10 mL IntraVENous PRN    tiotropium (SPIRIVA) inhalation capsule 18 mcg  1 Cap Inhalation DAILY    alum-mag hydroxide-simeth (MYLANTA) oral suspension 30 mL  30 mL Oral Q4H PRN    guaiFENesin-dextromethorphan (ROBITUSSIN DM) 100-10 mg/5 mL syrup 10 mL  10 mL Oral Q4H PRN    hydrALAZINE (APRESOLINE) 20 mg/mL injection 20 mg  20 mg IntraVENous Q4H PRN    HYDROcodone-acetaminophen (NORCO) 5-325 mg per tablet 1 Tab  1 Tab Oral Q4H PRN    zolpidem (AMBIEN) tablet 5 mg  5 mg Oral QHS PRN    influenza vaccine 2018-19 (6 mos+)(PF) (FLUARIX QUAD/FLULAVAL QUAD) injection 0.5 mL  0.5 mL IntraMUSCular PRIOR TO DISCHARGE    nystatin (MYCOSTATIN) 100,000 unit/mL oral suspension 500,000 Units  500,000 Units Oral QID    acetaminophen (TYLENOL) tablet 650 mg  650 mg Oral Q6H PRN    ondansetron (ZOFRAN) injection 4 mg  4 mg IntraVENous Q6H PRN         Objective:     Vitals:    09/11/18 0703 09/11/18 0757 09/11/18 1102 09/11/18 1117   BP: 135/81  144/86    Pulse: 76  61    Resp: 20  21    Temp: 98.2 °F (36.8 °C)  98.6 °F (37 °C)    SpO2: 95% 94% 94% 95%   Weight:       Height:           PHYSICAL EXAM     Constitutional:  the patient is well developed and in no acute distress, NC 2L, sat 95% at rest  EENMT:  Sclera clear, pupils equal, oral mucosa moist  Respiratory: few fine basilar crackles, no wheezing  Cardiovascular:  RRR without M,G,R  Gastrointestinal: soft and non-tender; with positive bowel sounds. Musculoskeletal: warm without cyanosis. There is no lower leg edema. Skin:  Right posterior shoulder healing shingles lesions, no jaundice or rashes, no open wounds   Neurologic: no gross neuro deficits     Psychiatric:  alert and oriented x 3    CXR:  None today    CXR 9/9/18:  Stable interstitial prominence, mild chronic lung disease versus mild pulmonary edema. Recent Labs      09/11/18   0541  09/10/18   0652  09/09/18   0730   WBC  15.1*  16.5*  14.3*   HGB  13.6  13.6  12.9*   HCT  41.1  40.8*  38.8*   PLT  338  322  233     Recent Labs      09/11/18   0746  09/11/18   0541  09/10/18   0652   NA  143  142  147*   K  4.0  3.5  3.3*   CL  104  104  109*   GLU  100  108*  99   CO2  28  29  28   BUN  6*  6*  4*   CREA  0.65*  0.71*  0.63*   MG   --   2.0  2.1   PHOS   --   3.2  2.3   CA  9.3  9.4  9.2   ALB   --   3.3  2.9*   TBILI   --   0.5  0.7   ALT   --   23  24   SGOT   --   16  18     No results for input(s): PH, PCO2, PO2, HCO3 in the last 72 hours. No results for input(s): LCAD, LAC in the last 72 hours.     Assessment:  (Medical Decision Making)     Hospital Problems  Date Reviewed: 9/11/2018          Codes Class Noted POA    Hypoxia ICD-10-CM: R09.02  ICD-9-CM: 799.02  9/11/2018 Yes    Checking RA sats    Diarrhea of infectious origin ICD-10-CM: A09  ICD-9-CM: 009.3  9/6/2018 Yes    Per primary    Aspiration pneumonia of right lower lobe ([address] ICD-10-CM: J69.0  ICD-9-CM: 507.0  9/6/2018 Yes    Will continue antibiotics for a few more days    Gastroenteritis ICD-10-CM: K52.9  ICD-9-CM: 558.9  9/6/2018 Yes    Per primary    Colitis ICD-10-CM: K52.9  ICD-9-CM: 558.9  9/5/2018 Yes    Less diarrhea    * (Principal)Sepsis ([facility] ICD-10-CM: A41.9  ICD-9-CM: 038.9, 995.91  9/5/2018 Yes    resolved    Hypokalemia ICD-10-CM: E87.6  ICD-9-CM: 276.8  9/5/2018 Yes    resolved    Esophagitis ICD-10-CM: K20.9  ICD-9-CM: 530.10  9/5/2018 Yes    On admission    Thrush (Chronic) ICD-10-CM: B37.0  ICD-9-CM: 112.0  9/5/2018 Yes    resolved    CLL (chronic lymphocytic leukemia) (HCC) (Chronic) ICD-10-CM: C91.90  ICD-9-CM: 204.10  7/24/2018 Yes    Chronic--not receiving any treatment    Chronic obstructive pulmonary disease (HCC) (Chronic) ICD-10-CM: J44.9  ICD-9-CM: 496  8/28/2017 Yes    chronic    Atrial fibrillation (HCC) (Chronic) ICD-10-CM: I48.91  ICD-9-CM: 427.31  7/22/2010 Yes    Regular now--not on anticoagulation due to hx PUD with GIB    HTN (hypertension) (Chronic) ICD-10-CM: I10  ICD-9-CM: 401.9  7/22/2010 Yes    chronic    CAD (coronary artery disease) (Chronic) ICD-10-CM: I25.10  ICD-9-CM: 414.00  12/1/2009 Yes     pci to lad 11/19 2.75x28 Xience drug eluting stent. Chronic--no angina          Plan:  (Medical Decision Making)     --GI to follow up as outpatient in 2 weeks  --RA and ambulating sat 92 so no need for supplemental O2. Have asked case management to arrange home overnight oximetry. --Will need follow up in our office in 6 weeks with CXR and spirometry. Will need complete PFTs in the future when over acute illness. --Is being discharged home today. More than 50% of the time documented was spent in face-to-face contact with the patient and in the care of the patient on the floor/unit where the patient is located. Thank you very much for this referral.  We appreciate the opportunity to participate in this patient's care. Will follow along with above stated plan. Marisa Saucedo NP  Lungs:  distan t  Heart:  RRR with no Murmur/Rubs/Gallops    Additional Comments:  Going home today, will need follow up with spirometry to asess his COPD and severity as well. Will continue Anoro and albuterol for now,    I have spoken with and examined the patient.  I agree with the above assessment and plan as documented.     Nunda MD Pb

## 2018-09-11 NOTE — DISCHARGE SUMMARY
Hospitalist Discharge Summary     Patient ID:  Luis Antonio Yee  [id number]  48 y.o.  1955  Admit date: 9/5/2018  Discharge date: Attending: Kerry Sutherland MD  PCP:  Arun Preston DO    Admission Diagnosis: Sepsis [facility])    Discharge Diagnosis: Sepsis [facility])   Principal Problem:    Sepsis ([address] (9/5/2018)    Active Problems:    CAD (coronary artery disease) (12/1/2009)      Overview: pci to lad 11/19 2.75x28 Xience drug eluting stent. Atrial fibrillation ([address] (7/22/2010)      HTN (hypertension) (7/22/2010)      Chronic obstructive pulmonary disease ([address] (8/28/2017)      CLL (chronic lymphocytic leukemia) ([address] (7/24/2018)      Colitis (9/5/2018)      Hypokalemia (9/5/2018)      Esophagitis (9/5/2018)      Nickolas Gubler (9/5/2018)      Diarrhea of infectious origin (9/6/2018)      Aspiration pneumonia of right lower lobe ([address] (9/6/2018)      Gastroenteritis (9/6/2018)      Hypoxia (9/11/2018)         Hospital Course:  Please refer to the admission H&P for details of presentation. In summary, This is a 79-year-old gentleman with history of COPD, hypertension, CLL admitted to the hospital secondary to sepsis and acute colitis and possible pneumonia. Patient was initially hypoxemic likely from COPD and possible pneumonia. Patient was placed on IV antibiotics including Levaquin,, Flagyl. Patient was seen by GI in consultation. Stool for C. difficile was negative. Stool cultures came back positive for Salmonella species. At this time Flagyl was discontinued and he already received nearly 5 days of Levaquin. Patient was given bronchodilators and his respiratory status and the hypoxemia seems to be improving. She had home oxygen eval today and his oxygen saturation stayed greater than 90% with ambulation without any supplemental oxygen. Overall patient's diarrhea was improving.   Patient was seen by pulmonology in consultation, recommended to continue the patient on Anoro and albuterol inhalers and as needed bronchodilators neb treatment. They recommended outpatient follow-up with pulmonology in 6 weeks for outpatient pulmonary function testing. Case Management consulted for arranging nebulizer and nocturnal pulse oximetry evaluation at home. Overall patient clinically much improved and his diarrhea almost resolved. Patient was given 2 more days of Levaquin to finish a course of 7 days. As patient is symptomatically much improved, clinically stable, cleared by GI and pulmonology, he is being discharged home. He was advised to follow-up with PCP in 1 week. He was also need to follow-up with pulmonology in 6 weeks. Lab/Data Reviewed    Discharge Exam:  Visit Vitals    /86 (BP 1 Location: Right arm, BP Patient Position: At rest)    Pulse 61    Temp 98.6 °F (37 °C)    Resp 21    Ht 6' 1\" (1.854 m)    Wt 81.4 kg (179 lb 6.4 oz)    SpO2 95%    BMI 23.67 kg/m2     General: Conscious, comfortable  Lungs:  Diminished air entry with mild expiratory wheezing but overall air entry is improving  Heart:  S1 S2 regular  Abdomen: Soft, Positive bowel sounds, NTND, No guarding/rigidity/rebound tend. Neurologic:  AAOX3, No gross FND  Psych:             Appropriate mood    Disposition: home  Discharge Condition: Stable  Patient Instructions:   Current Discharge Medication List      START taking these medications    Details   levoFLOXacin (LEVAQUIN) 500 mg tablet Take 1 Tab by mouth every twenty-four (24) hours. Qty: 2 Tab, Refills: 0      albuterol-ipratropium (DUO-NEB) 2.5 mg-0.5 mg/3 ml nebu 3 mL by Nebulization route every four (4) hours as needed. Qty: 30 Nebule, Refills: 0      loperamide (IMODIUM) 2 mg capsule Take 2 Caps by mouth every six (6) hours as needed for Diarrhea for up to 5 days. Qty: 20 Cap, Refills: 0      guaiFENesin ER (MUCINEX) 600 mg ER tablet Take 1 Tab by mouth two (2) times daily as needed for Congestion.   Qty: 20 Tab, Refills: 0      dextromethorphan-guaiFENesin (ROBITUSSIN COUGH-CHEST SERINA DM)  mg/5 mL liqd syrup Take 5 mL by mouth every six (6) hours as needed for up to 7 days. Qty: 1 Bottle, Refills: 0      promethazine (PHENERGAN) 25 mg tablet Take 1 Tab by mouth every six (6) hours as needed for Nausea. Qty: 11 Tab, Refills: 0      promethazine (PHENERGAN) 25 mg suppository Insert 1 Suppository into rectum every six (6) hours as needed for Nausea for up to 7 days. Qty: 11 Suppository, Refills: 0      oxyCODONE IR (ROXICODONE) 5 mg immediate release tablet Take 1 Tab by mouth every four (4) hours as needed for Pain. Max Daily Amount: 30 mg.  Qty: 19 Tab, Refills: 0    Associated Diagnoses: Leukocytosis, unspecified type         CONTINUE these medications which have CHANGED    Details   amLODIPine (NORVASC) 10 mg tablet Take 1 Tab by mouth every evening. Qty: 30 Tab, Refills: 0         CONTINUE these medications which have NOT CHANGED    Details   meclizine (ANTIVERT) 25 mg tablet Take 1 Tab by mouth three (3) times daily as needed. Indications: Vertigo  Qty: 30 Tab, Refills: 1    Associated Diagnoses: Benign paroxysmal positional vertigo due to bilateral vestibular disorder      pantoprazole (PROTONIX) 40 mg tablet Take 1 Tab by mouth daily. Qty: 90 Tab, Refills: 3    Associated Diagnoses: Gastroesophageal reflux disease with esophagitis      sertraline (ZOLOFT) 100 mg tablet TAKE 1 TABLET EVERY DAY  Qty: 90 Tab, Refills: 3    Associated Diagnoses: Recurrent major depressive disorder, in full remission ([facility])      ANORO ELLIPTA 62.5-25 mcg/actuation inhaler INHALE 1 PUFF DAILY  Qty: 3 Inhaler, Refills: 1      sotalol (BETAPACE) 80 mg tablet TAKE 1 TABLET TWICE DAILY. Qty: 180 Tab, Refills: 3      clopidogrel (PLAVIX) 75 mg tab Take 1 Tab by mouth daily. Qty: 30 Tab, Refills: 11      aspirin 81 mg chewable tablet Take 1 Tab by mouth daily.       albuterol (PROVENTIL HFA, VENTOLIN HFA, PROAIR HFA) 90 mcg/actuation inhaler Take 2 Puffs by inhalation every four (4) hours as needed. nitroglycerin (NITROSTAT) 0.4 mg SL tablet 1 Tab by SubLINGual route every five (5) minutes as needed for Chest Pain. Qty: 25 Tab, Refills: 11      multivitamin (ONE A DAY) tablet Take 1 Tab by mouth daily. STOP taking these medications       acetaminophen-codeine (TYLENOL #3) 300-30 mg per tablet Comments:   Reason for Stopping:         lisinopril (PRINIVIL, ZESTRIL) 40 mg tablet Comments:   Reason for Stopping:         albuterol (PROVENTIL VENTOLIN) 2.5 mg /3 mL (0.083 %) nebulizer solution Comments:   Reason for Stopping:         cetirizine (ZYRTEC) 10 mg tablet Comments:   Reason for Stopping:         colestipol (COLESTID) 1 gram tablet Comments:   Reason for Stopping:                Follow-up  Pt was advised to follow up with PCP in one week     Total time spent in discharge day management: 35 minutes    Signed:  Maura Lee MD

## 2018-09-12 ENCOUNTER — PATIENT OUTREACH (OUTPATIENT)
Dept: CASE MANAGEMENT | Age: 63
End: 2018-09-12

## 2018-09-12 LAB
BACTERIA SPEC CULT: NORMAL
GRAM STN SPEC: NORMAL
SERVICE CMNT-IMP: NORMAL

## 2018-09-12 NOTE — PATIENT INSTRUCTIONS
9/12/18 @ 9am- Called to establish relationship. Left a voicemail with my contact information. 9/12/18 @ 1:10pm- Called and left a voicemail with my contact information.

## 2018-09-17 ENCOUNTER — PATIENT OUTREACH (OUTPATIENT)
Dept: CASE MANAGEMENT | Age: 63
End: 2018-09-17

## 2018-09-17 NOTE — PROGRESS NOTES
9/17/18 @ 10am- Reached member, RN Care  role explained and my contact information given. [facility] nurse advice line number given. Member was diagnosised with samonella. No one else in his family got sick. He had diarrhea, vomiting, fever, blood in stool. Unsure how he was impacted. He states all the symptoms are gone and he feels much better. He has completed his antibioitc therapy and he is having no breathing issues. He states the only new medication for him was duonebs and he has not side effects or issues with the treatments. He has a f/u appt. with PCP on 9/20/18. His wife can drive him if he is not able to drive. No home health or PT ordered. Advised him to call PCP with any fever, chills, diarrhea, nausea or vomiting. He voiced understanding. He lost power for about 5 hours over the weekend but it has been restored.

## 2018-09-17 NOTE — PATIENT INSTRUCTIONS
9/17/18 @ 10am- Reached member, RN Care  role explained and my contact information given. [facility] nurse advice line number given. Member was diagnosised with samonella. No one else in his family got sick. He had diarrhea, vomiting, fever, blood in stool. Unsure how he was impacted. He states all the symptoms are gone and he feels much better. He has completed his antibioitc therapy and he is having no breathing issues. He states the only new medication for him was duonebs and he has not side effects or issues with the treatments. He has a f/u appt. with PCP on 9/20/18. His wife can drive him if he is not able to drive. No home health or PT ordered. Advised him to call PCP with any fever, chills, diarrhea, nausea or vomiting. He voiced understanding. He lost power for about 5 hours over the weekend but it has been restored.

## 2018-09-27 ENCOUNTER — PATIENT OUTREACH (OUTPATIENT)
Dept: CASE MANAGEMENT | Age: 63
End: 2018-09-27

## 2018-09-27 NOTE — PROGRESS NOTES
9/27/18 @ 10:50am- Called to check on member. He states he has improved. No further diarrhea, no stomach pain or cramps. Has completed antibiotic therapy for samellona. No fevers or chills. No s/s of pneumonia. Cough has subsided. No longer having to do nebulizers or mucinex. No shortness of breath or cough. Ambulating without assistance. Driving self to appointments. Saw PCP on 9/20/18. No new orders. Advised him to call PCP with any start of fever, diarrhea, cramps or stomach pain, or any changes or concerns in his condition. He voiced understanding. [facility]ed [facility] for non emergent issues.

## 2018-10-02 LAB
BACTERIA SPEC CULT: ABNORMAL
SERVICE CMNT-IMP: ABNORMAL

## 2018-10-03 ENCOUNTER — PATIENT OUTREACH (OUTPATIENT)
Dept: CASE MANAGEMENT | Age: 63
End: 2018-10-03

## 2018-10-04 NOTE — PROGRESS NOTES
10/3/18 @ 12:10pm- Called and left a voicemail with my contact information. 10/3/18 @ 4:20pm- Called and left a voicemail with my contact information.

## 2018-10-10 NOTE — PROGRESS NOTES
Patient pre-assessment complete for LAAO scheduled for 10/16/18, arrival time 0800. Patient verified using . Patient instructed to bring all home medications in labeled bottles on the day of procedure. NPO status reinforced. Patient instructed to use inhalers and  take Sotalol, prilosec, amlodipine, and zoloft the morning of his procedure. He will hold all other medications that morning. Patient verbalizes understanding of all instructions & denies any questions at this time. He has the [facility] contact information if he were to have any other questions.

## 2018-10-15 ENCOUNTER — HOSPITAL ENCOUNTER (OUTPATIENT)
Dept: LAB | Age: 63
Discharge: HOME | DRG: 274 | End: 2018-10-15
Payer: MEDICARE

## 2018-10-15 ENCOUNTER — ANESTHESIA EVENT (OUTPATIENT)
Dept: SURGERY | Age: 63
DRG: 274 | End: 2018-10-15
Payer: MEDICARE

## 2018-10-15 DIAGNOSIS — I48.91 ATRIAL FIBRILLATION, UNSPECIFIED TYPE (HCC): ICD-10-CM

## 2018-10-15 LAB
ANION GAP SERPL CALC-SCNC: 10 MMOL/L (ref 7–16)
BASOPHILS # BLD: 0.1 K/UL (ref 0–0.2)
BASOPHILS NFR BLD: 1 % (ref 0–2)
BUN SERPL-MCNC: 10 MG/DL (ref 8–23)
CALCIUM SERPL-MCNC: 9.1 MG/DL (ref 8.3–10.4)
CHLORIDE SERPL-SCNC: 105 MMOL/L (ref 98–107)
CO2 SERPL-SCNC: 27 MMOL/L (ref 21–32)
CREAT SERPL-MCNC: 0.96 MG/DL (ref 0.8–1.5)
DIFFERENTIAL METHOD BLD: ABNORMAL
EOSINOPHIL # BLD: 0.2 K/UL (ref 0–0.8)
EOSINOPHIL NFR BLD: 2 % (ref 0.5–7.8)
ERYTHROCYTE [DISTWIDTH] IN BLOOD BY AUTOMATED COUNT: 12.4 %
GLUCOSE SERPL-MCNC: 117 MG/DL (ref 65–100)
HCT VFR BLD AUTO: 49.5 % (ref 41.1–50.3)
HGB BLD-MCNC: 16.4 G/DL (ref 13.6–17.2)
IMM GRANULOCYTES # BLD: 0 K/UL (ref 0–0.5)
IMM GRANULOCYTES NFR BLD AUTO: 0 % (ref 0–5)
INR PPP: 1
LYMPHOCYTES # BLD: 9.7 K/UL (ref 0.5–4.6)
LYMPHOCYTES NFR BLD: 61 % (ref 13–44)
MCH RBC QN AUTO: 30.4 PG (ref 26.1–32.9)
MCHC RBC AUTO-ENTMCNC: 33.1 G/DL (ref 31.4–35)
MCV RBC AUTO: 91.7 FL (ref 79.6–97.8)
MONOCYTES # BLD: 0.7 K/UL (ref 0.1–1.3)
MONOCYTES NFR BLD: 5 % (ref 4–12)
NEUTS SEG # BLD: 5.2 K/UL (ref 1.7–8.2)
NEUTS SEG NFR BLD: 33 % (ref 43–78)
NRBC # BLD: 0.03 K/UL (ref 0–0.2)
PLATELET # BLD AUTO: 240 K/UL (ref 150–450)
PMV BLD AUTO: 10.3 FL (ref 9.4–12.3)
POTASSIUM SERPL-SCNC: 3.7 MMOL/L (ref 3.5–5.1)
PROTHROMBIN TIME: 12.6 SEC (ref 11.5–14.5)
RBC # BLD AUTO: 5.4 M/UL (ref 4.23–5.6)
SODIUM SERPL-SCNC: 142 MMOL/L (ref 136–145)
WBC # BLD AUTO: 16 K/UL (ref 4.3–11.1)

## 2018-10-15 PROCEDURE — 86901 BLOOD TYPING SEROLOGIC RH(D): CPT

## 2018-10-15 PROCEDURE — 85025 COMPLETE CBC W/AUTO DIFF WBC: CPT

## 2018-10-15 PROCEDURE — 85610 PROTHROMBIN TIME: CPT

## 2018-10-15 PROCEDURE — 86920 COMPATIBILITY TEST SPIN: CPT

## 2018-10-15 PROCEDURE — 80048 BASIC METABOLIC PNL TOTAL CA: CPT

## 2018-10-15 PROCEDURE — 36415 COLL VENOUS BLD VENIPUNCTURE: CPT

## 2018-10-15 NOTE — ANESTHESIA PREPROCEDURE EVALUATION
Anesthetic History Review of Systems / Medical History Patient summary reviewed, nursing notes reviewed and pertinent labs reviewed Pulmonary COPD (quit smoking 2 yrs ago): moderate Smoker Neuro/Psych Cardiovascular Hypertension: well controlled Dysrhythmias (8 yrs s/p ablation procedure) : atrial fibrillation CAD (stent X 1) and hyperlipidemia Exercise tolerance: >4 METS Comments: TTE 8/18/18:  LVEF 55%. No thrombus identified in LA appendage. GI/Hepatic/Renal 
  
GERD: well controlled PUD (remote hx) Endo/Other Arthritis and cancer (CLL) Other Findings Physical Exam 
 
Airway Mallampati: II 
TM Distance: > 6 cm Neck ROM: decreased range of motion Mouth opening: Normal 
 
 Cardiovascular Rhythm: irregular Rate: normal 
 
 
 
 Dental 
 
Dentition: Edentulous Pulmonary Breath sounds clear to auscultation Abdominal 
GI exam deferred Other Findings Anesthetic Plan ASA: 3 Anesthesia type: general 
 
Monitoring Plan: Arterial line and KEN Anesthetic plan and risks discussed with: Patient

## 2018-10-16 ENCOUNTER — ANESTHESIA (OUTPATIENT)
Dept: SURGERY | Age: 63
DRG: 274 | End: 2018-10-16
Payer: MEDICARE

## 2018-10-16 ENCOUNTER — HOSPITAL ENCOUNTER (INPATIENT)
Age: 63
LOS: 1 days | Discharge: HOME | DRG: 274 | End: 2018-10-17
Attending: INTERNAL MEDICINE | Admitting: INTERNAL MEDICINE
Payer: MEDICARE

## 2018-10-16 PROBLEM — I48.91 A-FIB (HCC): Status: ACTIVE | Noted: 2018-10-16

## 2018-10-16 LAB
ACT BLD: 257 SECS (ref 70–128)
ATRIAL RATE: 70 BPM
ATRIAL RATE: 76 BPM
CALCULATED P AXIS, ECG09: 70 DEGREES
CALCULATED P AXIS, ECG09: 87 DEGREES
CALCULATED R AXIS, ECG10: 48 DEGREES
CALCULATED R AXIS, ECG10: 68 DEGREES
CALCULATED T AXIS, ECG11: 60 DEGREES
CALCULATED T AXIS, ECG11: 65 DEGREES
DIAGNOSIS, 93000: NORMAL
DIAGNOSIS, 93000: NORMAL
P-R INTERVAL, ECG05: 152 MS
P-R INTERVAL, ECG05: 154 MS
Q-T INTERVAL, ECG07: 434 MS
Q-T INTERVAL, ECG07: 438 MS
QRS DURATION, ECG06: 72 MS
QRS DURATION, ECG06: 76 MS
QTC CALCULATION (BEZET), ECG08: 468 MS
QTC CALCULATION (BEZET), ECG08: 492 MS
VENTRICULAR RATE, ECG03: 70 BPM
VENTRICULAR RATE, ECG03: 76 BPM

## 2018-10-16 PROCEDURE — 77030038112 HC IMPL LAA WATCHMAN 30MM BSC -L

## 2018-10-16 PROCEDURE — 77030020407 HC IV BLD WRMR ST 3M -A: Performed by: ANESTHESIOLOGY

## 2018-10-16 PROCEDURE — B24BZZ4 ULTRASONOGRAPHY OF HEART WITH AORTA, TRANSESOPHAGEAL: ICD-10-PCS | Performed by: INTERNAL MEDICINE

## 2018-10-16 PROCEDURE — 74011250636 HC RX REV CODE- 250/636

## 2018-10-16 PROCEDURE — 94640 AIRWAY INHALATION TREATMENT: CPT

## 2018-10-16 PROCEDURE — C1894 INTRO/SHEATH, NON-LASER: HCPCS

## 2018-10-16 PROCEDURE — 76210000000 HC OR PH I REC 2 TO 2.5 HR: Performed by: INTERNAL MEDICINE

## 2018-10-16 PROCEDURE — 77030020782 HC GWN BAIR PAWS FLX 3M -B: Performed by: ANESTHESIOLOGY

## 2018-10-16 PROCEDURE — 77030008477 HC STYL SATN SLP COVD -A: Performed by: NURSE ANESTHETIST, CERTIFIED REGISTERED

## 2018-10-16 PROCEDURE — 93005 ELECTROCARDIOGRAM TRACING: CPT | Performed by: INTERNAL MEDICINE

## 2018-10-16 PROCEDURE — 77030005401 HC CATH RAD ARRO -A: Performed by: NURSE ANESTHETIST, CERTIFIED REGISTERED

## 2018-10-16 PROCEDURE — 74011000250 HC RX REV CODE- 250: Performed by: INTERNAL MEDICINE

## 2018-10-16 PROCEDURE — 77030013292 HC BOWL MX PRSM J&J -A: Performed by: ANESTHESIOLOGY

## 2018-10-16 PROCEDURE — 77030008703 HC TU ET UNCUF COVD -A: Performed by: NURSE ANESTHETIST, CERTIFIED REGISTERED

## 2018-10-16 PROCEDURE — 76060000034 HC ANESTHESIA 1.5 TO 2 HR: Performed by: INTERNAL MEDICINE

## 2018-10-16 PROCEDURE — C1760 CLOSURE DEV, VASC: HCPCS

## 2018-10-16 PROCEDURE — 77030013794 HC KT TRNSDUC BLD EDWD -B: Performed by: NURSE ANESTHETIST, CERTIFIED REGISTERED

## 2018-10-16 PROCEDURE — 77030020506 HC NDL TRNSPTL NRG BAYL -F

## 2018-10-16 PROCEDURE — 74011250637 HC RX REV CODE- 250/637: Performed by: INTERNAL MEDICINE

## 2018-10-16 PROCEDURE — 02L73DK OCCLUSION OF LEFT ATRIAL APPENDAGE WITH INTRALUMINAL DEVICE, PERCUTANEOUS APPROACH: ICD-10-PCS | Performed by: INTERNAL MEDICINE

## 2018-10-16 PROCEDURE — 33340 PERQ CLSR TCAT L ATR APNDGE: CPT

## 2018-10-16 PROCEDURE — 74011636320 HC RX REV CODE- 636/320: Performed by: INTERNAL MEDICINE

## 2018-10-16 PROCEDURE — 74011250636 HC RX REV CODE- 250/636: Performed by: INTERNAL MEDICINE

## 2018-10-16 PROCEDURE — 94760 N-INVAS EAR/PLS OXIMETRY 1: CPT

## 2018-10-16 PROCEDURE — 85347 COAGULATION TIME ACTIVATED: CPT

## 2018-10-16 PROCEDURE — 65660000000 HC RM CCU STEPDOWN

## 2018-10-16 PROCEDURE — 77010033678 HC OXYGEN DAILY

## 2018-10-16 PROCEDURE — 77030039425 HC BLD LARYNG TRULITE DISP TELE -A: Performed by: NURSE ANESTHETIST, CERTIFIED REGISTERED

## 2018-10-16 PROCEDURE — 93312 ECHO TRANSESOPHAGEAL: CPT

## 2018-10-16 PROCEDURE — 76937 US GUIDE VASCULAR ACCESS: CPT

## 2018-10-16 PROCEDURE — 74011000250 HC RX REV CODE- 250

## 2018-10-16 PROCEDURE — C1893 INTRO/SHEATH, FIXED,NON-PEEL: HCPCS

## 2018-10-16 PROCEDURE — 77030013687 HC GD NDL BARD -B

## 2018-10-16 PROCEDURE — C1769 GUIDE WIRE: HCPCS

## 2018-10-16 PROCEDURE — 77030004559 HC CATH ANGI DX SUPT CARD -B

## 2018-10-16 RX ORDER — PROPOFOL 10 MG/ML
INJECTION, EMULSION INTRAVENOUS AS NEEDED
Status: DISCONTINUED | OUTPATIENT
Start: 2018-10-16 | End: 2018-10-16 | Stop reason: HOSPADM

## 2018-10-16 RX ORDER — EPHEDRINE SULFATE 50 MG/ML
INJECTION, SOLUTION INTRAVENOUS AS NEEDED
Status: DISCONTINUED | OUTPATIENT
Start: 2018-10-16 | End: 2018-10-16 | Stop reason: HOSPADM

## 2018-10-16 RX ORDER — ALBUTEROL SULFATE 90 UG/1
2 AEROSOL, METERED RESPIRATORY (INHALATION)
Status: DISCONTINUED | OUTPATIENT
Start: 2018-10-16 | End: 2018-10-16 | Stop reason: CLARIF

## 2018-10-16 RX ORDER — HYDROCODONE BITARTRATE AND ACETAMINOPHEN 5; 325 MG/1; MG/1
1 TABLET ORAL
Status: DISCONTINUED | OUTPATIENT
Start: 2018-10-16 | End: 2018-10-17 | Stop reason: HOSPADM

## 2018-10-16 RX ORDER — GUAIFENESIN 100 MG/5ML
81 LIQUID (ML) ORAL DAILY
Status: DISCONTINUED | OUTPATIENT
Start: 2018-10-17 | End: 2018-10-17 | Stop reason: HOSPADM

## 2018-10-16 RX ORDER — ALBUTEROL SULFATE 0.83 MG/ML
2.5 SOLUTION RESPIRATORY (INHALATION)
Status: DISCONTINUED | OUTPATIENT
Start: 2018-10-16 | End: 2018-10-17 | Stop reason: HOSPADM

## 2018-10-16 RX ORDER — SODIUM CHLORIDE, SODIUM LACTATE, POTASSIUM CHLORIDE, CALCIUM CHLORIDE 600; 310; 30; 20 MG/100ML; MG/100ML; MG/100ML; MG/100ML
100 INJECTION, SOLUTION INTRAVENOUS CONTINUOUS
Status: DISCONTINUED | OUTPATIENT
Start: 2018-10-16 | End: 2018-10-16 | Stop reason: HOSPADM

## 2018-10-16 RX ORDER — FENTANYL CITRATE 50 UG/ML
INJECTION, SOLUTION INTRAMUSCULAR; INTRAVENOUS AS NEEDED
Status: DISCONTINUED | OUTPATIENT
Start: 2018-10-16 | End: 2018-10-16 | Stop reason: HOSPADM

## 2018-10-16 RX ORDER — CEFAZOLIN SODIUM/WATER 2 G/20 ML
2 SYRINGE (ML) INTRAVENOUS
Status: COMPLETED | OUTPATIENT
Start: 2018-10-16 | End: 2018-10-16

## 2018-10-16 RX ORDER — SODIUM CHLORIDE, SODIUM LACTATE, POTASSIUM CHLORIDE, CALCIUM CHLORIDE 600; 310; 30; 20 MG/100ML; MG/100ML; MG/100ML; MG/100ML
INJECTION, SOLUTION INTRAVENOUS
Status: DISCONTINUED | OUTPATIENT
Start: 2018-10-16 | End: 2018-10-16 | Stop reason: HOSPADM

## 2018-10-16 RX ORDER — MIDAZOLAM HYDROCHLORIDE 1 MG/ML
2 INJECTION, SOLUTION INTRAMUSCULAR; INTRAVENOUS
Status: DISCONTINUED | OUTPATIENT
Start: 2018-10-16 | End: 2018-10-16 | Stop reason: HOSPADM

## 2018-10-16 RX ORDER — SODIUM CHLORIDE 0.9 % (FLUSH) 0.9 %
5-10 SYRINGE (ML) INJECTION AS NEEDED
Status: DISCONTINUED | OUTPATIENT
Start: 2018-10-16 | End: 2018-10-16 | Stop reason: HOSPADM

## 2018-10-16 RX ORDER — SOTALOL HYDROCHLORIDE 80 MG/1
80 TABLET ORAL EVERY 12 HOURS
Status: DISCONTINUED | OUTPATIENT
Start: 2018-10-16 | End: 2018-10-17 | Stop reason: HOSPADM

## 2018-10-16 RX ORDER — NEOSTIGMINE METHYLSULFATE 1 MG/ML
INJECTION INTRAVENOUS AS NEEDED
Status: DISCONTINUED | OUTPATIENT
Start: 2018-10-16 | End: 2018-10-16 | Stop reason: HOSPADM

## 2018-10-16 RX ORDER — SODIUM CHLORIDE 0.9 % (FLUSH) 0.9 %
5-10 SYRINGE (ML) INJECTION AS NEEDED
Status: DISCONTINUED | OUTPATIENT
Start: 2018-10-16 | End: 2018-10-17 | Stop reason: HOSPADM

## 2018-10-16 RX ORDER — AMLODIPINE BESYLATE 10 MG/1
10 TABLET ORAL EVERY EVENING
Status: DISCONTINUED | OUTPATIENT
Start: 2018-10-16 | End: 2018-10-17 | Stop reason: HOSPADM

## 2018-10-16 RX ORDER — DEXAMETHASONE SODIUM PHOSPHATE 4 MG/ML
INJECTION, SOLUTION INTRA-ARTICULAR; INTRALESIONAL; INTRAMUSCULAR; INTRAVENOUS; SOFT TISSUE AS NEEDED
Status: DISCONTINUED | OUTPATIENT
Start: 2018-10-16 | End: 2018-10-16 | Stop reason: HOSPADM

## 2018-10-16 RX ORDER — PANTOPRAZOLE SODIUM 40 MG/1
40 TABLET, DELAYED RELEASE ORAL DAILY
Status: DISCONTINUED | OUTPATIENT
Start: 2018-10-17 | End: 2018-10-17 | Stop reason: HOSPADM

## 2018-10-16 RX ORDER — HEPARIN SODIUM 1000 [USP'U]/ML
INJECTION, SOLUTION INTRAVENOUS; SUBCUTANEOUS AS NEEDED
Status: DISCONTINUED | OUTPATIENT
Start: 2018-10-16 | End: 2018-10-16 | Stop reason: HOSPADM

## 2018-10-16 RX ORDER — LIDOCAINE HYDROCHLORIDE 20 MG/ML
INJECTION, SOLUTION EPIDURAL; INFILTRATION; INTRACAUDAL; PERINEURAL AS NEEDED
Status: DISCONTINUED | OUTPATIENT
Start: 2018-10-16 | End: 2018-10-16 | Stop reason: HOSPADM

## 2018-10-16 RX ORDER — SODIUM CHLORIDE 0.9 % (FLUSH) 0.9 %
5-10 SYRINGE (ML) INJECTION EVERY 8 HOURS
Status: DISCONTINUED | OUTPATIENT
Start: 2018-10-16 | End: 2018-10-17 | Stop reason: HOSPADM

## 2018-10-16 RX ORDER — SODIUM CHLORIDE 9 MG/ML
INJECTION, SOLUTION INTRAVENOUS
Status: DISCONTINUED | OUTPATIENT
Start: 2018-10-16 | End: 2018-10-16 | Stop reason: HOSPADM

## 2018-10-16 RX ORDER — ONDANSETRON 2 MG/ML
INJECTION INTRAMUSCULAR; INTRAVENOUS AS NEEDED
Status: DISCONTINUED | OUTPATIENT
Start: 2018-10-16 | End: 2018-10-16 | Stop reason: HOSPADM

## 2018-10-16 RX ORDER — ROCURONIUM BROMIDE 10 MG/ML
INJECTION, SOLUTION INTRAVENOUS AS NEEDED
Status: DISCONTINUED | OUTPATIENT
Start: 2018-10-16 | End: 2018-10-16 | Stop reason: HOSPADM

## 2018-10-16 RX ORDER — SODIUM CHLORIDE 0.9 % (FLUSH) 0.9 %
5-10 SYRINGE (ML) INJECTION EVERY 8 HOURS
Status: DISCONTINUED | OUTPATIENT
Start: 2018-10-16 | End: 2018-10-16 | Stop reason: HOSPADM

## 2018-10-16 RX ORDER — ACETAMINOPHEN 325 MG/1
650 TABLET ORAL
Status: DISCONTINUED | OUTPATIENT
Start: 2018-10-16 | End: 2018-10-17 | Stop reason: HOSPADM

## 2018-10-16 RX ORDER — GLYCOPYRROLATE 0.2 MG/ML
INJECTION INTRAMUSCULAR; INTRAVENOUS AS NEEDED
Status: DISCONTINUED | OUTPATIENT
Start: 2018-10-16 | End: 2018-10-16 | Stop reason: HOSPADM

## 2018-10-16 RX ORDER — LIDOCAINE HYDROCHLORIDE 10 MG/ML
0.1 INJECTION INFILTRATION; PERINEURAL AS NEEDED
Status: DISCONTINUED | OUTPATIENT
Start: 2018-10-16 | End: 2018-10-16 | Stop reason: HOSPADM

## 2018-10-16 RX ORDER — CLOPIDOGREL BISULFATE 75 MG/1
75 TABLET ORAL DAILY
Status: DISCONTINUED | OUTPATIENT
Start: 2018-10-17 | End: 2018-10-17

## 2018-10-16 RX ORDER — SERTRALINE HYDROCHLORIDE 100 MG/1
100 TABLET, FILM COATED ORAL DAILY
Status: DISCONTINUED | OUTPATIENT
Start: 2018-10-17 | End: 2018-10-17 | Stop reason: HOSPADM

## 2018-10-16 RX ORDER — HEPARIN SODIUM 200 [USP'U]/100ML
3 INJECTION, SOLUTION INTRAVENOUS CONTINUOUS
Status: DISCONTINUED | OUTPATIENT
Start: 2018-10-16 | End: 2018-10-16 | Stop reason: HOSPADM

## 2018-10-16 RX ORDER — OXYCODONE HYDROCHLORIDE 5 MG/1
5 TABLET ORAL
Status: DISCONTINUED | OUTPATIENT
Start: 2018-10-16 | End: 2018-10-16 | Stop reason: HOSPADM

## 2018-10-16 RX ADMIN — SOTALOL HYDROCHLORIDE 80 MG: 80 TABLET ORAL at 17:24

## 2018-10-16 RX ADMIN — RIVAROXABAN 20 MG: 20 TABLET, FILM COATED ORAL at 17:24

## 2018-10-16 RX ADMIN — SODIUM CHLORIDE: 9 INJECTION, SOLUTION INTRAVENOUS at 09:47

## 2018-10-16 RX ADMIN — HEPARIN SODIUM 2000 UNITS: 1000 INJECTION, SOLUTION INTRAVENOUS; SUBCUTANEOUS at 10:29

## 2018-10-16 RX ADMIN — HEPARIN SODIUM 3 UNITS/HR: 200 INJECTION, SOLUTION INTRAVENOUS at 09:40

## 2018-10-16 RX ADMIN — ROCURONIUM BROMIDE 50 MG: 10 INJECTION, SOLUTION INTRAVENOUS at 09:38

## 2018-10-16 RX ADMIN — SODIUM CHLORIDE, SODIUM LACTATE, POTASSIUM CHLORIDE, CALCIUM CHLORIDE: 600; 310; 30; 20 INJECTION, SOLUTION INTRAVENOUS at 09:28

## 2018-10-16 RX ADMIN — DEXAMETHASONE SODIUM PHOSPHATE 4 MG: 4 INJECTION, SOLUTION INTRA-ARTICULAR; INTRALESIONAL; INTRAMUSCULAR; INTRAVENOUS; SOFT TISSUE at 10:03

## 2018-10-16 RX ADMIN — NEOSTIGMINE METHYLSULFATE 3 MG: 1 INJECTION INTRAVENOUS at 11:05

## 2018-10-16 RX ADMIN — EPHEDRINE SULFATE 5 MG: 50 INJECTION, SOLUTION INTRAVENOUS at 09:47

## 2018-10-16 RX ADMIN — PROPOFOL 170 MG: 10 INJECTION, EMULSION INTRAVENOUS at 09:37

## 2018-10-16 RX ADMIN — GLYCOPYRROLATE 0.4 MG: 0.2 INJECTION INTRAMUSCULAR; INTRAVENOUS at 11:05

## 2018-10-16 RX ADMIN — Medication 10 ML: at 21:23

## 2018-10-16 RX ADMIN — Medication 2 G: at 10:03

## 2018-10-16 RX ADMIN — HEPARIN SODIUM 3 UNITS/HR: 200 INJECTION, SOLUTION INTRAVENOUS at 09:52

## 2018-10-16 RX ADMIN — FENTANYL CITRATE 50 MCG: 50 INJECTION, SOLUTION INTRAMUSCULAR; INTRAVENOUS at 09:36

## 2018-10-16 RX ADMIN — HEPARIN SODIUM 9000 UNITS: 1000 INJECTION, SOLUTION INTRAVENOUS; SUBCUTANEOUS at 10:17

## 2018-10-16 RX ADMIN — EPHEDRINE SULFATE 5 MG: 50 INJECTION, SOLUTION INTRAVENOUS at 09:53

## 2018-10-16 RX ADMIN — FENTANYL CITRATE 50 MCG: 50 INJECTION, SOLUTION INTRAMUSCULAR; INTRAVENOUS at 09:37

## 2018-10-16 RX ADMIN — LIDOCAINE HYDROCHLORIDE 80 MG: 20 INJECTION, SOLUTION EPIDURAL; INFILTRATION; INTRACAUDAL; PERINEURAL at 09:37

## 2018-10-16 RX ADMIN — IOPAMIDOL 55 ML: 755 INJECTION, SOLUTION INTRAVENOUS at 10:57

## 2018-10-16 RX ADMIN — ROCURONIUM BROMIDE 10 MG: 10 INJECTION, SOLUTION INTRAVENOUS at 10:30

## 2018-10-16 RX ADMIN — ALBUTEROL SULFATE 2.5 MG: 2.5 SOLUTION RESPIRATORY (INHALATION) at 19:29

## 2018-10-16 RX ADMIN — ONDANSETRON 4 MG: 2 INJECTION INTRAMUSCULAR; INTRAVENOUS at 10:03

## 2018-10-16 NOTE — H&P
[facility] Cardiology/Electrophyiology Consult Date of  Admission: 10/16/2018  8:36 AM  
 
 
CC/Reason for admission: Douglas Meyer is a 58 y.o. male admitted for Paroxysmal atrial fibrillation ([facility] [I48.0]. 60 y.o. male with a past medical and cardiac history significant for afib and CLL and presents for Watchman implantation. Pt reports he has had problems with GIB in the past on coumadin. He has history of CLL, and also reports multiple admissions for concern for stroke and TIA in the past. Pt has history of pAF and is s/p ablation. He has been doing well recently, one episode of AF recently lasting a few hours.   
  
Cardiac PMH: (Old records have been reviewed and summarized below) Cath (12/17): CONCLUSION:   
1.  Successful angioplasty and stenting of the first obtuse marginal branch. 2.  Moderate in-stent restenosis in the left anterior descending. 3.  Small vessel disease in the right coronary artery system. 4.  Preserved left ventricular systolic function. Patient Active Problem List  
Diagnosis Code  CAD (coronary artery disease) I25.10  Dyslipidemia E78.5  Atrial fibrillation (HCC) I48.91  
 S/P coronary artery stent placement (2.75 x 28 Xience drug-eluting stent to mLAD 11/20/09) Z95.5  
 HTN (hypertension) I10  
 GERD (gastroesophageal reflux disease) K21.9  Skin lesion L98.9  Tobacco use disorder F17.200  Palpitations R00.2  Coronary atherosclerosis of native coronary artery I25.10  Dizziness R42  Duodenal ulcer K26.9  Recurrent depression ([facility] F33.9  Chronic midline low back pain without sciatica M54.5, G89.29  Chronic obstructive pulmonary disease ([facility] J44.9  Leukocytosis D72.829  
 CLL (chronic lymphocytic leukemia) ([facility]) C91.90  Colitis K52.9  Sepsis ([facility] A41.9  Hypokalemia E87.6  Esophagitis K20.9  Thrush B37.0  Diarrhea of infectious origin A09  Aspiration pneumonia of right lower lobe ([facility] J69.0  Gastroenteritis K52.9  Hypoxia R09.02 Past Medical History:  
Diagnosis Date  Arrhythmia A Fib twice since 7/2010-- ablation no problems since  Arthritis  CAD (coronary artery disease) 11/2009  
 stent x 1-- ablation 2010-- no problems since  Cancer ([facility] skin cancer  Chest pain  Chronic midline low back pain without sciatica 8/28/2017  CLL (chronic lymphocytic leukemia) ([facility] 7/24/2018  COPD   
 prn inhalers  Coronary atherosclerosis of native coronary artery 12/30/2015  Depression  Dyspnea on exertion   
 with any activity; associated with nausea  GERD (gastroesophageal reflux disease) 7/22/2010  
 Heart failure ([facility]  Hematochezia   
 HTN (hypertension) x 1 month  
 started on med--- controlled  Hypercholesteremia   
 controlled by medication  Other ill-defined conditions(799.89)   
 dyslipidemia/hyperlipidemia  Palpitations 12/30/2015  Paroxysmal atrial fibrillation (HCC)  Psychiatric disorder   
 depression  PUD (peptic ulcer disease) 2010  
 hx-- r/t coumadin--  
 Recurrent depression ([facility] 8/8/2016  Tobacco use disorder 12/30/2015  Unspecified adverse effect of anesthesia 1978  
 quit breathing Past Surgical History:  
Procedure Laterality Date  HX CHOLECYSTECTOMY  HX COLONOSCOPY  2012  HX HEART CATHETERIZATION  11/2009 PCI x 1 to LAD  ----ablation 2010  HX HEART CATHETERIZATION  3/2016  HX HEENT  1977 and 1978  
 eardrum repair-- right [address] Right inguinal  
 HX LAP CHOLECYSTECTOMY  1995  HX ORTHOPAEDIC Left   
 left shoulder surg x1  
 HX ORTHOPAEDIC Left 2012  
 hand  HX OTHER SURGICAL    
 EGD  HX SHOULDER ARTHROSCOPY Right 2000  
 x 3 on right Allergies Allergen Reactions  Fentanyl Other (comments)   States had through a PCA after shoulder surgery in 2000, and \"throat swelled shut\" and that he \"quit breathing\". Update 11/16/10--Patient thinks the PCA gave him to much and it wasn't an allergy to drug itself.  Lovastatin Other (comments) Hullucinations  Niacin Other (comments) Denies allery  Advicor [Niacin-Lovastatin] Anaphylaxis and Itching Family History Problem Relation Age of Onset  Heart Disease Father  Heart Attack Father  Hypertension Father  Bleeding Prob Father  Heart Surgery Father   
  multiple PCIs, first in his 46s  Heart Disease Brother PCI x 2  
 Diabetes Mother  Cancer Mother   
  lung  Diabetes Brother  Diabetes Brother  Heart Attack Brother  Heart Disease Brother  Diabetes Brother  Bleeding Prob Brother   
  blood clots  Heart Disease Brother  No Known Problems Brother Current Facility-Administered Medications Medication Dose Route Frequency  lidocaine (XYLOCAINE) 10 mg/mL (1 %) injection 0.1 mL  0.1 mL SubCUTAneous PRN  
 lactated Ringers infusion  100 mL/hr IntraVENous CONTINUOUS  
 sodium chloride (NS) flush 5-10 mL  5-10 mL IntraVENous Q8H  
 sodium chloride (NS) flush 5-10 mL  5-10 mL IntraVENous PRN  
 midazolam (VERSED) injection 2 mg  2 mg IntraVENous ONCE PRN  
 ceFAZolin (ANCEF) 2 g/20 mL in sterile water IV syringe  2 g IntraVENous ON CALL TO OR Review of Symptoms: A comprehensive ROS was performed with the pertinent positives and negatives mentioned in the HPI, all other systems reviewed and are negative Physical Exam 
There were no vitals filed for this visit. Physical Exam: 
Gen: well appearing, well developed, NAD Eyes: Pupils equal, EOMI 
ENT: oropharynx clear, no oral lesions, normal dentition CV: RRR, no M/R/G, PMI not palpable, normal JVD, no carotid bruits, normal distal pulses, no CAMILLA Pulm: CTAB, no accessory muscle uses, no wheezes, crackles GI: soft, NT, ND Cardiographics Telemetry: ECG (Indpendently visualized and interpreted): 
Echocardiogram:  
 
Labs:  
Recent Labs 10/15/18 [place] NA  142  
K  3.7 BUN  10  
CREA  0.96  
GLU  117* WBC  16.0*  
HGB  16.4 HCT  49.5 PLT  240 INR  1.0 Assessment:  
  
Active Problems: 
  CAD (coronary artery disease) (12/1/2009) Overview: pci to lad 11/19 2.75x28 Xience drug eluting stent. Atrial fibrillation ([address] (7/22/2010) HTN (hypertension) (7/22/2010) Plan: 1. CVA protection: Mr. Jai Mejias is a pleasant 59 yo with a history of afib, HTN, CAD, and multiple admissions for TIA like symptoms and GIB and is an excellent candidate for an alternative to oral anticoagulation and presents for scheduled Watchman procedure. Answered all questions and concerns and patient wishes to proceed. Quinn Arechiga MD, MS 
Cardiology/Electrophysiology

## 2018-10-16 NOTE — ANESTHESIA POSTPROCEDURE EVALUATION
Post-Anesthesia Evaluation and Assessment Patient: Rodrigo Genao MRN: [id number]  SSN: [id number] YOB: 1955  Age: 58 y.o. Sex: male Cardiovascular Function/Vital Signs Visit Vitals  /77 (BP 1 Location: Right arm, BP Patient Position: Supine)  Pulse 73  Temp 36.4 °C (97.5 °F)  Resp 9  
 Ht 6' 1\" (1.854 m)  Wt 82.6 kg (182 lb)  SpO2 97%  BMI 24.01 kg/m2 Patient is status post general anesthesia for Procedure(s): LEFT ATRIAL APPENDAGE CLOSURE. Nausea/Vomiting: None Postoperative hydration reviewed and adequate. Pain: 
Pain Scale 1: Visual (10/16/18 1156) Pain Intensity 1: 0 (10/16/18 1156) Managed Neurological Status:  
Neuro Neurologic State: Sleeping;Eyes do not open to any stimulus (10/16/18 1156) At baseline Mental Status and Level of Consciousness: Arousable Pulmonary Status:  
O2 Device: Nasal cannula (10/16/18 1156) Adequate oxygenation and airway patent Complications related to anesthesia: None Post-anesthesia assessment completed. No concerns Signed By: Tiffanie Cotto MD   
 October 16, 2018

## 2018-10-16 NOTE — ANESTHESIA PROCEDURE NOTES
Arterial Line Placement Start time: 10/16/2018 9:48 AM 
End time: 10/16/2018 9:51 AM 
Performed by: John Garcia Authorized by: John Garcai  
 
Pre-Procedure Indications:  Arterial pressure monitoring Preanesthetic Checklist: patient identified, risks and benefits discussed, anesthesia consent, site marked, patient being monitored, timeout performed and patient being monitored Timeout Time: 09:47 Procedure:  
Prep:  Chlorhexidine Seldinger Technique?: Yes Orientation:  Right Location:  Brachical artery Catheter size:  20 G Number of attempts:  2 Cont Cardiac Output Sensor: No   
 
Assessment:  
Post-procedure:  Line secured and sterile dressing applied Patient Tolerance:  Patient tolerated the procedure well with no immediate complications

## 2018-10-16 NOTE — PROGRESS NOTES
Patient received to [address] [place] [address] room # 9  Ambulatory from [facility]. Patient scheduled for LAAO today with Dr Felice De Leon. Procedure reviewed & questions answered, voiced good understanding consent obtained & placed on chart. All medications and medical history reviewed. Will prep patient per orders. Patient & family updated on plan of care. The patient has a fraility score of 3-MANAGING WELL, based on patient A&Ox3, patient able to perform ADL's independently, patient able to ambulate to room without difficulty.

## 2018-10-16 NOTE — PROGRESS NOTES
Pt admitted to 3rd floor tele for atrial fib. CM met with pt to discuss CM needs & DCP. Pt is A&Ox4. Pt is indep at home with all ADLS. Pt lives with spouse. Pt has no DME needs. Pt has no difficulty with obtaining medications or transport. DCP home with spouse. ACO referral email sent. No further needs noted. CM to continue to monitor. Care Management Interventions PCP Verified by CM: Yes Last Visit to PCP: 09/20/18 Mode of Transport at Discharge: Other (see comment) (Spouse Irina Young [phone]) Transition of Care Consult (CM Consult): Discharge Planning Discharge Durable Medical Equipment: No 
Physical Therapy Consult: No 
Occupational Therapy Consult: No 
Speech Therapy Consult: No 
Current Support Network: Lives with Spouse, Own Home, [address] [place] [address] Confirm Follow Up Transport: Family Plan discussed with Pt/Family/Caregiver: Yes Freedom of Choice Offered: Yes Discharge Location Discharge Placement: Home

## 2018-10-16 NOTE — OP NOTES
PROCEDURE/OPERATIVE REPORT    Patient: Daniela Sr MRN: [id number]  SSN: [id number]    YOB: 1955  Age: 58 y.o. Sex: male      DATE OF PROCEDURE: 10/16/2018     PROCEDURE: Left atrial appendage occlusion with Watchman device. Pre-Procedure Diagnosis  1. Atrial fibrillation  2. Intolerant to long term anticoagulation     Procedure Performed  1. Transesophageal echocardiogram  2. Transeptal puncture   3. Watchman implantation    Cardiac Electrophysiologist: Wen Hong MD  Interventional Cardiologist: Celia Burleson MD    Anesthesia: General     Estimated Blood Loss: Less than 10 mL     Specimens: * No specimens in log *    Procedure in Detail:  The patient was brought to the [facility] OR in the fasting state. The patient was intubated by anesthesiology, invasive arterial blood pressure monitoring obtained, a nix catheter inserted. A tranesophageal echocardiogram was performed directly prior to the procedure and was negative for a DELIO thrombus (see full report in chart). As the secondary , I obtained venous access under ultrasound guidance x 1 using modified Seldinger technique with placement of a 16Fr short sidearm sheath into the right femoral vein after deployment of Perclose device in \"preclose\" fashion. I placed an SL-O 63cm long braided sheath through the 16 Fr sheath in the right femoral vein and guided over a wire to the RA. Next, I inserted a trans-septal needle into the SLO and it was used to perform a trans-septal puncture with assistance from KEN, as well as fluoroscopy. The SLO sheath was advanced into the LA. Total weight based heparin bolus was administered (1/2 prior to transeptal puncture and 1/2 just after transeptal access) and systemic blood pressure monitored invasively. The ACT target was 250-300. As the primary implanting physician, Dr. Rowan Hong prepped the Watchman delivery sheath and delivered the device as outlined in his procedure note.   I was present and assisted him with this portion of the procedure. At the completion of the watchman delivery, all catheters were removed, the sheath was then removed and I deployed the Perclose device with good hemostasis. The patient tolerated the procedure well with no acute complications recognized. Just prior to pulling shealths, the KEN was used to obtain ultrasound images and revealed no evidence of pericardial effusion. Complications: None    Summary:   1. Successful Watchman implantation of a 30 mm device.       Kitty Moreno MD

## 2018-10-16 NOTE — PROGRESS NOTES
Douglas Pt transported to PACU with CRNA on NC 
RFV closed with perclose, site soft without bleeding

## 2018-10-16 NOTE — ANESTHESIA PROCEDURE NOTES
KEN 
Date/Time: 10/16/2018 9:43 AM 
 
 
Procedure Details: probe placement only Site marked, Timeout performed, 09:43 Risks and benefits discussed with the patient and plans are to proceed Procedure Note Performed by: Marco Antonio Fraser Authorized by: Marco Antonio Fraser

## 2018-10-16 NOTE — PROCEDURES
Pre-Electrophysiology Diagnosis  1. Atrial fibrillation  2. Intolerant to long term anticoagulation     Procedure Performed  1. Transesophageal echocardiogram  2. Transeptal puncture   3. Watchman implantation    Cardiac Electrophysiologist: Herbert Thibodeaux. Melba Jaeger MD  Interventional Cardiologist: Raul Guerra MD    Anesthesia: General     Estimated Blood Loss: Less than 10 mL     Specimens: * No specimens in log *    Procedure in Detail:  The patient was brought to the hybrid OR suite in the fasting state. The patient was intubated by anesthesiology and invasive arterial blood pressure monitoring obtained. A tranesophageal echocardiogram was performed directly prior to the procedure and was negative for a DELIO thrombus (see full report in chart). Dr. Claribel Noguera obtained venous access, placed an SLO and performed the transseptal as outlined in his procedure note. I was present and assisted with this portion of the procedure. As the primary implanting phyisician, I prepped and draped the Watchman delivery sheath and exchanged for the SLO via an Amplatz super stiff wire located in the left upper pulmonary vein. A pig tail catheter was then inserted into the delivery sheath and used to guide the delivery sheath into the appendage. Depth measurements were performed with fluoroscopy and depth and size measurements determined via KEN. The sheath was then advanced over the pig tail catheter into position within the DELIO. The Watchman device was then prepped per protocol and placed into the delivery sheath via a wet to wet connection and advanced into the sheath. The sheath was then pulled back to allow delivery of the Watchman device within the left atrial appendage. Successful delivery of a 30 mm device revealed excellent PASS criteria. A contrast appendogram was performed revealing an adequate seal. After extensive evaluation including and excellent tug test, the device was deployed.  Further measurements were taken post implant. The sheath was removed. At the completion of the Watchman implantation procedure, all catheters were removed, and a Perclose device was deployed by Dr. Meri Gold for hemostasis. The patient tolerated the procedure well with no acute complications recognized. Just prior to pulling shealths, the KEN was used to obtain ultrasound images and revealed no evidence of pericardial effusion. Complications: None    Summary:   1. Successful Watchman implantation  2. Family updated. Sabrina Arechiga MD, MS  Clinical Cardiac Electrophysiology

## 2018-10-16 NOTE — PROGRESS NOTES
Pt has suddenly woken up. He was opening eyes but was not moving extremities, but he is now moving all extremities and is oriented x3. Groin site remains without ooze or hematoma. Receiving RN Crystal updated.

## 2018-10-16 NOTE — PERIOP NOTES
TRANSFER - OUT REPORT: 
 
Verbal report given to Isaac Valentin RN 
 on Suzan Thomas  being transferred to [facility] for routine post - op Report consisted of patients Situation, Background, Assessment and  
Recommendations(SBAR). Information from the following report(s) Procedure Summary, Intake/Output, MAR and Recent Results. was reviewed with the receiving nurse. Lines:  
Peripheral IV 10/16/18 Left Wrist (Active) Site Assessment Clean, dry, & intact 10/16/2018 11:24 AM  
Phlebitis Assessment 0 10/16/2018 11:24 AM  
Infiltration Assessment 0 10/16/2018 11:24 AM  
Dressing Status Clean, dry, & intact 10/16/2018 11:24 AM  
Dressing Type Tape;Transparent 10/16/2018 11:24 AM  
Hub Color/Line Status Pink; Infusing 10/16/2018 11:24 AM  
   
Peripheral IV 10/16/18 Left Arm (Active) Site Assessment Clean, dry, & intact 10/16/2018 11:24 AM  
Phlebitis Assessment 0 10/16/2018 11:24 AM  
Infiltration Assessment 0 10/16/2018 11:24 AM  
Dressing Status Clean, dry, & intact 10/16/2018 11:24 AM  
Dressing Type Tape;Transparent 10/16/2018 11:24 AM  
Hub Color/Line Status Green;Flushed;Capped 10/16/2018 11:24 AM  
   
Arterial Line 10/16/18 Right (Active) Site Assessment Clean, dry, & intact 10/16/2018 11:24 AM  
Dressing Status Clean, dry, & intact 10/16/2018 11:24 AM  
Dressing Type Tape;Transparent 10/16/2018 11:24 AM  
Line Status Intact and in place 10/16/2018 11:24 AM  
Treatment Zeroed or re-zeroed 10/16/2018 11:24 AM  
Affected Extremity/Extremities Color distal to insertion site pink (or appropriate for race); Pulses palpable 10/16/2018 11:24 AM  
  
 
Opportunity for questions and clarification was provided. Patient transported with: 
 O2 @ 3 liters Registered Nurse VTE prophylaxis orders have been written for Suzan Thomas. Patient and family given floor number and nurses name. Family updated re: pt status after security code verified.

## 2018-10-17 VITALS
DIASTOLIC BLOOD PRESSURE: 71 MMHG | BODY MASS INDEX: 23.11 KG/M2 | SYSTOLIC BLOOD PRESSURE: 108 MMHG | WEIGHT: 174.4 LBS | OXYGEN SATURATION: 90 % | TEMPERATURE: 98.2 F | HEART RATE: 73 BPM | HEIGHT: 73 IN | RESPIRATION RATE: 17 BRPM

## 2018-10-17 LAB
ANION GAP SERPL CALC-SCNC: 7 MMOL/L (ref 7–16)
ATRIAL RATE: 72 BPM
BUN SERPL-MCNC: 11 MG/DL (ref 8–23)
CALCIUM SERPL-MCNC: 8.5 MG/DL (ref 8.3–10.4)
CALCULATED P AXIS, ECG09: 90 DEGREES
CALCULATED R AXIS, ECG10: 49 DEGREES
CALCULATED T AXIS, ECG11: 74 DEGREES
CHLORIDE SERPL-SCNC: 107 MMOL/L (ref 98–107)
CO2 SERPL-SCNC: 28 MMOL/L (ref 21–32)
CREAT SERPL-MCNC: 0.97 MG/DL (ref 0.8–1.5)
DIAGNOSIS, 93000: NORMAL
GLUCOSE SERPL-MCNC: 110 MG/DL (ref 65–100)
MAGNESIUM SERPL-MCNC: 2.3 MG/DL (ref 1.8–2.4)
P-R INTERVAL, ECG05: 162 MS
POTASSIUM SERPL-SCNC: 3.9 MMOL/L (ref 3.5–5.1)
Q-T INTERVAL, ECG07: 432 MS
QRS DURATION, ECG06: 74 MS
QTC CALCULATION (BEZET), ECG08: 473 MS
SODIUM SERPL-SCNC: 142 MMOL/L (ref 136–145)
VENTRICULAR RATE, ECG03: 72 BPM

## 2018-10-17 PROCEDURE — 36415 COLL VENOUS BLD VENIPUNCTURE: CPT

## 2018-10-17 PROCEDURE — 94760 N-INVAS EAR/PLS OXIMETRY 1: CPT

## 2018-10-17 PROCEDURE — 94640 AIRWAY INHALATION TREATMENT: CPT

## 2018-10-17 PROCEDURE — 83735 ASSAY OF MAGNESIUM: CPT

## 2018-10-17 PROCEDURE — 74011000250 HC RX REV CODE- 250: Performed by: INTERNAL MEDICINE

## 2018-10-17 PROCEDURE — 80048 BASIC METABOLIC PNL TOTAL CA: CPT

## 2018-10-17 PROCEDURE — 74011250637 HC RX REV CODE- 250/637: Performed by: INTERNAL MEDICINE

## 2018-10-17 PROCEDURE — 93005 ELECTROCARDIOGRAM TRACING: CPT | Performed by: INTERNAL MEDICINE

## 2018-10-17 RX ADMIN — ALBUTEROL SULFATE 2.5 MG: 2.5 SOLUTION RESPIRATORY (INHALATION) at 09:07

## 2018-10-17 RX ADMIN — SOTALOL HYDROCHLORIDE 80 MG: 80 TABLET ORAL at 08:44

## 2018-10-17 RX ADMIN — SERTRALINE HYDROCHLORIDE 100 MG: 100 TABLET ORAL at 08:44

## 2018-10-17 RX ADMIN — AMLODIPINE BESYLATE 10 MG: 10 TABLET ORAL at 08:44

## 2018-10-17 RX ADMIN — TIOTROPIUM BROMIDE 18 MCG: 18 CAPSULE ORAL; RESPIRATORY (INHALATION) at 09:07

## 2018-10-17 RX ADMIN — PANTOPRAZOLE SODIUM 40 MG: 40 TABLET, DELAYED RELEASE ORAL at 08:43

## 2018-10-17 RX ADMIN — Medication 10 ML: at 06:29

## 2018-10-17 RX ADMIN — ASPIRIN 81 MG: 81 TABLET, CHEWABLE ORAL at 08:43

## 2018-10-17 NOTE — PROGRESS NOTES
Verbal bedside report given to Anya Page oncoming RN. Patient's situation, background, assessment and recommendations provided. Opportunity for questions provided. Oncoming RN assumed care of patient. R groin site visualized.

## 2018-10-17 NOTE — PROGRESS NOTES
Discharge instructions were reviewed with patient. An opportunity was given for questions. All medications were reviewed, and information was given on the new medications. Patient verbalized understanding, and has no questions at this time. Discharge delayed because primary RN responded to a code stroke.

## 2018-10-17 NOTE — DISCHARGE INSTRUCTIONS
DISCHARGE SUMMARY from Nurse    PATIENT INSTRUCTIONS:    After general anesthesia or intravenous sedation, for 24 hours or while taking prescription Narcotics:  · Limit your activities  · Do not drive and operate hazardous machinery  · Do not make important personal or business decisions  · Do  not drink alcoholic beverages  · If you have not urinated within 8 hours after discharge, please contact your surgeon on call. Report the following to your surgeon:  · Excessive pain, swelling, redness or odor of or around the surgical area  · Temperature over 100.5  · Nausea and vomiting lasting longer than 4 hours or if unable to take medications  · Any signs of decreased circulation or nerve impairment to extremity: change in color, persistent  numbness, tingling, coldness or increase pain  · Any questions    What to do at Home:  Recommended activity: No lifting, Driving, or Strenuous exercise for 5 days    If you experience any of the following symptoms chest pain, shortness of breath, palpitations, dizziness please follow up with [facility]. *  Please give a list of your current medications to your Primary Care Provider. *  Please update this list whenever your medications are discontinued, doses are      changed, or new medications (including over-the-counter products) are added. *  Please carry medication information at all times in case of emergency situations. These are general instructions for a healthy lifestyle:    No smoking/ No tobacco products/ Avoid exposure to second hand smoke  Surgeon General's Warning:  Quitting smoking now greatly reduces serious risk to your health.     Obesity, smoking, and sedentary lifestyle greatly increases your risk for illness    A healthy diet, regular physical exercise & weight monitoring are important for maintaining a healthy lifestyle    You may be retaining fluid if you have a history of heart failure or if you experience any of the following symptoms:  Weight gain of 3 pounds or more overnight or 5 pounds in a week, increased swelling in our hands or feet or shortness of breath while lying flat in bed. Please call your doctor as soon as you notice any of these symptoms; do not wait until your next office visit. Recognize signs and symptoms of STROKE:    F-face looks uneven    A-arms unable to move or move unevenly    S-speech slurred or non-existent    T-time-call 911 as soon as signs and symptoms begin-DO NOT go       Back to bed or wait to see if you get better-TIME IS BRAIN. Warning Signs of HEART ATTACK     Call 911 if you have these symptoms:   Chest discomfort. Most heart attacks involve discomfort in the center of the chest that lasts more than a few minutes, or that goes away and comes back. It can feel like uncomfortable pressure, squeezing, fullness, or pain.  Discomfort in other areas of the upper body. Symptoms can include pain or discomfort in one or both arms, the back, neck, jaw, or stomach.  Shortness of breath with or without chest discomfort.  Other signs may include breaking out in a cold sweat, nausea, or lightheadedness. Don't wait more than five minutes to call 911 - MINUTES MATTER! Fast action can save your life. Calling 911 is almost always the fastest way to get lifesaving treatment. Emergency Medical Services staff can begin treatment when they arrive -- up to an hour sooner than if someone gets to the hospital by car. The discharge information has been reviewed with the patient. The patient verbalized understanding. Discharge medications reviewed with the patient and appropriate educational materials and side effects teaching were provided.   ___________________________________________________________________________________________________________________________________    Darin Low Discharge Instructions    Activity:     Follow your doctors recommendations   Return to normal activities gradually, pacing yourself as you feel better, resting when tired. · Activity should be limited for the next 48 hours. Climb stairs as little as possible and avoid any stooping, bending or strenuous activity for 48 hours. No heavy lifting (anything over 10 pounds) for three days. · Do not drive for 48 hoursHave a responsible person drive you home and stay with you for at least 24 hours after your heart catheterization/angiography. · Check the puncture site frequently for swelling or bleeding. If you see any bleeding, lie down and apply pressure over the area with a clean town or washcloth. Notify your doctor for any redness, swelling, drainage or oozing from the puncture site. Notify your doctor for any fever or chills. · You may resume your usual diet. Drink more fluids than usual.        Incision / Wound Care       Cleanse wounds with mild soap and water. Keep wound dry.  A small amount of bloody or clear drainage is normal.   Watch for redness, swelling, incision site hot to touch, foul or colored drainage from the incision site, these are all signs of infection and should be reported immediately to the physicians.  If there is site concern please notify your implanting physician.  You may remove the bandage from your Right and Groin in 24 hours. You may shower in 24 hours. No tub baths, hot tubs or swimming for one week. Do not place any lotions, creams, powders, ointments over the puncture site for one week. You may place a clean band-aid over the puncture site each day for 5 days. Change this daily. Continued        Medications:   DO NOT STOP TAKING YOUR WARFARIN OR ASPIRIN  (and/or PLAVIX) WITHOUT SPEAKING WITH YOUR CARDIOLOGIST FIRST!  Take your medications as ordered at the time of discharge.  Do NOT stop taking any medications without first discussing it with your doctor.  Notify all your doctors of current medication lists.  Follow instructions on medication administration especially if blood thinning medications are prescribed. Your doctor will monitor your medications and advise you when or if you can stop taking them. Notify your doctor immediately if any of the following:   Sudden weight gain   Increasing shortness of breath   Pain, change in color, temperature or swelling in lower legs or feet.  Fevers greater than 101 degrees, redness, swelling, incision site hot to touch, foul or colored drainage from the incision site, these are all signs of infection and should be reported immediately to the physicians. Post Watchman Discharge Instructions Timeline     2 weeks from day of discharge you will need a follow up visit with the implanting physician. Your [facility] Coordinator can facilitate arranging these appointments.  After a minimum of 45 days from date of procedure you will need to return to hospital to have an outpatient KEN performed to determine if the implant has closed the opening of the appendage. At this time you will see the [place] [id number]. Coordinator for a follow up. If the KEN reveals the appendage is not fully closed - you will require another KEN at a later date to reassess. Once the results from KEN have been reviewed the physicians will determine what medication changes need to be made. Your [facility] Coordinator can facilitate arranging these appointments.  6 months post implant you will need to see the [facility] Clinic Coordinator and visit the physician for a routine follow up and potentially EKG, and lab work. Your Coordinator can facilitate with setting up these appointments.  At 1, 2 and 4 years post implant you will be contacted by your [place] [id number]. Coordinator for a brief interview. This allows us to complete required patient monitoring.        Prior to any dental work or surgery notify your dentist or surgeon about your Watchman implant and the medications you are on.     Maintain regular follow up visits with your cardiologist     Keep all bloodwork appointments. Thank you for entrusting us with your care! Avoiding Triggers With Heart Failure: Care Instructions  Your Care Instructions    Triggers are anything that make your heart failure flare up. A flare-up is also called \"sudden heart failure\" or \"acute heart failure. \" When you have a flare-up, fluid builds up in your lungs, and you have problems breathing. You might need to go to the hospital. By watching for changes in your condition and avoiding triggers, you can prevent heart failure flare-ups. Follow-up care is a key part of your treatment and safety. Be sure to make and go to all appointments, and call your doctor if you are having problems. It's also a good idea to know your test results and keep a list of the medicines you take. How can you care for yourself at home? Watch for changes in your weight and condition  · Weigh yourself without clothing at the same time each day. Record your weight. Call your doctor if you have sudden weight gain, such as more than 2 to 3 pounds in a day or 5 pounds in a week. (Your doctor may suggest a different range of weight gain.) A sudden weight gain may mean that your heart failure is getting worse. · Keep a daily record of your symptoms. Write down any changes in how you feel, such as new shortness of breath, cough, or problems eating. Also record if your ankles are more swollen than usual and if you feel more tired than usual. Note anything that you ate or did that could have triggered these changes. Limit sodium  Sodium causes your body to hold on to extra water. This may cause your heart failure symptoms to get worse. People get most of their sodium from processed foods. Fast food and restaurant meals also tend to be very high in sodium. · Your doctor may suggest that you limit sodium to 2,000 milligrams (mg) a day or less.  That is less than 1 teaspoon of salt a day, including all the salt you eat in cooking or in packaged foods. · Read food labels on cans and food packages. They tell you how much sodium you get in one serving. Check the serving size. If you eat more than one serving, you are getting more sodium. · Be aware that sodium can come in forms other than salt, including monosodium glutamate (MSG), sodium citrate, and sodium bicarbonate (baking soda). MSG is often added to Asian food. You can sometimes ask for food without MSG or salt. · Slowly reducing salt will help you adjust to the taste. Take the salt shaker off the table. · Flavor your food with garlic, lemon juice, onion, vinegar, herbs, and spices instead of salt. Do not use soy sauce, steak sauce, onion salt, garlic salt, mustard, or ketchup on your food, unless it is labeled \"low-sodium\" or \"low-salt. \"  · Make your own salad dressings, sauces, and ketchup without adding salt. · Use fresh or frozen ingredients, instead of canned ones, whenever you can. Choose low-sodium canned goods. · Eat less processed food and food from restaurants, including fast food. Exercise as directed  Moderate, regular exercise is very good for your heart. It improves your blood flow and helps control your weight. But too much exercise can stress your heart and cause a heart failure flare-up. · Check with your doctor before you start an exercise program.  · Walking is an easy way to get exercise. Start out slowly. Gradually increase the length and pace of your walk. Swimming, riding a bike, and using a treadmill are also good forms of exercise. · When you exercise, watch for signs that your heart is working too hard. You are pushing yourself too hard if you cannot talk while you are exercising. If you become short of breath or dizzy or have chest pain, stop, sit down, and rest.  · Do not exercise when you do not feel well. Take medicines correctly  · Take your medicines exactly as prescribed.  Call your doctor if you think you are having a problem with your medicine. · Make a list of all the medicines you take. Include those prescribed to you by other doctors and any over-the-counter medicines, vitamins, or supplements you take. Take this list with you when you go to any doctor. · Take your medicines at the same time every day. It may help you to post a list of all the medicines you take every day and what time of day you take them. · Make taking your medicine as simple as you can. Plan times to take your medicines when you are doing other things, such as eating a meal or getting ready for bed. This will make it easier to remember to take your medicines. · Get organized. Use helpful tools, such as daily or weekly pill containers. When should you call for help? Call 911 if you have symptoms of sudden heart failure such as:    · You have severe trouble breathing.     · You cough up pink, foamy mucus.     · You have a new irregular or rapid heartbeat.    Call your doctor now or seek immediate medical care if:    · You have new or increased shortness of breath.     · You are dizzy or lightheaded, or you feel like you may faint.     · You have sudden weight gain, such as more than 2 to 3 pounds in a day or 5 pounds in a week. (Your doctor may suggest a different range of weight gain.)     · You have increased swelling in your legs, ankles, or feet.     · You are suddenly so tired or weak that you cannot do your usual activities.    Watch closely for changes in your health, and be sure to contact your doctor if you develop new symptoms. Where can you learn more? Go to http://mary kate-ramón.info/. Enter Q127 in the search box to learn more about \"Avoiding Triggers With Heart Failure: Care Instructions. \"  Current as of: December 6, 2017  Content Version: 11.8  © 9126-4455 [employer].  Care instructions adapted under license by [employer] (which disclaims liability or warranty for this information). If you have questions about a medical condition or this instruction, always ask your healthcare professional. [place] [address] any warranty or liability for your use of this information.

## 2018-10-17 NOTE — PROGRESS NOTES
Verbal bedside report received from [facility]. Assumed care of patient. R solomon visualized.

## 2018-10-17 NOTE — DISCHARGE SUMMARY
[facility] Discharge Summary     Patient ID:  Peterson Oakley  [id number]  89 y.o.  1955    Admit date: 10/16/2018    Discharge date:  10/17/2018    Admitting Physician: Robin Perera MD     Discharge Physician: Cem Braun NP/Dr. Drummond    Admission Diagnoses: Paroxysmal atrial fibrillation [facility]) [I48.0]    Discharge Diagnoses:    Diagnosis    A-fib     CLL (chronic lymphocytic leukemia)    Chronic midline low back pain without sciatica    Chronic obstructive pulmonary disease     Recurrent depression     Tobacco use disorder    Palpitations    Coronary atherosclerosis of native coronary artery    Atrial fibrillation     S/P coronary artery stent placement (2.75 x 28 Xience drug-eluting stent to mLAD 11/20/09)    HTN (hypertension)    GERD (gastroesophageal reflux disease)    Dyslipidemia       Cardiology Procedures this admission:  KEN, Implantation of Watchman device, Echocardiogram  Consults: None    Hospital Course: Patient was seen at the office of [facility] by Dr. Everett Barrientos in evaluation for Watchman. The patient was felt to be an appropriate candidate for Watchman device. The patient presented for procedure. KEN was performed directly prior to procedure that was negative for DELIO thrombus. The patient underwent successful implantation of a 30mm Watchman device. The patient tolerated the procedure well and was monitored closely overnight on the telemetry floor. The morning of 10/17/18, patient was up feeling well without any complaints of chest pain or shortness of breath. Patient's labs were stable. Patient was seen and examined by Dr. Elham Ariza and determined stable and ready for discharge. Patient was instructed on the importance of medication compliance. His Plavix will be stopped and he will remain on ASA and Xarelto for at least 45 days.  The patient will have repeat KEN performed in 45 days on 12/6/18 at [facility] by Dr. Itzel Henderson (Pt to arrive at 7:00AM). He will have follow-up with Dr. Pepito Almanzar on 12/11/18 @ 9:30AM in the [place] office. DISPOSITION: The patient is being discharged home in stable condition on a low saturated fat, low cholesterol and low salt diet. The patient is instructed to advance activities as tolerated to the limit of fatigue or shortness of breath. The patient is instructed to avoid lifting anything heavier than 10 lbs for 1 week. The patient is instructed to avoid any straining, stooping or squatting for 2 days. The patient is instructed not to drive for 2 days. The patient is instructed to watch the groin site for bleeding/oozing; if seen, the patient is instructed to apply firm pressure with a clean cloth and call [facility] at [phone]. The patient is instructed to watch for signs of infection which include: increasing area of redness, fever/hot to touch or purulent drainage at the groin site. The patient is instructed not to soak in a bathtub for 7-10 days, but is cleared to shower. The patient is instructed to monitor for dark, black or tarry stool and to notify our office immediately. The patient is instructed to return to the ER immediately for any severe pain, color change, or temperature change in leg. The patient is informed not to stop any medications without discussing with our office and to contact our office if any dental work or possible surgeries are expected.      Discharge Exam:   Visit Vitals  /71 (BP 1 Location: Right arm, BP Patient Position: At rest)   Pulse 73   Temp 98.2 °F (36.8 °C)   Resp 17   Ht 6' 1\" (1.854 m)   Wt 79.1 kg (174 lb 6.4 oz)   SpO2 97%   BMI 23.01 kg/m²         Physical Exam:  General: Well Developed, Well Nourished, No Acute Distress, Alert & Oriented  Neck: supple, no JVD  Heart: S1S2 with RRR without murmurs or gallops  Lungs: Clear throughout auscultation bilaterally without adventitious sounds  Abd: soft, nontender, nondistended, with good bowel sounds  Ext: warm, no edema, calves supple/nontender, pulses 2+ bilaterally  Right and left groin sites: clean, dry, and intact without bruits, hematoma, or bleeding  Skin: warm and dry      Recent Results (from the past 24 hour(s))   EKG, 12 LEAD, INITIAL    Collection Time: 10/16/18  9:05 AM   Result Value Ref Range    Ventricular Rate 70 BPM    Atrial Rate 70 BPM    P-R Interval 154 ms    QRS Duration 76 ms    Q-T Interval 434 ms    QTC Calculation (Bezet) 468 ms    Calculated P Axis 70 degrees    Calculated R Axis 68 degrees    Calculated T Axis 65 degrees    Diagnosis       Normal sinus rhythm  Normal ECG  When compared with ECG of 05-SEP-2018 18:57,  Premature ventricular complexes are no longer Present  Vent.  rate has decreased BY  42 BPM  Questionable change in QRS axis  Nonspecific T wave abnormality no longer evident in Inferior leads  Nonspecific T wave abnormality no longer evident in Anterolateral leads  Confirmed by Maya Miguel MD ()TIMOTHY ([id number]) on 10/16/2018 10:28:00 AM     POC ACTIVATED CLOTTING TIME    Collection Time: 10/16/18 10:25 AM   Result Value Ref Range    Activated Clotting Time (POC) 257 (H) 70 - 128 SECS   EKG, 12 LEAD, INITIAL    Collection Time: 10/16/18 11:33 AM   Result Value Ref Range    Ventricular Rate 76 BPM    Atrial Rate 76 BPM    P-R Interval 152 ms    QRS Duration 72 ms    Q-T Interval 438 ms    QTC Calculation (Bezet) 492 ms    Calculated P Axis 87 degrees    Calculated R Axis 48 degrees    Calculated T Axis 60 degrees    Diagnosis       Normal sinus rhythm  Prolonged QT  Abnormal ECG  When compared with ECG of 16-OCT-2018 09:05,  No significant change was found  Confirmed by ST BAKARI DYE MD ()ANSHUL ([id number]) on 10/16/2018 48:41:84 AM     METABOLIC PANEL, BASIC    Collection Time: 10/17/18  3:55 AM   Result Value Ref Range    Sodium 142 136 - 145 mmol/L    Potassium 3.9 3.5 - 5.1 mmol/L    Chloride 107 98 - 107 mmol/L    CO2 28 21 - 32 mmol/L    Anion gap 7 7 - 16 mmol/L    Glucose 110 (H) 65 - 100 mg/dL    BUN 11 8 - 23 MG/DL    Creatinine 0.97 0.8 - 1.5 MG/DL    GFR est AA >60 >60 ml/min/1.73m2    GFR est non-AA >60 >60 ml/min/1.73m2    Calcium 8.5 8.3 - 10.4 MG/DL   MAGNESIUM    Collection Time: 10/17/18  3:55 AM   Result Value Ref Range    Magnesium 2.3 1.8 - 2.4 mg/dL   EKG, 12 LEAD, INITIAL    Collection Time: 10/17/18  7:03 AM   Result Value Ref Range    Ventricular Rate 72 BPM    Atrial Rate 72 BPM    P-R Interval 162 ms    QRS Duration 74 ms    Q-T Interval 432 ms    QTC Calculation (Bezet) 473 ms    Calculated P Axis 90 degrees    Calculated R Axis 49 degrees    Calculated T Axis 74 degrees    Diagnosis       Sinus rhythm with occasional Premature ventricular complexes  Otherwise normal ECG  When compared with ECG of 16-OCT-2018 11:33,  Premature ventricular complexes are now Present  Confirmed by Ted Odonnell MD (), Rosalie Parker ([id number]) on 10/17/2018 7:29:52 AM           Patient Instructions:      My Medications      START taking these medications      Instructions Each Dose to Equal Morning Noon Evening Bedtime   rivaroxaban 20 mg Tab tablet  Commonly known as:  Liza Kaplan    Your last dose was: Your next dose is: Take 1 Tab by mouth daily (with dinner). 20 mg                    CONTINUE taking these medications      Instructions Each Dose to Equal Morning Noon Evening Bedtime   albuterol 90 mcg/actuation inhaler  Commonly known as:  PROVENTIL HFA, VENTOLIN HFA, PROAIR HFA    Your last dose was: Your next dose is: Take 2 Puffs by inhalation every four (4) hours as needed. 2 Puff                 albuterol-ipratropium 2.5 mg-0.5 mg/3 ml Nebu  Commonly known as:  DUO-NEB    Your last dose was: Your next dose is:          3 mL by Nebulization route every four (4) hours as needed. 3 mL                 amLODIPine 10 mg tablet  Commonly known as:  NORVASC    Your last dose was: Your next dose is:           Take 1 Tab by mouth every evening. 10 mg                 ANORO ELLIPTA 62.5-25 mcg/actuation inhaler  Generic drug:  umeclidinium-vilanterol    Your last dose was: Your next dose is:          INHALE 1 PUFF DAILY                  aspirin 81 mg chewable tablet    Your last dose was: Your next dose is: Take 1 Tab by mouth daily. 81 mg                 guaiFENesin  mg ER tablet  Commonly known as:  [employer]    Your last dose was: Your next dose is: Take 1 Tab by mouth two (2) times daily as needed for Congestion. 600 mg                 meclizine 25 mg tablet  Commonly known as:  ANTIVERT    Your last dose was: Your next dose is: Take 1 Tab by mouth three (3) times daily as needed. Indications: Vertigo   25 mg                 multivitamin tablet  Commonly known as:  ONE A DAY    Your last dose was: Your next dose is: Take 1 Tab by mouth daily. 1 Tab                 nitroglycerin 0.4 mg SL tablet  Commonly known as:  NITROSTAT    Your last dose was: Your next dose is:          1 Tab by SubLINGual route every five (5) minutes as needed for Chest Pain. 0.4 mg                 pantoprazole 40 mg tablet  Commonly known as:  PROTONIX    Your last dose was: Your next dose is: Take 1 Tab by mouth daily. 40 mg                 sertraline 100 mg tablet  Commonly known as:  ZOLOFT    Your last dose was: Your next dose is:          TAKE 1 TABLET EVERY DAY                  sotalol 80 mg tablet  Commonly known as:  BETAPACE    Your last dose was: Your next dose is:          TAKE 1 TABLET TWICE DAILY. STOP taking these medications    clopidogrel 75 mg Tab  Commonly known as:  PLAVIX              Where to Get Your Medications      Information on where to get these meds will be given to you by the nurse or doctor.     Ask your nurse or doctor about these medications  · rivaroxaban 20 mg Tab tablet           Signed:  Gabby Thomas NP-C  10/17/2018  8:07 AM

## 2018-10-17 NOTE — PROGRESS NOTES
Verbal bedside report received from Kang Neal, [facility]. Assumed care of patient.

## 2018-10-18 ENCOUNTER — PATIENT OUTREACH (OUTPATIENT)
Dept: CASE MANAGEMENT | Age: 63
End: 2018-10-18

## 2018-10-19 LAB
ABO + RH BLD: NORMAL
BLD PROD TYP BPU: NORMAL
BLOOD GROUP ANTIBODIES SERPL: NORMAL
BPU ID: NORMAL
CROSSMATCH RESULT,%XM: NORMAL
SPECIMEN EXP DATE BLD: NORMAL
STATUS OF UNIT,%ST: NORMAL
UNIT DIVISION, %UDIV: 0

## 2018-10-22 ENCOUNTER — PATIENT OUTREACH (OUTPATIENT)
Dept: CASE MANAGEMENT | Age: 63
End: 2018-10-22

## 2018-10-22 NOTE — PATIENT INSTRUCTIONS
10/22/18 @ 11:30am- Called to establish relationship. Was ask to call back at a later time. 10/22/18 @ 5pm- Called to establish relationship. RN Care  role explained and my contact information given. Member had wtachman device implanted via right groin. Right groin cath site is bruised. Denies any redness, fever, or drainage. Advised him if area started to bleed, to get a clean towel, apply pressure and call Dr. Mana Babcock office. He voiced understanding. He has stopped plavix and is taking aspirin and xarelto. No side effects or issues with medications. Advised him to monitor for dark, tarry stools and to notify PCP if he has this. He voiced understanding. He has a f/u appt. with Pulmonary on 10/23, Dr. Colten Whitt on 12/11. He has not made PCP f/u appointment. Advised him of the importance of 3-7 day f/u with PCP. He voiced understanding and stated he would be calling them. Reviewed the [facility] Nurse advice line number and the Urgent care centers in his area for non-emergent issues.

## 2018-10-22 NOTE — PROGRESS NOTES
10/22/18 @ 11:30am- Called to establish relationship. Was ask to call back at a later time. 10/22/18 @ 5pm- Called to establish relationship. RN Care  role explained and my contact information given. Member had wtachman device implanted via right groin. Right groin cath site is bruised. Denies any redness, fever, or drainage. Advised him if area started to bleed, to get a clean towel, apply pressure and call Dr. Elayne Che office. He voiced understanding. He has stopped plavix and is taking aspirin and xarelto. No side effects or issues with medications. Advised him to monitor for dark, tarry stools and to notify PCP if he has this. He voiced understanding. He has a f/u appt. with Pulmonary on 10/23, Dr. Jany Evans on 12/11. He has not made PCP f/u appointment. Advised him of the importance of 3-7 day f/u with PCP. He voiced understanding and stated he would be calling them. Reviewed the [facility] [employer] [facility] Nurse advice line number and the Urgent care centers in his area for non-emergent issues.

## 2018-10-23 ENCOUNTER — HOSPITAL ENCOUNTER (OUTPATIENT)
Dept: GENERAL RADIOLOGY | Age: 63
Discharge: HOME | End: 2018-10-23
Payer: MEDICARE

## 2018-10-23 DIAGNOSIS — Z09 HOSPITAL DISCHARGE FOLLOW-UP: ICD-10-CM

## 2018-10-23 PROBLEM — K52.9 COLITIS: Status: RESOLVED | Noted: 2018-09-05 | Resolved: 2018-10-23

## 2018-10-23 PROBLEM — D72.829 LEUKOCYTOSIS: Status: RESOLVED | Noted: 2018-02-06 | Resolved: 2018-10-23

## 2018-10-23 PROBLEM — J69.0 ASPIRATION PNEUMONIA OF RIGHT LOWER LOBE (HCC): Status: RESOLVED | Noted: 2018-09-06 | Resolved: 2018-10-23

## 2018-10-23 PROBLEM — A41.9 SEPSIS (HCC): Status: RESOLVED | Noted: 2018-09-05 | Resolved: 2018-10-23

## 2018-10-23 PROCEDURE — 71046 X-RAY EXAM CHEST 2 VIEWS: CPT

## 2018-11-02 ENCOUNTER — PATIENT OUTREACH (OUTPATIENT)
Dept: CASE MANAGEMENT | Age: 63
End: 2018-11-02

## 2018-11-02 NOTE — PATIENT INSTRUCTIONS
11/2/18 @ 10am- Called to check on member. He states he is doing well. He had pulmonary appt. No new orders. He states he has received his anero from mail order pharmacy and he is doing his duonebs as ordered. States his cath sites are healed but still a little tender. No s/s of infection or drainage. He has had no chest pain, no shortness of breath, dyspnea, or heart palpitations. He continues on asa and xarelton, No s/s of bleeding in stool, urine or cath sites. Advised him to call PCP with any problems or concerns in his condition or medicaitons. He voiced understanding.

## 2018-11-07 ENCOUNTER — PATIENT OUTREACH (OUTPATIENT)
Dept: CASE MANAGEMENT | Age: 63
End: 2018-11-07

## 2018-11-07 NOTE — PATIENT INSTRUCTIONS
11/7/18 @ 1:10pm- called and left a voicemail with my contact information. 11/7/18 @ 3:50pm- Called and left a voicemail with my contact information.

## 2018-11-12 ENCOUNTER — PATIENT OUTREACH (OUTPATIENT)
Dept: CASE MANAGEMENT | Age: 63
End: 2018-11-12

## 2018-11-12 NOTE — PROGRESS NOTES
11/12/18 @ 9:23am- Called to check on member. He states he is doing well. No chest pain, no shortness of breath, no heart palpitations. Groin sites has healed. No dark or tarry stools from  asa and xarelto therapy. He does need oxygen at bedtime. He stated pulmonary doctor has ordered and he is waiting on the DME company to contact him. He is to wear 02 at 2lit/min at hs due to low 02 sats at night. Advised him to call PCP with any problems or concerns with his medications or condition. He voiced understanding.

## 2018-11-21 ENCOUNTER — PATIENT OUTREACH (OUTPATIENT)
Dept: CASE MANAGEMENT | Age: 63
End: 2018-11-21

## 2018-11-21 NOTE — PATIENT INSTRUCTIONS
11/21/18 @ 10:30am- Called to check on member. Spoke with member. He states he has started on oxygen at bedtime and was sleeping better. He had a nose bleed the first night , they sent a humidifer and that stopped the nose bleed. He has had no chest pain, palpitations, or shortness of breath. He has a f/u with pulmonary in March 2019. Reviewed the Urgent care centers for non emergent issues, Advised to keep seeing PCP every 3 months for check up. He voiced understanding. Reviewed my contact information and the [facility] nurse advice line as this would be may last call. He voiced understanding.

## 2018-11-21 NOTE — PROGRESS NOTES
11/21/18 @ 10:30am- Called to check on member. Spoke with member. He states he has started on oxygen at bedtime and was sleeping better. He had a nose bleed the first night , they sent a humidifer and that stopped the nose bleed. He has had no chest pain, palpitations, or shortness of breath. He has a f/u with pulmonary in March 2019. Reviewed the Urgent care centers for non emergent issues, Advised to keep seeing PCP every 3 months for check up. He voiced understanding. Reviewed my contact information and the [facility] nurse advice line as this would be may last call. He voiced understanding.

## 2018-12-03 NOTE — PROGRESS NOTES
Patient pre-assessment complete for watchman KEN scheduled for 18, arrival time 0700. Patient verified using . Patient instructed to bring all home medications in labeled bottles on the day of procedure. NPO status reinforced. Patient wishes to hold all medications that morning because he does not take medicine until 11am. Patient verbalizes understanding of all instructions & denies any questions at this time.

## 2018-12-06 ENCOUNTER — HOSPITAL ENCOUNTER (OUTPATIENT)
Dept: CARDIAC CATH/INVASIVE PROCEDURES | Age: 63
Discharge: HOME | End: 2018-12-06
Attending: INTERNAL MEDICINE | Admitting: INTERNAL MEDICINE
Payer: MEDICARE

## 2018-12-06 VITALS
HEART RATE: 68 BPM | DIASTOLIC BLOOD PRESSURE: 71 MMHG | BODY MASS INDEX: 21.92 KG/M2 | SYSTOLIC BLOOD PRESSURE: 117 MMHG | WEIGHT: 180 LBS | HEIGHT: 76 IN | RESPIRATION RATE: 18 BRPM | TEMPERATURE: 98.2 F | OXYGEN SATURATION: 95 %

## 2018-12-06 LAB
ANION GAP SERPL CALC-SCNC: 8 MMOL/L (ref 7–16)
ATRIAL RATE: 63 BPM
BUN SERPL-MCNC: 11 MG/DL (ref 8–23)
CALCIUM SERPL-MCNC: 9.2 MG/DL (ref 8.3–10.4)
CALCULATED P AXIS, ECG09: 89 DEGREES
CALCULATED R AXIS, ECG10: 42 DEGREES
CALCULATED T AXIS, ECG11: 58 DEGREES
CHLORIDE SERPL-SCNC: 109 MMOL/L (ref 98–107)
CO2 SERPL-SCNC: 26 MMOL/L (ref 21–32)
CREAT SERPL-MCNC: 0.87 MG/DL (ref 0.8–1.5)
DIAGNOSIS, 93000: NORMAL
ERYTHROCYTE [DISTWIDTH] IN BLOOD BY AUTOMATED COUNT: 12.5 % (ref 11.9–14.6)
GLUCOSE SERPL-MCNC: 92 MG/DL (ref 65–100)
HCT VFR BLD AUTO: 42.6 % (ref 41.1–50.3)
HGB BLD-MCNC: 14 G/DL (ref 13.6–17.2)
INR PPP: 1.1
MCH RBC QN AUTO: 29.4 PG (ref 26.1–32.9)
MCHC RBC AUTO-ENTMCNC: 32.9 G/DL (ref 31.4–35)
MCV RBC AUTO: 89.3 FL (ref 79.6–97.8)
NRBC # BLD: 0.02 K/UL (ref 0–0.2)
P-R INTERVAL, ECG05: 150 MS
PLATELET # BLD AUTO: 249 K/UL (ref 150–450)
PMV BLD AUTO: 10.2 FL (ref 9.4–12.3)
POTASSIUM SERPL-SCNC: 4 MMOL/L (ref 3.5–5.1)
PROTHROMBIN TIME: 14.1 SEC (ref 11.7–14.5)
Q-T INTERVAL, ECG07: 444 MS
QRS DURATION, ECG06: 72 MS
QTC CALCULATION (BEZET), ECG08: 454 MS
RBC # BLD AUTO: 4.77 M/UL (ref 4.23–5.6)
SODIUM SERPL-SCNC: 143 MMOL/L (ref 136–145)
VENTRICULAR RATE, ECG03: 63 BPM
WBC # BLD AUTO: 11.8 K/UL (ref 4.3–11.1)

## 2018-12-06 PROCEDURE — 85027 COMPLETE CBC AUTOMATED: CPT

## 2018-12-06 PROCEDURE — 99152 MOD SED SAME PHYS/QHP 5/>YRS: CPT

## 2018-12-06 PROCEDURE — 74011000250 HC RX REV CODE- 250: Performed by: INTERNAL MEDICINE

## 2018-12-06 PROCEDURE — 74011250636 HC RX REV CODE- 250/636: Performed by: INTERNAL MEDICINE

## 2018-12-06 PROCEDURE — 74011250636 HC RX REV CODE- 250/636

## 2018-12-06 PROCEDURE — 93005 ELECTROCARDIOGRAM TRACING: CPT | Performed by: INTERNAL MEDICINE

## 2018-12-06 PROCEDURE — 93312 ECHO TRANSESOPHAGEAL: CPT

## 2018-12-06 PROCEDURE — 85610 PROTHROMBIN TIME: CPT

## 2018-12-06 PROCEDURE — 80048 BASIC METABOLIC PNL TOTAL CA: CPT

## 2018-12-06 RX ORDER — MIDAZOLAM HYDROCHLORIDE 1 MG/ML
.5-2 INJECTION, SOLUTION INTRAMUSCULAR; INTRAVENOUS
Status: DISCONTINUED | OUTPATIENT
Start: 2018-12-06 | End: 2018-12-06

## 2018-12-06 RX ORDER — SODIUM CHLORIDE 9 MG/ML
75 INJECTION, SOLUTION INTRAVENOUS CONTINUOUS
Status: DISCONTINUED | OUTPATIENT
Start: 2018-12-06 | End: 2018-12-06 | Stop reason: HOSPADM

## 2018-12-06 RX ORDER — LIDOCAINE HYDROCHLORIDE 20 MG/ML
15 SOLUTION OROPHARYNGEAL AS NEEDED
Status: DISCONTINUED | OUTPATIENT
Start: 2018-12-06 | End: 2018-12-06

## 2018-12-06 RX ORDER — CLOPIDOGREL BISULFATE 75 MG/1
75 TABLET ORAL DAILY
Qty: 30 TAB | Refills: 6 | Status: SHIPPED | OUTPATIENT
Start: 2018-12-06 | End: 2019-03-13 | Stop reason: SDUPTHER

## 2018-12-06 RX ADMIN — MIDAZOLAM HYDROCHLORIDE 2 MG: 1 INJECTION, SOLUTION INTRAMUSCULAR; INTRAVENOUS at 09:30

## 2018-12-06 RX ADMIN — MIDAZOLAM HYDROCHLORIDE 1 MG: 1 INJECTION, SOLUTION INTRAMUSCULAR; INTRAVENOUS at 09:27

## 2018-12-06 RX ADMIN — MIDAZOLAM HYDROCHLORIDE 3 MG: 1 INJECTION, SOLUTION INTRAMUSCULAR; INTRAVENOUS at 09:24

## 2018-12-06 RX ADMIN — LIDOCAINE HYDROCHLORIDE 15 ML: 20 SOLUTION ORAL; TOPICAL at 09:05

## 2018-12-06 RX ADMIN — SODIUM CHLORIDE 75 ML/HR: 900 INJECTION, SOLUTION INTRAVENOUS at 08:45

## 2018-12-06 NOTE — PROGRESS NOTES
Watchman 45 Day Follow up Patient can now stop xarelto. He will start plavix 75mg today ( and will continue until 6 months post watchman implant)  and will continue ASA 81mg indefinitely (per Dr. Brigido Chen). Patient will follow up with Dr. Mimi Bonilla in the office next week.

## 2018-12-06 NOTE — PROGRESS NOTES
TRANSFER - OUT REPORT: 
 
Verbal report given to Samaria Villar RN (name) on Guillermo Delong  being transferred to CPRU (unit) for routine progression of care Report consisted of patients Situation, Background, Assessment and  
Recommendations(SBAR). Information from the following report(s) SBAR was reviewed with the receiving nurse. Lines:  
Peripheral IV 12/06/18 Anterior;Right Antecubital (Active) Opportunity for questions and clarification was provided. Pt had KEN. Tolerated well. Pt received Versed 6mg IV. Pt to remain NPO until 1105.

## 2018-12-06 NOTE — DISCHARGE INSTRUCTIONS
AFTER YOU TRANSESOPHAGEAL ECHOCARDIOGRAM    Be sure someone else drives you home. You may feel drowsy for several hours. Do not eat or drink for at least two hours after your procedure. Your throat will be numb and there is a risk you might have difficulty swallowing for a while. Be careful when you do eat or drink for the first time especially with hot fluids since you could easily burn your throat. Call your doctor if:    · You are bleeding from your throat or mouth. · You have trouble breathing all of a sudden. · You have chest pain or any pain that spreads to your neck, jaw, or arms. · You have questions or concerns. · You have a fever greater than 101°F.    Doctor: Ellis Dixon [phone]    Special Instructions:    No driving for 24 hours. Do not eat or drink for 2 hours following your procedure.  Wait until after 11:30am.

## 2018-12-06 NOTE — PROGRESS NOTES
TRANSFER - IN REPORT: 
 
Verbal report received from [facility]) on Ness Chaudhari  being received from cath lab(unit) for routine progression of care Report consisted of patients Situation, Background, Assessment and  
Recommendations(SBAR). Information from the following report(s) Procedure Summary was reviewed with the receiving nurse. Opportunity for questions and clarification was provided. Assessment completed upon patients arrival to unit and care assumed.

## 2018-12-06 NOTE — PROGRESS NOTES
Patient received to [address] [place] [address] room # 1  Ambulatory from [facility]. Patient scheduled for KEN today with Dr Stephen Rubin. Procedure reviewed & questions answered, voiced good understanding consent obtained & placed on chart. All medications and medical history reviewed. Will prep patient per orders. Patient & family updated on plan of care. The patient has a fraility score of 3-MANAGING WELL, based on ambulatory and independent of ADL's but c/o SOB.

## 2018-12-06 NOTE — PROGRESS NOTES
Discharge instructions given per orders, voiced good understanding of post procedure care, medications & follow up care. Denies any questions

## 2018-12-06 NOTE — H&P
[facility] Cardiology History & Physical  
  
Date of  Admission: 12/6/2018  7:01 AM  
 
CC: Post watchman KEN 
 
HPI:  Daniela Sr is a 58 y.o. male 60 y. o. male with a past medical and cardiac history significant for afib and CLL and presents for post Watchman implantation KEN. Pt has had problems with GIB in the past on coumadin. He has history of CLL, and also reports multiple admissions for concern for stroke and TIA in the past. Pt has history of pAF and is s/p ablation. He has been doing well since watchman implantation; tolerating AC 
  
Cardiac PMH: (Old records have been reviewed and summarized below) Cath (12/17): CONCLUSION:   
1.  Successful angioplasty and stenting of the first obtuse marginal branch. 2.  Moderate in-stent restenosis in the left anterior descending. 3.  Small vessel disease in the right coronary artery system. 4.  Preserved left ventricular systolic function. Past Medical History:  
Diagnosis Date  Arrhythmia A Fib twice since 7/2010-- ablation no problems since  Arthritis  CAD (coronary artery disease) 11/2009  
 stent x 1-- ablation 2010-- no problems since  Cancer ([address] skin cancer  Chest pain  Chronic midline low back pain without sciatica 8/28/2017  CLL (chronic lymphocytic leukemia) ([address] 7/24/2018  COPD   
 prn inhalers  Coronary atherosclerosis of native coronary artery 12/30/2015  Depression  Dyspnea on exertion   
 with any activity; associated with nausea  GERD (gastroesophageal reflux disease) 7/22/2010  
 Heart failure ([address]  Hematochezia   
 HTN (hypertension) x 1 month  
 started on med--- controlled  Hypercholesteremia   
 controlled by medication  Other ill-defined conditions(799.89)   
 dyslipidemia/hyperlipidemia  Palpitations 12/30/2015  Paroxysmal atrial fibrillation (HCC)  Psychiatric disorder   
 depression  PUD (peptic ulcer disease) 2010  
 hx-- r/t coumadin--  
  Recurrent depression ([facility] 2016  Tobacco use disorder 2015  Unspecified adverse effect of anesthesia 1978  
 quit breathing Past Surgical History:  
Procedure Laterality Date [address] 10/18  HX CHOLECYSTECTOMY  HX COLONOSCOPY    HX HEART CATHETERIZATION  2009 PCI x 1 to LAD  ----ablation   HX HEART CATHETERIZATION  3/2016  HX HEENT   and   
 eardrum repair-- right [address] Right inguinal  
 HX LAP CHOLECYSTECTOMY    HX ORTHOPAEDIC Left   
 left shoulder surg x1  
 HX ORTHOPAEDIC Left 2012  
 hand  HX OTHER SURGICAL    
 EGD  HX SHOULDER ARTHROSCOPY Right 2000  
 x 3 on right Allergies Allergen Reactions  Fentanyl Other (comments) States had through a PCA after shoulder surgery in , and \"throat swelled shut\" and that he \"quit breathing\". Update 11/16/10--Patient thinks the PCA gave him to much and it wasn't an allergy to drug itself.  Lovastatin Other (comments) Hullucinations  Niacin Other (comments) Denies allery  Advicor [Niacin-Lovastatin] Anaphylaxis and Itching Social History Socioeconomic History  Marital status:  Spouse name: Not on file  Number of children: Not on file  Years of education: Not on file  Highest education level: Not on file Social Needs  Financial resource strain: Not on file  Food insecurity - worry: Not on file  Food insecurity - inability: Not on file  Transportation needs - medical: Not on file  Transportation needs - non-medical: Not on file Occupational History  Not on file Tobacco Use  Smoking status: Former Smoker Packs/day: 1.00 Years: 45.00 Pack years: 45.00 Types: Cigarettes Last attempt to quit: 3/4/2016 Years since quittin.7  Smokeless tobacco: Never Used Substance and Sexual Activity  Alcohol use:  No  
 Alcohol/week: 0.0 oz  Drug use: No  
 Sexual activity: Not on file Other Topics Concern  Not on file Social History Narrative  Not on file Family History Problem Relation Age of Onset  Heart Disease Father  Heart Attack Father  Hypertension Father  Bleeding Prob Father  Heart Surgery Father   
     multiple PCIs, first in his 46s  Heart Disease Brother PCI x 2  
 Diabetes Mother  Cancer Mother   
     lung  Diabetes Brother  Diabetes Brother  Heart Attack Brother  Heart Disease Brother  Diabetes Brother  Bleeding Prob Brother   
     blood clots  Heart Disease Brother  No Known Problems Brother Current Facility-Administered Medications Medication Dose Route Frequency  0.9% sodium chloride infusion  75 mL/hr IntraVENous CONTINUOUS  
 midazolam (VERSED) injection 0.5-2 mg  0.5-2 mg IntraVENous Multiple  lidocaine (XYLOCAINE) 2 % viscous solution 15 mL  15 mL Mouth/Throat PRN Review of Systems Review of Systems Constitution: Negative for chills, decreased appetite, fever, malaise/fatigue, weight gain and weight loss. Cardiovascular: Negative for chest pain, dyspnea on exertion, irregular heartbeat, near-syncope, orthopnea, palpitations, paroxysmal nocturnal dyspnea and syncope. Respiratory: Negative for cough, shortness of breath and wheezing. Musculoskeletal: Negative for joint pain and myalgias. Subjective: There were no vitals taken for this visit. No intake/output data recorded. No intake/output data recorded. Physical Exam: 
General: Well Developed, Well Nourished, No Acute Distress HEENT: pupils equal and round, no abnormalities noted Neck: supple, no JVD, no carotid bruits Heart: S1S2 with RRR without murmurs or gallops Lungs: Clear throughout auscultation bilaterally without adventitious sounds Abd: soft, nontender, nondistended, with good bowel sounds Ext: warm, no edema, calves supple/nontender, pulses 2+ bilaterally Skin: warm and dry Psychiatric: Normal mood and affect Neurologic: Alert and oriented X 3 Labs: No results found for this or any previous visit (from the past 24 hour(s)). Assessment/Plan: 1. PAF s/p watchman device 2. Hypertension 3. CAD 4. History of CLL Plan KEN; if no evidence of significant larissa-device flow, will stop AC and plan ASA/plavix for 6 mos and then ASA indefinitely.   
 
Jayjay Quintana MD 
12/6/2018 8:26 AM

## 2019-02-20 PROBLEM — R09.02 HYPOXIA: Status: RESOLVED | Noted: 2018-09-11 | Resolved: 2019-02-20

## 2019-02-20 PROBLEM — E87.6 HYPOKALEMIA: Status: RESOLVED | Noted: 2018-09-05 | Resolved: 2019-02-20

## 2019-02-20 PROBLEM — B37.0 THRUSH: Chronic | Status: RESOLVED | Noted: 2018-09-05 | Resolved: 2019-02-20

## 2019-04-18 ENCOUNTER — HOSPITAL ENCOUNTER (OUTPATIENT)
Dept: CT IMAGING | Age: 64
Discharge: HOME | End: 2019-04-18
Attending: PHYSICIAN ASSISTANT
Payer: MEDICARE

## 2019-04-18 DIAGNOSIS — R10.32 ACUTE LEFT LOWER QUADRANT PAIN: ICD-10-CM

## 2019-04-18 LAB — CREAT BLD-MCNC: 0.9 MG/DL (ref 0.8–1.5)

## 2019-04-18 PROCEDURE — 74177 CT ABD & PELVIS W/CONTRAST: CPT

## 2019-04-18 PROCEDURE — 74011636320 HC RX REV CODE- 636/320: Performed by: PHYSICIAN ASSISTANT

## 2019-04-18 PROCEDURE — 82565 ASSAY OF CREATININE: CPT

## 2019-04-18 PROCEDURE — 74011000258 HC RX REV CODE- 258: Performed by: PHYSICIAN ASSISTANT

## 2019-04-18 RX ORDER — SODIUM CHLORIDE 0.9 % (FLUSH) 0.9 %
10 SYRINGE (ML) INJECTION
Status: COMPLETED | OUTPATIENT
Start: 2019-04-18 | End: 2019-04-18

## 2019-04-18 RX ADMIN — DIATRIZOATE MEGLUMINE AND DIATRIZOATE SODIUM 15 ML: 660; 100 LIQUID ORAL; RECTAL at 10:26

## 2019-04-18 RX ADMIN — SODIUM CHLORIDE 100 ML: 900 INJECTION, SOLUTION INTRAVENOUS at 10:26

## 2019-04-18 RX ADMIN — IOPAMIDOL 100 ML: 755 INJECTION, SOLUTION INTRAVENOUS at 10:26

## 2019-04-18 RX ADMIN — Medication 10 ML: at 10:26

## 2019-04-18 NOTE — PROGRESS NOTES
The Ct of the abdomen is normal. No signs of infection. I think it would be a good idea for you to see the GI doctor. Do you need a referral or will you call for an appointment?

## 2019-04-18 NOTE — PROGRESS NOTES
Pt notified and he said he will call GI himself. I advised if he has any trouble getting in with GI to call back and Viridiana Proud will do what she needs to on our in to get him seen.

## 2019-08-19 ENCOUNTER — HOSPITAL ENCOUNTER (OUTPATIENT)
Age: 64
Setting detail: OUTPATIENT SURGERY
Discharge: HOME | End: 2019-08-19
Attending: INTERNAL MEDICINE | Admitting: INTERNAL MEDICINE
Payer: MEDICARE

## 2019-08-19 ENCOUNTER — ANESTHESIA (OUTPATIENT)
Dept: ENDOSCOPY | Age: 64
End: 2019-08-19
Payer: MEDICARE

## 2019-08-19 ENCOUNTER — ANESTHESIA EVENT (OUTPATIENT)
Dept: ENDOSCOPY | Age: 64
End: 2019-08-19
Payer: MEDICARE

## 2019-08-19 VITALS
RESPIRATION RATE: 16 BRPM | HEART RATE: 67 BPM | HEIGHT: 72 IN | SYSTOLIC BLOOD PRESSURE: 148 MMHG | WEIGHT: 180 LBS | TEMPERATURE: 98.6 F | BODY MASS INDEX: 24.38 KG/M2 | OXYGEN SATURATION: 95 % | DIASTOLIC BLOOD PRESSURE: 90 MMHG

## 2019-08-19 PROCEDURE — 74011250636 HC RX REV CODE- 250/636: Performed by: ANESTHESIOLOGY

## 2019-08-19 PROCEDURE — 74011000250 HC RX REV CODE- 250

## 2019-08-19 PROCEDURE — 74011250636 HC RX REV CODE- 250/636

## 2019-08-19 PROCEDURE — 77030013991 HC SNR POLYP ENDOSC BSC -A: Performed by: INTERNAL MEDICINE

## 2019-08-19 PROCEDURE — 76040000025: Performed by: INTERNAL MEDICINE

## 2019-08-19 PROCEDURE — 76060000032 HC ANESTHESIA 0.5 TO 1 HR: Performed by: INTERNAL MEDICINE

## 2019-08-19 PROCEDURE — 88305 TISSUE EXAM BY PATHOLOGIST: CPT

## 2019-08-19 PROCEDURE — 77030021593 HC FCPS BIOP ENDOSC BSC -A: Performed by: INTERNAL MEDICINE

## 2019-08-19 PROCEDURE — 88312 SPECIAL STAINS GROUP 1: CPT

## 2019-08-19 RX ORDER — SODIUM CHLORIDE, SODIUM LACTATE, POTASSIUM CHLORIDE, CALCIUM CHLORIDE 600; 310; 30; 20 MG/100ML; MG/100ML; MG/100ML; MG/100ML
100 INJECTION, SOLUTION INTRAVENOUS CONTINUOUS
Status: DISCONTINUED | OUTPATIENT
Start: 2019-08-19 | End: 2019-08-19 | Stop reason: HOSPADM

## 2019-08-19 RX ORDER — SODIUM CHLORIDE 0.9 % (FLUSH) 0.9 %
5-40 SYRINGE (ML) INJECTION AS NEEDED
Status: DISCONTINUED | OUTPATIENT
Start: 2019-08-19 | End: 2019-08-19 | Stop reason: HOSPADM

## 2019-08-19 RX ORDER — PROPOFOL 10 MG/ML
INJECTION, EMULSION INTRAVENOUS
Status: DISCONTINUED | OUTPATIENT
Start: 2019-08-19 | End: 2019-08-19 | Stop reason: HOSPADM

## 2019-08-19 RX ORDER — SODIUM CHLORIDE 0.9 % (FLUSH) 0.9 %
5-40 SYRINGE (ML) INJECTION EVERY 8 HOURS
Status: DISCONTINUED | OUTPATIENT
Start: 2019-08-19 | End: 2019-08-19 | Stop reason: HOSPADM

## 2019-08-19 RX ORDER — PROPOFOL 10 MG/ML
INJECTION, EMULSION INTRAVENOUS AS NEEDED
Status: DISCONTINUED | OUTPATIENT
Start: 2019-08-19 | End: 2019-08-19 | Stop reason: HOSPADM

## 2019-08-19 RX ADMIN — PROPOFOL 50 MG: 10 INJECTION, EMULSION INTRAVENOUS at 09:46

## 2019-08-19 RX ADMIN — PROPOFOL 160 MCG/KG/MIN: 10 INJECTION, EMULSION INTRAVENOUS at 09:46

## 2019-08-19 RX ADMIN — SODIUM CHLORIDE, SODIUM LACTATE, POTASSIUM CHLORIDE, AND CALCIUM CHLORIDE 100 ML/HR: 600; 310; 30; 20 INJECTION, SOLUTION INTRAVENOUS at 09:05

## 2019-08-19 NOTE — DISCHARGE INSTRUCTIONS
Gastrointestinal Esophagogastroduodenoscopy (EGD)/ Endoscopic Ultrasound(EUS)- Upper Exam Discharge Instructions    1. Call Dr. Cyndee Hopkins  for any problems or questions. 2. Contact the doctor's office for follow up appointment as directed. 3. Medication may cause drowsiness for several hours, therefore, do not drive or operate machinery for remainder of the day. 4. No alcohol today. 5. Do not make any important decisions such as signing legal paperwork. 6. Ordinarily, you may resume regular diet and activity after exam unless otherwise specified by your physician. 7. For mild soreness in your throat you may use Cepacol throat lozenges or warm  salt-water gargles as needed. Any additional instructions:   1. Dr. Apolinar Guzman office will call you with the results of the pathology within the week. If you do not hear from the office within a week, call the office and ask about your results. 2. Avoid NSAIDs such as Aspirin, Aleve, goodie powders, advil, naproxen, motrin. 3. Continue Protonix as prescribed. Instructions given to Christy Campo and other family members. Gastrointestinal Colonoscopy/Flexible Sigmoidoscopy - Lower Exam Discharge Instructions  1. Call Dr. Cyndee Hopkins for any problems or questions. 2. Contact the doctors office for follow up appointment as directed  3. Medication may cause drowsiness for several hours, therefore, do not drive or operate machinery for remainder of the day. 4. No alcohol today. 5. Do not make any important decisions such signing legal paperwork. 6. Ordinarily, you may resume regular diet and activity after exam unless otherwise specified by your physician. 7. Because of air put into your colon during exam, you may experience some abdominal distension, relieved by the passage of gas, for several hours. 8. Contact your physician if you have any of the following:  a. Excessive amount of bleeding - large amount when having a bowel movement.   Occasional specks of blood with bowel movement would not be unusual.  b. Severe abdominal pain  c. Fever or Chills  9. Polyp Removal - follow these additional instructions  a. Take Metamucil - 1 tablespoon in juice every morning for 3 days, if needed. b. No Aspirin, Advil, Aleve, Nuprin, Ibuprofen, or medications that contain these drugs for 2 weeks. Any additional instructions:   1. Dr. Apolinar Guzman office will call you with the results of the pathology within the week. If you do not hear from the office within a week, call the office and ask about your results. 2. High fiber diet for bowel regularity. Daily fiber supplement if needed. 3. Levsin as needed for abdominal pain   4. Consider Amitiza Or Linzess if pain and constipation continue. 5. Followup 3 months as an office visit, call Dr. Apolinar Guzman office to make this appointment. 6. Possibly Repeat Colonoscopy in 5 years. Instructions given to Christy Campo and other family members.

## 2019-08-19 NOTE — H&P
Gastroenterology Outpatient History and Physical    Patient: Major Chavez    Physician: Ash Perez MD    Chief Complaint: GERD    History of Present Illness: LLQ pain    Justification for Procedure: As above    History:  Past Medical History:   Diagnosis Date    Arrhythmia     A Fib twice since 7/2010-- ablation no problems since    Arthritis     CAD (coronary artery disease) 11/2009    stent x 1-- ablation 2010-- no problems since    Cancer [facility])     skin cancer    Chest pain     Chronic midline low back pain without sciatica 8/28/2017    CLL (chronic lymphocytic leukemia) ([address] 7/24/2018    COPD     prn inhalers    Coronary atherosclerosis of native coronary artery 12/30/2015    Depression     Dyspnea on exertion     with any activity; associated with nausea    GERD (gastroesophageal reflux disease) 7/22/2010    Heart failure ([address]     Hematochezia     HTN (hypertension) x 1 month    started on med--- controlled    Hypercholesteremia     controlled by medication    Other ill-defined conditions(799.89)     dyslipidemia/hyperlipidemia    Palpitations 12/30/2015    [facility]ox[facility]     Psychiatric disorder     depression    PUD (peptic ulcer disease) 2010    hx-- r/t coumadin--    Recurrent depression ([address] 8/8/2016    Tobacco use disorder 12/30/2015    Unspecified adverse effect of anesthesia 1978    quit breathing      Past Surgical History:   Procedure Laterality Date    CARDIAC SURG PROCEDURE UNLIST      [address] 10/18    HX CHOLECYSTECTOMY      HX COLONOSCOPY  2012    HX HEART CATHETERIZATION  11/2009    PCI x 1 to LAD  ----ablation 2010    HX HEART CATHETERIZATION  3/2016    HX HEENT  1977 and 1978    eardrum repair-- right    HX HERNIA REPAIR  1990    Right inguinal    HX LAP CHOLECYSTECTOMY  1995    HX ORTHOPAEDIC Left     left shoulder surg x1    HX ORTHOPAEDIC Left 2012    hand    HX OTHER SURGICAL      EGD    HX SHOULDER ARTHROSCOPY Right 2000    x 3 on right      Social History     Socioeconomic History    Marital status:      Spouse name: Not on file    Number of children: Not on file    Years of education: Not on file    Highest education level: Not on file   Tobacco Use    Smoking status: Former Smoker     Packs/day: 1.00     Years: 45.00     Pack years: 45.00     Types: Cigarettes     Last attempt to quit: 3/4/2016     Years since quitting: 3.4    Smokeless tobacco: Never Used   Substance and Sexual Activity    Alcohol use: No     Alcohol/week: 0.0 standard drinks    Drug use: No      Family History   Problem Relation Age of Onset    Heart Disease Father     Heart Attack Father     Hypertension Father     Bleeding Prob Father     Heart Surgery Father         multiple PCIs, first in his 46s    Heart Disease Brother         PCI x 2    Diabetes Mother     Cancer Mother         lung    Diabetes Brother     Diabetes Brother     Heart Attack Brother     Heart Disease Brother     Diabetes Brother     Bleeding Prob Brother         blood clots    Heart Disease Brother     No Known Problems Brother        Allergies: Allergies   Allergen Reactions    Fentanyl Other (comments)     States had through a PCA after shoulder surgery in 2000, and \"throat swelled shut\" and that he \"quit breathing\". Update 11/16/10--Patient thinks the PCA gave him to much and it wasn't an allergy to drug itself.  Lovastatin Other (comments)     Hullucinations    Niacin Other (comments)     Denies allery    Advicor [Niacin-Lovastatin] Anaphylaxis and Itching       Medications:   Prior to Admission medications    Medication Sig Start Date End Date Taking? Authorizing Provider   meclizine (ANTIVERT) 25 mg tablet Take 1 Tab by mouth three (3) times daily as needed for Dizziness. Indications: sensation of spinning or whirling 7/9/19  Yes TONE Sheikh   rOPINIRole (REQUIP) 0.5 mg tablet Take 1 Tab by mouth nightly.  7/9/19  Yes Antwan Laguerre TONE Storm   amLODIPine (NORVASC) 10 mg tablet TAKE 1 TABLET EVERY EVENING 4/2/19  Yes BrothersGonsalo, DO   albuterol (VENTOLIN HFA) 90 mcg/actuation inhaler Take  by inhalation as needed. Yes Provider, Historical   albuterol-ipratropium (DUO-NEB) 2.5 mg-0.5 mg/3 ml nebu 3 mL by Nebulization route every four (4) hours as needed. 9/11/18  Yes Porfirio Barlow MD   guaiFENesin ER (MUCINEX) 600 mg ER tablet Take 1 Tab by mouth two (2) times daily as needed for Congestion. 9/11/18  Yes Porfirio Barlow MD   pantoprazole (PROTONIX) 40 mg tablet Take 1 Tab by mouth daily. 8/27/18  Yes BrothMacario mcclain, DO   sertraline (ZOLOFT) 100 mg tablet TAKE 1 TABLET EVERY DAY 6/25/18  Yes BrothGonsalo mcclain, DO   sotalol (BETAPACE) 80 mg tablet TAKE 1 TABLET TWICE DAILY. 5/18/18  Yes Ramon Paz MD   aspirin 81 mg chewable tablet Take 1 Tab by mouth daily. 12/21/17  Yes TONE Abbott   multivitamin (ONE A DAY) tablet Take 1 Tab by mouth daily. Yes Provider, Historical   nitroglycerin (NITROSTAT) 0.4 mg SL tablet 1 Tab by SubLINGual route every five (5) minutes as needed for Chest Pain. 3/10/16   Ramon Paz MD       Vital Signs: Blood pressure (!) 160/95, pulse 84, temperature 98.2 °F (36.8 °C), resp. rate 18, height 6' (1.829 m), weight 81.6 kg (180 lb), SpO2 94 %.     Physical Exam:   General: alert      Heart: regular rate and rhythm   Lungs: no tachypnea, retractions or cyanosis, Heart exam - S1, S2 normal, no murmur, no gallop, rate regular   Abdominal: Bowel sounds are normal, soft, non distended             Plan of Care/Planned Procedure: EGD/[place]    Signed:  Jim Pizarro MD 8/19/2019

## 2019-08-19 NOTE — ANESTHESIA PREPROCEDURE EVALUATION
Anesthetic History   No history of anesthetic complications            Review of Systems / Medical History  Patient summary reviewed, nursing notes reviewed and pertinent labs reviewed    Pulmonary    COPD (quit smoking 2 yrs ago): moderate      Smoker         Neuro/Psych              Cardiovascular    Hypertension: well controlled          CAD, cardiac stents and hyperlipidemia  Pertinent negatives: No dysrhythmias (9 yrs s/p ablation procedure)  Exercise tolerance: >4 METS  Comments: Stent times one in 2010, stopped Plavix this spring    TTE 8/18/18:  LVEF 55%. DELIO Watchman device placed in 2018    KEN from 12/2018:  -  Left ventricle: Systolic function was normal. Ejection fraction was  estimated in the range of 55 % to 60 %. There were no regional wall motion  abnormalities. -  Left atrium: The atrium was moderately dilated. -  Left atrial appendage: A 30mm Watchman device was present. There was   trivial  peridevice flow noted (clinically insignificant; 0.2cm).    GI/Hepatic/Renal     GERD: well controlled      PUD (remote hx)     Endo/Other        Arthritis and cancer (CLL)     Other Findings            Physical Exam    Airway  Mallampati: II  TM Distance: > 6 cm  Neck ROM: decreased range of motion   Mouth opening: Normal     Cardiovascular    Rhythm: irregular  Rate: normal         Dental    Dentition: Edentulous     Pulmonary  Breath sounds clear to auscultation               Abdominal  GI exam deferred       Other Findings            Anesthetic Plan    ASA: 3  Anesthesia type: total IV anesthesia            Anesthetic plan and risks discussed with: Patient

## 2019-08-19 NOTE — ANESTHESIA POSTPROCEDURE EVALUATION
Procedure(s):  ESOPHAGOGASTRODUODENOSCOPY (EGD)  COLONOSCOPY/ 24  ESOPHAGOGASTRODUODENAL (EGD) BIOPSY  ENDOSCOPIC POLYPECTOMY. total IV anesthesia    Anesthesia Post Evaluation      Multimodal analgesia: multimodal analgesia not used between 6 hours prior to anesthesia start to PACU discharge  Patient location during evaluation: PACU  Patient participation: complete - patient participated  Level of consciousness: awake and alert  Pain management: adequate  Airway patency: patent  Anesthetic complications: no  Cardiovascular status: acceptable  Respiratory status: acceptable  Hydration status: acceptable  Post anesthesia nausea and vomiting:  none      Vitals Value Taken Time   /83 8/19/2019 10:52 AM   Temp 37 °C (98.6 °F) 8/19/2019 10:20 AM   Pulse 73 8/19/2019 10:56 AM   Resp 16 8/19/2019 10:47 AM   SpO2 94 % 8/19/2019 10:55 AM   Vitals shown include unvalidated device data.

## 2019-08-19 NOTE — PROCEDURES
EGD Procedure Note    PreOp Diagnosis:   Dyspepsia   Nausea   GERD     PostOp Diagnosis:  Hiatal hernia     Medications:  Monitored Anesthesia    Procedure:  EGD   Instrument:   After informed consent was obtained, the patient was sedated and the endoscope was inserted  in the mouth and advanced into the duodenum without difficulty. The scope was slowly withdrawn while the mucosa was carefully inspected, including a retroflexed view of the cardia and fundus. Findings:   OROPHARYNX: The piriform sinuses, arytenoid cartilage and vocal cords were briefly evaluated on entry and withdrawal of the endoscope through the oropharynx. These were found to be unremarkable. ESOPHAGUS: The esophageal mucosa was unremarkable. The squamocolumnar junction was intact without erosions, ulcerations, rings, webs or strictures. There was no evidence of Le's type intestinal metaplasia. STOMACH: The gastric mucosa was unremarkable throughout. There were no erosions, ulcerations, polyps, mass lesions, vascular ectasias or other abnormalities noted. The pylorus was patent and easily intubated. There was a 3 cm hiatal hernia noted by direct view and retroflexion within the stomach. Biopsies were obtained from the antrum and body to rule out H. pylori infection as a cause for symptoms. Small amount of bile in the stomach. DUODENUM: The endoscope was advanced as far as possible into the duodenum. The villi appeared normal.  There were no mucosal breaks, erosions, ulcerations, vascular ectasias or other abnormalities present. Recommendations:   Follow up pathology results  Avoid NSAIDs   Continue Protonix   Consider treating for bile gastritis if no improvement     Suleman Javier MD

## 2019-08-19 NOTE — PROCEDURES
Colonoscopy Procedure Note    PreOp Diagnosis:   Colon cancer screening   History of polyps   Left lower quadrant pain     PostOp Diagnosis:  Colon polyps   Diverticulosis   Internal hemorrhoids     Medications:  Monitored Anesthesia    Procedure:  Colonoscopy  Instrument:   AL  After informed consent was obtained, the patient was sedated and the colonoscope was inserted  in the anus and advanced into the cecum without difficulty. The scope was slowly withdrawn while the mucosa was carefully inspected, including a retroflexed view of the rectum. Prep: Good     Findings:   TERMINAL ILEUM:  The terminal ileum was entered and appeared normal.  There were no inflammatory changes and the mucosa appeared intact. COLON:  The appendiceal orifice, cecum and ileocecal valve were positively identified. Retroflexion was performed in the cecum and ascending colon. The colon was carefully examined on slow withdrawal from the cecum to the rectum. There were no inflammatory changes, or vascular malformations. Diverticulosis was identified  In the sigmoid colon, moderate severity. 2 polyps in the descending colon, 4-5 mm and sesame, cold snare polypectomy. RECTUM:  The rectal examination demonstrated hemorrhoids. These are Small and internal .  Otherwise,  antegrade and retroflexed views appeared normal without inflammation, polyps or mass lesions. Recommendations: Follow up on pathology results. High fiber diet for bowel regularity. Daily fiber supplement if needed.    Levsin as needed for abdominal pain   Consider Amitiza Or Linzess if pain and constipation continue   Followup 3 months   Repeat 5 years if adenomas are identified     Martina Tamayo MD

## 2019-08-19 NOTE — ROUTINE PROCESS
VSS. Discharge instructions reviewed with patient and daughter and copy of instructions sent home with patient. Dr. Camejo Kin spoke with patient and daughter prior to discharge. Questions answered. Discharged via car, wheeled out by juan a Gaitan. IV discontinued prior to discharge.      Personal items with patient at discharge: clothing, dentures

## 2019-08-28 PROBLEM — I48.91 A-FIB (HCC): Status: RESOLVED | Noted: 2018-10-16 | Resolved: 2019-08-28

## 2019-08-30 ENCOUNTER — APPOINTMENT (OUTPATIENT)
Dept: CT IMAGING | Age: 64
End: 2019-08-30
Attending: EMERGENCY MEDICINE
Payer: MEDICARE

## 2019-08-30 ENCOUNTER — APPOINTMENT (OUTPATIENT)
Dept: GENERAL RADIOLOGY | Age: 64
End: 2019-08-30
Attending: EMERGENCY MEDICINE
Payer: MEDICARE

## 2019-08-30 ENCOUNTER — HOSPITAL ENCOUNTER (OUTPATIENT)
Age: 64
Setting detail: OBSERVATION
Discharge: HOME | End: 2019-09-01
Attending: EMERGENCY MEDICINE | Admitting: INTERNAL MEDICINE
Payer: MEDICARE

## 2019-08-30 DIAGNOSIS — R07.9 ACUTE CHEST PAIN: Primary | ICD-10-CM

## 2019-08-30 LAB
ALBUMIN SERPL-MCNC: 4.3 G/DL (ref 3.2–4.6)
ALBUMIN/GLOB SERPL: 1.3 {RATIO} (ref 1.2–3.5)
ALP SERPL-CCNC: 98 U/L (ref 50–136)
ALT SERPL-CCNC: 17 U/L (ref 12–65)
ANION GAP SERPL CALC-SCNC: 8 MMOL/L (ref 7–16)
AST SERPL-CCNC: 18 U/L (ref 15–37)
ATRIAL RATE: 92 BPM
BASOPHILS # BLD: 0.2 K/UL (ref 0–0.2)
BASOPHILS NFR BLD: 1 % (ref 0–2)
BILIRUB SERPL-MCNC: 0.6 MG/DL (ref 0.2–1.1)
BUN SERPL-MCNC: 8 MG/DL (ref 8–23)
CALCIUM SERPL-MCNC: 9.5 MG/DL (ref 8.3–10.4)
CALCULATED P AXIS, ECG09: 90 DEGREES
CALCULATED R AXIS, ECG10: 54 DEGREES
CALCULATED T AXIS, ECG11: 70 DEGREES
CHLORIDE SERPL-SCNC: 106 MMOL/L (ref 98–107)
CO2 SERPL-SCNC: 26 MMOL/L (ref 21–32)
CREAT SERPL-MCNC: 0.96 MG/DL (ref 0.8–1.5)
DIAGNOSIS, 93000: NORMAL
DIFFERENTIAL METHOD BLD: ABNORMAL
EOSINOPHIL # BLD: 0.2 K/UL (ref 0–0.8)
EOSINOPHIL NFR BLD: 1 % (ref 0.5–7.8)
ERYTHROCYTE [DISTWIDTH] IN BLOOD BY AUTOMATED COUNT: 12.7 % (ref 11.9–14.6)
GLOBULIN SER CALC-MCNC: 3.3 G/DL (ref 2.3–3.5)
GLUCOSE SERPL-MCNC: 111 MG/DL (ref 65–100)
HCT VFR BLD AUTO: 46.6 % (ref 41.1–50.3)
HGB BLD-MCNC: 15.3 G/DL (ref 13.6–17.2)
IMM GRANULOCYTES # BLD AUTO: 0 K/UL (ref 0–0.5)
IMM GRANULOCYTES NFR BLD AUTO: 0 % (ref 0–5)
LYMPHOCYTES # BLD: 11.1 K/UL (ref 0.5–4.6)
LYMPHOCYTES NFR BLD: 73 % (ref 13–44)
MCH RBC QN AUTO: 30.2 PG (ref 26.1–32.9)
MCHC RBC AUTO-ENTMCNC: 32.8 G/DL (ref 31.4–35)
MCV RBC AUTO: 91.9 FL (ref 79.6–97.8)
MONOCYTES # BLD: 0.6 K/UL (ref 0.1–1.3)
MONOCYTES NFR BLD: 4 % (ref 4–12)
NEUTS SEG # BLD: 3.2 K/UL (ref 1.7–8.2)
NEUTS SEG NFR BLD: 21 % (ref 43–78)
NRBC # BLD: 0.02 K/UL (ref 0–0.2)
P-R INTERVAL, ECG05: 140 MS
PLATELET # BLD AUTO: 243 K/UL (ref 150–450)
PLATELET COMMENTS,PCOM: ADEQUATE
PMV BLD AUTO: 10.4 FL (ref 9.4–12.3)
POTASSIUM SERPL-SCNC: 3.7 MMOL/L (ref 3.5–5.1)
PROT SERPL-MCNC: 7.6 G/DL (ref 6.3–8.2)
Q-T INTERVAL, ECG07: 388 MS
QRS DURATION, ECG06: 74 MS
QTC CALCULATION (BEZET), ECG08: 479 MS
RBC # BLD AUTO: 5.07 M/UL (ref 4.23–5.6)
RBC MORPH BLD: ABNORMAL
SODIUM SERPL-SCNC: 140 MMOL/L (ref 136–145)
TROPONIN I BLD-MCNC: 0.02 NG/ML (ref 0.02–0.05)
TROPONIN I SERPL-MCNC: <0.02 NG/ML (ref 0.02–0.05)
VENTRICULAR RATE, ECG03: 92 BPM
WBC # BLD AUTO: 15.3 K/UL (ref 4.3–11.1)
WBC MORPH BLD: ABNORMAL

## 2019-08-30 PROCEDURE — 74011000258 HC RX REV CODE- 258: Performed by: EMERGENCY MEDICINE

## 2019-08-30 PROCEDURE — 99285 EMERGENCY DEPT VISIT HI MDM: CPT | Performed by: EMERGENCY MEDICINE

## 2019-08-30 PROCEDURE — 71260 CT THORAX DX C+: CPT

## 2019-08-30 PROCEDURE — 99218 HC RM OBSERVATION: CPT

## 2019-08-30 PROCEDURE — 94664 DEMO&/EVAL PT USE INHALER: CPT

## 2019-08-30 PROCEDURE — 80053 COMPREHEN METABOLIC PANEL: CPT

## 2019-08-30 PROCEDURE — 74011250637 HC RX REV CODE- 250/637: Performed by: NURSE PRACTITIONER

## 2019-08-30 PROCEDURE — 74011250637 HC RX REV CODE- 250/637: Performed by: EMERGENCY MEDICINE

## 2019-08-30 PROCEDURE — 94760 N-INVAS EAR/PLS OXIMETRY 1: CPT

## 2019-08-30 PROCEDURE — 96374 THER/PROPH/DIAG INJ IV PUSH: CPT

## 2019-08-30 PROCEDURE — 71046 X-RAY EXAM CHEST 2 VIEWS: CPT

## 2019-08-30 PROCEDURE — 74011636320 HC RX REV CODE- 636/320: Performed by: EMERGENCY MEDICINE

## 2019-08-30 PROCEDURE — 84484 ASSAY OF TROPONIN QUANT: CPT

## 2019-08-30 PROCEDURE — 74011250636 HC RX REV CODE- 250/636: Performed by: NURSE PRACTITIONER

## 2019-08-30 PROCEDURE — 94640 AIRWAY INHALATION TREATMENT: CPT

## 2019-08-30 PROCEDURE — 74011000250 HC RX REV CODE- 250: Performed by: NURSE PRACTITIONER

## 2019-08-30 PROCEDURE — 93005 ELECTROCARDIOGRAM TRACING: CPT | Performed by: EMERGENCY MEDICINE

## 2019-08-30 PROCEDURE — 85025 COMPLETE CBC W/AUTO DIFF WBC: CPT

## 2019-08-30 PROCEDURE — 36415 COLL VENOUS BLD VENIPUNCTURE: CPT

## 2019-08-30 PROCEDURE — 77030013140 HC MSK NEB VYRM -A

## 2019-08-30 RX ORDER — GUAIFENESIN 100 MG/5ML
324 LIQUID (ML) ORAL
Status: COMPLETED | OUTPATIENT
Start: 2019-08-30 | End: 2019-08-30

## 2019-08-30 RX ORDER — AMLODIPINE BESYLATE 10 MG/1
10 TABLET ORAL EVERY EVENING
Status: DISCONTINUED | OUTPATIENT
Start: 2019-08-30 | End: 2019-09-01 | Stop reason: HOSPADM

## 2019-08-30 RX ORDER — GUAIFENESIN 100 MG/5ML
81 LIQUID (ML) ORAL DAILY
Status: DISCONTINUED | OUTPATIENT
Start: 2019-08-31 | End: 2019-09-01 | Stop reason: HOSPADM

## 2019-08-30 RX ORDER — MECLIZINE HYDROCHLORIDE 25 MG/1
25 TABLET ORAL
Status: DISCONTINUED | OUTPATIENT
Start: 2019-08-30 | End: 2019-09-01 | Stop reason: HOSPADM

## 2019-08-30 RX ORDER — MORPHINE SULFATE 2 MG/ML
2 INJECTION, SOLUTION INTRAMUSCULAR; INTRAVENOUS
Status: DISCONTINUED | OUTPATIENT
Start: 2019-08-30 | End: 2019-09-01 | Stop reason: HOSPADM

## 2019-08-30 RX ORDER — NITROGLYCERIN 0.4 MG/1
0.4 TABLET SUBLINGUAL
Status: DISCONTINUED | OUTPATIENT
Start: 2019-08-30 | End: 2019-09-01 | Stop reason: HOSPADM

## 2019-08-30 RX ORDER — SODIUM CHLORIDE 0.9 % (FLUSH) 0.9 %
5-40 SYRINGE (ML) INJECTION EVERY 8 HOURS
Status: DISCONTINUED | OUTPATIENT
Start: 2019-08-30 | End: 2019-09-01 | Stop reason: HOSPADM

## 2019-08-30 RX ORDER — NITROGLYCERIN 0.4 MG/1
0.4 TABLET SUBLINGUAL
Status: DISCONTINUED | OUTPATIENT
Start: 2019-08-30 | End: 2019-08-30 | Stop reason: SDUPTHER

## 2019-08-30 RX ORDER — SODIUM CHLORIDE 0.9 % (FLUSH) 0.9 %
10 SYRINGE (ML) INJECTION
Status: COMPLETED | OUTPATIENT
Start: 2019-08-30 | End: 2019-08-30

## 2019-08-30 RX ORDER — ALBUTEROL SULFATE 0.83 MG/ML
2.5 SOLUTION RESPIRATORY (INHALATION)
Status: DISCONTINUED | OUTPATIENT
Start: 2019-08-30 | End: 2019-09-01 | Stop reason: HOSPADM

## 2019-08-30 RX ORDER — SODIUM CHLORIDE 0.9 % (FLUSH) 0.9 %
5-40 SYRINGE (ML) INJECTION AS NEEDED
Status: DISCONTINUED | OUTPATIENT
Start: 2019-08-30 | End: 2019-09-01 | Stop reason: HOSPADM

## 2019-08-30 RX ORDER — SOTALOL HYDROCHLORIDE 80 MG/1
80 TABLET ORAL EVERY 12 HOURS
Status: DISCONTINUED | OUTPATIENT
Start: 2019-08-30 | End: 2019-09-01 | Stop reason: HOSPADM

## 2019-08-30 RX ORDER — PANTOPRAZOLE SODIUM 40 MG/1
40 TABLET, DELAYED RELEASE ORAL DAILY
Status: DISCONTINUED | OUTPATIENT
Start: 2019-08-31 | End: 2019-09-01 | Stop reason: HOSPADM

## 2019-08-30 RX ORDER — ROPINIROLE 0.25 MG/1
1 TABLET, FILM COATED ORAL
Status: DISCONTINUED | OUTPATIENT
Start: 2019-08-30 | End: 2019-09-01 | Stop reason: HOSPADM

## 2019-08-30 RX ORDER — HYOSCYAMINE SULFATE 0.12 MG/1
0.12 TABLET SUBLINGUAL
Status: DISCONTINUED | OUTPATIENT
Start: 2019-08-30 | End: 2019-09-01 | Stop reason: HOSPADM

## 2019-08-30 RX ORDER — HYOSCYAMINE SULFATE 0.12 MG/1
0.12 TABLET SUBLINGUAL
COMMUNITY

## 2019-08-30 RX ORDER — SERTRALINE HYDROCHLORIDE 50 MG/1
100 TABLET, FILM COATED ORAL DAILY
Status: DISCONTINUED | OUTPATIENT
Start: 2019-08-31 | End: 2019-09-01 | Stop reason: HOSPADM

## 2019-08-30 RX ADMIN — NITROGLYCERIN 1 INCH: 20 OINTMENT TOPICAL at 17:42

## 2019-08-30 RX ADMIN — ROPINIROLE HYDROCHLORIDE 1 MG: 0.25 TABLET, FILM COATED ORAL at 21:08

## 2019-08-30 RX ADMIN — NITROGLYCERIN 0.4 MG: 0.4 TABLET SUBLINGUAL at 12:04

## 2019-08-30 RX ADMIN — Medication 5 ML: at 21:09

## 2019-08-30 RX ADMIN — Medication 10 ML: at 13:47

## 2019-08-30 RX ADMIN — Medication 10 ML: at 17:53

## 2019-08-30 RX ADMIN — AMLODIPINE BESYLATE 10 MG: 10 TABLET ORAL at 17:42

## 2019-08-30 RX ADMIN — SOTALOL HYDROCHLORIDE 80 MG: 80 TABLET ORAL at 21:08

## 2019-08-30 RX ADMIN — SODIUM CHLORIDE 100 ML: 900 INJECTION, SOLUTION INTRAVENOUS at 13:47

## 2019-08-30 RX ADMIN — HYOSCYAMINE SULFATE 0.12 MG: 0.12 TABLET ORAL at 21:08

## 2019-08-30 RX ADMIN — MORPHINE SULFATE 2 MG: 2 INJECTION, SOLUTION INTRAMUSCULAR; INTRAVENOUS at 21:08

## 2019-08-30 RX ADMIN — NITROGLYCERIN 0.4 MG: 0.4 TABLET SUBLINGUAL at 12:10

## 2019-08-30 RX ADMIN — ASPIRIN 81 MG 324 MG: 81 TABLET ORAL at 12:03

## 2019-08-30 RX ADMIN — IOPAMIDOL 100 ML: 755 INJECTION, SOLUTION INTRAVENOUS at 13:47

## 2019-08-30 RX ADMIN — ALBUTEROL SULFATE 2.5 MG: 2.5 SOLUTION RESPIRATORY (INHALATION) at 20:23

## 2019-08-30 NOTE — PROGRESS NOTES
Bedside and Verbal shift change report given to Judith Gonzales RN (oncoming nurse) by self Marcia Ruelas nurse).  Report included the following information SBAR, Kardex, ED Summary, Intake/Output, MAR, Recent Results and Cardiac Rhythm SR.

## 2019-08-30 NOTE — ED NOTES
TRANSFER - OUT REPORT:    Verbal report given to 334(name) on Pearl Link  being transferred to [facility], RN(unit) for routine progression of care       Report consisted of patients Situation, Background, Assessment and   Recommendations(SBAR). Information from the following report(s) SBAR, ED Summary and Recent Results was reviewed with the receiving nurse. Lines:   Peripheral IV 08/30/19 Left Antecubital (Active)        Opportunity for questions and clarification was provided.       Patient transported with:   Monitor

## 2019-08-30 NOTE — PROGRESS NOTES
TRANSFER - IN REPORT:    Verbal report received from Preston Tom RN on Dontae Irby being received from ER for routine progression of care. Report consisted of patients Situation, Background, Assessment and Recommendations(SBAR). Information from the following report(s) SBAR, Kardex, ED Summary, Intake/Output, MAR, Recent Results and Cardiac Rhythm SR was reviewed. Opportunity for questions and clarification was provided. Assessment completed upon patients arrival to unit and care assumed. Patient received to room 334 at 1425. Patient connected to monitor and assessment completed. Plan of care reviewed. Patient oriented to room and call light. Patient aware to use call light to communicate any chest pain or needs. Admission skin assessment completed with second RN and reveals the following:  BLE dry and flaky. No real abrasions or breakdown. Heels intact. Sacrum c/d/i. Not red. R arm has a few cuts/abrasions. Scattered bruising to BUE.

## 2019-08-30 NOTE — PROGRESS NOTES
Patient is being admitted to floor from ER  calm      Will follow upon transfer to floor    Minh Sorto,  Staff garcia  C: [phone]  /  [email or web address]

## 2019-08-30 NOTE — H&P
[address] Cardiology H&P    Admitting Cardiologist:Dr. Tisha Beck    Primary Cardiologist:Dr Violeta Borrero     Primary Care Physician:Dr. Florida Sanchez Brothers    Subjective:     Arron Limon is a 71 y. o.male with known hx of CAD, last cath in 2017 with PCI to OM and noted 50% lesion in LAD. He presents to ER today with new onset of severe chest pain and SOB while ambulating across parking lot. He was here taking stool sample to GI when his symptoms started. He took nothing for pain but came to ER and was given sl ntg with resolution of pain. No recent bouts of CP. He has been compliant with taking his medications. Pain radiated down left arm, 5/10 intensity.  S/p watchman, recent colonoscopy and EGD with biopsies     Past Medical History:   Diagnosis Date    Arrhythmia     A Fib twice since 7/2010-- ablation no problems since    Arthritis     CAD (coronary artery disease) 11/2009    stent x 1-- ablation 2010-- no problems since    Cancer [facility])     skin cancer    Chest pain     Chronic midline low back pain without sciatica 8/28/2017    CLL (chronic lymphocytic leukemia) ([address] 7/24/2018    COPD     prn inhalers    Coronary atherosclerosis of native coronary artery 12/30/2015    Depression     Dyspnea on exertion     with any activity; associated with nausea    GERD (gastroesophageal reflux disease) 7/22/2010    Heart failure ([address]     Hematochezia     HTN (hypertension) x 1 month    started on med--- controlled    Hypercholesteremia     controlled by medication    Other ill-defined conditions(799.89)     dyslipidemia/hyperlipidemia    Palpitations 12/30/2015    Paroxysmal atrial fibrillation ([address]     Psychiatric disorder     depression    PUD (peptic ulcer disease) 2010    hx-- r/t coumadin--    Recurrent depression ([address] 8/8/2016    Tobacco use disorder 12/30/2015    Unspecified adverse effect of anesthesia 1978    quit breathing      Past Surgical History:   Procedure Laterality Date    CARDIAC SURG PROCEDURE UNLIST      LAAO 10/18    COLONOSCOPY N/A 8/19/2019    COLONOSCOPY/ 24 performed by Enoch Lundberg MD at [address] [facility] HX CHOLECYSTECTOMY      HX COLONOSCOPY  2012    HX HEART CATHETERIZATION  11/2009    PCI x 1 to LAD  ----ablation 2010    HX HEART CATHETERIZATION  3/2016    HX HEENT  1977 and 1978    eardrum repair-- right    HX HERNIA REPAIR  1990    Right inguinal    HX LAP CHOLECYSTECTOMY  1995    HX ORTHOPAEDIC Left     left shoulder surg x1    HX ORTHOPAEDIC Left 2012    hand    HX OTHER SURGICAL      EGD    HX SHOULDER ARTHROSCOPY Right 2000    x 3 on right      Current Facility-Administered Medications   Medication Dose Route Frequency    nitroglycerin (NITROSTAT) tablet 0.4 mg  0.4 mg SubLINGual Q5MIN PRN     Current Outpatient Medications   Medication Sig    rOPINIRole (REQUIP) 1 mg tablet Take 1 Tab by mouth nightly.  umeclidinium-vilanterol (ANORO ELLIPTA) 62.5-25 mcg/actuation inhaler Take 1 Puff by inhalation daily. Indications: Bronchospasm Prevention with COPD    meclizine (ANTIVERT) 25 mg tablet Take 1 Tab by mouth three (3) times daily as needed for Dizziness. Indications: sensation of spinning or whirling    amLODIPine (NORVASC) 10 mg tablet TAKE 1 TABLET EVERY EVENING    albuterol (VENTOLIN HFA) 90 mcg/actuation inhaler Take  by inhalation as needed.  albuterol-ipratropium (DUO-NEB) 2.5 mg-0.5 mg/3 ml nebu 3 mL by Nebulization route every four (4) hours as needed.  pantoprazole (PROTONIX) 40 mg tablet Take 1 Tab by mouth daily.  sertraline (ZOLOFT) 100 mg tablet TAKE 1 TABLET EVERY DAY    sotalol (BETAPACE) 80 mg tablet TAKE 1 TABLET TWICE DAILY.  aspirin 81 mg chewable tablet Take 1 Tab by mouth daily.  nitroglycerin (NITROSTAT) 0.4 mg SL tablet 1 Tab by SubLINGual route every five (5) minutes as needed for Chest Pain.  multivitamin (ONE A DAY) tablet Take 1 Tab by mouth daily.      Allergies   Allergen Reactions    Fentanyl Other (comments)     States had through a PCA after shoulder surgery in 2000, and \"throat swelled shut\" and that he \"quit breathing\". Update 11/16/10--Patient thinks the PCA gave him to much and it wasn't an allergy to drug itself.  Lovastatin Other (comments)     Hullucinations    Niacin Other (comments)     Denies allery    Advicor [Niacin-Lovastatin] Anaphylaxis and Itching      Social History     Tobacco Use    Smoking status: Former Smoker     Packs/day: 1.00     Years: 45.00     Pack years: 45.00     Types: Cigarettes     Last attempt to quit: 3/4/2016     Years since quitting: 3.4    Smokeless tobacco: Never Used   Substance Use Topics    Alcohol use: No     Alcohol/week: 0.0 standard drinks      Family History   Problem Relation Age of Onset    Heart Disease Father     Heart Attack Father     Hypertension Father     Bleeding Prob Father     Heart Surgery Father         multiple PCIs, first in his 46s    Heart Disease Brother         PCI x 2    Diabetes Mother     Cancer Mother         lung    Diabetes Brother     Diabetes Brother     Heart Attack Brother     Heart Disease Brother     Diabetes Brother     Bleeding Prob Brother         blood clots    Heart Disease Brother     No Known Problems Brother         Review of Systems  Gen: Denies fever, chills, malaise or fatigue. Appetite good. HEENT: Denies frequent headaches, dizzyness, visual disturbances, Neck pain or swallowing difficulty  Lungs: as above, chronic SOB, increased severity in last two weeks. Cardiovascular:as above  GI: Denies hememesis, dark tarry stools, No prior Hx of GI bleed, Denies constipation  : Denies dysuria, no complaints of frequency, nocturia  Heme: No prior bleeding disorders, no prior Cancer  Neuro: Denies prior CVA, TIA. Endocrine: no diabetes, thyroid disorders  Psychiatric: Denies anxiety, or other psychiatric illnesses.      Objective:     Visit Vitals  /68   Pulse 89   Temp 98.4 °F (36.9 °C) Resp 18   Ht 6' 1\" (1.854 m)   Wt 81.6 kg (180 lb)   SpO2 90%   BMI 23.75 kg/m²     General:Alert, cooperative, no distress, appears stated age  Head: Normocephalic, without obvious abnormality, atraumatic. Eyes: Conjunctivae/corneas clear. PERRL, EOMs intact  Nose:Nares normal. Septum midline. Mucosa normal. No drainage or sinus tenderness. Throat: Lips, mucosa, and tongue normal. Teeth and gums normal.   Neck: Supple, symmetrical, trachea midline,  no carotid bruit and no JVD. Lungs:Clear to auscultation bilaterally. Chest wall: No tenderness or deformity. Heart: Regular rate and rhythm, S1, S2 normal, no murmur, click, rub or gallop. Abdomen:Soft, non-tender. Bowel sounds normal. No masses, No organomegaly. Extremities: Extremities normal, atraumatic, no cyanosis or edema. Pulses: 2+ and symmetric all extremities. Skin: Skin color, texture, turgor normal. No rashes or lesions  Lymph nodes: Cervical, supraclavicular, and axillary nodes normal  Neurologic:No focal deficits identified                 ECG:NSR with nonspecific st/ t wave changes.      Data Review:     Recent Results (from the past 24 hour(s))   EKG, 12 LEAD, INITIAL    Collection Time: 08/30/19 11:20 AM   Result Value Ref Range    Ventricular Rate 92 BPM    Atrial Rate 92 BPM    P-R Interval 140 ms    QRS Duration 74 ms    Q-T Interval 388 ms    QTC Calculation (Bezet) 479 ms    Calculated P Axis 90 degrees    Calculated R Axis 54 degrees    Calculated T Axis 70 degrees    Diagnosis       Normal sinus rhythm  Septal infarct , age undetermined  Abnormal ECG  When compared with ECG of 06-DEC-2018 08:39,  Premature ventricular complexes are no longer Present  ST now depressed in Lateral leads     CBC WITH AUTOMATED DIFF    Collection Time: 08/30/19 11:36 AM   Result Value Ref Range    WBC 15.3 (H) 4.3 - 11.1 K/uL    RBC 5.07 4.23 - 5.6 M/uL    HGB 15.3 13.6 - 17.2 g/dL    HCT 46.6 41.1 - 50.3 %    MCV 91.9 79.6 - 97.8 FL    MCH 30.2 26.1 - 32.9 PG    MCHC 32.8 31.4 - 35.0 g/dL    RDW 12.7 11.9 - 14.6 %    PLATELET 238 622 - 342 K/uL    MPV 10.4 9.4 - 12.3 FL    ABSOLUTE NRBC 0.02 0.0 - 0.2 K/uL    NEUTROPHILS 21 (L) 43 - 78 %    LYMPHOCYTES 73 (H) 13 - 44 %    MONOCYTES 4 4.0 - 12.0 %    EOSINOPHILS 1 0.5 - 7.8 %    BASOPHILS 1 0.0 - 2.0 %    IMMATURE GRANULOCYTES 0 0.0 - 5.0 %    ABS. NEUTROPHILS 3.2 1.7 - 8.2 K/UL    ABS. LYMPHOCYTES 11.1 (H) 0.5 - 4.6 K/UL    ABS. MONOCYTES 0.6 0.1 - 1.3 K/UL    ABS. EOSINOPHILS 0.2 0.0 - 0.8 K/UL    ABS. BASOPHILS 0.2 0.0 - 0.2 K/UL    ABS. IMM. GRANS. 0.0 0.0 - 0.5 K/UL    RBC COMMENTS NORMOCYTIC/NORMOCHROMIC      WBC COMMENTS MARKED      PLATELET COMMENTS ADEQUATE      DF AUTOMATED     METABOLIC PANEL, COMPREHENSIVE    Collection Time: 08/30/19 11:36 AM   Result Value Ref Range    Sodium 140 136 - 145 mmol/L    Potassium 3.7 3.5 - 5.1 mmol/L    Chloride 106 98 - 107 mmol/L    CO2 26 21 - 32 mmol/L    Anion gap 8 7 - 16 mmol/L    Glucose 111 (H) 65 - 100 mg/dL    BUN 8 8 - 23 MG/DL    Creatinine 0.96 0.8 - 1.5 MG/DL    GFR est AA >60 >60 ml/min/1.73m2    GFR est non-AA >60 >60 ml/min/1.73m2    Calcium 9.5 8.3 - 10.4 MG/DL    Bilirubin, total 0.6 0.2 - 1.1 MG/DL    ALT (SGPT) 17 12 - 65 U/L    AST (SGOT) 18 15 - 37 U/L    Alk. phosphatase 98 50 - 136 U/L    Protein, total 7.6 6.3 - 8.2 g/dL    Albumin 4.3 3.2 - 4.6 g/dL    Globulin 3.3 2.3 - 3.5 g/dL    A-G Ratio 1.3 1.2 - 3.5     TROPONIN I    Collection Time: 08/30/19 11:36 AM   Result Value Ref Range    Troponin-I, Qt. <0.02 (L) 0.02 - 0.05 NG/ML         Assessment / Plan     Principal Problem:    Chest pain (8/30/2019)--admit to telemetry, check serial enzymes, plan for definitive repeat angiiography to evaluate CAD. Active Problems:    CAD (coronary artery disease) (12/1/2009)--as above       Overview: pci to lad 11/19 2.75x28 Xience drug eluting stent.          Dyslipidemia (12/1/2009)--statin       Atrial fibrillation (HCC) (7/22/2010)--in NSR on low dose sotalol--continue, not on oral anticoagulation       Overview: Left atrial appendage occlusion (10/16/18):  30 mm Watchman device. HTN (hypertension) (7/22/2010)--continue home meds, titrate as needed. Coronary atherosclerosis of native coronary artery (12/30/2015)      COPD, very severe ([facility] [address] (8/28/2017)--continue home meds. CLL (chronic lymphocytic leukemia) (HCC) (7/24/2018)--chronic leukocytosis.                Gabi Omalley, NP

## 2019-08-30 NOTE — ED PROVIDER NOTES
Patient presents to the ER complaining of chest pain. Patient reports approximately 2 minutes ago he had onset of pain in the center part of his chest, radiating down to his left arm. Reports pain was rated approximately 8 out of 10. Does report some shortness of breath. Denies any vomiting or diaphoresis. Upon arrival to the ER he reports his pain is currently a 3 out of 10. Denies any cough, fevers or chills. Reports pain is somewhat made worse by taking a deep breath    The history is provided by the patient. Chest Pain (Angina)    This is a new problem. The current episode started less than 1 hour ago. The problem has been gradually improving. The pain is present in the substernal region and left side. The pain is at a severity of 5/10. The pain is moderate. The quality of the pain is described as pressure-like. The pain radiates to the left arm. Associated symptoms include shortness of breath. Pertinent negatives include no abdominal pain, no back pain, no cough, no exertional chest pressure, no fever, no malaise/fatigue, no numbness and no vomiting. He has tried nothing for the symptoms. Procedural history includes cardiac stents.        Past Medical History:   Diagnosis Date    Arrhythmia     A Fib twice since 7/2010-- ablation no problems since    Arthritis     CAD (coronary artery disease) 11/2009    stent x 1-- ablation 2010-- no problems since    Cancer [facility])     skin cancer    Chest pain     Chronic midline low back pain without sciatica 8/28/2017    CLL (chronic lymphocytic leukemia) ([facility] [address] 7/24/2018    COPD     prn inhalers    Coronary atherosclerosis of native coronary artery 12/30/2015    Depression     Dyspnea on exertion     with any activity; associated with nausea    GERD (gastroesophageal reflux disease) 7/22/2010    Heart failure ([facility] [address]     Hematochezia     HTN (hypertension) x 1 month    started on med--- controlled    Hypercholesteremia     controlled by medication    Other ill-defined conditions(799.89)     dyslipidemia/hyperlipidemia    Palpitations 12/30/2015    Paroxysmal atrial fibrillation ([facility] [address]     Psychiatric disorder     depression    PUD (peptic ulcer disease) 2010    hx-- r/t coumadin--    Recurrent depression ([facility] [address] 8/8/2016    Tobacco use disorder 12/30/2015    Unspecified adverse effect of anesthesia 1978    quit breathing       Past Surgical History:   Procedure Laterality Date    CARDIAC SURG PROCEDURE UNLIST      [address] 10/18    COLONOSCOPY N/A 8/19/2019    COLONOSCOPY/ 24 performed by Janes Vega MD at [place] [address] [facility] HX COLONOSCOPY  2012    HX HEART CATHETERIZATION  11/2009    PCI x 1 to LAD  ----ablation 2010    HX HEART CATHETERIZATION  3/2016    HX HEENT  1977 and 1978    eardrum repair-- right    HX HERNIA REPAIR  1990    Right inguinal    HX LAP CHOLECYSTECTOMY  1995    HX ORTHOPAEDIC Left     left shoulder surg x1    HX ORTHOPAEDIC Left 2012    hand    HX OTHER SURGICAL      EGD    HX SHOULDER ARTHROSCOPY Right 2000    x 3 on right         Family History:   Problem Relation Age of Onset    Heart Disease Father     Heart Attack Father     Hypertension Father     Bleeding Prob Father     Heart Surgery Father         multiple PCIs, first in his 46s    Heart Disease Brother         PCI x 2    Diabetes Mother     Cancer Mother         lung    Diabetes Brother     Diabetes Brother     Heart Attack Brother     Heart Disease Brother     Diabetes Brother     Bleeding Prob Brother         blood clots    Heart Disease Brother     No Known Problems Brother        Social History     Socioeconomic History    Marital status:      Spouse name: Not on file    Number of children: Not on file    Years of education: Not on file    Highest education level: Not on file   Occupational History    Not on file   Social Needs    Financial resource strain: Not on file    Food insecurity:     Worry: Not on file     Inability: Not on file    Transportation needs:     Medical: Not on file     Non-medical: Not on file   Tobacco Use    Smoking status: Former Smoker     Packs/day: 1.00     Years: 45.00     Pack years: 45.00     Types: Cigarettes     Last attempt to quit: 3/4/2016     Years since quitting: 3.4    Smokeless tobacco: Never Used   Substance and Sexual Activity    Alcohol use: No     Alcohol/week: 0.0 standard drinks    Drug use: No    Sexual activity: Not on file   Lifestyle    Physical activity:     Days per week: Not on file     Minutes per session: Not on file    Stress: Not on file   Relationships    Social connections:     Talks on phone: Not on file     Gets together: Not on file     Attends Anabaptist service: Not on file     Active member of club or organization: Not on file     Attends meetings of clubs or organizations: Not on file     Relationship status: Not on file    Intimate partner violence:     Fear of current or ex partner: Not on file     Emotionally abused: Not on file     Physically abused: Not on file     Forced sexual activity: Not on file   Other Topics Concern    Not on file   Social History Narrative    Not on file         ALLERGIES: Fentanyl; Lovastatin; Niacin; and Advicor [niacin-lovastatin]    Review of Systems   Constitutional: Negative for fatigue, fever, malaise/fatigue and unexpected weight change. HENT: Negative for congestion. Eyes: Negative for photophobia, redness and visual disturbance. Respiratory: Positive for chest tightness and shortness of breath. Negative for cough. Cardiovascular: Positive for chest pain. Gastrointestinal: Negative for abdominal pain and vomiting. Genitourinary: Negative for frequency. Musculoskeletal: Negative for arthralgias and back pain. Skin: Negative for color change and pallor. Neurological: Negative for light-headedness and numbness. Hematological: Negative for adenopathy.  Does not bruise/bleed easily. Psychiatric/Behavioral: Negative for behavioral problems. All other systems reviewed and are negative. Vitals:    08/30/19 1133   BP: (!) 145/97   Pulse: 93   Resp: 18   Temp: 98.4 °F (36.9 °C)   SpO2: 94%   Weight: 81.6 kg (180 lb)   Height: 6' 1\" (1.854 m)            Physical Exam   Constitutional: He is oriented to person, place, and time. He appears well-developed and well-nourished. HENT:   Head: Normocephalic and atraumatic. Eyes: Pupils are equal, round, and reactive to light. Conjunctivae and EOM are normal.   Cardiovascular: Normal rate and regular rhythm. No murmur heard. Pulmonary/Chest: Effort normal and breath sounds normal. No stridor. No respiratory distress. Abdominal: Soft. Bowel sounds are normal. He exhibits no distension. There is no tenderness. Musculoskeletal: Normal range of motion. He exhibits no edema or deformity. Neurological: He is alert and oriented to person, place, and time. No cranial nerve deficit. Nursing note and vitals reviewed. MDM  Number of Diagnoses or Management Options  Diagnosis management comments: Differential diagnosis includes acute coronary syndrome, PE, effusion    2:21 PM  WBCs to be 15.3 but this appears to be stable in comparison to previous labs  Initial normal troponin. Did obtain CT scan of chest that shows no signs of PE. Symptomatically patient is improved after being treated with aspirin nitroglycerin. Will discuss case with cardiology. Amount and/or Complexity of Data Reviewed  Clinical lab tests: reviewed and ordered  Tests in the radiology section of CPT®: ordered and reviewed  Discuss the patient with other providers: yes  Independent visualization of images, tracings, or specimens: yes (EKG shows a normal sinus rhythm, rate of 92, no blocks.   Normal axis, no acute ischemic changes in comparison to previous EKG)    Risk of Complications, Morbidity, and/or Mortality  Presenting problems: moderate  Diagnostic procedures: low  Management options: low    Patient Progress  Patient progress: improved         Procedures          Results Include:    Recent Results (from the past 24 hour(s))   EKG, 12 LEAD, INITIAL    Collection Time: 08/30/19 11:20 AM   Result Value Ref Range    Ventricular Rate 92 BPM    Atrial Rate 92 BPM    P-R Interval 140 ms    QRS Duration 74 ms    Q-T Interval 388 ms    QTC Calculation (Bezet) 479 ms    Calculated P Axis 90 degrees    Calculated R Axis 54 degrees    Calculated T Axis 70 degrees    Diagnosis       Normal sinus rhythm  Septal infarct , age undetermined  Abnormal ECG  When compared with ECG of 06-DEC-2018 08:39,  Premature ventricular complexes are no longer Present  ST now depressed in Lateral leads     CBC WITH AUTOMATED DIFF    Collection Time: 08/30/19 11:36 AM   Result Value Ref Range    WBC 15.3 (H) 4.3 - 11.1 K/uL    RBC 5.07 4.23 - 5.6 M/uL    HGB 15.3 13.6 - 17.2 g/dL    HCT 46.6 41.1 - 50.3 %    MCV 91.9 79.6 - 97.8 FL    MCH 30.2 26.1 - 32.9 PG    MCHC 32.8 31.4 - 35.0 g/dL    RDW 12.7 11.9 - 14.6 %    PLATELET 540 522 - 319 K/uL    MPV 10.4 9.4 - 12.3 FL    ABSOLUTE NRBC 0.02 0.0 - 0.2 K/uL    NEUTROPHILS 21 (L) 43 - 78 %    LYMPHOCYTES 73 (H) 13 - 44 %    MONOCYTES 4 4.0 - 12.0 %    EOSINOPHILS 1 0.5 - 7.8 %    BASOPHILS 1 0.0 - 2.0 %    IMMATURE GRANULOCYTES 0 0.0 - 5.0 %    ABS. NEUTROPHILS 3.2 1.7 - 8.2 K/UL    ABS. LYMPHOCYTES 11.1 (H) 0.5 - 4.6 K/UL    ABS. MONOCYTES 0.6 0.1 - 1.3 K/UL    ABS. EOSINOPHILS 0.2 0.0 - 0.8 K/UL    ABS. BASOPHILS 0.2 0.0 - 0.2 K/UL    ABS. IMM.  GRANS. 0.0 0.0 - 0.5 K/UL    RBC COMMENTS NORMOCYTIC/NORMOCHROMIC      WBC COMMENTS MARKED      PLATELET COMMENTS ADEQUATE      DF AUTOMATED     METABOLIC PANEL, COMPREHENSIVE    Collection Time: 08/30/19 11:36 AM   Result Value Ref Range    Sodium 140 136 - 145 mmol/L    Potassium 3.7 3.5 - 5.1 mmol/L    Chloride 106 98 - 107 mmol/L    CO2 26 21 - 32 mmol/L    Anion gap 8 7 - 16 mmol/L    Glucose 111 (H) 65 - 100 mg/dL    BUN 8 8 - 23 MG/DL    Creatinine 0.96 0.8 - 1.5 MG/DL    GFR est AA >60 >60 ml/min/1.73m2    GFR est non-AA >60 >60 ml/min/1.73m2    Calcium 9.5 8.3 - 10.4 MG/DL    Bilirubin, total 0.6 0.2 - 1.1 MG/DL    ALT (SGPT) 17 12 - 65 U/L    AST (SGOT) 18 15 - 37 U/L    Alk. phosphatase 98 50 - 136 U/L    Protein, total 7.6 6.3 - 8.2 g/dL    Albumin 4.3 3.2 - 4.6 g/dL    Globulin 3.3 2.3 - 3.5 g/dL    A-G Ratio 1.3 1.2 - 3.5     TROPONIN I    Collection Time: 08/30/19 11:36 AM   Result Value Ref Range    Troponin-I, Qt. <0.02 (L) 0.02 - 0.05 NG/ML     Voice dictation software was used during the making of this note. This software is not perfect and grammatical and other typographical errors may be present. This note has been proofread, but may still contain errors.   Calli Lindo MD; 8/30/2019 @2:22 PM   ===================================================================

## 2019-08-31 LAB
ANION GAP SERPL CALC-SCNC: 5 MMOL/L (ref 7–16)
BUN SERPL-MCNC: 11 MG/DL (ref 8–23)
CALCIUM SERPL-MCNC: 8.7 MG/DL (ref 8.3–10.4)
CHLORIDE SERPL-SCNC: 107 MMOL/L (ref 98–107)
CHOLEST SERPL-MCNC: 137 MG/DL
CO2 SERPL-SCNC: 30 MMOL/L (ref 21–32)
CREAT SERPL-MCNC: 0.91 MG/DL (ref 0.8–1.5)
CRP SERPL HS-MCNC: 0.8 MG/L
ERYTHROCYTE [DISTWIDTH] IN BLOOD BY AUTOMATED COUNT: 12.7 % (ref 11.9–14.6)
ERYTHROCYTE [SEDIMENTATION RATE] IN BLOOD: 2 MM/HR (ref 0–20)
GLUCOSE SERPL-MCNC: 99 MG/DL (ref 65–100)
HCT VFR BLD AUTO: 41.8 % (ref 41.1–50.3)
HDLC SERPL-MCNC: 37 MG/DL (ref 40–60)
HDLC SERPL: 3.7 {RATIO}
HGB BLD-MCNC: 13.6 G/DL (ref 13.6–17.2)
LDLC SERPL CALC-MCNC: 68.2 MG/DL
LIPID PROFILE,FLP: ABNORMAL
MCH RBC QN AUTO: 30.2 PG (ref 26.1–32.9)
MCHC RBC AUTO-ENTMCNC: 32.5 G/DL (ref 31.4–35)
MCV RBC AUTO: 92.9 FL (ref 79.6–97.8)
NRBC # BLD: 0.02 K/UL (ref 0–0.2)
PLATELET # BLD AUTO: 205 K/UL (ref 150–450)
PMV BLD AUTO: 10.1 FL (ref 9.4–12.3)
POTASSIUM SERPL-SCNC: 3.8 MMOL/L (ref 3.5–5.1)
RBC # BLD AUTO: 4.5 M/UL (ref 4.23–5.6)
SODIUM SERPL-SCNC: 142 MMOL/L (ref 136–145)
TRIGL SERPL-MCNC: 159 MG/DL (ref 35–150)
VLDLC SERPL CALC-MCNC: 31.8 MG/DL (ref 6–23)
WBC # BLD AUTO: 12.6 K/UL (ref 4.3–11.1)

## 2019-08-31 PROCEDURE — 99218 HC RM OBSERVATION: CPT

## 2019-08-31 PROCEDURE — 74011250636 HC RX REV CODE- 250/636: Performed by: NURSE PRACTITIONER

## 2019-08-31 PROCEDURE — 74011000250 HC RX REV CODE- 250: Performed by: NURSE PRACTITIONER

## 2019-08-31 PROCEDURE — 74011250636 HC RX REV CODE- 250/636

## 2019-08-31 PROCEDURE — 77030020263 HC SOL INJ SOD CL0.9% LFCR 1000ML

## 2019-08-31 PROCEDURE — 80061 LIPID PANEL: CPT

## 2019-08-31 PROCEDURE — 99152 MOD SED SAME PHYS/QHP 5/>YRS: CPT

## 2019-08-31 PROCEDURE — 93458 L HRT ARTERY/VENTRICLE ANGIO: CPT

## 2019-08-31 PROCEDURE — 96376 TX/PRO/DX INJ SAME DRUG ADON: CPT

## 2019-08-31 PROCEDURE — 74011636320 HC RX REV CODE- 636/320: Performed by: INTERNAL MEDICINE

## 2019-08-31 PROCEDURE — 36415 COLL VENOUS BLD VENIPUNCTURE: CPT

## 2019-08-31 PROCEDURE — 86141 C-REACTIVE PROTEIN HS: CPT

## 2019-08-31 PROCEDURE — 74011250637 HC RX REV CODE- 250/637: Performed by: INTERNAL MEDICINE

## 2019-08-31 PROCEDURE — 85652 RBC SED RATE AUTOMATED: CPT

## 2019-08-31 PROCEDURE — 77030013687 HC GD NDL BARD -B

## 2019-08-31 PROCEDURE — C1894 INTRO/SHEATH, NON-LASER: HCPCS

## 2019-08-31 PROCEDURE — 85027 COMPLETE CBC AUTOMATED: CPT

## 2019-08-31 PROCEDURE — 74011000250 HC RX REV CODE- 250: Performed by: INTERNAL MEDICINE

## 2019-08-31 PROCEDURE — C8929 TTE W OR WO FOL WCON,DOPPLER: HCPCS

## 2019-08-31 PROCEDURE — 77010033678 HC OXYGEN DAILY

## 2019-08-31 PROCEDURE — 80048 BASIC METABOLIC PNL TOTAL CA: CPT

## 2019-08-31 PROCEDURE — 74011250637 HC RX REV CODE- 250/637: Performed by: NURSE PRACTITIONER

## 2019-08-31 PROCEDURE — 77030004559 HC CATH ANGI DX SUPT CARD -B

## 2019-08-31 PROCEDURE — 74011250636 HC RX REV CODE- 250/636: Performed by: INTERNAL MEDICINE

## 2019-08-31 PROCEDURE — 94760 N-INVAS EAR/PLS OXIMETRY 1: CPT

## 2019-08-31 PROCEDURE — 99153 MOD SED SAME PHYS/QHP EA: CPT

## 2019-08-31 PROCEDURE — 94640 AIRWAY INHALATION TREATMENT: CPT

## 2019-08-31 PROCEDURE — 77030004558 HC CATH ANGI DX SUPR TORQ CARD -A

## 2019-08-31 RX ORDER — SODIUM CHLORIDE 0.9 % (FLUSH) 0.9 %
5-40 SYRINGE (ML) INJECTION EVERY 8 HOURS
Status: DISCONTINUED | OUTPATIENT
Start: 2019-08-31 | End: 2019-09-01 | Stop reason: HOSPADM

## 2019-08-31 RX ORDER — MORPHINE SULFATE 2 MG/ML
1 INJECTION, SOLUTION INTRAMUSCULAR; INTRAVENOUS
Status: DISCONTINUED | OUTPATIENT
Start: 2019-08-31 | End: 2019-09-01 | Stop reason: HOSPADM

## 2019-08-31 RX ORDER — HYDROCODONE BITARTRATE AND ACETAMINOPHEN 5; 325 MG/1; MG/1
1 TABLET ORAL
Status: DISCONTINUED | OUTPATIENT
Start: 2019-08-31 | End: 2019-09-01 | Stop reason: HOSPADM

## 2019-08-31 RX ORDER — MIDAZOLAM HYDROCHLORIDE 1 MG/ML
.5-2 INJECTION, SOLUTION INTRAMUSCULAR; INTRAVENOUS
Status: DISCONTINUED | OUTPATIENT
Start: 2019-08-31 | End: 2019-09-01 | Stop reason: HOSPADM

## 2019-08-31 RX ORDER — NALOXONE HYDROCHLORIDE 0.4 MG/ML
0.4 INJECTION, SOLUTION INTRAMUSCULAR; INTRAVENOUS; SUBCUTANEOUS AS NEEDED
Status: DISCONTINUED | OUTPATIENT
Start: 2019-08-31 | End: 2019-09-01 | Stop reason: HOSPADM

## 2019-08-31 RX ORDER — SODIUM CHLORIDE 9 MG/ML
75 INJECTION, SOLUTION INTRAVENOUS CONTINUOUS
Status: DISCONTINUED | OUTPATIENT
Start: 2019-08-31 | End: 2019-09-01 | Stop reason: HOSPADM

## 2019-08-31 RX ORDER — LIDOCAINE HYDROCHLORIDE 10 MG/ML
2-20 INJECTION INFILTRATION; PERINEURAL
Status: DISCONTINUED | OUTPATIENT
Start: 2019-08-31 | End: 2019-09-01 | Stop reason: HOSPADM

## 2019-08-31 RX ORDER — HEPARIN SODIUM 200 [USP'U]/100ML
2 INJECTION, SOLUTION INTRAVENOUS CONTINUOUS
Status: DISPENSED | OUTPATIENT
Start: 2019-08-31 | End: 2019-08-31

## 2019-08-31 RX ORDER — SODIUM CHLORIDE 0.9 % (FLUSH) 0.9 %
5-40 SYRINGE (ML) INJECTION AS NEEDED
Status: DISCONTINUED | OUTPATIENT
Start: 2019-08-31 | End: 2019-09-01 | Stop reason: HOSPADM

## 2019-08-31 RX ORDER — ISOSORBIDE MONONITRATE 30 MG/1
30 TABLET, EXTENDED RELEASE ORAL DAILY
Status: DISCONTINUED | OUTPATIENT
Start: 2019-08-31 | End: 2019-09-01 | Stop reason: HOSPADM

## 2019-08-31 RX ORDER — FENTANYL CITRATE 50 UG/ML
25-100 INJECTION, SOLUTION INTRAMUSCULAR; INTRAVENOUS
Status: DISCONTINUED | OUTPATIENT
Start: 2019-08-31 | End: 2019-09-01 | Stop reason: HOSPADM

## 2019-08-31 RX ORDER — ONDANSETRON 2 MG/ML
4 INJECTION INTRAMUSCULAR; INTRAVENOUS
Status: DISCONTINUED | OUTPATIENT
Start: 2019-08-31 | End: 2019-09-01 | Stop reason: HOSPADM

## 2019-08-31 RX ORDER — ACETAMINOPHEN 325 MG/1
650 TABLET ORAL
Status: DISCONTINUED | OUTPATIENT
Start: 2019-08-31 | End: 2019-09-01 | Stop reason: HOSPADM

## 2019-08-31 RX ADMIN — NITROGLYCERIN 1 INCH: 20 OINTMENT TOPICAL at 06:10

## 2019-08-31 RX ADMIN — PANTOPRAZOLE SODIUM 40 MG: 40 TABLET, DELAYED RELEASE ORAL at 07:22

## 2019-08-31 RX ADMIN — NITROGLYCERIN 1 INCH: 20 OINTMENT TOPICAL at 00:12

## 2019-08-31 RX ADMIN — HYDROCODONE BITARTRATE AND ACETAMINOPHEN 1 TABLET: 5; 325 TABLET ORAL at 19:58

## 2019-08-31 RX ADMIN — PERFLUTREN 1 ML: 6.52 INJECTION, SUSPENSION INTRAVENOUS at 11:00

## 2019-08-31 RX ADMIN — ALBUTEROL SULFATE 2.5 MG: 2.5 SOLUTION RESPIRATORY (INHALATION) at 20:57

## 2019-08-31 RX ADMIN — SOTALOL HYDROCHLORIDE 80 MG: 80 TABLET ORAL at 07:22

## 2019-08-31 RX ADMIN — LIDOCAINE HYDROCHLORIDE 5 ML: 10 INJECTION, SOLUTION INFILTRATION; PERINEURAL at 09:10

## 2019-08-31 RX ADMIN — ROPINIROLE HYDROCHLORIDE 1 MG: 0.25 TABLET, FILM COATED ORAL at 21:47

## 2019-08-31 RX ADMIN — SERTRALINE HYDROCHLORIDE 100 MG: 50 TABLET ORAL at 07:22

## 2019-08-31 RX ADMIN — SODIUM CHLORIDE 75 ML/HR: 900 INJECTION, SOLUTION INTRAVENOUS at 08:05

## 2019-08-31 RX ADMIN — Medication 10 ML: at 05:55

## 2019-08-31 RX ADMIN — LIDOCAINE HYDROCHLORIDE 4 ML: 10 INJECTION, SOLUTION INFILTRATION; PERINEURAL at 08:53

## 2019-08-31 RX ADMIN — ASPIRIN 81 MG 81 MG: 81 TABLET ORAL at 07:22

## 2019-08-31 RX ADMIN — TIOTROPIUM BROMIDE 18 MCG: 18 CAPSULE ORAL; RESPIRATORY (INHALATION) at 08:11

## 2019-08-31 RX ADMIN — MORPHINE SULFATE 2 MG: 2 INJECTION, SOLUTION INTRAMUSCULAR; INTRAVENOUS at 07:23

## 2019-08-31 RX ADMIN — ALBUTEROL SULFATE 2.5 MG: 2.5 SOLUTION RESPIRATORY (INHALATION) at 07:58

## 2019-08-31 RX ADMIN — MIDAZOLAM HYDROCHLORIDE 2 MG: 1 INJECTION, SOLUTION INTRAMUSCULAR; INTRAVENOUS at 09:10

## 2019-08-31 RX ADMIN — HEPARIN SODIUM 2 ML/HR: 200 INJECTION, SOLUTION INTRAVENOUS at 08:32

## 2019-08-31 RX ADMIN — FENTANYL CITRATE 25 MCG: 50 INJECTION, SOLUTION INTRAMUSCULAR; INTRAVENOUS at 09:00

## 2019-08-31 RX ADMIN — IOPAMIDOL 80 ML: 755 INJECTION, SOLUTION INTRAVENOUS at 09:33

## 2019-08-31 RX ADMIN — Medication 10 ML: at 14:03

## 2019-08-31 RX ADMIN — ISOSORBIDE MONONITRATE 30 MG: 30 TABLET, EXTENDED RELEASE ORAL at 11:46

## 2019-08-31 RX ADMIN — SOTALOL HYDROCHLORIDE 80 MG: 80 TABLET ORAL at 21:49

## 2019-08-31 RX ADMIN — ALBUTEROL SULFATE 2.5 MG: 2.5 SOLUTION RESPIRATORY (INHALATION) at 14:21

## 2019-08-31 RX ADMIN — MIDAZOLAM HYDROCHLORIDE 2 MG: 1 INJECTION, SOLUTION INTRAMUSCULAR; INTRAVENOUS at 08:53

## 2019-08-31 RX ADMIN — HYDROCODONE BITARTRATE AND ACETAMINOPHEN 1 TABLET: 5; 325 TABLET ORAL at 14:01

## 2019-08-31 RX ADMIN — MORPHINE SULFATE 2 MG: 2 INJECTION, SOLUTION INTRAMUSCULAR; INTRAVENOUS at 11:17

## 2019-08-31 NOTE — PROCEDURES
Brief Cardiac Procedure Note    Patient: Jojo Pichardo MRN: [id number]  SSN: [id number]    YOB: 1955  Age: 71 y.o. Sex: male      Date of Procedure: 8/31/2019     Pre-procedure Diagnosis: Unstable Angina    Post-procedure Diagnosis: Coronary Artery Disease    Reason for Procedure: New Onset Angina < or = 2 Months    Procedure: Left Heart Catheterization    Brief Description of Procedure: LHC via R fem artery. (Failed access of R radial). Performed By: Brittani De La Garza MD     Assistants: None    Anesthesia: Moderate Sedation    Estimated Blood Loss: Less than 10 mL      Specimens: None    Implants: None    Findings: LM normal, LAD luminal irregs, Circ patent stent in OM, fistula present with mid circ and DELIO flow demonstrated around Watchmen device. RCA non-dom 90% in sub 2 mm RA branch. Complications: None    Recommendations: Check echo for effusion, start IMDUR. Check CRP/ESR. R/o pericarditis from watchmen device.     Signed By: Brittani De La Garza MD     August 31, 2019

## 2019-08-31 NOTE — PROGRESS NOTES
TRANSFER - OUT REPORT:    Verbal report given to Jewell VALERA(name) on Trung Ulloa  being transferred to [facility](unit) for routine progression of care       Report consisted of patients Situation, Background, Assessment and   Recommendations(SBAR). Information from the following report(s) Procedure Summary, MAR and Cardiac Rhythm nsr was reviewed with the receiving nurse. Lines:   Peripheral IV 81/79/75 Left Basilic (Active)   Site Assessment Clean, dry, & intact 8/31/2019  7:15 AM   Phlebitis Assessment 0 8/31/2019  7:15 AM   Infiltration Assessment 0 8/31/2019  7:15 AM   Dressing Status Clean, dry, & intact 8/31/2019  7:15 AM   Dressing Type Transparent 8/31/2019  7:15 AM   Hub Color/Line Status Capped 8/31/2019  7:15 AM   Alcohol Cap Used No 8/31/2019  3:00 AM        Opportunity for questions and clarification was provided. Patient transported with:   Registered Nurse    [facility] Dr Jake Foy only  Right radial access attempted - tegaderm pressure dressing to site  Right femoral access  - 6 fr sheath removed and manual pressure helod until hemostasis achieved.   Versed 4 mg IV  Fent 25 mcg IV

## 2019-08-31 NOTE — PROGRESS NOTES
[facility] CARDIOLOGY PROGRESS NOTE           8/31/2019 10:07 AM    Admit Date: 8/30/2019         Subjective: Cath showed concerning finding of fistula between Circ and DELIO ? Erosion of Watchmen into coronary artery. Echo pending    ROS:  Cardiovascular:  As noted above    Objective:      Vitals:    08/31/19 0123 08/31/19 0456 08/31/19 0811 08/31/19 0935   BP: 100/65 92/66  111/69   Pulse: 81 70  68   Resp: 20 18  18   Temp: 98.3 °F (36.8 °C) 97.8 °F (36.6 °C)     SpO2: 95% 95% 95% 95%   Weight:  81.1 kg (178 lb 12.8 oz)     Height:  6' 1\" (1.854 m)         On telemetry:      Physical Exam:  General: Well Developed, Well Nourished, No Acute Distress, Alert & Oriented x 3, Appropriate mood  Neck: supple, no JVD  Heart: S1S2 with RRR without murmurs or gallops  Lungs: Clear throughout auscultation bilaterally without adventitious sounds  Abd: soft, nontender, nondistended, with good bowel sounds  Ext: no edema bilaterally  Skin: warm and dry      Data Review:   Recent Labs     08/31/19  0454 08/30/19  1136    140   K 3.8 3.7   BUN 11 8   CREA 0.91 0.96   GLU 99 111*   WBC 12.6* 15.3*   HGB 13.6 15.3   HCT 41.8 46.6    243   CHOL 137  --    LDLC 68.2  --    HDL 37*  --        Recent Labs     08/30/19  2232 08/30/19  1717 08/30/19  1525 08/30/19  1136   TNIPOC  --   --  0.02  --    TROIQ <0.02* <0.02*  --  <0.02*         Assessment/Plan:     Principal Problem:    Chest pain (8/30/2019)    Active Problems:    CAD (coronary artery disease) (12/1/2009)      Overview: pci to lad 11/19 2.75x28 Xience drug eluting stent. 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        HTN (hypertension) (7/22/2010)      Coronary atherosclerosis of native coronary artery (12/30/2015)      COPD, very severe ([facility] [address] (8/28/2017)      CLL (chronic lymphocytic leukemia) ([facility] (7/24/2018)      A/P  1) CP - start imdur 30 mg daily for small vessel disease, check CRP/ESR and echo  2) Watchmen as above  3) HTN - continue home meds  4) COPD - continue home meds    Butch Gutiérrez MD  8/31/2019 10:07 AM

## 2019-08-31 NOTE — PROCEDURES
[facility]  CARDIAC CATH    Name:  Denise Espitia  MR#:  [id number]  :  1955  ACCOUNT #:  [de-identified]  DATE OF SERVICE:  2019    PROCEDURES PERFORMED:  Left heart catheterization with selective coronary angiography. PREOPERATIVE DIAGNOSIS:  Angina. POSTOPERATIVE DIAGNOSIS:  Coronary artery disease. SURGEON:  Tania Mccullough. David Patel MD    ASSISTANT:  None. ESTIMATED BLOOD LOSS:  5 mL. SPECIMENS REMOVED:  None. COMPLICATIONS:  None. IMPLANTS:  None. ANESTHESIA:  The patient received 4 of Versed and 25 mcg of fentanyl and was monitored for conscious sedation beginning at 08:54 and ending at 09:40 by nurse, Mack Fajardo. PROCEDURE:  After informed consent, the patient was prepped and draped in the usual sterile fashion. The right wrist was infiltrated with lidocaine. An attempt was made to access the right radial artery that was unsuccessful, so the right groin was infiltrated with lidocaine and the right common femoral was accessed under ultrasound guidance via the modified Seldinger technique with a 6-Malian sheath. A total of 80 mL of Isovue contrast was used for the entire procedure. Terumo band was used for hemostasis. CATHETERS USED:  Included a 6-Malian JL4, 6-Malian JR4, and 6-Malian angled pigtail catheter. FINDINGS:  1. Left ventricle:  Left ventricular ejection fraction is estimated at 55%-60% with normal wall motion. 2.  LVEDP:  3 mmHg. 3.  Left main:  Normal.  4.  Left anterior descending artery had a stent in the midportion that had mild 10% in-stent restenosis, had luminal irregularities throughout without any obstructive lesions. 5.  The circumflex artery gave rise to a large obtuse marginal with a patent stent in its proximal portion. The midportion of the circumflex artery appeared to have a fistula that provided blood flow into the left atrial appendage and around previously placed Watchman device.   6.  The right coronary artery was nondominant and had a 90% lesion and a small right atrial sub 2-mm branch. CONCLUSIONS:  This is a 41-year-old male with a known history of coronary artery disease who presents with some small vessel obstructive disease and what appears to be a fistula between his circumflex and left atrial appendage concerning for erosion after a Watchman device was placed. We will check an echocardiogram, CRP, and ESR.   We will plan on starting him on low-dose long-acting nitrates and ongoing inpatient management      Milady Ruiz MD      [id number]  D:  08/31/2019 9:49  T:  08/31/2019 10:00  JOB #:  [id number]

## 2019-09-01 VITALS
SYSTOLIC BLOOD PRESSURE: 147 MMHG | TEMPERATURE: 98.4 F | DIASTOLIC BLOOD PRESSURE: 92 MMHG | WEIGHT: 179.2 LBS | RESPIRATION RATE: 18 BRPM | HEART RATE: 71 BPM | OXYGEN SATURATION: 92 % | HEIGHT: 73 IN | BODY MASS INDEX: 23.75 KG/M2

## 2019-09-01 LAB
ANION GAP SERPL CALC-SCNC: 5 MMOL/L (ref 7–16)
BASOPHILS # BLD: 0.1 K/UL (ref 0–0.2)
BASOPHILS NFR BLD: 0 % (ref 0–2)
BUN SERPL-MCNC: 12 MG/DL (ref 8–23)
CALCIUM SERPL-MCNC: 8.4 MG/DL (ref 8.3–10.4)
CHLORIDE SERPL-SCNC: 107 MMOL/L (ref 98–107)
CO2 SERPL-SCNC: 30 MMOL/L (ref 21–32)
CREAT SERPL-MCNC: 0.93 MG/DL (ref 0.8–1.5)
DIFFERENTIAL METHOD BLD: ABNORMAL
EOSINOPHIL # BLD: 0.2 K/UL (ref 0–0.8)
EOSINOPHIL NFR BLD: 1 % (ref 0.5–7.8)
ERYTHROCYTE [DISTWIDTH] IN BLOOD BY AUTOMATED COUNT: 12.7 % (ref 11.9–14.6)
GLUCOSE SERPL-MCNC: 106 MG/DL (ref 65–100)
HCT VFR BLD AUTO: 38.4 % (ref 41.1–50.3)
HGB BLD-MCNC: 12.5 G/DL (ref 13.6–17.2)
IMM GRANULOCYTES # BLD AUTO: 0 K/UL (ref 0–0.5)
IMM GRANULOCYTES NFR BLD AUTO: 0 % (ref 0–5)
LYMPHOCYTES # BLD: 7.6 K/UL (ref 0.5–4.6)
LYMPHOCYTES NFR BLD: 59 % (ref 13–44)
MCH RBC QN AUTO: 30.5 PG (ref 26.1–32.9)
MCHC RBC AUTO-ENTMCNC: 32.6 G/DL (ref 31.4–35)
MCV RBC AUTO: 93.7 FL (ref 79.6–97.8)
MONOCYTES # BLD: 1.3 K/UL (ref 0.1–1.3)
MONOCYTES NFR BLD: 10 % (ref 4–12)
NEUTS SEG # BLD: 3.8 K/UL (ref 1.7–8.2)
NEUTS SEG NFR BLD: 29 % (ref 43–78)
NRBC # BLD: 0.05 K/UL (ref 0–0.2)
PLATELET # BLD AUTO: 192 K/UL (ref 150–450)
PMV BLD AUTO: 10.3 FL (ref 9.4–12.3)
POTASSIUM SERPL-SCNC: 3.7 MMOL/L (ref 3.5–5.1)
RBC # BLD AUTO: 4.1 M/UL (ref 4.23–5.6)
SODIUM SERPL-SCNC: 142 MMOL/L (ref 136–145)
WBC # BLD AUTO: 12.9 K/UL (ref 4.3–11.1)

## 2019-09-01 PROCEDURE — 74011250637 HC RX REV CODE- 250/637: Performed by: NURSE PRACTITIONER

## 2019-09-01 PROCEDURE — 36415 COLL VENOUS BLD VENIPUNCTURE: CPT

## 2019-09-01 PROCEDURE — 99218 HC RM OBSERVATION: CPT

## 2019-09-01 PROCEDURE — 85025 COMPLETE CBC W/AUTO DIFF WBC: CPT

## 2019-09-01 PROCEDURE — 94640 AIRWAY INHALATION TREATMENT: CPT

## 2019-09-01 PROCEDURE — 94760 N-INVAS EAR/PLS OXIMETRY 1: CPT

## 2019-09-01 PROCEDURE — 80048 BASIC METABOLIC PNL TOTAL CA: CPT

## 2019-09-01 PROCEDURE — 74011000250 HC RX REV CODE- 250: Performed by: NURSE PRACTITIONER

## 2019-09-01 PROCEDURE — 77010033678 HC OXYGEN DAILY

## 2019-09-01 PROCEDURE — 74011250637 HC RX REV CODE- 250/637: Performed by: INTERNAL MEDICINE

## 2019-09-01 RX ORDER — ISOSORBIDE MONONITRATE 30 MG/1
30 TABLET, EXTENDED RELEASE ORAL DAILY
Qty: 30 TAB | Refills: 11 | Status: SHIPPED | OUTPATIENT
Start: 2019-09-01 | End: 2019-09-04 | Stop reason: SDUPTHER

## 2019-09-01 RX ORDER — ACETAMINOPHEN 325 MG/1
650 TABLET ORAL
Status: SHIPPED | COMMUNITY
Start: 2019-09-01

## 2019-09-01 RX ADMIN — SERTRALINE HYDROCHLORIDE 100 MG: 50 TABLET ORAL at 08:16

## 2019-09-01 RX ADMIN — ISOSORBIDE MONONITRATE 30 MG: 30 TABLET, EXTENDED RELEASE ORAL at 08:16

## 2019-09-01 RX ADMIN — TIOTROPIUM BROMIDE 18 MCG: 18 CAPSULE ORAL; RESPIRATORY (INHALATION) at 07:47

## 2019-09-01 RX ADMIN — PANTOPRAZOLE SODIUM 40 MG: 40 TABLET, DELAYED RELEASE ORAL at 08:16

## 2019-09-01 RX ADMIN — ALBUTEROL SULFATE 2.5 MG: 2.5 SOLUTION RESPIRATORY (INHALATION) at 07:47

## 2019-09-01 RX ADMIN — ASPIRIN 81 MG 81 MG: 81 TABLET ORAL at 08:16

## 2019-09-01 RX ADMIN — SOTALOL HYDROCHLORIDE 80 MG: 80 TABLET ORAL at 08:16

## 2019-09-01 RX ADMIN — Medication 5 ML: at 05:19

## 2019-09-01 RX ADMIN — HYDROCODONE BITARTRATE AND ACETAMINOPHEN 1 TABLET: 5; 325 TABLET ORAL at 08:16

## 2019-09-01 NOTE — PROGRESS NOTES
Discharge instructions given and reviewed with patient. Prescription given, questions answered. Discharged home.

## 2019-09-01 NOTE — DISCHARGE SUMMARY
[facility] Discharge Summary     Patient ID:  Cb Gaitan  [id number]  85 y.o.  1955    Admit date: 2019    Discharge date:  19    Admitting Physician: Kyle Ferrer MD     Discharge Physician: Jonh Telles NP/Dr. El Dawson    Admission Diagnoses: Chest pain [R07.9]    Discharge Diagnoses:   Patient Active Problem List    Diagnosis Date Noted    Chest pain 2019    Diarrhea of infectious origin 2018    Gastroenteritis 2018    Esophagitis 2018    CLL (chronic lymphocytic leukemia) ([facility] 2018    Chronic midline low back pain without sciatica 2017    COPD, very severe ([facility] [address] 2017    Duodenal ulcer 2016    [facility] 2016    Dizziness 2016    Tobacco use disorder 2015    Coronary atherosclerosis of native coronary artery 2015    Atrial fibrillation ([facility] [address] 2010    S/P coronary artery stent placement (2.75 x 28 Xience drug-eluting stent to mLAD 09) 2010    HTN (hypertension) 2010    GERD (gastroesophageal reflux disease) 2010    CAD (coronary artery disease) 2009    Dyslipidemia 2009       Cardiology Procedures this admission:  Diagnostic left heart catheterization  EchoCardiogram  Consults: None    Hospital Course: Patient was seen in the ED for complaints of new onset of severe chest pain and SOB while ambulating across parking lot and was subsequently scheduled for a LHC at [facility] on 2019. Patient underwent cardiac catheterization by Dr. Gianluca Tatum That showed the followin. Left ventricle:  Left ventricular ejection fraction is estimated at 55%-60% with normal wall motion. 2.  LVEDP:  3 mmHg. 3.  Left main:  Normal.  4.  Left anterior descending artery had a stent in the midportion that had mild 10% in-stent restenosis, had luminal irregularities throughout without any obstructive lesions.   5.  The circumflex artery gave rise to a large obtuse marginal with a patent stent in its proximal portion. The midportion of the circumflex artery appeared to have a fistula that provided blood flow into the left atrial appendage and around previously placed Watchman device. 6.  The right coronary artery was nondominant and had a 90% lesion and a small right atrial sub 2-mm branch. Patient tolerated the procedure well and was taken to the telemetry floor for recovery. Echo Showed the Following: SUMMARY:    -  Left ventricle: Systolic function was at the lower limits of normal.  Ejection fraction was estimated in the range of 50 % to 55 %. This study was  inadequate for the evaluation of regional wall motion. The following morning patient was up feeling well without any complaints of chest pain or shortness of breath. Patient's right radial cath site was clean, dry and intact without hematoma or bruit. Patient's labs were WNL. Patient was seen and examined by Dr. Jerry Anderson and determined stable and ready for discharge. Patient was instructed on the importance of medication compliance including taking aspirin. For maximized medical therapy for CAD, patient will continue BB, no ace due to hypotention, and statin as well. Norvasc stopped due to hypotension. The patient will follow up with [facility] [address] [facility] [place] For a close follow up and will maintain a BP log. DISPOSITION: The patient is being discharged home in stable condition on a low saturated fat, low cholesterol and low salt diet. The patient is instructed to advance activities as tolerated to the limit of fatigue or shortness of breath. The patient is instructed to avoid all heavy lifting for 5 days. The patient is instructed to watch the cath site for bleeding/oozing; if seen, the patient is instructed to apply firm pressure with a clean cloth and call [address] Card[facility] at [phone].  The patient is instructed to watch for signs of infection which include: increasing area of redness, fever/hot to touch or purulent drainage at the catheterization site. The patient is instructed not to soak in a bathtub for 7-10 days, but is cleared to shower. The patient is instructed to call the office or return to the ER for immediate evaluation for any shortness of breath or chest pain not relieved by NTG. Discharge Exam:   Visit Vitals  /61 (BP 1 Location: Right arm, BP Patient Position: At rest)   Pulse 73   Temp 97.8 °F (36.6 °C)   Resp 17   Ht 6' 1\" (1.854 m)   Wt 81.3 kg (179 lb 3.2 oz)   SpO2 91%   BMI 23.64 kg/m²     Patient has been seen by Dr. An Thomas: see his progress note for exam details. Recent Results (from the past 24 hour(s))   CBC WITH AUTOMATED DIFF    Collection Time: 09/01/19  4:31 AM   Result Value Ref Range    WBC 12.9 (H) 4.3 - 11.1 K/uL    RBC 4.10 (L) 4.23 - 5.6 M/uL    HGB 12.5 (L) 13.6 - 17.2 g/dL    HCT 38.4 (L) 41.1 - 50.3 %    MCV 93.7 79.6 - 97.8 FL    MCH 30.5 26.1 - 32.9 PG    MCHC 32.6 31.4 - 35.0 g/dL    RDW 12.7 11.9 - 14.6 %    PLATELET 755 867 - 973 K/uL    MPV 10.3 9.4 - 12.3 FL    ABSOLUTE NRBC 0.05 0.0 - 0.2 K/uL    DF AUTOMATED      NEUTROPHILS 29 (L) 43 - 78 %    LYMPHOCYTES 59 (H) 13 - 44 %    MONOCYTES 10 4.0 - 12.0 %    EOSINOPHILS 1 0.5 - 7.8 %    BASOPHILS 0 0.0 - 2.0 %    IMMATURE GRANULOCYTES 0 0.0 - 5.0 %    ABS. NEUTROPHILS 3.8 1.7 - 8.2 K/UL    ABS. LYMPHOCYTES 7.6 (H) 0.5 - 4.6 K/UL    ABS. MONOCYTES 1.3 0.1 - 1.3 K/UL    ABS. EOSINOPHILS 0.2 0.0 - 0.8 K/UL    ABS. BASOPHILS 0.1 0.0 - 0.2 K/UL    ABS. IMM.  GRANS. 0.0 0.0 - 0.5 K/UL   METABOLIC PANEL, BASIC    Collection Time: 09/01/19  4:31 AM   Result Value Ref Range    Sodium 142 136 - 145 mmol/L    Potassium 3.7 3.5 - 5.1 mmol/L    Chloride 107 98 - 107 mmol/L    CO2 30 21 - 32 mmol/L    Anion gap 5 (L) 7 - 16 mmol/L    Glucose 106 (H) 65 - 100 mg/dL    BUN 12 8 - 23 MG/DL    Creatinine 0.93 0.8 - 1.5 MG/DL    GFR est AA >60 >60 ml/min/1.73m2    GFR est non-AA >60 >60 ml/min/1.73m2    Calcium 8.4 8.3 - 10.4 MG/DL         Patient Instructions:   Current Discharge Medication List      START taking these medications    Details   isosorbide mononitrate ER (IMDUR) 30 mg tablet Take 1 Tab by mouth daily. Qty: 30 Tab, Refills: 11      acetaminophen (TYLENOL) 325 mg tablet Take 2 Tabs by mouth every four (4) hours as needed. CONTINUE these medications which have NOT CHANGED    Details   hyoscyamine SL (LEVSIN/SL) 0.125 mg SL tablet 0.125 mg by SubLINGual route every four (4) hours as needed for Cramping. rOPINIRole (REQUIP) 1 mg tablet Take 1 Tab by mouth nightly. Qty: 30 Tab, Refills: 5    Associated Diagnoses: Restless legs      umeclidinium-vilanterol (ANORO ELLIPTA) 62.5-25 mcg/actuation inhaler Take 1 Puff by inhalation daily. Indications: Bronchospasm Prevention with COPD  Qty: 3 Inhaler, Refills: 3    Associated Diagnoses: COPD, very severe (HCC)      meclizine (ANTIVERT) 25 mg tablet Take 1 Tab by mouth three (3) times daily as needed for Dizziness. Indications: sensation of spinning or whirling  Qty: 90 Tab, Refills: 1    Associated Diagnoses: Benign paroxysmal positional vertigo due to bilateral vestibular disorder      albuterol (VENTOLIN HFA) 90 mcg/actuation inhaler Take  by inhalation as needed. albuterol-ipratropium (DUO-NEB) 2.5 mg-0.5 mg/3 ml nebu 3 mL by Nebulization route every four (4) hours as needed. Qty: 30 Nebule, Refills: 0      pantoprazole (PROTONIX) 40 mg tablet Take 1 Tab by mouth daily. Qty: 90 Tab, Refills: 3    Associated Diagnoses: Gastroesophageal reflux disease with esophagitis      sertraline (ZOLOFT) 100 mg tablet TAKE 1 TABLET EVERY DAY  Qty: 90 Tab, Refills: 3    Associated Diagnoses: Recurrent major depressive disorder, in full remission (HCC)      sotalol (BETAPACE) 80 mg tablet TAKE 1 TABLET TWICE DAILY. Qty: 180 Tab, Refills: 3      aspirin 81 mg chewable tablet Take 1 Tab by mouth daily.       nitroglycerin (NITROSTAT) 0.4 mg SL tablet 1 Tab by SubLINGual route every five (5) minutes as needed for Chest Pain. Qty: 25 Tab, Refills: 11      multivitamin (ONE A DAY) tablet Take 1 Tab by mouth daily.          STOP taking these medications       amLODIPine (NORVASC) 10 mg tablet Comments:   Reason for Stopping:                 Signed:  NATALIA Islas  9/1/2019  7:56 AM

## 2019-09-01 NOTE — DISCHARGE INSTRUCTIONS
HEART CATHETERIZATION/ANGIOGRAPHY DISCHARGE INSTRUCTIONS    1. Check puncture site frequently for swelling or bleeding. If there is any bleeding, lie down and apply pressure over the area with a clean towel or washcloth. Notify your doctor for any redness, swelling, drainage, or oozing from the puncture site. Notify your doctor for any fever or chills. 2. If the extremity becomes cold, numb, or painful call [facility]  3. Activity should be limited for the next 48 hours. Climb stairs as little as possible and avoid any stooping, bending, or strenuous activity for 48 hours. No heavy lifting (anything over 10 pounds) for 3 days. 4. You may resume your usual diet. Drink more fluids than usual.  5. Have a responsible person drive you home and stay with you for at least 24 hours after your heart catheterization/angiography. 6. You may remove bandage from your Right and Groin in 24 hours. You may shower in 24 hours. No tub baths, hot tubs, or swimming for 1 week. Do not place any lotions, creams, powders, or ointments over puncture site for 1 week. You may place a clean band-aid over the puncture site each day for 5 days. Change daily. I have read the above instructions and have had the opportunity to ask questions. The patient is being discharged home in stable condition on a low saturated fat, low cholesterol and low salt diet. The patient is instructed to advance activities as tolerated to the limit of fatigue or shortness of breath. The patient is instructed to avoid all heavy lifting for 5 days. The patient is instructed to watch the cath site for bleeding/oozing; if seen, the patient is instructed to apply firm pressure with a clean cloth and call [facility] at [phone]. The patient is instructed to watch for signs of infection which include: increasing area of redness, fever/hot to touch or purulent drainage at the catheterization site.  The patient is instructed not to soak in a bathtub for 7-10 days, but is cleared to shower. The patient is instructed to call the office or return to the ER for immediate evaluation for any shortness of breath or chest pain not relieved by NTG.    Patient Education   Isosorbide Mononitrate (By mouth)   Isosorbide Mononitrate (eye-melchor-SOR-bide hhx-pf-RIH-trate)  Prevents angina. This medicine is a nitrate. Brand Name(s):   There may be other brand names for this medicine. When This Medicine Should Not Be Used: This medicine is not right for everyone. Do not use it if you had an allergic reaction to isosorbide or similar medicines. How to Use This Medicine:   Tablet, Long Acting Tablet  · Take your medicine as directed. Your dose may need to be changed several times to find what works best for you. You should take this medicine first thing in the morning. Carefully follow the same schedule each day so the medicine will work properly. · It is best to take this medicine on an empty stomach with at least half a glass of water. · Swallow the extended-release tablet whole. Do not crush, break, or chew it. · Missed dose: Take a dose as soon as you remember. If it is almost time for your next dose, wait until then and take a regular dose. Do not take extra medicine to make up for a missed dose. · Store the medicine in a closed container at room temperature, away from heat, moisture, and direct light. Drugs and Foods to Avoid:   Ask your doctor or pharmacist before using any other medicine, including over-the-counter medicines, vitamins, and herbal products. · Do not use this medicine together with riociguat, sildenafil, tadalafil, or vardenafil. · Some foods and medicines can affect how isosorbide mononitrate works. Tell your doctor if you are using blood pressure medicine or if you drink alcohol. Warnings While Using This Medicine:   · Tell your doctor if you are pregnant or breastfeeding.  Tell him if you had a recent heart attack or you have heart failure, an enlarged heart, low blood pressure, or other heart problems. · This medicine may cause the following problems:  ¨ Severe low blood pressure  ¨ Worsening of chest pain caused by an enlarged heart  · Medicines that treat chest pain sometimes cause headaches. These headaches are a sign that the medicine is working. Do not stop taking the medicine or change the time you take it in order to avoid the headaches. Ask your doctor if you can take aspirin or acetaminophen to treat the headache. · This medicine may make you dizzy. Do not drive or do anything else that could be dangerous until you know how this medicine affects you. You may feel lightheaded when standing, so stand up slowly. Drinking alcohol may make these symptoms worse. · Do not stop using this medicine suddenly. Your doctor will need to slowly decrease your dose before you stop it completely. · Your doctor will do lab tests at regular visits to check on the effects of this medicine. Keep all appointments. · Keep all medicine out of the reach of children. Never share your medicine with anyone. Possible Side Effects While Using This Medicine:   Call your doctor right away if you notice any of these side effects:  · Allergic reaction: Itching or hives, swelling in your face or hands, swelling or tingling in your mouth or throat, chest tightness, trouble breathing  · Severe or ongoing dizziness, lightheadedness, or fainting  · Severe headache  · Slow heartbeat, increased chest pain  If you notice these less serious side effects, talk with your doctor:   · Mild headache  If you notice other side effects that you think are caused by this medicine, tell your doctor. Call your doctor for medical advice about side effects. You may report side effects to FDA at 1-770-RGE-6367  © 2017 [facility] Information is for End User's use only and may not be sold, redistributed or otherwise used for commercial purposes.   The above information is an  only. It is not intended as medical advice for individual conditions or treatments. Talk to your doctor, nurse or pharmacist before following any medical regimen to see if it is safe and effective for you. DISCHARGE SUMMARY from Nurse    PATIENT INSTRUCTIONS:    After general anesthesia or intravenous sedation, for 24 hours or while taking prescription Narcotics:  · Limit your activities  · Do not drive and operate hazardous machinery  · Do not make important personal or business decisions  · Do  not drink alcoholic beverages  · If you have not urinated within 8 hours after discharge, please contact your surgeon on call. Report the following to your surgeon:  · Excessive pain, swelling, redness or odor of or around the surgical area  · Temperature over 100.5  · Nausea and vomiting lasting longer than 4 hours or if unable to take medications  · Any signs of decreased circulation or nerve impairment to extremity: change in color, persistent  numbness, tingling, coldness or increase pain  · Any questions    What to do at Home:  Recommended activity: Activity as tolerated, limited by fatigue    If you experience any of the following symptoms chest pain or symptoms of a heart attack, call 911. *  Please give a list of your current medications to your Primary Care Provider. *  Please update this list whenever your medications are discontinued, doses are      changed, or new medications (including over-the-counter products) are added. *  Please carry medication information at all times in case of emergency situations. These are general instructions for a healthy lifestyle:    No smoking/ No tobacco products/ Avoid exposure to second hand smoke  Surgeon General's Warning:  Quitting smoking now greatly reduces serious risk to your health.     Obesity, smoking, and sedentary lifestyle greatly increases your risk for illness    A healthy diet, regular physical exercise & weight monitoring are important for maintaining a healthy lifestyle    You may be retaining fluid if you have a history of heart failure or if you experience any of the following symptoms:  Weight gain of 3 pounds or more overnight or 5 pounds in a week, increased swelling in our hands or feet or shortness of breath while lying flat in bed. Please call your doctor as soon as you notice any of these symptoms; do not wait until your next office visit. The discharge information has been reviewed with the patient. The patient verbalized understanding. Discharge medications reviewed with the patient and appropriate educational materials and side effects teaching were provided.   ___________________________________________________________________________________________________________________________________

## 2019-09-01 NOTE — PROGRESS NOTES
Spoke with Felipe Goldstein NP about present BP. Discussed Sotalol and Norvasc and that pt had dose of Imdur at noon today Reviewed BP over last 24 hours. Pt takes Sotalol at home. Instructed to give the sotalol and hold Norvasc.

## 2019-12-11 PROBLEM — R07.9 CHEST PAIN: Status: RESOLVED | Noted: 2019-08-30 | Resolved: 2019-12-11

## 2019-12-11 PROBLEM — A09 DIARRHEA OF INFECTIOUS ORIGIN: Status: RESOLVED | Noted: 2018-09-06 | Resolved: 2019-12-11

## 2020-04-09 ENCOUNTER — HOSPITAL ENCOUNTER (OUTPATIENT)
Dept: CT IMAGING | Age: 65
Discharge: HOME | End: 2020-04-09
Attending: FAMILY MEDICINE
Payer: MEDICARE

## 2020-04-09 DIAGNOSIS — K40.90 INGUINAL HERNIA WITHOUT OBSTRUCTION OR GANGRENE, RECURRENCE NOT SPECIFIED, UNSPECIFIED LATERALITY: ICD-10-CM

## 2020-04-09 LAB — CREAT BLD-MCNC: 0.9 MG/DL (ref 0.8–1.5)

## 2020-04-09 PROCEDURE — 74011000258 HC RX REV CODE- 258: Performed by: FAMILY MEDICINE

## 2020-04-09 PROCEDURE — 74011636320 HC RX REV CODE- 636/320: Performed by: FAMILY MEDICINE

## 2020-04-09 PROCEDURE — 82565 ASSAY OF CREATININE: CPT

## 2020-04-09 PROCEDURE — 74177 CT ABD & PELVIS W/CONTRAST: CPT

## 2020-04-09 RX ORDER — SODIUM CHLORIDE 0.9 % (FLUSH) 0.9 %
10 SYRINGE (ML) INJECTION
Status: COMPLETED | OUTPATIENT
Start: 2020-04-09 | End: 2020-04-09

## 2020-04-09 RX ADMIN — SODIUM CHLORIDE 100 ML: 900 INJECTION, SOLUTION INTRAVENOUS at 09:21

## 2020-04-09 RX ADMIN — DIATRIZOATE MEGLUMINE AND DIATRIZOATE SODIUM 15 ML: 660; 100 LIQUID ORAL; RECTAL at 09:21

## 2020-04-09 RX ADMIN — IOPAMIDOL 100 ML: 755 INJECTION, SOLUTION INTRAVENOUS at 09:21

## 2020-04-09 RX ADMIN — Medication 10 ML: at 09:21

## 2020-04-10 NOTE — PROGRESS NOTES
Please let know looks okay. I do not see any acute signs of right inguinal hernia. I suspect he most likely has a groin pull. Will need to give time. If continues to be problematic, then would suggest following up with surgery as scheduled.

## 2020-04-16 PROBLEM — K52.9 GASTROENTERITIS: Status: RESOLVED | Noted: 2018-09-06 | Resolved: 2020-04-16

## 2020-04-16 PROBLEM — K20.90 ESOPHAGITIS: Status: RESOLVED | Noted: 2018-09-05 | Resolved: 2020-04-16

## 2020-06-16 PROBLEM — K40.91 RECURRENT RIGHT INGUINAL HERNIA: Status: ACTIVE | Noted: 2020-06-16

## 2020-06-22 RX ORDER — SODIUM CHLORIDE 0.9 % (FLUSH) 0.9 %
5-40 SYRINGE (ML) INJECTION AS NEEDED
Status: CANCELLED | OUTPATIENT
Start: 2020-06-22

## 2020-06-22 RX ORDER — SODIUM CHLORIDE 0.9 % (FLUSH) 0.9 %
5-40 SYRINGE (ML) INJECTION EVERY 8 HOURS
Status: CANCELLED | OUTPATIENT
Start: 2020-06-22

## 2020-06-29 ENCOUNTER — HOSPITAL ENCOUNTER (OUTPATIENT)
Dept: SURGERY | Age: 65
Discharge: HOME | End: 2020-06-29
Payer: MEDICARE

## 2020-06-29 ENCOUNTER — HOSPITAL ENCOUNTER (OUTPATIENT)
Dept: GENERAL RADIOLOGY | Age: 65
Discharge: HOME | End: 2020-06-29
Attending: SURGERY
Payer: MEDICARE

## 2020-06-29 VITALS
HEIGHT: 73 IN | OXYGEN SATURATION: 90 % | SYSTOLIC BLOOD PRESSURE: 118 MMHG | TEMPERATURE: 98.4 F | WEIGHT: 187.5 LBS | HEART RATE: 79 BPM | DIASTOLIC BLOOD PRESSURE: 84 MMHG | BODY MASS INDEX: 24.85 KG/M2 | RESPIRATION RATE: 18 BRPM

## 2020-06-29 LAB
ALBUMIN SERPL-MCNC: 3.7 G/DL (ref 3.2–4.6)
ALBUMIN/GLOB SERPL: 1.2 {RATIO} (ref 1.2–3.5)
ALP SERPL-CCNC: 86 U/L (ref 50–136)
ALT SERPL-CCNC: 22 U/L (ref 12–65)
ANION GAP SERPL CALC-SCNC: 6 MMOL/L (ref 7–16)
APPEARANCE UR: CLEAR
AST SERPL-CCNC: 20 U/L (ref 15–37)
BASOPHILS # BLD: 0.2 K/UL (ref 0–0.2)
BASOPHILS NFR BLD: 1 % (ref 0–2)
BILIRUB SERPL-MCNC: 0.6 MG/DL (ref 0.2–1.1)
BILIRUB UR QL: NEGATIVE
BUN SERPL-MCNC: 13 MG/DL (ref 8–23)
CALCIUM SERPL-MCNC: 8.9 MG/DL (ref 8.3–10.4)
CHLORIDE SERPL-SCNC: 108 MMOL/L (ref 98–107)
CO2 SERPL-SCNC: 26 MMOL/L (ref 21–32)
COLOR UR: YELLOW
CREAT SERPL-MCNC: 0.83 MG/DL (ref 0.8–1.5)
DIFFERENTIAL METHOD BLD: ABNORMAL
EOSINOPHIL # BLD: 0.2 K/UL (ref 0–0.8)
EOSINOPHIL NFR BLD: 1 % (ref 0.5–7.8)
ERYTHROCYTE [DISTWIDTH] IN BLOOD BY AUTOMATED COUNT: 13.1 % (ref 11.9–14.6)
GLOBULIN SER CALC-MCNC: 3.2 G/DL (ref 2.3–3.5)
GLUCOSE SERPL-MCNC: 97 MG/DL (ref 65–100)
GLUCOSE UR STRIP.AUTO-MCNC: NEGATIVE MG/DL
HCT VFR BLD AUTO: 42.7 % (ref 41.1–50.3)
HGB BLD-MCNC: 14.4 G/DL (ref 13.6–17.2)
HGB UR QL STRIP: NEGATIVE
IMM GRANULOCYTES # BLD AUTO: 0 K/UL (ref 0–0.5)
IMM GRANULOCYTES NFR BLD AUTO: 0 % (ref 0–5)
INR PPP: 1
KETONES UR QL STRIP.AUTO: NEGATIVE MG/DL
LEUKOCYTE ESTERASE UR QL STRIP.AUTO: NEGATIVE
LYMPHOCYTES # BLD: 16.4 K/UL (ref 0.5–4.6)
LYMPHOCYTES NFR BLD: 72 % (ref 13–44)
MCH RBC QN AUTO: 29.8 PG (ref 26.1–32.9)
MCHC RBC AUTO-ENTMCNC: 33.7 G/DL (ref 31.4–35)
MCV RBC AUTO: 88.2 FL (ref 79.6–97.8)
MONOCYTES # BLD: 1.1 K/UL (ref 0.1–1.3)
MONOCYTES NFR BLD: 5 % (ref 4–12)
NEUTS SEG # BLD: 4.8 K/UL (ref 1.7–8.2)
NEUTS SEG NFR BLD: 21 % (ref 43–78)
NITRITE UR QL STRIP.AUTO: NEGATIVE
NRBC # BLD: 0.07 K/UL (ref 0–0.2)
PH UR STRIP: 6 [PH] (ref 5–9)
PLATELET # BLD AUTO: 299 K/UL (ref 150–450)
PLATELET COMMENTS,PCOM: ADEQUATE
PMV BLD AUTO: 9.7 FL (ref 9.4–12.3)
POTASSIUM SERPL-SCNC: 3.8 MMOL/L (ref 3.5–5.1)
PROT SERPL-MCNC: 6.9 G/DL (ref 6.3–8.2)
PROT UR STRIP-MCNC: NEGATIVE MG/DL
PROTHROMBIN TIME: 13.8 SEC (ref 12–14.7)
RBC # BLD AUTO: 4.84 M/UL (ref 4.23–5.6)
RBC MORPH BLD: ABNORMAL
SODIUM SERPL-SCNC: 140 MMOL/L (ref 136–145)
SP GR UR REFRACTOMETRY: 1.01 (ref 1–1.02)
UROBILINOGEN UR QL STRIP.AUTO: 0.2 EU/DL (ref 0.2–1)
WBC # BLD AUTO: 22.7 K/UL (ref 4.3–11.1)
WBC MORPH BLD: ABNORMAL

## 2020-06-29 PROCEDURE — 71046 X-RAY EXAM CHEST 2 VIEWS: CPT

## 2020-06-29 PROCEDURE — 80053 COMPREHEN METABOLIC PANEL: CPT

## 2020-06-29 PROCEDURE — 81003 URINALYSIS AUTO W/O SCOPE: CPT

## 2020-06-29 PROCEDURE — 85025 COMPLETE CBC W/AUTO DIFF WBC: CPT

## 2020-06-29 PROCEDURE — 85610 PROTHROMBIN TIME: CPT

## 2020-06-29 PROCEDURE — 93005 ELECTROCARDIOGRAM TRACING: CPT | Performed by: SURGERY

## 2020-06-29 NOTE — PERIOP NOTES
Patient confirms name and . Order to obtain consent  found in EHR and  matches case posting. Pt verbalizes understanding of 1 visitor policy. Type 1B surgery, PAT walk-in assessment complete. Labs per surgeon: CBC, CMP, UA, chest xray, PT/ INR, EKG  Labs per anesthesia protocol: none required  EKG: completed and WDL of anesthesia protocol. Glucose: not required        >A negative Covid swab result is required to proceed with surgery; the swab will be collected 7 days prior to surgery at the [address] [facility] at [address]. The patient will be contacted by the Covid swab team for an appointment date and time. For questions or concerns the patient should call [phone]. The clinic is closed from  for lunch and on weekends. Covid swab completed today per patient. Patient aware Dr. Amelia Escamilla requires pulmonary and cardiac clearance to have procedure. Surgeon's office has been notified. Chart flagged. Rx bottles visualized today. Antibacterial soap and Hibiclens and instructions given per hospital policy. Patient provided with and instructed on educational handouts including Guide to Surgery, Pain Management, Hand Hygiene, Blood Transfusion Education, and [facility] Brochure. Patient answered medical/surgical history questions at their best of ability. All prior to admission medications documented in [facility]. Medication instructions given as listed on handout (see PTA meds note)    Patient instructed on the following:  Arrive at [facility], time of arrival to be called the day before by 1700  NPO after midnight including gum, mints, and ice chips. Responsible adult must drive patient to the hospital, stay during surgery, and patient will require supervision 24 hours after anesthesia. Use antibacterial soap and hibiclens in shower the night before surgery and on the morning of surgery.   Leave all valuables (money and jewelry) at home but bring insurance card and ID on DOS. Do not wear make-up, nail polish, lotions, cologne, perfumes, powders, or oil on skin. Patient teach back successful and patient demonstrates knowledge of instruction.

## 2020-06-29 NOTE — PERIOP NOTES
Recent Results (from the past 12 hour(s))   CBC WITH AUTOMATED DIFF    Collection Time: 06/29/20  3:00 PM   Result Value Ref Range    WBC 22.7 (H) 4.3 - 11.1 K/uL    RBC 4.84 4.23 - 5.6 M/uL    HGB 14.4 13.6 - 17.2 g/dL    HCT 42.7 41.1 - 50.3 %    MCV 88.2 79.6 - 97.8 FL    MCH 29.8 26.1 - 32.9 PG    MCHC 33.7 31.4 - 35.0 g/dL    RDW 13.1 11.9 - 14.6 %    PLATELET 318 509 - 291 K/uL    MPV 9.7 9.4 - 12.3 FL    ABSOLUTE NRBC 0.07 0.0 - 0.2 K/uL    DF PENDING      CBC reviewed. WBC's high. Chemistry, PT/ INR and urine pending. Chart flagged. Chest Xray reviewed. Chest 2 view     CLINICAL INDICATION: Preoperative evaluation before hernia repair with history  of coronary artery disease, cardiac arrhythmia, stones, smoking, COPD, dyspnea  on exertion, skin cancer, hypertension, hypoxia     COMPARISON: Radiography and CT August 30, 2019     TECHNIQUE: Upright PA  and lateral views of the chest      FINDINGS: Lung volumes are again hyperinflated in keeping with COPD. Cardiomediastinal silhouette and hilar contours within normal limits. Coronary  artery stent material again noted. The lungs are unchanged demonstrating no  developing consolidation, effusion, mass, pneumothorax, or CHF. There is minimal  chronic linear scarring and/or atelectasis in the bases.       No acute osseous abnormalities are seen.        IMPRESSION  IMPRESSION: No acute disease.     COPD.     Forwarded to surgeon

## 2020-06-29 NOTE — PERIOP NOTES
Discussed patient's cardiac and pulmonary history with Dr. Jackson Calvert. Aware patient reports recent chest pain (last occurrence \"last week\" per patient) and worsening dyspnea  (\"very severe\" COPD per MD note, home O2 2L QHS, recent antibiotics for symptoms). Last cardiology visit  9/4/19 with recommendation to return in 6 weeks. Per Dr. Jackson Calvert, patient requires cardiac and pulmonary clearance to have procedure. Left vm for Ilir at surgeon's office with this information. Chart flagged for charge nurse.

## 2020-06-29 NOTE — PERIOP NOTES
PLEASE CONTINUE TAKING ALL PRESCRIPTION MEDICATIONS UP TO THE DAY OF SURGERY UNLESS OTHERWISE DIRECTED BELOW. DISCONTINUE all vitamins and supplements 7 days prior to surgery. DISCONTINUE Non-Steriodal Anti-Inflammatory (NSAIDS) such as Advil and Aleve 5 days prior to surgery. Home Medications to take  the day of surgery   Acetaminophen (Tylenol) if needed  Use nebulizer  Albuterol (Ventolin/ Pro-air) use and bring  Amlodipine (Norvasc)  Aspirin 81 mg   Trelegy inhaler  Isosorbide (Imdur)  Pantoprazole (Protonix)  Sertraline (Zoloft)   Sotalol (Betapace)           Home Medications   to Hold   Vitamins and supplements (7days prior)        Comments          *Visitor policy of 1 visitor per patient discussed. Please do not bring home medications with you on the day of surgery unless otherwise directed by your nurse. If you are instructed to bring home medications, please give them to your nurse as they will be administered by the nursing staff. If you have any questions, please call  [facility] [phone]. A copy of this note was provided to the patient for reference.

## 2020-06-30 LAB
ATRIAL RATE: 67 BPM
CALCULATED P AXIS, ECG09: 88 DEGREES
CALCULATED R AXIS, ECG10: 28 DEGREES
CALCULATED T AXIS, ECG11: 58 DEGREES
DIAGNOSIS, 93000: NORMAL
P-R INTERVAL, ECG05: 142 MS
Q-T INTERVAL, ECG07: 452 MS
QRS DURATION, ECG06: 72 MS
QTC CALCULATION (BEZET), ECG08: 477 MS
VENTRICULAR RATE, ECG03: 67 BPM

## 2020-07-05 ENCOUNTER — ANESTHESIA EVENT (OUTPATIENT)
Dept: SURGERY | Age: 65
End: 2020-07-05
Payer: MEDICARE

## 2020-07-05 NOTE — PERIOP NOTES
Notified patient of change to surgical time. Informed patient to be here at 0730. Patient voiced understanding.

## 2020-07-06 ENCOUNTER — ANESTHESIA (OUTPATIENT)
Dept: SURGERY | Age: 65
End: 2020-07-06
Payer: MEDICARE

## 2020-07-06 ENCOUNTER — HOSPITAL ENCOUNTER (OUTPATIENT)
Age: 65
Setting detail: OUTPATIENT SURGERY
Discharge: HOME | End: 2020-07-06
Attending: SURGERY | Admitting: SURGERY
Payer: MEDICARE

## 2020-07-06 VITALS
HEART RATE: 70 BPM | BODY MASS INDEX: 24.12 KG/M2 | OXYGEN SATURATION: 94 % | RESPIRATION RATE: 14 BRPM | DIASTOLIC BLOOD PRESSURE: 65 MMHG | SYSTOLIC BLOOD PRESSURE: 106 MMHG | HEIGHT: 73 IN | WEIGHT: 182 LBS | TEMPERATURE: 98.2 F

## 2020-07-06 DIAGNOSIS — K40.91 RECURRENT RIGHT INGUINAL HERNIA: Primary | ICD-10-CM

## 2020-07-06 PROCEDURE — 77030040503 HC DRN WND PENRS MDII -A: Performed by: SURGERY

## 2020-07-06 PROCEDURE — 74011250636 HC RX REV CODE- 250/636: Performed by: NURSE ANESTHETIST, CERTIFIED REGISTERED

## 2020-07-06 PROCEDURE — 74011250636 HC RX REV CODE- 250/636: Performed by: SURGERY

## 2020-07-06 PROCEDURE — 74011000250 HC RX REV CODE- 250: Performed by: NURSE ANESTHETIST, CERTIFIED REGISTERED

## 2020-07-06 PROCEDURE — 77030040922 HC BLNKT HYPOTHRM STRY -A: Performed by: ANESTHESIOLOGY

## 2020-07-06 PROCEDURE — 77030031139 HC SUT VCRL2 J&J -A: Performed by: SURGERY

## 2020-07-06 PROCEDURE — 76010000162 HC OR TIME 1.5 TO 2 HR INTENSV-TIER 1: Performed by: SURGERY

## 2020-07-06 PROCEDURE — 77030019908 HC STETH ESOPH SIMS -A: Performed by: ANESTHESIOLOGY

## 2020-07-06 PROCEDURE — 77030039425 HC BLD LARYNG TRULITE DISP TELE -A: Performed by: ANESTHESIOLOGY

## 2020-07-06 PROCEDURE — 74011000250 HC RX REV CODE- 250: Performed by: SURGERY

## 2020-07-06 PROCEDURE — 74011250636 HC RX REV CODE- 250/636: Performed by: ANESTHESIOLOGY

## 2020-07-06 PROCEDURE — C1781 MESH (IMPLANTABLE): HCPCS | Performed by: SURGERY

## 2020-07-06 PROCEDURE — 74011250637 HC RX REV CODE- 250/637: Performed by: ANESTHESIOLOGY

## 2020-07-06 PROCEDURE — 76210000063 HC OR PH I REC FIRST 0.5 HR: Performed by: SURGERY

## 2020-07-06 PROCEDURE — 74011000250 HC RX REV CODE- 250: Performed by: ANESTHESIOLOGY

## 2020-07-06 PROCEDURE — 76060000034 HC ANESTHESIA 1.5 TO 2 HR: Performed by: SURGERY

## 2020-07-06 PROCEDURE — 77030040361 HC SLV COMPR DVT MDII -B: Performed by: SURGERY

## 2020-07-06 PROCEDURE — 77030037088 HC TUBE ENDOTRACH ORAL NSL COVD-A: Performed by: ANESTHESIOLOGY

## 2020-07-06 PROCEDURE — 76210000026 HC REC RM PH II 1 TO 1.5 HR: Performed by: SURGERY

## 2020-07-06 PROCEDURE — 77030018836 HC SOL IRR NACL ICUM -A: Performed by: SURGERY

## 2020-07-06 DEVICE — MESH HERN W3XL6IN INGUINAL POLYPR MFIL RECTANG: Type: IMPLANTABLE DEVICE | Site: INGUINAL | Status: FUNCTIONAL

## 2020-07-06 RX ORDER — HYDROMORPHONE HYDROCHLORIDE 2 MG/ML
0.5 INJECTION, SOLUTION INTRAMUSCULAR; INTRAVENOUS; SUBCUTANEOUS
Status: DISCONTINUED | OUTPATIENT
Start: 2020-07-06 | End: 2020-07-06 | Stop reason: HOSPADM

## 2020-07-06 RX ORDER — CEFAZOLIN SODIUM/WATER 2 G/20 ML
2 SYRINGE (ML) INTRAVENOUS ONCE
Status: COMPLETED | OUTPATIENT
Start: 2020-07-06 | End: 2020-07-06

## 2020-07-06 RX ORDER — DIPHENHYDRAMINE HYDROCHLORIDE 50 MG/ML
12.5 INJECTION, SOLUTION INTRAMUSCULAR; INTRAVENOUS
Status: DISCONTINUED | OUTPATIENT
Start: 2020-07-06 | End: 2020-07-06 | Stop reason: HOSPADM

## 2020-07-06 RX ORDER — NALOXONE HYDROCHLORIDE 0.4 MG/ML
0.1 INJECTION, SOLUTION INTRAMUSCULAR; INTRAVENOUS; SUBCUTANEOUS AS NEEDED
Status: DISCONTINUED | OUTPATIENT
Start: 2020-07-06 | End: 2020-07-06 | Stop reason: HOSPADM

## 2020-07-06 RX ORDER — OXYCODONE HYDROCHLORIDE 5 MG/1
10 TABLET ORAL
Status: DISCONTINUED | OUTPATIENT
Start: 2020-07-06 | End: 2020-07-06 | Stop reason: HOSPADM

## 2020-07-06 RX ORDER — ONDANSETRON 2 MG/ML
INJECTION INTRAMUSCULAR; INTRAVENOUS AS NEEDED
Status: DISCONTINUED | OUTPATIENT
Start: 2020-07-06 | End: 2020-07-06 | Stop reason: HOSPADM

## 2020-07-06 RX ORDER — BUPIVACAINE HYDROCHLORIDE AND EPINEPHRINE 5; 5 MG/ML; UG/ML
INJECTION, SOLUTION EPIDURAL; INTRACAUDAL; PERINEURAL AS NEEDED
Status: DISCONTINUED | OUTPATIENT
Start: 2020-07-06 | End: 2020-07-06 | Stop reason: HOSPADM

## 2020-07-06 RX ORDER — ROCURONIUM BROMIDE 10 MG/ML
INJECTION, SOLUTION INTRAVENOUS AS NEEDED
Status: DISCONTINUED | OUTPATIENT
Start: 2020-07-06 | End: 2020-07-06 | Stop reason: HOSPADM

## 2020-07-06 RX ORDER — OXYCODONE HYDROCHLORIDE 5 MG/1
5 TABLET ORAL
Status: DISCONTINUED | OUTPATIENT
Start: 2020-07-06 | End: 2020-07-06 | Stop reason: HOSPADM

## 2020-07-06 RX ORDER — FENTANYL CITRATE 50 UG/ML
INJECTION, SOLUTION INTRAMUSCULAR; INTRAVENOUS AS NEEDED
Status: DISCONTINUED | OUTPATIENT
Start: 2020-07-06 | End: 2020-07-06 | Stop reason: HOSPADM

## 2020-07-06 RX ORDER — PROPOFOL 10 MG/ML
INJECTION, EMULSION INTRAVENOUS AS NEEDED
Status: DISCONTINUED | OUTPATIENT
Start: 2020-07-06 | End: 2020-07-06 | Stop reason: HOSPADM

## 2020-07-06 RX ORDER — ALBUTEROL SULFATE 0.83 MG/ML
2.5 SOLUTION RESPIRATORY (INHALATION) AS NEEDED
Status: DISCONTINUED | OUTPATIENT
Start: 2020-07-06 | End: 2020-07-06 | Stop reason: HOSPADM

## 2020-07-06 RX ORDER — EPHEDRINE SULFATE/0.9% NACL/PF 50 MG/5 ML
SYRINGE (ML) INTRAVENOUS AS NEEDED
Status: DISCONTINUED | OUTPATIENT
Start: 2020-07-06 | End: 2020-07-06 | Stop reason: HOSPADM

## 2020-07-06 RX ORDER — HYDROCODONE BITARTRATE AND ACETAMINOPHEN 7.5; 325 MG/1; MG/1
1-2 TABLET ORAL
Qty: 30 TAB | Refills: 0 | Status: SHIPPED | OUTPATIENT
Start: 2020-07-06 | End: 2020-07-11

## 2020-07-06 RX ORDER — SODIUM CHLORIDE, SODIUM LACTATE, POTASSIUM CHLORIDE, CALCIUM CHLORIDE 600; 310; 30; 20 MG/100ML; MG/100ML; MG/100ML; MG/100ML
100 INJECTION, SOLUTION INTRAVENOUS CONTINUOUS
Status: DISCONTINUED | OUTPATIENT
Start: 2020-07-06 | End: 2020-07-06 | Stop reason: HOSPADM

## 2020-07-06 RX ORDER — HYDROMORPHONE HYDROCHLORIDE 2 MG/ML
INJECTION, SOLUTION INTRAMUSCULAR; INTRAVENOUS; SUBCUTANEOUS AS NEEDED
Status: DISCONTINUED | OUTPATIENT
Start: 2020-07-06 | End: 2020-07-06 | Stop reason: HOSPADM

## 2020-07-06 RX ORDER — MIDAZOLAM HYDROCHLORIDE 1 MG/ML
2 INJECTION, SOLUTION INTRAMUSCULAR; INTRAVENOUS ONCE
Status: DISCONTINUED | OUTPATIENT
Start: 2020-07-06 | End: 2020-07-06 | Stop reason: HOSPADM

## 2020-07-06 RX ORDER — GLYCOPYRROLATE 0.2 MG/ML
INJECTION INTRAMUSCULAR; INTRAVENOUS AS NEEDED
Status: DISCONTINUED | OUTPATIENT
Start: 2020-07-06 | End: 2020-07-06 | Stop reason: HOSPADM

## 2020-07-06 RX ORDER — LIDOCAINE HYDROCHLORIDE 10 MG/ML
0.1 INJECTION INFILTRATION; PERINEURAL AS NEEDED
Status: DISCONTINUED | OUTPATIENT
Start: 2020-07-06 | End: 2020-07-06 | Stop reason: HOSPADM

## 2020-07-06 RX ORDER — MIDAZOLAM HYDROCHLORIDE 1 MG/ML
2 INJECTION, SOLUTION INTRAMUSCULAR; INTRAVENOUS
Status: DISCONTINUED | OUTPATIENT
Start: 2020-07-06 | End: 2020-07-06 | Stop reason: HOSPADM

## 2020-07-06 RX ORDER — NEOSTIGMINE METHYLSULFATE 1 MG/ML
INJECTION, SOLUTION INTRAVENOUS AS NEEDED
Status: DISCONTINUED | OUTPATIENT
Start: 2020-07-06 | End: 2020-07-06 | Stop reason: HOSPADM

## 2020-07-06 RX ORDER — IBUPROFEN 400 MG/1
400 TABLET ORAL ONCE
Status: COMPLETED | OUTPATIENT
Start: 2020-07-06 | End: 2020-07-06

## 2020-07-06 RX ORDER — LIDOCAINE HYDROCHLORIDE 20 MG/ML
INJECTION, SOLUTION EPIDURAL; INFILTRATION; INTRACAUDAL; PERINEURAL AS NEEDED
Status: DISCONTINUED | OUTPATIENT
Start: 2020-07-06 | End: 2020-07-06 | Stop reason: HOSPADM

## 2020-07-06 RX ADMIN — PROPOFOL 30 MG: 10 INJECTION, EMULSION INTRAVENOUS at 11:38

## 2020-07-06 RX ADMIN — Medication 2 G: at 10:38

## 2020-07-06 RX ADMIN — Medication 10 MG: at 11:18

## 2020-07-06 RX ADMIN — SODIUM CHLORIDE, SODIUM LACTATE, POTASSIUM CHLORIDE, AND CALCIUM CHLORIDE 100 ML/HR: 600; 310; 30; 20 INJECTION, SOLUTION INTRAVENOUS at 08:11

## 2020-07-06 RX ADMIN — LIDOCAINE HYDROCHLORIDE 50 MG: 20 INJECTION, SOLUTION EPIDURAL; INFILTRATION; INTRACAUDAL; PERINEURAL at 10:29

## 2020-07-06 RX ADMIN — Medication 10 MG: at 10:43

## 2020-07-06 RX ADMIN — Medication 10 MG: at 10:40

## 2020-07-06 RX ADMIN — Medication 5 MG: at 11:26

## 2020-07-06 RX ADMIN — ROCURONIUM BROMIDE 30 MG: 10 INJECTION, SOLUTION INTRAVENOUS at 10:29

## 2020-07-06 RX ADMIN — Medication 10 MG: at 11:33

## 2020-07-06 RX ADMIN — GLYCOPYRROLATE 0.4 MG: 0.2 INJECTION, SOLUTION INTRAMUSCULAR; INTRAVENOUS at 11:35

## 2020-07-06 RX ADMIN — PROPOFOL 130 MG: 10 INJECTION, EMULSION INTRAVENOUS at 10:29

## 2020-07-06 RX ADMIN — Medication 3 MG: at 11:35

## 2020-07-06 RX ADMIN — ROCURONIUM BROMIDE 5 MG: 10 INJECTION, SOLUTION INTRAVENOUS at 11:11

## 2020-07-06 RX ADMIN — ONDANSETRON 4 MG: 2 INJECTION INTRAMUSCULAR; INTRAVENOUS at 11:30

## 2020-07-06 RX ADMIN — PROMETHAZINE HYDROCHLORIDE 6.25 MG: 25 INJECTION INTRAMUSCULAR; INTRAVENOUS at 12:41

## 2020-07-06 RX ADMIN — HYDROMORPHONE HYDROCHLORIDE 0.2 MG: 2 INJECTION INTRAMUSCULAR; INTRAVENOUS; SUBCUTANEOUS at 11:41

## 2020-07-06 RX ADMIN — IBUPROFEN 400 MG: 400 TABLET, FILM COATED ORAL at 12:57

## 2020-07-06 RX ADMIN — LIDOCAINE HYDROCHLORIDE 40 MG: 20 INJECTION, SOLUTION EPIDURAL; INFILTRATION; INTRACAUDAL; PERINEURAL at 11:38

## 2020-07-06 RX ADMIN — PHENYLEPHRINE HYDROCHLORIDE 50 MCG: 10 INJECTION INTRAVENOUS at 11:26

## 2020-07-06 RX ADMIN — SODIUM CHLORIDE, SODIUM LACTATE, POTASSIUM CHLORIDE, AND CALCIUM CHLORIDE: 600; 310; 30; 20 INJECTION, SOLUTION INTRAVENOUS at 11:31

## 2020-07-06 RX ADMIN — FENTANYL CITRATE 100 MCG: 50 INJECTION INTRAMUSCULAR; INTRAVENOUS at 10:29

## 2020-07-06 RX ADMIN — Medication 10 MG: at 11:24

## 2020-07-06 RX ADMIN — PHENYLEPHRINE HYDROCHLORIDE 50 MCG: 10 INJECTION INTRAVENOUS at 11:33

## 2020-07-06 RX ADMIN — Medication 10 MG: at 11:06

## 2020-07-06 NOTE — ANESTHESIA PREPROCEDURE EVALUATION
Anesthetic History   No history of anesthetic complications            Review of Systems / Medical History  Patient summary reviewed, nursing notes reviewed and pertinent labs reviewed    Pulmonary    COPD (quit smoking 2 yrs ago): moderate      Smoker         Neuro/Psych              Cardiovascular    Hypertension: well controlled          CAD, cardiac stents and hyperlipidemia  Pertinent negatives: No dysrhythmias (9 yrs s/p ablation procedure)  Exercise tolerance: >4 METS  Comments: Stent times one in 2010, stopped Plavix this spring    TTE 8/18/18:  LVEF 55%. DELIO Watchman device placed in 2018    KEN from 12/2018:  -  Left ventricle: Systolic function was normal. Ejection fraction was  estimated in the range of 55 % to 60 %. There were no regional wall motion  abnormalities. -  Left atrium: The atrium was moderately dilated. -  Left atrial appendage: A 30mm Watchman device was present. There was   trivial  peridevice flow noted (clinically insignificant; 0.2cm).    GI/Hepatic/Renal     GERD: well controlled      PUD (remote hx)     Endo/Other        Arthritis and cancer (CLL)     Other Findings              Physical Exam    Airway  Mallampati: II  TM Distance: > 6 cm  Neck ROM: decreased range of motion   Mouth opening: Normal     Cardiovascular    Rhythm: irregular  Rate: normal         Dental    Dentition: Edentulous     Pulmonary  Breath sounds clear to auscultation               Abdominal  GI exam deferred       Other Findings            Anesthetic Plan    ASA: 3  Anesthesia type: general          Induction: Intravenous  Anesthetic plan and risks discussed with: Patient

## 2020-07-06 NOTE — DISCHARGE INSTRUCTIONS
Don't take norco and tylenol at the same time    Remove dressing after 48 hours, leave sterile strips on for 7-10 days, OK to shower    No heavy lifting over 20 lbs for 6 weeks. Avoid constipation. Ice pack to right groin for 24 hours while on bed      After general anesthesia or intravenous sedation, for 24 hours or while taking prescription Narcotics:  · Limit your activities  · A responsible adult needs to be with you for the next 24 hours  · Do not drive and operate hazardous machinery  · Do not make important personal or business decisions  · Do not drink alcoholic beverages  · If you have not urinated within 8 hours after discharge, please contact your surgeon on call. · If you have sleep apnea and have a CPAP machine, please use it for all naps and sleeping. · Please use caution when taking narcotics and any of your home medications that may cause drowsiness. *  Please give a list of your current medications to your Primary Care Provider. *  Please update this list whenever your medications are discontinued, doses are      changed, or new medications (including over-the-counter products) are added. *  Please carry medication information at all times in case of emergency situations. These are general instructions for a healthy lifestyle:  No smoking/ No tobacco products/ Avoid exposure to second hand smoke  Surgeon General's Warning:  Quitting smoking now greatly reduces serious risk to your health. Obesity, smoking, and sedentary lifestyle greatly increases your risk for illness  A healthy diet, regular physical exercise & weight monitoring are important for maintaining a healthy lifestyle    You may be retaining fluid if you have a history of heart failure or if you experience any of the following symptoms:  Weight gain of 3 pounds or more overnight or 5 pounds in a week, increased swelling in our hands or feet or shortness of breath while lying flat in bed.   Please call your doctor as soon as you notice any of these symptoms; do not wait until your next office visit.

## 2020-07-06 NOTE — OP NOTES
[address] [facility]  OPERATIVE REPORT    Name:  Amanda Shah  MR#:  [id number]  :  1955  ACCOUNT #:  [de-identified]  DATE OF SERVICE:  2020    PREOPERATIVE DIAGNOSIS:  Recurrent right inguinal hernia. POSTOPERATIVE DIAGNOSIS:  Recurrent right inguinal hernia. PROCEDURE PERFORMED:  Right recurrent inguinal hernia repair with mesh. SURGEON:  Claude Brisker, MD    ASSISTANT:  Lynne Baez NP    ANESTHESIA:  general    COMPLICATIONS:  none    SPECIMENS REMOVED:  none    IMPLANTS:  Prolene mesh    ESTIMATED BLOOD LOSS:  Minimal.    INDICATION:  This is a 70-year-old gentleman who presented with right groin protrusion with pain radiating to the upper thigh and he did have previous hernia repair and his symptom consistent with recurrent hernia. Surgery was offered to him. He understood risks and benefits, agreed to proceed. FINDING:  He does have an indirect hernia at the internal ring through previous placed mesh that appeared to be a PTFE mesh. PROCEDURE:  After informed consent was obtained, the patient was brought into the operating room, left in supine position. General anesthesia was administered. The patient's right groin area were prepped and draped in usual routine fashion. I took the same incision at the right inguinal area and dissection carried through the dermal layer. Then, the Franklin's fascia was divided and then the external oblique fascia was dissected carefully. Then, I opened the external ring first and followed the spermatic cord. The indirect hernia was identified. Then, great caution was used to dissect the spermatic cord and then the external oblique fascia was carefully dissected off. I did see the previous mesh, mainly located lateral.  The hernia appeared at the internal ring. Then, I brought the another mesh, the Prolene mesh.   I dissected the anterior rectus sheath off the external oblique fascia and then I sewed the new mesh onto the anterior rectus sheath in running stitch and this transitioned onto the pubic tubercle and then transitioned to the shelving edge of inguinal ligament. Then, I made a new internal ring and purposefully make that tight and then the mesh was laid down under the external oblique fascia. We tried to mobilize off the external oblique fascia so I can close it with interrupted in combination with running 2-0 Vicryl stitch,  fascia was closed above the mesh. Then, the Franklin's fascia was closed with 3-0 Vicryl stitch in interrupted fashion. The skin was closed with 4-0 Vicryl stitch in subcuticular running fashion. The patient tolerated the procedure well, transferred to the recovery room in stable condition. All the instrument count and lap count were correct. Estimated blood loss was minimal.    As noted, Colletta Councilman is the first assistant in this case. She offered excellent exposure and this was a redo case and the exposure was actually quite challenging and it would be extremely difficult to perform this case without her assistance.       Meri Aly MD      [id number]  D:  07/06/2020 11:52  T:  07/06/2020 17:37  JOB #:  [id number]

## 2020-07-06 NOTE — BRIEF OP NOTE
Brief Postoperative Note    Patient: Angel Soto  YOB: 1955  MRN: [id number]    Date of Procedure: 7/6/2020     Pre-Op Diagnosis: Unilateral inguinal hernia without obstruction or gangrene, recurrence not specified [K40.90]    Post-Op Diagnosis: recurrent right inguinal hernia    Procedure(s):  RIGHT OPEN HERNIA INGUINAL REPAIR WITH MESH    Surgeon(s): Capri Ashford MD    Surgical Assistant: Nurse Practitioner: Dylon Jarquin NP    Anesthesia: General     Estimated Blood Loss (mL): Minimal    Complications: None    Specimens: * No specimens in log *     Implants:   Implant Name Type Inv.  Item Serial No.  Lot No. LRB No. Used Action   MESH JONE FLAT SHT MARLX 3X6IN --  - [id number]  MESH JONE FLAT SHT MARLX 3X6IN --   [employer] [id number] 1 Implanted       Drains: * No LDAs found *    Findings: as post op, indirect hernia at internal ring    Electronically Signed by Wendy Choudhary MD on 7/6/2020 at 11:44 AM

## 2020-07-06 NOTE — ANESTHESIA POSTPROCEDURE EVALUATION
Procedure(s):  RIGHT OPEN HERNIA INGUINAL REPAIR WITH MESH. general    Anesthesia Post Evaluation      Multimodal analgesia: multimodal analgesia used between 6 hours prior to anesthesia start to PACU discharge  Patient location during evaluation: PACU  Patient participation: complete - patient participated  Level of consciousness: awake and awake and alert  Pain management: adequate  Airway patency: patent  Anesthetic complications: no  Cardiovascular status: acceptable  Respiratory status: acceptable  Hydration status: acceptable  Post anesthesia nausea and vomiting:  controlled      INITIAL Post-op Vital signs:   Vitals Value Taken Time   /65 7/6/2020 12:52 PM   Temp 36.6 °C (97.9 °F) 7/6/2020 12:06 PM   Pulse 76 7/6/2020 12:57 PM   Resp 15 7/6/2020 12:07 PM   SpO2 92 % 7/6/2020 12:57 PM   Vitals shown include unvalidated device data.

## 2020-09-03 PROBLEM — G25.81 RESTLESS LEGS: Status: ACTIVE | Noted: 2020-09-03

## 2020-09-21 ENCOUNTER — HOSPITAL ENCOUNTER (OUTPATIENT)
Dept: LAB | Age: 65
Discharge: HOME | End: 2020-09-21
Payer: MEDICARE

## 2020-09-21 ENCOUNTER — HOSPITAL ENCOUNTER (OUTPATIENT)
Dept: GENERAL RADIOLOGY | Age: 65
Discharge: HOME | End: 2020-09-21

## 2020-09-21 DIAGNOSIS — D72.829 LEUKOCYTOSIS, UNSPECIFIED TYPE: ICD-10-CM

## 2020-09-21 DIAGNOSIS — C91.10 CLL (CHRONIC LYMPHOCYTIC LEUKEMIA) (HCC): Chronic | ICD-10-CM

## 2020-09-21 DIAGNOSIS — C91.10 CLL (CHRONIC LYMPHOCYTIC LEUKEMIA) (HCC): ICD-10-CM

## 2020-09-21 LAB
ALBUMIN SERPL-MCNC: 4 G/DL (ref 3.2–4.6)
ALBUMIN/GLOB SERPL: 1.2 {RATIO} (ref 1.2–3.5)
ALP SERPL-CCNC: 94 U/L (ref 50–136)
ALT SERPL-CCNC: 26 U/L (ref 12–65)
ANION GAP SERPL CALC-SCNC: 5 MMOL/L (ref 7–16)
AST SERPL-CCNC: 20 U/L (ref 15–37)
BASOPHILS # BLD: 0 K/UL (ref 0–0.2)
BASOPHILS NFR BLD: 0 % (ref 0–2)
BILIRUB SERPL-MCNC: 0.7 MG/DL (ref 0.2–1.1)
BUN SERPL-MCNC: 7 MG/DL (ref 8–23)
CALCIUM SERPL-MCNC: 9.2 MG/DL (ref 8.3–10.4)
CHLORIDE SERPL-SCNC: 105 MMOL/L (ref 98–107)
CO2 SERPL-SCNC: 28 MMOL/L (ref 21–32)
CREAT SERPL-MCNC: 1.1 MG/DL (ref 0.8–1.5)
DIFFERENTIAL METHOD BLD: ABNORMAL
EOSINOPHIL # BLD: 0.2 K/UL (ref 0–0.8)
EOSINOPHIL NFR BLD: 1 % (ref 0.5–7.8)
ERYTHROCYTE [DISTWIDTH] IN BLOOD BY AUTOMATED COUNT: 13 % (ref 11.9–14.6)
GLOBULIN SER CALC-MCNC: 3.3 G/DL (ref 2.3–3.5)
GLUCOSE SERPL-MCNC: 125 MG/DL (ref 65–100)
HCT VFR BLD AUTO: 45.4 % (ref 41.1–50.3)
HGB BLD-MCNC: 14.9 G/DL (ref 13.6–17.2)
IMM GRANULOCYTES # BLD AUTO: 0 K/UL (ref 0–0.5)
IMM GRANULOCYTES NFR BLD AUTO: 0 % (ref 0–5)
LYMPHOCYTES # BLD: 12.8 K/UL (ref 0.5–4.6)
LYMPHOCYTES NFR BLD: 70 % (ref 13–44)
MCH RBC QN AUTO: 29.7 PG (ref 26.1–32.9)
MCHC RBC AUTO-ENTMCNC: 32.8 G/DL (ref 31.4–35)
MCV RBC AUTO: 90.6 FL (ref 79.6–97.8)
MONOCYTES # BLD: 0.9 K/UL (ref 0.1–1.3)
MONOCYTES NFR BLD: 5 % (ref 4–12)
NEUTS SEG # BLD: 4.4 K/UL (ref 1.7–8.2)
NEUTS SEG NFR BLD: 24 % (ref 43–78)
NRBC # BLD: 0.02 K/UL (ref 0–0.2)
PLATELET # BLD AUTO: 268 K/UL (ref 150–450)
PLATELET COMMENTS,PCOM: ADEQUATE
PMV BLD AUTO: 10.1 FL (ref 9.4–12.3)
POTASSIUM SERPL-SCNC: 4.3 MMOL/L (ref 3.5–5.1)
PROT SERPL-MCNC: 7.3 G/DL (ref 6.3–8.2)
RBC # BLD AUTO: 5.01 M/UL (ref 4.23–5.67)
RBC MORPH BLD: ABNORMAL
SODIUM SERPL-SCNC: 138 MMOL/L (ref 136–145)
WBC # BLD AUTO: 18.3 K/UL (ref 4.3–11.1)
WBC MORPH BLD: ABNORMAL

## 2020-09-21 PROCEDURE — 88185 FLOWCYTOMETRY/TC ADD-ON: CPT

## 2020-09-21 PROCEDURE — 36415 COLL VENOUS BLD VENIPUNCTURE: CPT

## 2020-09-21 PROCEDURE — 80053 COMPREHEN METABOLIC PANEL: CPT

## 2020-09-21 PROCEDURE — 85025 COMPLETE CBC W/AUTO DIFF WBC: CPT

## 2020-09-21 PROCEDURE — 88184 FLOWCYTOMETRY/ TC 1 MARKER: CPT

## 2020-09-28 LAB
FLOW CYTOMETRY, FBTC1: NORMAL
SPECIMEN SOURCE: NORMAL
TEST ORDERED:: NORMAL

## 2020-10-13 ENCOUNTER — HOSPITAL ENCOUNTER (OUTPATIENT)
Dept: MRI IMAGING | Age: 65
Discharge: HOME | End: 2020-10-13
Attending: INTERNAL MEDICINE
Payer: MEDICARE

## 2020-10-13 DIAGNOSIS — C91.10 CLL (CHRONIC LYMPHOCYTIC LEUKEMIA) (HCC): Chronic | ICD-10-CM

## 2020-10-13 PROCEDURE — 74011250636 HC RX REV CODE- 250/636: Performed by: INTERNAL MEDICINE

## 2020-10-13 PROCEDURE — 70553 MRI BRAIN STEM W/O & W/DYE: CPT

## 2020-10-13 PROCEDURE — A9575 INJ GADOTERATE MEGLUMI 0.1ML: HCPCS | Performed by: INTERNAL MEDICINE

## 2020-10-13 RX ORDER — SODIUM CHLORIDE 0.9 % (FLUSH) 0.9 %
10 SYRINGE (ML) INJECTION
Status: COMPLETED | OUTPATIENT
Start: 2020-10-13 | End: 2020-10-13

## 2020-10-13 RX ORDER — GADOTERATE MEGLUMINE 376.9 MG/ML
17 INJECTION INTRAVENOUS
Status: COMPLETED | OUTPATIENT
Start: 2020-10-13 | End: 2020-10-13

## 2020-10-13 RX ADMIN — Medication 10 ML: at 16:30

## 2020-10-13 RX ADMIN — GADOTERATE MEGLUMINE 17 ML: 376.9 INJECTION INTRAVENOUS at 16:30

## 2020-10-15 ENCOUNTER — HOSPITAL ENCOUNTER (OUTPATIENT)
Dept: LAB | Age: 65
Discharge: HOME | End: 2020-10-15
Payer: MEDICARE

## 2020-10-15 DIAGNOSIS — C91.10 CLL (CHRONIC LYMPHOCYTIC LEUKEMIA) (HCC): ICD-10-CM

## 2020-10-15 LAB
ALBUMIN SERPL-MCNC: 4.2 G/DL (ref 3.2–4.6)
ALBUMIN/GLOB SERPL: 1.3 {RATIO} (ref 1.2–3.5)
ALP SERPL-CCNC: 95 U/L (ref 50–136)
ALT SERPL-CCNC: 20 U/L (ref 12–65)
ANION GAP SERPL CALC-SCNC: 4 MMOL/L (ref 7–16)
AST SERPL-CCNC: 20 U/L (ref 15–37)
BASOPHILS # BLD: 0 K/UL (ref 0–0.2)
BASOPHILS NFR BLD: 0 % (ref 0–2)
BILIRUB SERPL-MCNC: 0.5 MG/DL (ref 0.2–1.1)
BUN SERPL-MCNC: 9 MG/DL (ref 8–23)
CALCIUM SERPL-MCNC: 9.5 MG/DL (ref 8.3–10.4)
CHLORIDE SERPL-SCNC: 108 MMOL/L (ref 98–107)
CO2 SERPL-SCNC: 29 MMOL/L (ref 21–32)
CREAT SERPL-MCNC: 1 MG/DL (ref 0.8–1.5)
DIFFERENTIAL METHOD BLD: ABNORMAL
EOSINOPHIL # BLD: 0.2 K/UL (ref 0–0.8)
EOSINOPHIL NFR BLD: 1 % (ref 0.5–7.8)
ERYTHROCYTE [DISTWIDTH] IN BLOOD BY AUTOMATED COUNT: 13.1 % (ref 11.9–14.6)
GLOBULIN SER CALC-MCNC: 3.2 G/DL (ref 2.3–3.5)
GLUCOSE SERPL-MCNC: 111 MG/DL (ref 65–100)
HCT VFR BLD AUTO: 46.9 % (ref 41.1–50.3)
HGB BLD-MCNC: 15.3 G/DL (ref 13.6–17.2)
IMM GRANULOCYTES # BLD AUTO: 0 K/UL (ref 0–0.5)
IMM GRANULOCYTES NFR BLD AUTO: 0 % (ref 0–5)
LYMPHOCYTES # BLD: 11.3 K/UL (ref 0.5–4.6)
LYMPHOCYTES NFR BLD: 63 % (ref 13–44)
MCH RBC QN AUTO: 29.3 PG (ref 26.1–32.9)
MCHC RBC AUTO-ENTMCNC: 32.6 G/DL (ref 31.4–35)
MCV RBC AUTO: 89.7 FL (ref 79.6–97.8)
MONOCYTES # BLD: 0.9 K/UL (ref 0.1–1.3)
MONOCYTES NFR BLD: 5 % (ref 4–12)
NEUTS SEG # BLD: 5.5 K/UL (ref 1.7–8.2)
NEUTS SEG NFR BLD: 31 % (ref 43–78)
NRBC # BLD: 0 K/UL (ref 0–0.2)
PLATELET # BLD AUTO: 267 K/UL (ref 150–450)
PLATELET COMMENTS,PCOM: ADEQUATE
PMV BLD AUTO: 9.8 FL (ref 9.4–12.3)
POTASSIUM SERPL-SCNC: 3.8 MMOL/L (ref 3.5–5.1)
PROT SERPL-MCNC: 7.4 G/DL (ref 6.3–8.2)
RBC # BLD AUTO: 5.23 M/UL (ref 4.23–5.67)
RBC MORPH BLD: ABNORMAL
SODIUM SERPL-SCNC: 141 MMOL/L (ref 136–145)
WBC # BLD AUTO: 17.9 K/UL (ref 4.3–11.1)
WBC MORPH BLD: ABNORMAL

## 2020-10-15 PROCEDURE — 80053 COMPREHEN METABOLIC PANEL: CPT

## 2020-10-15 PROCEDURE — 85025 COMPLETE CBC W/AUTO DIFF WBC: CPT

## 2020-10-15 PROCEDURE — 36415 COLL VENOUS BLD VENIPUNCTURE: CPT

## 2021-04-15 ENCOUNTER — HOSPITAL ENCOUNTER (OUTPATIENT)
Dept: LAB | Age: 66
Discharge: HOME | End: 2021-04-15
Payer: MEDICARE

## 2021-04-15 DIAGNOSIS — C91.10 CLL (CHRONIC LYMPHOCYTIC LEUKEMIA) (HCC): ICD-10-CM

## 2021-04-15 LAB
ALBUMIN SERPL-MCNC: 4.2 G/DL (ref 3.2–4.6)
ALBUMIN/GLOB SERPL: 1.4 {RATIO} (ref 1.2–3.5)
ALP SERPL-CCNC: 96 U/L (ref 50–136)
ALT SERPL-CCNC: 22 U/L (ref 12–65)
ANION GAP SERPL CALC-SCNC: 7 MMOL/L (ref 7–16)
AST SERPL-CCNC: 15 U/L (ref 15–37)
BASOPHILS # BLD: 0.2 K/UL (ref 0–0.2)
BASOPHILS NFR BLD: 1 % (ref 0–2)
BILIRUB SERPL-MCNC: 0.7 MG/DL (ref 0.2–1.1)
BUN SERPL-MCNC: 10 MG/DL (ref 8–23)
CALCIUM SERPL-MCNC: 9.2 MG/DL (ref 8.3–10.4)
CHLORIDE SERPL-SCNC: 107 MMOL/L (ref 98–107)
CO2 SERPL-SCNC: 26 MMOL/L (ref 21–32)
CREAT SERPL-MCNC: 1 MG/DL (ref 0.8–1.5)
DIFFERENTIAL METHOD BLD: ABNORMAL
EOSINOPHIL # BLD: 0.2 K/UL (ref 0–0.8)
EOSINOPHIL NFR BLD: 1 % (ref 0.5–7.8)
ERYTHROCYTE [DISTWIDTH] IN BLOOD BY AUTOMATED COUNT: 13.9 % (ref 11.9–14.6)
GLOBULIN SER CALC-MCNC: 3 G/DL (ref 2.3–3.5)
GLUCOSE SERPL-MCNC: 90 MG/DL (ref 65–100)
HCT VFR BLD AUTO: 47.4 %
HGB BLD-MCNC: 15.5 G/DL (ref 13.6–17.2)
IMM GRANULOCYTES # BLD AUTO: 0 K/UL (ref 0–0.5)
IMM GRANULOCYTES NFR BLD AUTO: 0 % (ref 0–5)
LYMPHOCYTES # BLD: 17 K/UL (ref 0.5–4.6)
LYMPHOCYTES NFR BLD: 72 % (ref 13–44)
MCH RBC QN AUTO: 30.3 PG (ref 26.1–32.9)
MCHC RBC AUTO-ENTMCNC: 32.7 G/DL (ref 31.4–35)
MCV RBC AUTO: 92.8 FL (ref 79.6–97.8)
MONOCYTES # BLD: 1.2 K/UL (ref 0.1–1.3)
MONOCYTES NFR BLD: 5 % (ref 4–12)
NEUTS SEG # BLD: 4.9 K/UL (ref 1.7–8.2)
NEUTS SEG NFR BLD: 21 % (ref 43–78)
NRBC # BLD: 0 K/UL (ref 0–0.2)
PLATELET # BLD AUTO: 292 K/UL (ref 150–450)
PLATELET COMMENTS,PCOM: ADEQUATE
PMV BLD AUTO: 9.7 FL (ref 9.4–12.3)
POTASSIUM SERPL-SCNC: 3.8 MMOL/L (ref 3.5–5.1)
PROT SERPL-MCNC: 7.2 G/DL (ref 6.3–8.2)
RBC # BLD AUTO: 5.11 M/UL (ref 4.23–5.6)
RBC MORPH BLD: ABNORMAL
SODIUM SERPL-SCNC: 140 MMOL/L (ref 136–145)
WBC # BLD AUTO: 23.5 K/UL (ref 4.3–11.1)
WBC MORPH BLD: ABNORMAL

## 2021-04-15 PROCEDURE — 36415 COLL VENOUS BLD VENIPUNCTURE: CPT

## 2021-04-15 PROCEDURE — 85025 COMPLETE CBC W/AUTO DIFF WBC: CPT

## 2021-04-15 PROCEDURE — 80053 COMPREHEN METABOLIC PANEL: CPT

## 2021-04-28 PROCEDURE — 88305 TISSUE EXAM BY PATHOLOGIST: CPT

## 2021-04-29 ENCOUNTER — HOSPITAL ENCOUNTER (OUTPATIENT)
Dept: LAB | Age: 66
Discharge: HOME | End: 2021-04-29

## 2021-05-13 ENCOUNTER — HOSPITAL ENCOUNTER (OUTPATIENT)
Dept: PHYSICAL THERAPY | Age: 66
Discharge: HOME | End: 2021-05-13
Attending: SURGERY
Payer: MEDICARE

## 2021-05-13 DIAGNOSIS — R10.31 RIGHT GROIN PAIN: ICD-10-CM

## 2021-05-13 PROCEDURE — 97162 PT EVAL MOD COMPLEX 30 MIN: CPT

## 2021-05-13 NOTE — THERAPY EVALUATION
Donna Cardona  : 1955  Primary: Junie Martínez Medicare Hmo  Secondary:  [address]  [facility] [address] [address], [address] [facility]  [phone]   [phone]           [address] Assessment 2021   ICD-10: Treatment Diagnosis:   Muscle weakness (generalized) (M62.81); Difficulty in walking, not elsewhere classified (R26.2)  Pain in right hip (M25.551)    Precautions/Allergies:   Fentanyl, Lovastatin, Niacin, and Advicor [niacin-lovastatin]   TREATMENT PLAN:  Effective Dates: 2021 TO 2021 (90 days). Frequency/Duration: 2 times a week for 90 Day(s) MEDICAL/REFERRING DIAGNOSIS:  Right groin pain [R10.31]   DATE OF ONSET: 2020  REFERRING PHYSICIAN: Dayday Brandon MD MD Orders: eval, treat  Return MD Appointment: unknown     INITIAL ASSESSMENT:  Mr. Vikas Pacheco presents with decreased strength secondary to hernia surgery in R hip  and restricted range of motion indicated below. Triggering of anterior hip pain occurs with closed chain ambulation and resolves with NWB sitting. Pt has extreme point tenderness on greater trochanter, piriformis, and glut med. See objective measurements for specific measurements below. Skilled and sophisticated PT is indicated for this patient functional mobility deficits    PROBLEM LIST (Impacting functional limitations):  1. Decreased Strength  2. Decreased ADL/Functional Activities  3. Decreased Transfer Abilities  4. Decreased Ambulation Ability/Technique  5. Decreased Balance  6. Increased Pain  7. Decreased Activity Tolerance  8. Decreased Flexibility/Joint Mobility  9. Edema/Girth INTERVENTIONS PLANNED: (Treatment may consist of any combination of the following)  1. Balance Exercise  2. Cold  3. Gait Training  4. Heat  5. Home Exercise Program (HEP)  6. Manual Therapy  7. Neuromuscular Re-education/Strengthening  8. Range of Motion (ROM)  9. Therapeutic Activites  10.  Therapeutic Exercise/Strengthening     GOALS: (Goals have been discussed and agreed upon with patient.)  SHORT TERM GOALS: Time Frame: 6 weeks                                                                                                                                       1.  Pt will be compliant and independent with advanced HEP in order to increase mobility of the hip and functional mobility. 2. Pt will increase score on the LEFS to 55/84 in order to demonstrate improved functional mobility and quality of life. 3. Pt will report standing and walking tolerance for > 10 minutes with minimal to no increase in pain in order to be able to stand for prolonged periods as needed to perform standing for job. DISCHARGE GOALS: Time Frame: 12 weeks                                                                                                                                       1.  Pt will report decrease hip pain to increase mobility of the hip and functional mobility. 2. Pt will increase score on the LEFS to 60/84 in order to demonstrate improved functional mobility and quality of life. 3. Pt will report standing and walking tolerance for > 60 minutes with minimal to no increase in pain in order to be able to stand for prolonged periods as needed to perform standing for job. 4. Pt will improve mCTSIB score to  12/12 to decrease risk for falls. .       OUTCOME MEASURE:   Tool Used: Lower Extremity Functional Scale (LEFS)  Score:  Initial: 42/80 Most Recent: X/80 (Date: -- )   Interpretation of Score: 20 questions each scored on a 5 point scale with 0 representing \"extreme difficulty or unable to perform\" and 4 representing \"no difficulty\". The lower the score, the greater the functional disability. 80/80 represents no disability. Minimal detectable change is 9 points. MCTSIB= 10/12  MEDICAL NECESSITY:   · Skilled intervention continues to be required due to decreased function.   REASON FOR SERVICES/OTHER COMMENTS:  · Patient continues to require skilled intervention due to medical complications and patient unable to attend/participate in therapy as expected. Total Duration:  PT Patient Time In/Time Out  Time In: 1515  Time Out: 1600    Rehabilitation Potential For Stated Goals: Good  Regarding Susie Esparza's therapy, I certify that the treatment plan above will be carried out by a therapist or under their direction. Thank you for this referral,  Xiomara Olmos DPT     Referring Physician Signature: Loreta Holloway MD _______________________________ Date _____________     PAIN/SUBJECTIVE:   Initial:   4 Post Session:  3/10   HISTORY:   History of Injury/Illness (Reason for Referral):  Pt states he had hernia surgery in June 2020. Since then he has had R groin pain since and he displays pain across inguinal ligament. He states he does not have pain in sitting or supine, but only has pain in WB. He also has pain with lifting, he loses balance when SLS on R LE to dress, when riding  the vibration increases pain to the point of antalgic gait. He noticed when walking around [facility] he has to stop and stretch HS putting foot up onto cart for a few seconds to relieve pain. He reports hx of back pain managed by pain mgmt.      Past Medical History/Comorbidities:   Mr. Martha Baltazar  has a past medical history of Arrhythmia, Arthritis, CAD (coronary artery disease) (11/2009), Cancer [facility]), Chest pain, Chest pain, Chronic midline low back pain without sciatica (8/28/2017), CLL (chronic lymphocytic leukemia) ([facility] [address] (7/24/2018), COPD, Coronary atherosclerosis of native coronary artery (12/30/2015), Depression, Dyspnea on exertion, GERD (gastroesophageal reflux disease) (7/22/2010), Heart failure ([facility] [address] Hematochezia, History of tobacco use (12/30/2015), HTN (hypertension) (x 1 month), Hypercholesteremia, On home O2, Other ill-defined conditions(799.89), Palpitations (12/30/2015), Paroxysmal atrial fibrillation [facility]), Presence of Watchman left atrial appendage closure device, Psychiatric disorder, PUD (peptic ulcer disease) (2010), Recurrent depression ([facility] [address] (8/8/2016), Tobacco use disorder (12/30/2015), and Unspecified adverse effect of anesthesia (1978). He also has no past medical history of Difficult intubation, Malignant hyperthermia due to anesthesia, Nausea & vomiting, or Pseudocholinesterase deficiency. Mr. Martha Baltazar  has a past surgical history that includes hx other surgical; hx colonoscopy (2012); hx shoulder arthroscopy (Right, 2000); hx orthopaedic (Left); hx orthopaedic (Left, 2012); colonoscopy (N/A, 8/19/2019); hx lap cholecystectomy (1995); hx hernia repair (1990); hx heent (1977 and 1978); hx heart catheterization (11/2009); hx heart catheterization (3/2016); pr cardiac surg procedure unlist; hx heart catheterization (08/2019); and hx hernia repair (07/06/2020). Social History/Living Environment:     lives in home no steps  Prior Level of Function/Work/Activity:  Pt is IND with all ADLs, no AD used, drives, works on home projects     Ambulatory/Rehab [address] [facility] [address] [facility] [address] Assessment   Risk Factors:       (1)  Gender [Male] Ability to Rise from Chair:       (1)  Pushes up, successful in one attempt   [address]:       No modifications necessary   Total: (5 or greater = High Risk): 2   ©2010 [facility] Patent #[id number]. Federal Law prohibits the replication, distribution or use without written permission from [facility] [employer]   Current Medications:       Current Outpatient Medications:     nystatin (MYCOSTATIN) 100,000 unit/mL suspension, Take 5 mL by mouth four (4) times daily.  swish and spit, Disp: 300 mL, Rfl: 3    promethazine (PHENERGAN) 25 mg tablet, Take 0.5-1 Tabs by mouth every six (6) hours as needed for Nausea., Disp: 30 Tab, Rfl: 2    amLODIPine (NORVASC) 10 mg tablet, TAKE 1 TABLET EVERY EVENING, Disp: 90 Tab, Rfl: 1   fluticasone-umeclidinium-vilanterol (Trelegy Ellipta) 100-62.5-25 mcg inhaler, Take 1 Puff by inhalation daily. , Disp: 3 Inhaler, Rfl: 3    meclizine (ANTIVERT) 25 mg tablet, TAKE 1 TABLET 3 TIMES DAILY AS NEEDED FOR DIZZINESS. INDICATIONS: SENSATION OF SPINNING OR WHIRLING, Disp: 90 Tab, Rfl: 1    pantoprazole (PROTONIX) 40 mg tablet, TAKE 1 TABLET EVERY DAY, Disp: 90 Tab, Rfl: 3    sertraline (ZOLOFT) 100 mg tablet, Take one tablet daily. , Disp: 90 Tab, Rfl: 3    rOPINIRole (REQUIP) 1 mg tablet, Take 1 Tab by mouth nightly., Disp: 90 Tab, Rfl: 3    triamcinolone acetonide (KENALOG) 0.1 % topical cream, Apply  to affected area two (2) times a day. use thin layer, do not use on face, use for no longer than 2 weeks. , Disp: 454 g, Rfl: 1    sotaloL (BETAPACE) 80 mg tablet, TAKE 1 TABLET TWICE DAILY, Disp: 180 Tab, Rfl: 1    isosorbide mononitrate ER (IMDUR) 60 mg CR tablet, TAKE 1 TABLET EVERY DAY, Disp: 90 Tab, Rfl: 1    Oxygen, 2LPM QHS, Disp: , Rfl:     albuterol-ipratropium (DUO-NEB) 2.5 mg-0.5 mg/3 ml nebu, 3 mL by Nebulization route every four (4) hours as needed for Wheezing. File to Medicare part B--J44.9, Disp: 60 Nebule, Rfl: 11    albuterol (Ventolin HFA) 90 mcg/actuation inhaler, Take 2 Puffs by inhalation every four (4) hours as needed (prn). , Disp: 3 Inhaler, Rfl: 3    acetaminophen (TYLENOL) 325 mg tablet, Take 2 Tabs by mouth every four (4) hours as needed. , Disp: , Rfl:     hyoscyamine SL (LEVSIN/SL) 0.125 mg SL tablet, 0.125 mg by SubLINGual route every four (4) hours as needed for Cramping., Disp: , Rfl:     aspirin 81 mg chewable tablet, Take 1 Tab by mouth daily. , Disp: , Rfl:     nitroglycerin (NITROSTAT) 0.4 mg SL tablet, 1 Tab by SubLINGual route every five (5) minutes as needed for Chest Pain., Disp: 25 Tab, Rfl: 11    multivitamin (ONE A DAY) tablet, Take 1 Tab by mouth daily. , Disp: , Rfl:    Date Last Reviewed:  5/13/2021     Number of Personal Factors/Comorbidities that affect the Plan of Care: 1-2: MODERATE COMPLEXITY   EXAMINATION:   Observation/Orthostatic Postural Assessment:          Lurch and WB thru R LE when WB, WB 25% on R LE. In supine he puts R LE into maximum ER,   Palpation:          Highly tender in R greater trochanter, glut med, piriformis, ASIS and along inguinal   Balance:          mCTSIB 10/12, C4=1/3  Range of Motion             Joint: Date: 5/13/21  Date:   Date:       Right Left Right Left Right Left   HIP             Flex 50 70           Abduction 20 40           KNEE               Flexion  WNL WNL           Extension  WNL WNL           ANKLE         DF WNL WNL                                STRENGTH               Joint: Date:  5/13/21   Date:   Date:       Right Left Right Left Right Left   Ankle DF 5/5 5/5           Knee extension 4/5 4/5           Hip flexion 3+/5 5/5           Glut med 4/5 4/5              Body Structures Involved:  1. Nerves  2. Eyes and Ears  3. Bones  4. Joints  5. Muscles  6. Ligaments Body Functions Affected:  1. Mental  2. Sensory/Pain  3. Neuromusculoskeletal  4. Movement Related Activities and Participation Affected:  1. General Tasks and Demands  2. Mobility  3. Self Care  4. Domestic Life  5.  Community, Social and [place]   Number of elements (examined above) that affect the Plan of Care: 3: MODERATE COMPLEXITY   CLINICAL PRESENTATION:   Presentation: Evolving clinical presentation with changing clinical characteristics: MODERATE COMPLEXITY   CLINICAL DECISION MAKING:   Use of outcome tool(s) and clinical judgement create a POC that gives a: Questionable prediction of patient's progress: MODERATE COMPLEXITY

## 2021-05-20 ENCOUNTER — APPOINTMENT (OUTPATIENT)
Dept: PHYSICAL THERAPY | Age: 66
End: 2021-05-20
Attending: SURGERY
Payer: MEDICARE

## 2021-05-27 ENCOUNTER — APPOINTMENT (OUTPATIENT)
Dept: CT IMAGING | Age: 66
End: 2021-05-27
Attending: EMERGENCY MEDICINE
Payer: MEDICARE

## 2021-05-27 ENCOUNTER — APPOINTMENT (OUTPATIENT)
Dept: GENERAL RADIOLOGY | Age: 66
End: 2021-05-27
Attending: EMERGENCY MEDICINE
Payer: MEDICARE

## 2021-05-27 ENCOUNTER — HOSPITAL ENCOUNTER (OUTPATIENT)
Age: 66
Setting detail: OBSERVATION
Discharge: HOME HEALTH | End: 2021-05-30
Attending: EMERGENCY MEDICINE | Admitting: HOSPITALIST
Payer: MEDICARE

## 2021-05-27 DIAGNOSIS — I48.0 PAROXYSMAL ATRIAL FIBRILLATION (HCC): Chronic | ICD-10-CM

## 2021-05-27 DIAGNOSIS — E86.0 DEHYDRATION: ICD-10-CM

## 2021-05-27 DIAGNOSIS — C91.10 CLL (CHRONIC LYMPHOCYTIC LEUKEMIA) (HCC): Chronic | ICD-10-CM

## 2021-05-27 DIAGNOSIS — R42 DIZZINESS: Primary | ICD-10-CM

## 2021-05-27 DIAGNOSIS — R51.9 ACUTE NONINTRACTABLE HEADACHE, UNSPECIFIED HEADACHE TYPE: ICD-10-CM

## 2021-05-27 DIAGNOSIS — R53.83 LETHARGY: ICD-10-CM

## 2021-05-27 PROBLEM — T67.5XXA HEAT EXHAUSTION: Status: ACTIVE | Noted: 2021-05-27

## 2021-05-27 LAB
ALBUMIN SERPL-MCNC: 4 G/DL (ref 3.2–4.6)
ALBUMIN/GLOB SERPL: 1.4 {RATIO} (ref 1.2–3.5)
ALP SERPL-CCNC: 85 U/L (ref 50–136)
ALT SERPL-CCNC: 17 U/L (ref 12–65)
ANION GAP SERPL CALC-SCNC: 6 MMOL/L (ref 7–16)
APPEARANCE UR: CLEAR
AST SERPL-CCNC: 18 U/L (ref 15–37)
ATRIAL RATE: 80 BPM
BASOPHILS # BLD: 0.2 K/UL (ref 0–0.2)
BASOPHILS NFR BLD: 1 % (ref 0–2)
BILIRUB SERPL-MCNC: 0.7 MG/DL (ref 0.2–1.1)
BILIRUB UR QL: NEGATIVE
BUN SERPL-MCNC: 10 MG/DL (ref 8–23)
CALCIUM SERPL-MCNC: 8.4 MG/DL (ref 8.3–10.4)
CALCULATED P AXIS, ECG09: 72 DEGREES
CALCULATED R AXIS, ECG10: 46 DEGREES
CALCULATED T AXIS, ECG11: 75 DEGREES
CHLORIDE SERPL-SCNC: 111 MMOL/L (ref 98–107)
CK SERPL-CCNC: 59 U/L (ref 21–215)
CO2 SERPL-SCNC: 26 MMOL/L (ref 21–32)
COLOR UR: YELLOW
CREAT SERPL-MCNC: 0.91 MG/DL (ref 0.8–1.5)
DIAGNOSIS, 93000: NORMAL
DIFFERENTIAL METHOD BLD: ABNORMAL
EOSINOPHIL # BLD: 0.2 K/UL (ref 0–0.8)
EOSINOPHIL NFR BLD: 1 % (ref 0.5–7.8)
ERYTHROCYTE [DISTWIDTH] IN BLOOD BY AUTOMATED COUNT: 12.4 % (ref 11.9–14.6)
GLOBULIN SER CALC-MCNC: 2.9 G/DL (ref 2.3–3.5)
GLUCOSE SERPL-MCNC: 89 MG/DL (ref 65–100)
GLUCOSE UR STRIP.AUTO-MCNC: NEGATIVE MG/DL
HCT VFR BLD AUTO: 46 % (ref 41.1–50.3)
HGB BLD-MCNC: 15.1 G/DL (ref 13.6–17.2)
HGB UR QL STRIP: NEGATIVE
IMM GRANULOCYTES # BLD AUTO: 0 K/UL (ref 0–0.5)
IMM GRANULOCYTES NFR BLD AUTO: 0 % (ref 0–5)
KETONES UR QL STRIP.AUTO: NEGATIVE MG/DL
LEUKOCYTE ESTERASE UR QL STRIP.AUTO: NEGATIVE
LIPASE SERPL-CCNC: 107 U/L (ref 73–393)
LYMPHOCYTES # BLD: 16.4 K/UL (ref 0.5–4.6)
LYMPHOCYTES NFR BLD: 67 % (ref 13–44)
MAGNESIUM SERPL-MCNC: 2.1 MG/DL (ref 1.8–2.4)
MCH RBC QN AUTO: 30.6 PG (ref 26.1–32.9)
MCHC RBC AUTO-ENTMCNC: 32.8 G/DL (ref 31.4–35)
MCV RBC AUTO: 93.3 FL (ref 79.6–97.8)
MONOCYTES # BLD: 1.2 K/UL (ref 0.1–1.3)
MONOCYTES NFR BLD: 5 % (ref 4–12)
NEUTS SEG # BLD: 6.3 K/UL (ref 1.7–8.2)
NEUTS SEG NFR BLD: 26 % (ref 43–78)
NITRITE UR QL STRIP.AUTO: NEGATIVE
NRBC # BLD: 0 K/UL (ref 0–0.2)
P-R INTERVAL, ECG05: 146 MS
PH UR STRIP: 5.5 [PH] (ref 5–9)
PLATELET # BLD AUTO: 272 K/UL (ref 150–450)
PLATELET COMMENTS,PCOM: ADEQUATE
PMV BLD AUTO: 10.2 FL (ref 9.4–12.3)
POTASSIUM SERPL-SCNC: 3.8 MMOL/L (ref 3.5–5.1)
PROT SERPL-MCNC: 6.9 G/DL (ref 6.3–8.2)
PROT UR STRIP-MCNC: NEGATIVE MG/DL
Q-T INTERVAL, ECG07: 404 MS
QRS DURATION, ECG06: 76 MS
QTC CALCULATION (BEZET), ECG08: 465 MS
RBC # BLD AUTO: 4.93 M/UL (ref 4.23–5.6)
RBC MORPH BLD: ABNORMAL
SODIUM SERPL-SCNC: 143 MMOL/L (ref 136–145)
SP GR UR REFRACTOMETRY: 1.01 (ref 1–1.02)
TROPONIN-HIGH SENSITIVITY: 19.6 PG/ML (ref 0–14)
TROPONIN-HIGH SENSITIVITY: 25.3 PG/ML (ref 0–14)
UROBILINOGEN UR QL STRIP.AUTO: 0.2 EU/DL (ref 0.2–1)
VENTRICULAR RATE, ECG03: 80 BPM
WBC # BLD AUTO: 24.3 K/UL (ref 4.3–11.1)
WBC MORPH BLD: ABNORMAL

## 2021-05-27 PROCEDURE — 99285 EMERGENCY DEPT VISIT HI MDM: CPT

## 2021-05-27 PROCEDURE — 99218 HC RM OBSERVATION: CPT

## 2021-05-27 PROCEDURE — 74011000258 HC RX REV CODE- 258: Performed by: EMERGENCY MEDICINE

## 2021-05-27 PROCEDURE — 74011250637 HC RX REV CODE- 250/637: Performed by: HOSPITALIST

## 2021-05-27 PROCEDURE — 70450 CT HEAD/BRAIN W/O DYE: CPT

## 2021-05-27 PROCEDURE — 82550 ASSAY OF CK (CPK): CPT

## 2021-05-27 PROCEDURE — 70498 CT ANGIOGRAPHY NECK: CPT

## 2021-05-27 PROCEDURE — 96372 THER/PROPH/DIAG INJ SC/IM: CPT

## 2021-05-27 PROCEDURE — 74011250636 HC RX REV CODE- 250/636: Performed by: EMERGENCY MEDICINE

## 2021-05-27 PROCEDURE — 83690 ASSAY OF LIPASE: CPT

## 2021-05-27 PROCEDURE — 84484 ASSAY OF TROPONIN QUANT: CPT

## 2021-05-27 PROCEDURE — 83735 ASSAY OF MAGNESIUM: CPT

## 2021-05-27 PROCEDURE — 81003 URINALYSIS AUTO W/O SCOPE: CPT

## 2021-05-27 PROCEDURE — 93005 ELECTROCARDIOGRAM TRACING: CPT | Performed by: EMERGENCY MEDICINE

## 2021-05-27 PROCEDURE — 2709999900 HC NON-CHARGEABLE SUPPLY

## 2021-05-27 PROCEDURE — 85025 COMPLETE CBC W/AUTO DIFF WBC: CPT

## 2021-05-27 PROCEDURE — 74011000636 HC RX REV CODE- 636: Performed by: EMERGENCY MEDICINE

## 2021-05-27 PROCEDURE — 71045 X-RAY EXAM CHEST 1 VIEW: CPT

## 2021-05-27 PROCEDURE — 80053 COMPREHEN METABOLIC PANEL: CPT

## 2021-05-27 RX ORDER — AMLODIPINE BESYLATE 10 MG/1
10 TABLET ORAL DAILY
Status: DISCONTINUED | OUTPATIENT
Start: 2021-05-28 | End: 2021-05-30 | Stop reason: HOSPADM

## 2021-05-27 RX ORDER — SODIUM CHLORIDE 0.9 % (FLUSH) 0.9 %
10 SYRINGE (ML) INJECTION
Status: COMPLETED | OUTPATIENT
Start: 2021-05-27 | End: 2021-05-27

## 2021-05-27 RX ORDER — ONDANSETRON 2 MG/ML
4 INJECTION INTRAMUSCULAR; INTRAVENOUS
Status: DISCONTINUED | OUTPATIENT
Start: 2021-05-27 | End: 2021-05-30 | Stop reason: HOSPADM

## 2021-05-27 RX ORDER — SODIUM CHLORIDE 0.9 % (FLUSH) 0.9 %
5-10 SYRINGE (ML) INJECTION AS NEEDED
Status: DISCONTINUED | OUTPATIENT
Start: 2021-05-27 | End: 2021-05-30 | Stop reason: HOSPADM

## 2021-05-27 RX ORDER — ROPINIROLE 1 MG/1
1 TABLET, FILM COATED ORAL
Status: DISCONTINUED | OUTPATIENT
Start: 2021-05-27 | End: 2021-05-30 | Stop reason: HOSPADM

## 2021-05-27 RX ORDER — SERTRALINE HYDROCHLORIDE 50 MG/1
100 TABLET, FILM COATED ORAL DAILY
Status: DISCONTINUED | OUTPATIENT
Start: 2021-05-28 | End: 2021-05-30 | Stop reason: HOSPADM

## 2021-05-27 RX ORDER — ENOXAPARIN SODIUM 100 MG/ML
40 INJECTION SUBCUTANEOUS DAILY
Status: DISCONTINUED | OUTPATIENT
Start: 2021-05-28 | End: 2021-05-28

## 2021-05-27 RX ORDER — GUAIFENESIN 100 MG/5ML
81 LIQUID (ML) ORAL DAILY
Status: DISCONTINUED | OUTPATIENT
Start: 2021-05-28 | End: 2021-05-30 | Stop reason: HOSPADM

## 2021-05-27 RX ORDER — PROMETHAZINE HYDROCHLORIDE 25 MG/1
12.5 TABLET ORAL
Status: DISCONTINUED | OUTPATIENT
Start: 2021-05-27 | End: 2021-05-30 | Stop reason: HOSPADM

## 2021-05-27 RX ORDER — MECLIZINE HCL 12.5 MG 12.5 MG/1
25 TABLET ORAL
Status: DISCONTINUED | OUTPATIENT
Start: 2021-05-27 | End: 2021-05-30 | Stop reason: HOSPADM

## 2021-05-27 RX ORDER — SOTALOL HYDROCHLORIDE 80 MG/1
80 TABLET ORAL EVERY 12 HOURS
Status: DISCONTINUED | OUTPATIENT
Start: 2021-05-27 | End: 2021-05-30 | Stop reason: HOSPADM

## 2021-05-27 RX ORDER — ISOSORBIDE MONONITRATE 30 MG/1
30 TABLET, EXTENDED RELEASE ORAL
Status: DISCONTINUED | OUTPATIENT
Start: 2021-05-28 | End: 2021-05-30 | Stop reason: HOSPADM

## 2021-05-27 RX ORDER — SODIUM CHLORIDE 0.9 % (FLUSH) 0.9 %
5-10 SYRINGE (ML) INJECTION EVERY 8 HOURS
Status: DISCONTINUED | OUTPATIENT
Start: 2021-05-27 | End: 2021-05-30 | Stop reason: HOSPADM

## 2021-05-27 RX ORDER — SODIUM CHLORIDE 9 MG/ML
50 INJECTION, SOLUTION INTRAVENOUS CONTINUOUS
Status: DISPENSED | OUTPATIENT
Start: 2021-05-27 | End: 2021-05-28

## 2021-05-27 RX ORDER — ACETAMINOPHEN 325 MG/1
650 TABLET ORAL
Status: DISCONTINUED | OUTPATIENT
Start: 2021-05-27 | End: 2021-05-30 | Stop reason: HOSPADM

## 2021-05-27 RX ORDER — PROMETHAZINE HYDROCHLORIDE 25 MG/ML
25 INJECTION, SOLUTION INTRAMUSCULAR; INTRAVENOUS
Status: COMPLETED | OUTPATIENT
Start: 2021-05-27 | End: 2021-05-27

## 2021-05-27 RX ORDER — POLYETHYLENE GLYCOL 3350 17 G/17G
17 POWDER, FOR SOLUTION ORAL DAILY PRN
Status: DISCONTINUED | OUTPATIENT
Start: 2021-05-27 | End: 2021-05-30 | Stop reason: HOSPADM

## 2021-05-27 RX ORDER — PANTOPRAZOLE SODIUM 40 MG/1
40 TABLET, DELAYED RELEASE ORAL
Status: DISCONTINUED | OUTPATIENT
Start: 2021-05-28 | End: 2021-05-30 | Stop reason: HOSPADM

## 2021-05-27 RX ORDER — MECLIZINE HYDROCHLORIDE 25 MG/1
25 TABLET ORAL
Status: COMPLETED | OUTPATIENT
Start: 2021-05-27 | End: 2021-05-27

## 2021-05-27 RX ORDER — ACETAMINOPHEN 650 MG/1
650 SUPPOSITORY RECTAL
Status: DISCONTINUED | OUTPATIENT
Start: 2021-05-27 | End: 2021-05-30 | Stop reason: HOSPADM

## 2021-05-27 RX ADMIN — ACETAMINOPHEN 650 MG: 325 TABLET ORAL at 23:48

## 2021-05-27 RX ADMIN — Medication 10 ML: at 18:16

## 2021-05-27 RX ADMIN — PROMETHAZINE HYDROCHLORIDE 25 MG: 25 INJECTION INTRAMUSCULAR; INTRAVENOUS at 17:53

## 2021-05-27 RX ADMIN — ROPINIROLE HYDROCHLORIDE 1 MG: 1 TABLET, FILM COATED ORAL at 23:48

## 2021-05-27 RX ADMIN — SODIUM CHLORIDE 100 ML: 900 INJECTION, SOLUTION INTRAVENOUS at 18:16

## 2021-05-27 RX ADMIN — IOPAMIDOL 50 ML: 755 INJECTION, SOLUTION INTRAVENOUS at 18:16

## 2021-05-27 RX ADMIN — SOTALOL HYDROCHLORIDE 80 MG: 80 TABLET ORAL at 23:48

## 2021-05-27 RX ADMIN — MECLIZINE HYDROCHLORIDE 25 MG: 25 TABLET ORAL at 17:54

## 2021-05-27 RX ADMIN — Medication 5 ML: at 23:49

## 2021-05-27 RX ADMIN — SODIUM CHLORIDE 1000 ML: 900 INJECTION, SOLUTION INTRAVENOUS at 17:53

## 2021-05-27 NOTE — ED PROVIDER NOTES
Patient with a history of proximal atrial fibrillation. Also with heart disease and stents in place. Has a history of COPD and hypertension. Has had off-and-on dizziness for the past 2 months. Has been dizzy for the past 3 days off and on. Current symptoms started 2 hours prior to arrival.  Gets very dizzy with room spinning. Cannot walk. Symptoms yesterday lasted half a day. Denies any chest pain shortness of breath or heart palpitations. Has bilateral frontal headache and pain behind both eyes. No neck pain. No shortness of breath. No nausea numbness or diaphoresis. Patient is on meclizine and Phenergan. The history is provided by the patient and a relative. No  was used. Dizziness  This is a recurrent problem. The current episode started 1 to 2 hours ago. The problem has not changed since onset. There was no focality noted. Primary symptoms include loss of balance. Pertinent negatives include no focal weakness, no loss of sensation, no slurred speech, no speech difficulty, no movement disorder, no agitation, no visual change, no mental status change, no unresponsiveness and no disorientation. There has been no fever. Associated symptoms include headaches (bilateral frontal and behind the eyes. ). Pertinent negatives include no shortness of breath, no chest pain, no vomiting, no altered mental status, no confusion, no nausea, no bowel incontinence and no bladder incontinence.         Past Medical History:   Diagnosis Date    Arrhythmia     A Fib twice since 7/2010-- ablation no problems since    Arthritis     CAD (coronary artery disease) 11/2009    stent x 1-- ablation 2010-- no problems since    Cancer [facility])     skin cancer    Chest pain     Chest pain     left-side chest pain    Chronic midline low back pain without sciatica 8/28/2017    CLL (chronic lymphocytic leukemia) ([facility] [address] 7/24/2018    COPD     prn inhalers--- severe per EHR    Coronary atherosclerosis of native coronary artery 2015    Depression     Dyspnea on exertion     with any activity; associated with nausea    GERD (gastroesophageal reflux disease) 2010    medication    Heart failure ([address]     Hematochezia     History of tobacco use 2015    HTN (hypertension) x 1 month    started on med--- controlled    Hypercholesteremia     controlled by medication    On home O2     QHS 2L     Other ill-defined conditions(799.89)     dyslipidemia/hyperlipidemia    Palpitations 2015    Paroxysmal atrial fibrillation ([address]     Presence of Watchman left atrial appendage closure device     per cath 2019- \"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  Per Dr. Daniel David note 9/3/2019- \" discussed this finding with experts in the field of Watchman\" - \"seen on a few occasions since inception of device\" \"nothing needed to be done\"    Psychiatric disorder     depression    PUD (peptic ulcer disease)     hx-- r/t coumadin--    Recurrent depression ([address] 2016    Tobacco use disorder 2015    Unspecified adverse effect of anesthesia     quit breathing ---Stated during ear surgery \" on the table\"--- states no problems with anesthesia many time given since since         Past Surgical History:   Procedure Laterality Date    COLONOSCOPY N/A 2019    COLONOSCOPY performed by Manuel Gracia MD at [address] [facility] HX COLONOSCOPY      HX HEART CATHETERIZATION  2009    PCI x 1 to LAD  ----ablation     HX HEART CATHETERIZATION  3/2016    HX HEART CATHETERIZATION  2019    HX HEENT   and     eardrum repair-- right    HX HERNIA REPAIR      Right inguinal    HX HERNIA REPAIR  2020    HX LAP CHOLECYSTECTOMY      HX ORTHOPAEDIC Left     left shoulder surg x1    HX ORTHOPAEDIC Left 2012    hand    HX OTHER SURGICAL      EGD    HX SHOULDER ARTHROSCOPY Right 2000    x 3 on right    NE CARDIAC SURG PROCEDURE UNLIST      [address] 10/18         Family History:   Problem Relation Age of Onset    Heart Disease Father     Heart Attack Father     Hypertension Father     Bleeding Prob Father     Heart Surgery Father         multiple PCIs, first in his 46s    Heart Disease Brother         PCI x 2    Diabetes Mother     Cancer Mother         lung    Diabetes Brother     Diabetes Brother     Heart Attack Brother     Heart Disease Brother     Diabetes Brother     Bleeding Prob Brother         blood clots    Heart Disease Brother     No Known Problems Brother        Social History     Socioeconomic History    Marital status:      Spouse name: Not on file    Number of children: Not on file    Years of education: Not on file    Highest education level: Not on file   Occupational History    Not on file   Tobacco Use    Smoking status: Former Smoker     Packs/day: 1.00     Years: 45.00     Pack years: 45.00     Types: Cigarettes     Quit date: 3/4/2016     Years since quittin.2    Smokeless tobacco: Never Used   Substance and Sexual Activity    Alcohol use: No     Alcohol/week: 0.0 standard drinks    Drug use: No    Sexual activity: Not on file   Other Topics Concern    Not on file   Social History Narrative    Not on file     Social Determinants of Health     Financial Resource Strain:     Difficulty of Paying Living Expenses:    Food Insecurity:     Worried About [facility] in the Last Year:     [facility] in the Last Year:    Transportation Needs:     Lack of Transportation (Medical):      Lack of Transportation (Non-Medical):    Physical Activity:     Days of Exercise per Week:     Minutes of Exercise per Session:    Stress:     Feeling of Stress :    Social Connections:     Frequency of Communication with Friends and Family:     Frequency of Social Gatherings with Friends and Family:     Attends Rastafarian Services:     Active Member of Clubs or Organizations:  Attends Club or Organization Meetings:     Marital Status:    Intimate Partner Violence:     Fear of Current or Ex-Partner:     Emotionally Abused:     Physically Abused:     Sexually Abused: ALLERGIES: Fentanyl, Lovastatin, Niacin, and Advicor [niacin-lovastatin]    Review of Systems   Constitutional: Positive for fatigue. Negative for chills and fever. HENT: Negative for rhinorrhea and sore throat. Eyes: Negative for pain and redness. Respiratory: Negative for chest tightness, shortness of breath and wheezing. Cardiovascular: Negative for chest pain and leg swelling. Gastrointestinal: Negative for abdominal pain, bowel incontinence, diarrhea, nausea and vomiting. Genitourinary: Negative for bladder incontinence, dysuria and hematuria. Musculoskeletal: Negative for back pain, gait problem, neck pain and neck stiffness. Skin: Negative for color change and rash. Neurological: Positive for dizziness, weakness (generalized), headaches (bilateral frontal and behind the eyes. ) and loss of balance. Negative for focal weakness, seizures, facial asymmetry, speech difficulty, light-headedness and numbness. Psychiatric/Behavioral: Negative for agitation and confusion. Vitals:    05/27/21 1625   BP: (!) 184/95   Pulse: 83   Resp: 16   Temp: 98 °F (36.7 °C)   SpO2: 99%   Weight: 79.8 kg (176 lb)   Height: 6' 1\" (1.854 m)            Physical Exam  Constitutional:       Appearance: Normal appearance. He is well-developed. HENT:      Head: Normocephalic and atraumatic. Eyes:      Extraocular Movements: Extraocular movements intact. Pupils: Pupils are equal, round, and reactive to light. Cardiovascular:      Rate and Rhythm: Normal rate and regular rhythm. Pulmonary:      Effort: Pulmonary effort is normal.      Breath sounds: Normal breath sounds. Abdominal:      General: Bowel sounds are normal.      Palpations: Abdomen is soft. Tenderness:  There is no abdominal tenderness. Musculoskeletal:         General: No swelling. Normal range of motion. Cervical back: Normal range of motion and neck supple. No rigidity or tenderness. Skin:     General: Skin is warm and dry. Neurological:      General: No focal deficit present. Mental Status: He is alert and oriented to person, place, and time. Cranial Nerves: No cranial nerve deficit. Motor: No weakness. MDM  Number of Diagnoses or Management Options  Diagnosis management comments: 5:43 PM  Wife is here with patient now. States around 1300 patient went out to mow the yard. Did not drink any water or bring any water with him. After about 30 minutes they went and checked on him and found him laying on the ground beside the lawnmower. Unsure if he became dizzy on the lawnmower and stopped and laid on the ground or if he fell. Denies hitting his head. She states patient has seen ENT and is scheduled for a neurology appointment in August for this dizziness and headache he gets frequently. Has also had a MRI of the brain to rule out tumor. The MRI was done October of last year. It was done for dizziness and headaches with history of CLL. This means the dizziness has been going on for longer than 2 months. Patient still dizzy with headache and neck pain. No rigidity. No fever. Has seen some improvement here in the ER but still lethargic. Will admit for further imaging and evaluation. Has a watchman in place. No observed irregular heart rhythms in the ED. Amount and/or Complexity of Data Reviewed  Clinical lab tests: ordered and reviewed  Tests in the radiology section of CPT®: ordered and reviewed  Tests in the medicine section of CPT®: ordered and reviewed    Patient Progress  Patient progress: stable         Procedures        EKG: normal sinus rhythm, nonspecific ST and T waves changes. Rate 80.         Study Result    Narrative & Impression   MRI BRAIN WITHOUT AND WITH CONTRAST 10/13/2020     HISTORY: Chronic lymphocytic leukemia. Dizziness. Headaches.     TECHNIQUE: Sagittal and axial T1-weighted, axial T2-weighted, axial FLAIR, axial  T2-weighted gradient-echo, axial diffusion weighted images with ADC maps and  postcontrast axial and coronal T1-weighted images of the brain. 17cc of Dotarem  gadolinium contrast was administered intravenously without adverse event to  evaluate for pathological leptomeningeal or parenchymal enhancement.     COMPARISON: July 22, 2016     FINDINGS: The cerebellar tonsils are in a normal position. There is no acute  infarction, hemorrhage, intra-axial mass, hydrocephalus, or extra-axial  hematoma.     On the T2-weighted and FLAIR sequences, there are scattered white matter  hyperintensities that have increased slightly in number compared to the previous  study. This pattern is compatible with migraine headaches or moderate chronic  small vessel ischemic disease.     There is no abnormal parenchymal or leptomeningeal enhancement.        IMPRESSION  IMPRESSION:     1. Slight progression of nonspecific white matter findings compared to the July 2016 study. This pattern is compatible with chronic small vessel ischemic  disease.     2. No acute intracranial findings.               EKG: normal sinus rhythm, nonspecific ST and T waves changes. Rate 80. CT HEAD WO CONT (Final result)  Result time 05/27/21 18:29:53  Final result by Maico Levi MD (05/27/21 18:29:53)                Impression:    No acute intracranial abnormality. Narrative:    CT of the head without contrast.     CLINICAL INDICATION: Dizziness     PROCEDURE: Serial thin section axial images are obtained from the cranial vertex   through the skull base without the administration of intravenous contrast.   Radiation dose reduction techniques were used for this study.  Our CT scanners   use one or all of the following: Automated exposure control, adjusted of the mA and/or kV according to patient size, iterative reconstruction     COMPARISON: Head CT dated 7/22/2016. FINDINGS: There is no acute intracranial hemorrhage, mass, or mass effect. No   abnormal extra-axial fluid collections identified. There is no hydrocephalus. The basilar cisterns are widely patent. The gray-white matter brain parenchymal   interface is well-maintained. No skull fracture or aggressive osseous lesion   noted. The mastoid air cells and included paranasal sinuses are clear.                     CTA HEAD NECK W WO CONT (Final result)  Result time 05/27/21 18:32:21  Final result by Joaquim Billy MD (05/27/21 18:32:21)                Impression:      No evidence of hemodynamically significant stenosis. COPD. Date of Dictation: 5/27/2021 6:29 PM            Narrative:    CT ANGIOGRAM OF THE HEAD     HISTORY: CVA, dizziness. COMPARISON: CT dated 5/27/2021     TECHNIQUE: CT angiogram of the head was performed utilizing helical imaging   during the infusion of 100 cc of Isovue-370. Multiplanar MIP reconstructions   were obtained. 3-D post-processed images were created on an independent   workstation. The images were sent to [employer]. Dose reduction techniques used: Automated exposure control, adjustment of the   mAs and/or kVp according to patient's size, and iterative reconstruction   techniques. FINDINGS:   *  COMMENTS: No intracranial mass effects, fluid collections or hydrocephalus. No infarct seen. *  INTRACRANIAL INTERNAL CAROTID ARTERIES: Unremarkable. Juani Rom CEREBRAL ARTERY: Unremarkable. Juani Rom COMMUNICATING ARTERY: Present. *  MIDDLE CEREBRAL ARTERIES: Unremarkable. *  VERTEBROBASILAR ARTERIES: Normal in caliber. *  RIGHT POSTERIOR COMMUNICATING ARTERY: Not visualized. *  LEFT POSTERIOR COMMUNICATING ARTERY: Not visualized.      *  CEREBRAL VEINS: Opacified and unremarkable     CT ANGIOGRAM OF THE NECK     TECHNIQUE: Helical imaging was performed utilizing 8.20MH slice thickness during   the same infusion of Isovue-370. Multiplanar reconstruction and 3D volume   rendered imaging were obtained on a separate workstation     FINDINGS:   [facility]: The origins of the great vessels are unremarkable. *  CCA: The common carotid arteries are unremarkable bilaterally. *  RIGHT ICA:   There is  calcified proximal ICA  plaque. The minimum diameter stenosis is 3 mm. The reference distal diameter of the right ICA is 4 mm. By NASCET criteria, this represent 25 percent stenosis. *  LEFT ICA:   There is  calcified proximal ICA  plaque. The minimum diameter stenosis is 4 mm. The reference distal diameter of the left ICA is 4 mm. By NASCET criteria, this represent 0 percent stenosis. *  VERTEBRAL ARTERIES: Patent bilaterally without significant stenosis. *  OTHER COMMENTS: COPD. 3D rendering and multi-planar vessel images better show the course and   orientation of the carotid arteries in the neck.                     XR CHEST SNGL V (Final result)  Result time 05/27/21 17:45:52  Final result by Eric Gilford, MD (05/27/21 17:45:52)                Impression:    No acute cardiopulmonary abnormality. Narrative:    Portable chest x-ray     CLINICAL INDICATION: Dizziness     FINDINGS: Single AP view the chest compared to a similar exam dated 6/29/2020   show the lungs to be well-expanded and clear. No pleural effusion or   pneumothorax. The cardiac silhouette and mediastinum are unremarkable.  The bones   are normal.                     Results Include:    Recent Results (from the past 24 hour(s))   EKG, 12 LEAD, INITIAL    Collection Time: 05/27/21  4:29 PM   Result Value Ref Range    Ventricular Rate 80 BPM    Atrial Rate 80 BPM    P-R Interval 146 ms    QRS Duration 76 ms    Q-T Interval 404 ms    QTC Calculation (Bezet) 465 ms    Calculated P Axis 72 degrees    Calculated R Axis 46 degrees    Calculated T Axis 75 degrees Diagnosis       Normal sinus rhythm  Septal infarct , age undetermined  Non-specific ST-t wave changes  When compared with ECG of 29-JUN-2020 16:05,  Septal infarct is now Present  Non-specific change in ST segment in Inferior leads  Confirmed by Etienne Li MD ()ENRIQUE ([id number]) on 5/27/2021 4:46:57 PM     TROPONIN-HIGH SENSITIVITY    Collection Time: 05/27/21  4:31 PM   Result Value Ref Range    Troponin-High Sensitivity 25.3 (H) 0 - 14 pg/mL   CBC WITH AUTOMATED DIFF    Collection Time: 05/27/21  4:31 PM   Result Value Ref Range    WBC 24.3 (H) 4.3 - 11.1 K/uL    RBC 4.93 4.23 - 5.6 M/uL    HGB 15.1 13.6 - 17.2 g/dL    HCT 46.0 41.1 - 50.3 %    MCV 93.3 79.6 - 97.8 FL    MCH 30.6 26.1 - 32.9 PG    MCHC 32.8 31.4 - 35.0 g/dL    RDW 12.4 11.9 - 14.6 %    PLATELET 961 550 - 303 K/uL    MPV 10.2 9.4 - 12.3 FL    ABSOLUTE NRBC 0.00 0.0 - 0.2 K/uL    NEUTROPHILS 26 (L) 43 - 78 %    LYMPHOCYTES 67 (H) 13 - 44 %    MONOCYTES 5 4.0 - 12.0 %    EOSINOPHILS 1 0.5 - 7.8 %    BASOPHILS 1 0.0 - 2.0 %    IMMATURE GRANULOCYTES 0 0.0 - 5.0 %    ABS. NEUTROPHILS 6.3 1.7 - 8.2 K/UL    ABS. LYMPHOCYTES 16.4 (H) 0.5 - 4.6 K/UL    ABS. MONOCYTES 1.2 0.1 - 1.3 K/UL    ABS. EOSINOPHILS 0.2 0.0 - 0.8 K/UL    ABS. BASOPHILS 0.2 0.0 - 0.2 K/UL    ABS. IMM.  GRANS. 0.0 0.0 - 0.5 K/UL    RBC COMMENTS SLIGHT  ANISOCYTOSIS + POIKILOCYTOSIS        WBC COMMENTS MODERATE      PLATELET COMMENTS ADEQUATE      DF AUTOMATED     METABOLIC PANEL, COMPREHENSIVE    Collection Time: 05/27/21  4:31 PM   Result Value Ref Range    Sodium 143 136 - 145 mmol/L    Potassium 3.8 3.5 - 5.1 mmol/L    Chloride 111 (H) 98 - 107 mmol/L    CO2 26 21 - 32 mmol/L    Anion gap 6 (L) 7 - 16 mmol/L    Glucose 89 65 - 100 mg/dL    BUN 10 8 - 23 MG/DL    Creatinine 0.91 0.8 - 1.5 MG/DL    GFR est AA >60 >60 ml/min/1.73m2    GFR est non-AA >60 >60 ml/min/1.73m2    Calcium 8.4 8.3 - 10.4 MG/DL    Bilirubin, total 0.7 0.2 - 1.1 MG/DL    ALT (SGPT) 17 12 - 65 U/L    AST (SGOT) 18 15 - 37 U/L    Alk.  phosphatase 85 50 - 136 U/L    Protein, total 6.9 6.3 - 8.2 g/dL    Albumin 4.0 3.2 - 4.6 g/dL    Globulin 2.9 2.3 - 3.5 g/dL    A-G Ratio 1.4 1.2 - 3.5     LIPASE    Collection Time: 05/27/21  4:31 PM   Result Value Ref Range    Lipase 107 73 - 393 U/L   MAGNESIUM    Collection Time: 05/27/21  4:31 PM   Result Value Ref Range    Magnesium 2.1 1.8 - 2.4 mg/dL   CK    Collection Time: 05/27/21  4:31 PM   Result Value Ref Range    CK 59 21 - 215 U/L   TROPONIN-HIGH SENSITIVITY    Collection Time: 05/27/21  7:14 PM   Result Value Ref Range    Troponin-High Sensitivity 19.6 (H) 0 - 14 pg/mL   URINALYSIS W/ RFLX MICROSCOPIC    Collection Time: 05/27/21  8:14 PM   Result Value Ref Range    Color YELLOW      Appearance CLEAR      Specific gravity 1.011 1.001 - 1.023      pH (UA) 5.5 5.0 - 9.0      Protein Negative NEG mg/dL    Glucose Negative mg/dL    Ketone Negative NEG mg/dL    Bilirubin Negative NEG      Blood Negative NEG      Urobilinogen 0.2 0.2 - 1.0 EU/dL    Nitrites Negative NEG      Leukocyte Esterase Negative NEG

## 2021-05-27 NOTE — ED TRIAGE NOTES
Ems called to home for heat exhaustion/dizziness. Pt has had dizzy spells for 2 months now. Has neuro appointment schedule. Pt uncooperative when asked questions. bgl 127, 99.1 oral, 18g LH given 300mL NS by ems, bp 126/82. Dr Jennyfer Curiel to triage.

## 2021-05-28 LAB
ANION GAP SERPL CALC-SCNC: 4 MMOL/L (ref 7–16)
BUN SERPL-MCNC: 11 MG/DL (ref 8–23)
CALCIUM SERPL-MCNC: 8.6 MG/DL (ref 8.3–10.4)
CHLORIDE SERPL-SCNC: 112 MMOL/L (ref 98–107)
CO2 SERPL-SCNC: 29 MMOL/L (ref 21–32)
CREAT SERPL-MCNC: 0.79 MG/DL (ref 0.8–1.5)
CRP SERPL HS-MCNC: 3.2 MG/L
ERYTHROCYTE [DISTWIDTH] IN BLOOD BY AUTOMATED COUNT: 12.5 % (ref 11.9–14.6)
ERYTHROCYTE [SEDIMENTATION RATE] IN BLOOD: 6 MM/HR (ref 0–20)
GLUCOSE SERPL-MCNC: 95 MG/DL (ref 65–100)
HCT VFR BLD AUTO: 45.3 % (ref 41.1–50.3)
HGB BLD-MCNC: 14.8 G/DL (ref 13.6–17.2)
MAGNESIUM SERPL-MCNC: 2.4 MG/DL (ref 1.8–2.4)
MCH RBC QN AUTO: 30.6 PG (ref 26.1–32.9)
MCHC RBC AUTO-ENTMCNC: 32.7 G/DL (ref 31.4–35)
MCV RBC AUTO: 93.6 FL (ref 79.6–97.8)
NRBC # BLD: 0.04 K/UL (ref 0–0.2)
PLATELET # BLD AUTO: 211 K/UL (ref 150–450)
PMV BLD AUTO: 10.2 FL (ref 9.4–12.3)
POTASSIUM SERPL-SCNC: 3.9 MMOL/L (ref 3.5–5.1)
RBC # BLD AUTO: 4.84 M/UL (ref 4.23–5.6)
SODIUM SERPL-SCNC: 145 MMOL/L (ref 136–145)
WBC # BLD AUTO: 15.2 K/UL (ref 4.3–11.1)

## 2021-05-28 PROCEDURE — 85027 COMPLETE CBC AUTOMATED: CPT

## 2021-05-28 PROCEDURE — C8929 TTE W OR WO FOL WCON,DOPPLER: HCPCS

## 2021-05-28 PROCEDURE — 85652 RBC SED RATE AUTOMATED: CPT

## 2021-05-28 PROCEDURE — 74011250636 HC RX REV CODE- 250/636: Performed by: HOSPITALIST

## 2021-05-28 PROCEDURE — 74011250636 HC RX REV CODE- 250/636: Performed by: FAMILY MEDICINE

## 2021-05-28 PROCEDURE — 83735 ASSAY OF MAGNESIUM: CPT

## 2021-05-28 PROCEDURE — 36415 COLL VENOUS BLD VENIPUNCTURE: CPT

## 2021-05-28 PROCEDURE — 97530 THERAPEUTIC ACTIVITIES: CPT

## 2021-05-28 PROCEDURE — 80048 BASIC METABOLIC PNL TOTAL CA: CPT

## 2021-05-28 PROCEDURE — 74011250637 HC RX REV CODE- 250/637: Performed by: HOSPITALIST

## 2021-05-28 PROCEDURE — 99218 HC RM OBSERVATION: CPT

## 2021-05-28 PROCEDURE — 97162 PT EVAL MOD COMPLEX 30 MIN: CPT

## 2021-05-28 PROCEDURE — 96372 THER/PROPH/DIAG INJ SC/IM: CPT

## 2021-05-28 PROCEDURE — 99222 1ST HOSP IP/OBS MODERATE 55: CPT | Performed by: PSYCHIATRY & NEUROLOGY

## 2021-05-28 PROCEDURE — 74011000250 HC RX REV CODE- 250: Performed by: FAMILY MEDICINE

## 2021-05-28 PROCEDURE — 2709999900 HC NON-CHARGEABLE SUPPLY

## 2021-05-28 PROCEDURE — 86141 C-REACTIVE PROTEIN HS: CPT

## 2021-05-28 RX ORDER — ENOXAPARIN SODIUM 100 MG/ML
40 INJECTION SUBCUTANEOUS EVERY 24 HOURS
Status: DISCONTINUED | OUTPATIENT
Start: 2021-05-28 | End: 2021-05-30 | Stop reason: HOSPADM

## 2021-05-28 RX ADMIN — ASPIRIN 81 MG: 81 TABLET, CHEWABLE ORAL at 09:02

## 2021-05-28 RX ADMIN — MECLIZINE 25 MG: 12.5 TABLET ORAL at 09:05

## 2021-05-28 RX ADMIN — Medication 10 ML: at 18:20

## 2021-05-28 RX ADMIN — MECLIZINE 25 MG: 12.5 TABLET ORAL at 18:23

## 2021-05-28 RX ADMIN — SOTALOL HYDROCHLORIDE 80 MG: 80 TABLET ORAL at 09:02

## 2021-05-28 RX ADMIN — Medication 10 ML: at 21:31

## 2021-05-28 RX ADMIN — SODIUM CHLORIDE 50 ML/HR: 900 INJECTION, SOLUTION INTRAVENOUS at 05:05

## 2021-05-28 RX ADMIN — PANTOPRAZOLE SODIUM 40 MG: 40 TABLET, DELAYED RELEASE ORAL at 06:08

## 2021-05-28 RX ADMIN — ENOXAPARIN SODIUM 40 MG: 40 INJECTION SUBCUTANEOUS at 09:06

## 2021-05-28 RX ADMIN — ROPINIROLE HYDROCHLORIDE 1 MG: 1 TABLET, FILM COATED ORAL at 21:30

## 2021-05-28 RX ADMIN — Medication 5 ML: at 05:06

## 2021-05-28 RX ADMIN — PERFLUTREN 1 ML: 6.52 INJECTION, SUSPENSION INTRAVENOUS at 09:00

## 2021-05-28 RX ADMIN — SOTALOL HYDROCHLORIDE 80 MG: 80 TABLET ORAL at 21:30

## 2021-05-28 RX ADMIN — ISOSORBIDE MONONITRATE 30 MG: 30 TABLET, EXTENDED RELEASE ORAL at 06:08

## 2021-05-28 RX ADMIN — SERTRALINE 100 MG: 50 TABLET, FILM COATED ORAL at 09:05

## 2021-05-28 RX ADMIN — Medication 5 ML: at 06:09

## 2021-05-28 RX ADMIN — ACETAMINOPHEN 650 MG: 325 TABLET ORAL at 18:23

## 2021-05-28 NOTE — ED NOTES
TRANSFER - OUT REPORT:    Verbal report given to Sharla Rubio RN on [facility]  being transferred to [address] [place] [address] for routine progression of care       Report consisted of patients Situation, Background, Assessment and   Recommendations(SBAR). Information from the following report(s) SBAR was reviewed with the receiving nurse. Lines:   Peripheral IV 05/27/21 Left Antecubital (Active)   Site Assessment Clean, dry, & intact 05/27/21 1720   Phlebitis Assessment 0 05/27/21 1720   Infiltration Assessment 0 05/27/21 1720   Dressing Status Clean, dry, & intact 05/27/21 1720   Hub Color/Line Status Pink 05/27/21 1720        Opportunity for questions and clarification was provided.       Patient transported with:   [employer]

## 2021-05-28 NOTE — H&P
[employer] Hospitalist History and Physical       Name:  Sal Solorzano  Age:65 y.o. Sex:male   :  1955    MRN:  [id number]   PCP:  Aparna Oropeza DO      Admit Date:  2021  5:12 PM   Chief Complaint: dizziness       Reason for Admission:   Dizziness [R42]    Assessment & Plan:     Principal Problem:    Dizziness (2016)    Off/on past several months. Being evaluated as outpatient. Likely exacerbated by sun and heat exhaustion; unclear if he fell, passed out    Active Problems:    Heat exhaustion (2021)          Dehydration (2021)          Atrial fibrillation ([facility] (2010)          CLL (chronic lymphocytic leukemia) ([facility] [facility] (2018)          HTN (hypertension) (2010)            PLAN:  1) Admit remote tele OBS  2) Continue to hydrate overnight  3) Check ecHO in am to rule out other causes of dizziness  4) resume home meds  5) FUll code      Disposition/Expected LOS: 1  Diet: DIET REGULAR  VTE ppx: lovenox  GI ppx:   Code status: Full Code  Surrogate decision-maker:       History of Presenting Illness:     Sal Solorzano is a 76 y.o. male with medical history of paroxysmal atrial fibrillation,  heart disease and stents in place, CLL, COPD and hypertension. Has had off-and-on dizziness for the past several months. Has been dizzy for the past 3 days off and on. Current symptoms started 2 hours prior to arrival.  Gets very dizzy with room spinning. Cannot walk. Wife states around 1300 patient went out to mow the yard. Did not drink any water or bring any water with him. After about 30 minutes they went and checked on him and found him laying on the ground beside the lawnmower. Unsure if he became dizzy on the lawnmower and stopped and laid on the ground or if he fell or passed out. Denies hitting his head. She states patient has seen ENT and is scheduled for a neurology appointment in August for frequent dizziness and headache.   Has also had an MRI of the brain in October to rule out tumor. Chart review shows that he was just seen by Cardiology yesterday    Denies any chest pain shortness of breath or heart palpitations. Has bilateral frontal headache and pain behind both eyes. No neck pain. No shortness of breath. No nausea numbness or diaphoresis. Patient is on meclizine and Phenergan. In ER he had CT head and CTA head and neck, both without acute findings. CXR and UA are ok. BP and pulse stable, although he is still symptomatic. Denies ear pain       Review of Systems:  A 14 point review of systems was taken and pertinent positive as per HPI. Past Medical History:   Diagnosis Date    Arrhythmia     A Fib twice since 7/2010-- ablation no problems since    Arthritis     CAD (coronary artery disease) 11/2009    stent x 1-- ablation 2010-- no problems since    Cancer [facility])     skin cancer    Chest pain     Chest pain     left-side chest pain    Chronic midline low back pain without sciatica 8/28/2017    CLL (chronic lymphocytic leukemia) ([facility] [address] 7/24/2018    COPD     prn inhalers--- severe per EHR    Coronary atherosclerosis of native coronary artery 12/30/2015    Depression     Dyspnea on exertion     with any activity; associated with nausea    GERD (gastroesophageal reflux disease) 7/22/2010    medication    Heart failure ([address]     Hematochezia     History of tobacco use 12/30/2015    HTN (hypertension) x 1 month    started on med--- controlled    Hypercholesteremia     controlled by medication    On home O2     QHS 2L     Other ill-defined conditions(799.89)     dyslipidemia/hyperlipidemia    Palpitations 12/30/2015    Paroxysmal atrial fibrillation ([address]     Presence of Watchman left atrial appendage closure device     per cath 8/31/2019- \"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  Per Dr. Elayne Shelby note 9/3/2019- \" discussed this finding with experts in the field of Watchman\" - \"seen on a few occasions since inception of device\" \"nothing needed to be done\"    Psychiatric disorder     depression    PUD (peptic ulcer disease)     hx-- r/t coumadin--    Recurrent depression ([address] 2016    Tobacco use disorder 2015    Unspecified adverse effect of anesthesia     quit breathing ---Stated during ear surgery \" on the table\"--- states no problems with anesthesia many time given since since         Past Surgical History:   Procedure Laterality Date    COLONOSCOPY N/A 2019    COLONOSCOPY/  performed by Khurram Mccall MD at [facility] HX COLONOSCOPY      HX HEART CATHETERIZATION  2009    PCI x 1 to LAD  ----ablation     HX HEART CATHETERIZATION  3/2016    HX HEART CATHETERIZATION  2019    HX HEENT   and     eardrum repair-- right    HX HERNIA REPAIR      Right inguinal    HX HERNIA REPAIR  2020    HX LAP CHOLECYSTECTOMY      HX ORTHOPAEDIC Left     left shoulder surg x1    HX ORTHOPAEDIC Left 2012    hand    HX OTHER SURGICAL      EGD    HX SHOULDER ARTHROSCOPY Right 2000    x 3 on right    [place] CARDIAC SURG PROCEDURE UNLIST      [address] 10/18       Family History : reviewed  Family History   Problem Relation Age of Onset    Heart Disease Father     Heart Attack Father     Hypertension Father     Bleeding Prob Father     Heart Surgery Father         multiple PCIs, first in his 46s    Heart Disease Brother         PCI x 2    Diabetes Mother     Cancer Mother         lung    Diabetes Brother     Diabetes Brother     Heart Attack Brother     Heart Disease Brother     Diabetes Brother     Bleeding Prob Brother         blood clots    Heart Disease Brother     No Known Problems Brother         Social History     Tobacco Use    Smoking status: Former Smoker     Packs/day: 1.00     Years: 45.00     Pack years: 45.00     Types: Cigarettes     Quit date: 3/4/2016     Years since quittin.2    Smokeless tobacco: Never Used   Substance Use Topics    Alcohol use: No     Alcohol/week: 0.0 standard drinks       Allergies   Allergen Reactions    Fentanyl Other (comments)     States had through a PCA after shoulder surgery in 2000, and \"throat swelled shut\" and that he \"quit breathing\". Update 11/16/10--Patient thinks the PCA gave him to much and it wasn't an allergy to drug itself. On 6/29/20 patient now states he is allergic to fentanyl. Had heart cath 8/30/19 and per note fentanyl was given. No reactions noted.     Lovastatin Other (comments)     Hullucinations    Niacin Other (comments)     Denies allery    Advicor [Niacin-Lovastatin] Anaphylaxis and Itching       Immunization History   Administered Date(s) Administered    (RETIRED) Pneumococcal Vaccine (Unspecified Type) 07/23/2010    Covid-19, MODERNA, Mrna, Lnp-s, Pf, 100mcg/0.5mL 02/18/2021, 03/25/2021    Influenza Vaccine 09/28/2015    Influenza Vaccine (Quad) PF (>6 Mo Flulaval, Fluarix, and >3 Yrs Afluria, Fluzone 81995) 12/15/2014, 12/21/2017, 09/11/2018    Influenza Vaccine (Tri) Adjuvanted (>65 Yrs FLUAD TRI 51619) 10/09/2019    Influenza Vaccine PF 02/15/2017    Influenza, Quadrivalent, Adjuvanted (>65 Yrs FLUAD QUAD 54186) 10/19/2020    Pneumococcal Conjugate (PCV-13) 10/09/2019    Pneumococcal Polysaccharide (PPSV-23) 01/01/2010, 07/01/2010, 09/01/2014    Td 01/01/2003    Tdap 01/01/2010         PTA Medications:  Current Outpatient Medications   Medication Instructions    acetaminophen (TYLENOL) 650 mg, Oral, EVERY 4 HOURS AS NEEDED    albuterol (Ventolin HFA) 90 mcg/actuation inhaler 2 Puffs, Inhalation, EVERY 4 HOURS AS NEEDED    albuterol-ipratropium (DUO-NEB) 2.5 mg-0.5 mg/3 ml nebu 3 mL, Nebulization, EVERY 4 HOURS AS NEEDED, File to Medicare part B--J44.9    amLODIPine (NORVASC) 10 mg tablet TAKE 1 TABLET EVERY EVENING    aspirin 81 mg, Oral, DAILY    fluticasone-umeclidinium-vilanterol (Trelegy Ellipta) 100-62.5-25 mcg inhaler 1 Puff, Inhalation, DAILY    hyoscyamine SL (LEVSIN/SL) 0.125 mg, SubLINGual, EVERY 4 HOURS AS NEEDED    isosorbide mononitrate ER (IMDUR) 30 mg, Oral, 7AM    meclizine (ANTIVERT) 25 mg tablet TAKE 1 TABLET 3 TIMES DAILY AS NEEDED FOR DIZZINESS. INDICATIONS: SENSATION OF SPINNING OR WHIRLING    multivitamin (ONE A DAY) tablet 1 Tablet, Oral, DAILY    nitroglycerin (NITROSTAT) 0.4 mg, SubLINGual, EVERY 5 MIN AS NEEDED    nystatin (MYCOSTATIN) 500,000 Units, Oral, 4 TIMES DAILY, swish and spit    Oxygen 2LPM QHS     pantoprazole (PROTONIX) 40 mg tablet TAKE 1 TABLET EVERY DAY    promethazine (PHENERGAN) 12.5-25 mg, Oral, EVERY 6 HOURS AS NEEDED    rOPINIRole (REQUIP) 1 mg, Oral, EVERY BEDTIME    sertraline (ZOLOFT) 100 mg tablet Take one tablet daily.  sotaloL (BETAPACE) 80 mg tablet TAKE 1 TABLET TWICE DAILY    triamcinolone acetonide (KENALOG) 0.1 % topical cream Topical, 2 TIMES DAILY, use thin layer, do not use on face, use for no longer than 2 weeks. Objective:     Patient Vitals for the past 24 hrs:   Temp Pulse Resp BP SpO2   05/27/21 2220 -- 68 18 129/83 96 %   05/27/21 1717 -- 74 18 136/82 93 %   05/27/21 1625 98 °F (36.7 °C) 83 16 (!) 184/95 99 %       Oxygen Therapy  O2 Sat (%): 96 % (05/27/21 2220)  Pulse via Oximetry: 74 beats per minute (05/27/21 1717)  O2 Device: None (Room air) (05/27/21 1625)    Body mass index is 23.22 kg/m². Physical Exam:    General:  Alert and oriented x 3, No acute distress, speaking in full sentences  HEENT:  Pupils equal and reactive to light and accommodation, oropharynx is clear. No nystagmus  Neck:   Supple, no lymphadenopathy, no JVD.  No rigidity  Lungs:  Clear to auscultation bilaterally   CV:   Regular rate, regular rhythm, with normal S1 and S2   Abdomen:  Soft, nontender, nondistended, normoactive bowel sounds   Extremities:  No cyanosis clubbing or edema   Neuro:  Nonfocal, A&O x3   Psych:  Normal mood and affect       Data Reviewed: I have reviewed all labs, meds, and studies. Recent Results (from the past 24 hour(s))   EKG, 12 LEAD, INITIAL    Collection Time: 05/27/21  4:29 PM   Result Value Ref Range    Ventricular Rate 80 BPM    Atrial Rate 80 BPM    P-R Interval 146 ms    QRS Duration 76 ms    Q-T Interval 404 ms    QTC Calculation (Bezet) 465 ms    Calculated P Axis 72 degrees    Calculated R Axis 46 degrees    Calculated T Axis 75 degrees    Diagnosis       Normal sinus rhythm  Septal infarct , age undetermined  Non-specific ST-t wave changes  When compared with ECG of 29-JUN-2020 16:05,  Septal infarct is now Present  Non-specific change in ST segment in Inferior leads  Confirmed by Quinn Minaya MD (), ENRIQUE HUNTER ([id number]) on 5/27/2021 4:46:57 PM     TROPONIN-HIGH SENSITIVITY    Collection Time: 05/27/21  4:31 PM   Result Value Ref Range    Troponin-High Sensitivity 25.3 (H) 0 - 14 pg/mL   CBC WITH AUTOMATED DIFF    Collection Time: 05/27/21  4:31 PM   Result Value Ref Range    WBC 24.3 (H) 4.3 - 11.1 K/uL    RBC 4.93 4.23 - 5.6 M/uL    HGB 15.1 13.6 - 17.2 g/dL    HCT 46.0 41.1 - 50.3 %    MCV 93.3 79.6 - 97.8 FL    MCH 30.6 26.1 - 32.9 PG    MCHC 32.8 31.4 - 35.0 g/dL    RDW 12.4 11.9 - 14.6 %    PLATELET 799 805 - 969 K/uL    MPV 10.2 9.4 - 12.3 FL    ABSOLUTE NRBC 0.00 0.0 - 0.2 K/uL    NEUTROPHILS 26 (L) 43 - 78 %    LYMPHOCYTES 67 (H) 13 - 44 %    MONOCYTES 5 4.0 - 12.0 %    EOSINOPHILS 1 0.5 - 7.8 %    BASOPHILS 1 0.0 - 2.0 %    IMMATURE GRANULOCYTES 0 0.0 - 5.0 %    ABS. NEUTROPHILS 6.3 1.7 - 8.2 K/UL    ABS. LYMPHOCYTES 16.4 (H) 0.5 - 4.6 K/UL    ABS. MONOCYTES 1.2 0.1 - 1.3 K/UL    ABS. EOSINOPHILS 0.2 0.0 - 0.8 K/UL    ABS. BASOPHILS 0.2 0.0 - 0.2 K/UL    ABS. IMM.  GRANS. 0.0 0.0 - 0.5 K/UL    RBC COMMENTS SLIGHT  ANISOCYTOSIS + POIKILOCYTOSIS        WBC COMMENTS MODERATE      PLATELET COMMENTS ADEQUATE      DF AUTOMATED     METABOLIC PANEL, COMPREHENSIVE    Collection Time: 05/27/21  4:31 PM   Result Value Ref Range    Sodium 143 136 - 145 mmol/L    Potassium 3.8 3.5 - 5.1 mmol/L    Chloride 111 (H) 98 - 107 mmol/L    CO2 26 21 - 32 mmol/L    Anion gap 6 (L) 7 - 16 mmol/L    Glucose 89 65 - 100 mg/dL    BUN 10 8 - 23 MG/DL    Creatinine 0.91 0.8 - 1.5 MG/DL    GFR est AA >60 >60 ml/min/1.73m2    GFR est non-AA >60 >60 ml/min/1.73m2    Calcium 8.4 8.3 - 10.4 MG/DL    Bilirubin, total 0.7 0.2 - 1.1 MG/DL    ALT (SGPT) 17 12 - 65 U/L    AST (SGOT) 18 15 - 37 U/L    Alk.  phosphatase 85 50 - 136 U/L    Protein, total 6.9 6.3 - 8.2 g/dL    Albumin 4.0 3.2 - 4.6 g/dL    Globulin 2.9 2.3 - 3.5 g/dL    A-G Ratio 1.4 1.2 - 3.5     LIPASE    Collection Time: 05/27/21  4:31 PM   Result Value Ref Range    Lipase 107 73 - 393 U/L   MAGNESIUM    Collection Time: 05/27/21  4:31 PM   Result Value Ref Range    Magnesium 2.1 1.8 - 2.4 mg/dL   CK    Collection Time: 05/27/21  4:31 PM   Result Value Ref Range    CK 59 21 - 215 U/L   TROPONIN-HIGH SENSITIVITY    Collection Time: 05/27/21  7:14 PM   Result Value Ref Range    Troponin-High Sensitivity 19.6 (H) 0 - 14 pg/mL   URINALYSIS W/ RFLX MICROSCOPIC    Collection Time: 05/27/21  8:14 PM   Result Value Ref Range    Color YELLOW      Appearance CLEAR      Specific gravity 1.011 1.001 - 1.023      pH (UA) 5.5 5.0 - 9.0      Protein Negative NEG mg/dL    Glucose Negative mg/dL    Ketone Negative NEG mg/dL    Bilirubin Negative NEG      Blood Negative NEG      Urobilinogen 0.2 0.2 - 1.0 EU/dL    Nitrites Negative NEG      Leukocyte Esterase Negative NEG         EKG Results     Procedure 720 Value Units Date/Time    EKG [[id number]] Collected: 05/27/21 1629    Order Status: Completed Updated: 05/27/21 1647     Ventricular Rate 80 BPM      Atrial Rate 80 BPM      P-R Interval 146 ms      QRS Duration 76 ms      Q-T Interval 404 ms      QTC Calculation (Bezet) 465 ms      Calculated P Axis 72 degrees      Calculated R Axis 46 degrees      Calculated T Axis 75 degrees      Diagnosis --     Normal sinus rhythm  Septal infarct , age undetermined  Non-specific ST-t wave changes  When compared with ECG of 29-JUN-2020 16:05,  Septal infarct is now Present  Non-specific change in ST segment in Inferior leads  Confirmed by George Murphy MD (), Colten Remy ([id number]) on 5/27/2021 4:46:57 PM            All Micro Results     None          Other Studies:  XR CHEST SNGL V    Result Date: 5/27/2021  Portable chest x-ray CLINICAL INDICATION: Dizziness FINDINGS: Single AP view the chest compared to a similar exam dated 6/29/2020 show the lungs to be well-expanded and clear. No pleural effusion or pneumothorax. The cardiac silhouette and mediastinum are unremarkable. The bones are normal.     No acute cardiopulmonary abnormality. CT HEAD WO CONT    Result Date: 5/27/2021  CT of the head without contrast. CLINICAL INDICATION: Dizziness PROCEDURE: Serial thin section axial images are obtained from the cranial vertex through the skull base without the administration of intravenous contrast. Radiation dose reduction techniques were used for this study. Our CT scanners use one or all of the following: Automated exposure control, adjusted of the mA and/or kV according to patient size, iterative reconstruction COMPARISON: Head CT dated 7/22/2016. FINDINGS: There is no acute intracranial hemorrhage, mass, or mass effect. No abnormal extra-axial fluid collections identified. There is no hydrocephalus. The basilar cisterns are widely patent. The gray-white matter brain parenchymal interface is well-maintained. No skull fracture or aggressive osseous lesion noted. The mastoid air cells and included paranasal sinuses are clear. No acute intracranial abnormality. CTA HEAD NECK W WO CONT    Result Date: 5/27/2021  CT ANGIOGRAM OF THE HEAD HISTORY: CVA, dizziness. COMPARISON: CT dated 5/27/2021 TECHNIQUE: CT angiogram of the head was performed utilizing helical imaging during the infusion of 100 cc of Isovue-370. Multiplanar MIP reconstructions were obtained.  3-D post-processed images were created on an independent workstation. The images were sent to [employer]. . Dose reduction techniques used: Automated exposure control, adjustment of the mAs and/or kVp according to patient's size, and iterative reconstruction techniques. FINDINGS: *  COMMENTS: No intracranial mass effects, fluid collections or hydrocephalus. No infarct seen. *  INTRACRANIAL INTERNAL CAROTID ARTERIES: Unremarkable. *  ANTERIOR CEREBRAL ARTERY: Unremarkable. *  ANTERIOR COMMUNICATING ARTERY: Present. *  MIDDLE CEREBRAL ARTERIES: Unremarkable. *  VERTEBROBASILAR ARTERIES: Normal in caliber. *  RIGHT POSTERIOR COMMUNICATING ARTERY: Not visualized. *  LEFT POSTERIOR COMMUNICATING ARTERY: Not visualized. *  CEREBRAL VEINS: Opacified and unremarkable CT ANGIOGRAM OF THE NECK TECHNIQUE: Helical imaging was performed utilizing 2.90UO slice thickness during the same infusion of Isovue-370. Multiplanar reconstruction and 3D volume rendered imaging were obtained on a separate workstation FINDINGS: *  ARCH: The origins of the great vessels are unremarkable. *  CCA: The common carotid arteries are unremarkable bilaterally. *  RIGHT ICA: There is  calcified proximal ICA  plaque. The minimum diameter stenosis is 3 mm. The reference distal diameter of the right ICA is 4 mm. By NASCET criteria, this represent 25 percent stenosis. *  LEFT ICA: There is  calcified proximal ICA  plaque. The minimum diameter stenosis is 4 mm. The reference distal diameter of the left ICA is 4 mm. By NASCET criteria, this represent 0 percent stenosis. *  VERTEBRAL ARTERIES: Patent bilaterally without significant stenosis. *  OTHER COMMENTS: COPD. 3D rendering and multi-planar vessel images better show the course and orientation of the carotid arteries in the neck. No evidence of hemodynamically significant stenosis. COPD.  Date of Dictation: 5/27/2021 6:29 PM        Medications:  Medications Administered      Medications Administered iopamidoL (ISOVUE-370) 76 % injection 50 mL     Admin Date  05/27/2021 Action  Given Dose  50 mL Route  IntraVENous Administered By  Dipesh JO          meclizine (ANTIVERT) tablet 25 mg     Admin Date  05/27/2021 Action  Given Dose  25 mg Route  Oral Administered By  Go Later, RN          promethazine (PHENERGAN) injection 25 mg     Admin Date  05/27/2021 Action  Given Dose  25 mg Route  IntraMUSCular Administered By  Go Later, RN          saline peripheral flush soln 10 mL     Admin Date  05/27/2021 Action  Given Dose  10 mL Route  InterCATHeter Administered By  Dipesh JO          sodium chloride 0.9 % bolus infusion 1,000 mL     Admin Date  05/27/2021 Action  New Bag Dose  1,000 mL Route  IntraVENous Administered By  Go Later, RN          sodium chloride 0.9 % bolus infusion 100 mL     Admin Date  05/27/2021 Action  New Bag Dose  100 mL Route  IntraVENous Administered By  Bryce Reilly                    Signed By: Ursula Fair MD   [facility]    May 27, 2021   10:27 PM

## 2021-05-28 NOTE — CONSULTS
[facility]  [address]  [address] [facility], [address] [facility]          No chief complaint on file. Saqib Lyle is a 76 y.o. male who presents on referral from the hospitalist service for evaluation with regards to headache and vertigo. The patient reports the on and off onset of hemicranial headache affecting the right hemicrania and and gets dizzy which she describes as an instability feeling not a specific rotational feeling associated with difficulty with ambulation and walking is present in a somewhat intermittent fashion and has failed treatment with meclizine and Phenergan. Denies worsening with movement    The patient appears to believe that this is a relatively acute problem but I do note that a headache disorder was mentioned by Dr. Lucio Stein at  office visit in October 2020    No history of unilateral hearing loss the patient does have a history of CLL which he indicates has been indolent for a period of about 10 years but on which his counts appear to be showing some degree of elevation and activity.   He does follow with cardiology for his atrial fibrillation-in this regard he has a watchman in place and has had concomitant with the current symptoms of a degree of shortness of breath      Past Medical History:   Diagnosis Date    Arrhythmia     A Fib twice since 7/2010-- ablation no problems since    Arthritis     CAD (coronary artery disease) 11/2009    stent x 1-- ablation 2010-- no problems since    Cancer [facility])     skin cancer    Chest pain     Chest pain     left-side chest pain    Chronic midline low back pain without sciatica 8/28/2017    CLL (chronic lymphocytic leukemia) ([facility] [address] 7/24/2018    COPD     prn inhalers--- severe per EHR    Coronary atherosclerosis of native coronary artery 12/30/2015    Depression     Dyspnea on exertion     with any activity; associated with nausea    GERD (gastroesophageal reflux disease) 7/22/2010    medication    Heart failure ([facility]     Hematochezia     History of tobacco use 2015    HTN (hypertension) x 1 month    started on med--- controlled    Hypercholesteremia     controlled by medication    On home O2     QHS 2L     Other ill-defined conditions(799.89)     dyslipidemia/hyperlipidemia    Palpitations 2015    Paroxysmal atrial fibrillation ([facility] [address]     Presence of Watchman left atrial appendage closure device     per cath 2019- \"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  Per Dr. Torsten Marie note 9/3/2019- \" discussed this finding with experts in the field of Watchman\" - \"seen on a few occasions since inception of device\" \"nothing needed to be done\"    Psychiatric disorder     depression    PUD (peptic ulcer disease)     hx-- r/t coumadin--    Recurrent depression ([facility] 2016    Tobacco use disorder 2015    Unspecified adverse effect of anesthesia     quit breathing ---Stated during ear surgery \" on the table\"--- states no problems with anesthesia many time given since since         Past Surgical History:   Procedure Laterality Date    COLONOSCOPY N/A 2019    COLONOSCOPY/ 24 performed by Gladis Marie [facility] at [facility] HX COLONOSCOPY      HX HEART CATHETERIZATION  2009    PCI x 1 to LAD  ----ablation     HX HEART CATHETERIZATION  3/2016    HX HEART CATHETERIZATION  2019    HX HEENT   and     eardrum repair-- right    HX HERNIA REPAIR      Right inguinal    HX HERNIA REPAIR  2020    HX LAP CHOLECYSTECTOMY      HX ORTHOPAEDIC Left     left shoulder surg x1    HX ORTHOPAEDIC Left 2012    hand    HX OTHER SURGICAL      EGD    HX SHOULDER ARTHROSCOPY Right 2000    x 3 on right    [place] CARDIAC SURG PROCEDURE UNLIST      [address] 10/18       Family History   Problem Relation Age of Onset    Heart Disease Father     Heart Attack Father     Hypertension Father     Bleeding Prob Father     Heart Surgery Father         multiple PCIs, first in his 46s    Heart Disease Brother         PCI x 2    Diabetes Mother     Cancer Mother         lung    Diabetes Brother     Diabetes Brother     Heart Attack Brother     Heart Disease Brother     Diabetes Brother     Bleeding Prob Brother         blood clots    Heart Disease Brother     No Known Problems Brother        Social History     Socioeconomic History    Marital status:      Spouse name: Not on file    Number of children: Not on file    Years of education: Not on file    Highest education level: Not on file   Tobacco Use    Smoking status: Former Smoker     Packs/day: 1.00     Years: 45.00     Pack years: 45.00     Types: Cigarettes     Quit date: 3/4/2016     Years since quittin.2    Smokeless tobacco: Never Used   Substance and Sexual Activity    Alcohol use: No     Alcohol/week: 0.0 standard drinks    Drug use: No     Social Determinants of Health     Financial Resource Strain:     Difficulty of Paying Living Expenses:    Food Insecurity:     Worried About Running Out of Food in the Last Year:     Ran Out of Food in the Last Year:    Transportation Needs:     Lack of Transportation (Medical):      Lack of Transportation (Non-Medical):    Physical Activity:     Days of Exercise per Week:     Minutes of Exercise per Session:    Stress:     Feeling of Stress :    Social Connections:     Frequency of Communication with Friends and Family:     Frequency of Social Gatherings with Friends and Family:     Attends Confucianist Services:     Active Member of Clubs or Organizations:     Attends Club or Organization Meetings:     Marital Status:            Current Facility-Administered Medications:     enoxaparin (LOVENOX) injection 40 mg, 40 mg, SubCUTAneous, Q24H, Ten Atkins MD, 40 mg at 21 0906    sodium chloride (NS) flush 5-10 mL, 5-10 mL, IntraVENous, Q8H, Bacilio Soto III, MD, 5 mL at 21 0609    sodium chloride (NS) flush 5-10 mL, 5-10 mL, IntraVENous, PRN, Sofia Soto Pomona MD PAIGE    acetaminophen (TYLENOL) tablet 650 mg, 650 mg, Oral, Q6H PRN, 650 mg at 05/27/21 2348 **OR** acetaminophen (TYLENOL) suppository 650 mg, 650 mg, Rectal, Q6H PRN, Elli Chung MD    polyethylene glycol (MIRALAX) packet 17 g, 17 g, Oral, DAILY PRN, Elli Chung MD    promethazine (PHENERGAN) tablet 12.5 mg, 12.5 mg, Oral, Q6H PRN **OR** ondansetron (ZOFRAN) injection 4 mg, 4 mg, IntraVENous, Q6H PRN, Elli Chung MD    amLODIPine (NORVASC) tablet 10 mg, 10 mg, Oral, DAILY, Elli Chung MD    aspirin chewable tablet 81 mg, 81 mg, Oral, DAILY, Elli Chung MD, 81 mg at 05/28/21 2970    isosorbide mononitrate ER (IMDUR) tablet 30 mg, 30 mg, Oral, 7am, Elli Chung MD, 30 mg at 05/28/21 0608    meclizine (ANTIVERT) tablet 25 mg, 25 mg, Oral, Q6H PRN, Elli Chung MD, 25 mg at 05/28/21 0905    pantoprazole (PROTONIX) tablet 40 mg, 40 mg, Oral, ACB, Elli Chung MD, 40 mg at 05/28/21 0608    rOPINIRole (REQUIP) tablet 1 mg, 1 mg, Oral, QHS, Elli Chung MD, 1 mg at 05/27/21 2348    sertraline (ZOLOFT) tablet 100 mg, 100 mg, Oral, DAILY, Elli Chung MD, 100 mg at 05/28/21 0905    sotaloL (BETAPACE) tablet 80 mg, 80 mg, Oral, Q12H, Elli Chung MD, 80 mg at 05/28/21 0677    Allergies   Allergen Reactions    Fentanyl Other (comments)     States had through a PCA after shoulder surgery in 2000, and \"throat swelled shut\" and that he \"quit breathing\". Update 11/16/10--Patient thinks the PCA gave him to much and it wasn't an allergy to drug itself. On 6/29/20 patient now states he is allergic to fentanyl. Had heart cath 8/30/19 and per note fentanyl was given. No reactions noted.  Lovastatin Other (comments)     Hullucinations    Niacin Other (comments)     Denies allery    Advicor [Niacin-Lovastatin] Anaphylaxis and Itching       Review of Systems   Review of systems  General reports normal energy.   Sleep wake cycle appetite  ENT no sinus congestion no epistaxis no unilateral hearing loss no swallowing difficulty  Pulmonary-denies shortness of breath, no orthopnea, no wheezing, no productive sputum no hemoptysis  Cardiac denies chest pain see history of present illness re palpitations  GI weight is stable appetite steady no constipation diarrhea abdominal pain  Joints no inflammation no hip pain or shoulder pain no inflammation. No new onset back pain  Skin no rash or ecchymosis  Neuro no syncope vertigo diplopia dysarthria dysphagia see HPI with regards difficulty with balance/ coordination unilateral weakness headache as described   no nocturia. Urinary control is normal.    Psychiatric no mood disorder no teodora no depression no outbursts or belligerent behavior          Visit Vitals  /81 (BP Patient Position: Standing)   Pulse 67   Temp 98.1 °F (36.7 °C)   Resp 17   Ht 6' 1\" (1.854 m)   Wt 176 lb (79.8 kg)   SpO2 94%   BMI 23.22 kg/m²       Neurologic Exam  The patient is alert cooperative and oriented. Marginally dysphoric. Appears chronically ill  No evident skin lesions no evidence of excessive bruising no trauma    Cranial nerve examination normal visual fields. Normal random eye movements. No ptosis. No lid lag. No nystagmus. Face moves strongly symmetrically and equally  Speech is normal  Motor  Upper extremities  Normal bulk strength tone and symmetric arm swing  Lower extremities  Normal bulk strength tone  Deep tendon reflexes 1+ and symmetric at biceps brachioradialis and triceps  Casual gait is normal with no evidence of ataxia  Fine motor coordination is normal in the hands bilaterally    Peripheral sensation light touch normal  There are no carotid bruits  Vital signs are reviewed      Most recent MRI  Results from Hospital Encounter encounter on 10/13/20    MRI BRAIN W WO CONT    Narrative  MRI BRAIN WITHOUT AND WITH CONTRAST 10/13/2020    HISTORY: Chronic lymphocytic leukemia. Dizziness. Headaches. TECHNIQUE: Sagittal and axial T1-weighted, axial T2-weighted, axial FLAIR, axial  T2-weighted gradient-echo, axial diffusion weighted images with ADC maps and  postcontrast axial and coronal T1-weighted images of the brain. 17cc of Dotarem  gadolinium contrast was administered intravenously without adverse event to  evaluate for pathological leptomeningeal or parenchymal enhancement. COMPARISON: July 22, 2016    FINDINGS: The cerebellar tonsils are in a normal position. There is no acute  infarction, hemorrhage, intra-axial mass, hydrocephalus, or extra-axial  hematoma. On the T2-weighted and FLAIR sequences, there are scattered white matter  hyperintensities that have increased slightly in number compared to the previous  study. This pattern is compatible with migraine headaches or moderate chronic  small vessel ischemic disease. There is no abnormal parenchymal or leptomeningeal enhancement. Impression  IMPRESSION:    1. Slight progression of nonspecific white matter findings compared to the July 2016 study. This pattern is compatible with chronic small vessel ischemic  disease. 2. No acute intracranial findings. Most recent MRA  Results from [place] encounter on 10/13/20    MRI BRAIN W WO CONT    Narrative  MRI BRAIN WITHOUT AND WITH CONTRAST 10/13/2020    HISTORY: Chronic lymphocytic leukemia. Dizziness. Headaches. TECHNIQUE: Sagittal and axial T1-weighted, axial T2-weighted, axial FLAIR, axial  T2-weighted gradient-echo, axial diffusion weighted images with ADC maps and  postcontrast axial and coronal T1-weighted images of the brain. 17cc of Dotarem  gadolinium contrast was administered intravenously without adverse event to  evaluate for pathological leptomeningeal or parenchymal enhancement. COMPARISON: July 22, 2016    FINDINGS: The cerebellar tonsils are in a normal position.  There is no acute  infarction, hemorrhage, intra-axial mass, hydrocephalus, or extra-axial  hematoma. On the T2-weighted and FLAIR sequences, there are scattered white matter  hyperintensities that have increased slightly in number compared to the previous  study. This pattern is compatible with migraine headaches or moderate chronic  small vessel ischemic disease. There is no abnormal parenchymal or leptomeningeal enhancement. Impression  IMPRESSION:    1. Slight progression of nonspecific white matter findings compared to the July 2016 study. This pattern is compatible with chronic small vessel ischemic  disease. 2. No acute intracranial findings. Most recent CTA  Results from [place] encounter on 05/27/21    CTA HEAD NECK W WO CONT    Narrative  CT ANGIOGRAM OF THE HEAD    HISTORY: CVA, dizziness. COMPARISON: CT dated 5/27/2021    TECHNIQUE: CT angiogram of the head was performed utilizing helical imaging  during the infusion of 100 cc of Isovue-370. Multiplanar MIP reconstructions  were obtained. 3-D post-processed images were created on an independent  workstation. The images were sent to [employer]. . Dose reduction techniques used: Automated exposure control, adjustment of the  mAs and/or kVp according to patient's size, and iterative reconstruction  techniques. FINDINGS:  *  COMMENTS: No intracranial mass effects, fluid collections or hydrocephalus. No infarct seen. *  INTRACRANIAL INTERNAL CAROTID ARTERIES: Unremarkable. *  ANTERIOR CEREBRAL ARTERY: Unremarkable. *  ANTERIOR COMMUNICATING ARTERY: Present. *  MIDDLE CEREBRAL ARTERIES: Unremarkable. *  VERTEBROBASILAR ARTERIES: Normal in caliber. *  RIGHT POSTERIOR COMMUNICATING ARTERY: Not visualized. *  LEFT POSTERIOR COMMUNICATING ARTERY: Not visualized. *  CEREBRAL VEINS: Opacified and unremarkable    CT ANGIOGRAM OF THE NECK    TECHNIQUE: Helical imaging was performed utilizing 6.18IU slice thickness during  the same infusion of Isovue-370.  Multiplanar reconstruction and 3D volume  rendered imaging were obtained on a separate workstation    FINDINGS:  *  ARCH: The origins of the great vessels are unremarkable. *  CCA: The common carotid arteries are unremarkable bilaterally. *  RIGHT ICA:  There is  calcified proximal ICA  plaque. The minimum diameter stenosis is 3 mm. The reference distal diameter of the right ICA is 4 mm. By NASCET criteria, this represent 25 percent stenosis. *  LEFT ICA:  There is  calcified proximal ICA  plaque. The minimum diameter stenosis is 4 mm. The reference distal diameter of the left ICA is 4 mm. By NASCET criteria, this represent 0 percent stenosis. *  VERTEBRAL ARTERIES: Patent bilaterally without significant stenosis. *  OTHER COMMENTS: COPD. 3D rendering and multi-planar vessel images better show the course and  orientation of the carotid arteries in the neck. Impression  No evidence of hemodynamically significant stenosis. COPD. Date of Dictation: 2021 6:29 PM      Most recent Echo  Results for orders placed or performed during the hospital encounter of 21   2D ECHO COMPLETE ADULT (TTE) [address]  [address]  [phone]    Transthoracic Echocardiogram  2D, M-mode, Doppler, and Color Doppler    Patient: Mal Lopez  MR #: [id number]  : [id number]  Age: 76 years  Gender: Male  Study date: 28-May-2021  Account #: [de-identified]  Height: 72 in  Weight: 175.6 lb  BSA: 2.02 mï¾²  Status:Routine  Location: [facility]  BP: 115/ 69    Allergies: FENTANYL, LOVASTATIN, NIACIN, NIACIN-LOVASTATIN    Sonographer:  Babak Ortiz RD  Group:  [facility]  Referring Physician:  Anabell Murillo MD  Reading Physician:  Lizbet Smith. Kimberly Chapman MD [facility]    INDICATIONS: Syncope of truly unknown origin.; Dizziness. PROCEDURE: This was a routine study. A transthoracic echocardiogram was  performed.  The study included complete 2D imaging, M-mode, complete spectral  Doppler, and color Doppler. Intravenous contrast (Definity) was administered. Image quality was adequate. LEFT VENTRICLE: Size was normal. Systolic function was at the lower limits of  normal. Ejection fraction was estimated in the range of 50 % to 55 %. There  were no regional wall motion abnormalities. Wall thickness was normal. Avg.  E/e': 7.5. Left ventricular diastolic function parameters were normal.    RIGHT VENTRICLE: The size was normal. Systolic function was normal. The  tricuspid jet envelope definition was inadequate for estimation of RV   systolic  pressure. LEFT ATRIUM: Size was normal.    RIGHT ATRIUM: Size was normal.    SYSTEMIC VEINS: IVC: The inferior vena cava was normal in size and course. AORTIC VALVE: The valve was structurally normal, tri-commissural. There was   no  evidence for stenosis. There was no insufficiency. MITRAL VALVE: Valve structure was normal. There was no evidence for stenosis. There was trivial regurgitation. TRICUSPID VALVE: The valve structure was normal. There was no evidence for  stenosis. There was trivial regurgitation. PULMONIC VALVE: Not well visualized. There was no evidence for stenosis. There  was no insufficiency. PERICARDIUM: There was no pericardial effusion. AORTA: The root exhibited normal size. SUMMARY:    -  Left ventricle: Systolic function was at the lower limits of normal.  Ejection fraction was estimated in the range of 50 % to 55 %. There were no  regional wall motion abnormalities. SYSTEM MEASUREMENT TABLES    2D mode  LA Dimension (2D): 3.9 cm  Left Atrium Systolic Volume Index; Method of Disks, Biplane; 2D mode;: 27.5  ml/m2  IVS/LVPW (2D): 0.9  IVSd (2D): 0.8 cm  LVIDd (2D): 5.6 cm  LVIDs (2D): 4.2 cm  LVPWd (2D): 1 cm    Unspecified Scan Mode  Peak Grad; Mean; Antegrade Flow: 7 mm[Hg]  Vmax; Antegrade Flow: 122 cm/s    Prepared and signed by    Marla Patel.  Pritesh Larson MD [facility]  Signed 28-May-2021 12:15:56           Most recent lipid panels  Lab Results   Component Value Date/Time    Cholesterol, total 194 03/08/2021 10:21 AM    HDL Cholesterol 48 03/08/2021 10:21 AM    LDL, calculated 118 (H) 03/08/2021 10:21 AM    LDL, calculated 68.2 08/31/2019 04:54 AM    VLDL, calculated 28 03/08/2021 10:21 AM    VLDL, calculated 31.8 (H) 08/31/2019 04:54 AM    Triglyceride 158 (H) 03/08/2021 10:21 AM    CHOL/HDL Ratio 3.7 08/31/2019 04:54 AM       Most recent Hgb A1C  Lab Results   Component Value Date/Time    Hemoglobin A1c 5.1 07/23/2016 07:27 AM                 Diagnoses and all orders for this visit:    1. Dizziness    2. Acute nonintractable headache, unspecified headache type    3. Lethargy    Other orders  -     CT HEAD WO CONT; Standing  -     CTA HEAD NECK W WO CONT; Standing  -     EKG, 12 LEAD, INITIAL; Standing  -     CARDIAC MONITOR - ED ONLY; Standing  -     SALINE LOCK IV; Standing  -     sodium chloride (NS) flush 5-10 mL  -     sodium chloride (NS) flush 5-10 mL  -     PULSE OXIMETRY CONTINUOUS; Standing  -     TROPONIN-HIGH SENSITIVITY; Standing  -     TROPONIN-HIGH SENSITIVITY; Standing  -     CBC WITH AUTOMATED DIFF; Standing  -     METABOLIC PANEL, COMPREHENSIVE; Standing  -     LIPASE; Standing  -     MAGNESIUM; Standing  -     INSERT PERIPHERAL IV; Standing  -     NURSING-MISCELLANEOUS:; Standing  -     XR CHEST SNGL V; Standing  -     URINALYSIS W/ RFLX MICROSCOPIC; Standing  -     sodium chloride 0.9 % bolus infusion 1,000 mL  -     promethazine (PHENERGAN) injection 25 mg  -     meclizine (ANTIVERT) tablet 25 mg  -     iopamidoL (ISOVUE-370) 76 % injection 50 mL  -     sodium chloride 0.9 % bolus infusion 100 mL  -     saline peripheral flush soln 10 mL  -     CK; Standing  -     sodium chloride 0.9 % bolus infusion 1,000 mL  -     STRAIGHT CATHETER (NURSING);  Standing  -     VITAL SIGNS; Standing  -     acetaminophen (TYLENOL) tablet 650 mg  -     acetaminophen (TYLENOL) suppository 650 mg  -     polyethylene glycol (MIRALAX) packet 17 g  -     promethazine (PHENERGAN) tablet 12.5 mg  -     ondansetron (ZOFRAN) injection 4 mg  -     MECHANICAL PROPHYLAXIS IS CONTRAINDICATED; Standing  -     INITIAL PHYSICIAN ORDER: OBSERVATION/OUTPATIENT IN A BED; Standing  -     FULL CODE; Standing  -     ACTIVITY AS TOLERATED W/ASSIST; Standing  -     CARDIAC MONITORING; Standing  -     DIET REGULAR; Standing  -     METABOLIC PANEL, BASIC; Standing  -     MAGNESIUM; Standing  -     CBC W/O DIFF; Standing  -     0.9% sodium chloride infusion  -     amLODIPine (NORVASC) tablet 10 mg  -     aspirin chewable tablet 81 mg  -     isosorbide mononitrate ER (IMDUR) tablet 30 mg  -     meclizine (ANTIVERT) tablet 25 mg  -     pantoprazole (PROTONIX) tablet 40 mg  -     rOPINIRole (REQUIP) tablet 1 mg  -     sertraline (ZOLOFT) tablet 100 mg  -     sotaloL (BETAPACE) tablet 80 mg  -     2D ECHO COMPLETE ADULT (TTE) W OR WO CONTR; Standing  -     B/P LYING/SIT/STANDING; Standing  -     enoxaparin (LOVENOX) injection 40 mg  -     IP CONSULT TO PHYSICAL THERAPY; Standing  -     perflutren lipid microspheres (DEFINITY) in NS bolus IV  -     DISCONTINUE CARDIAC MONITORING; Standing  -     IP CONSULT TO NEUROLOGY; Standing  -     MRI BRAIN W WO CONT; Standing  -     SED RATE, AUTOMATED; Standing  -     CRP, HIGH SENSITIVITY; Standing      Impression  Hemicranial headache and vertigo the etiology of which is a little unclear. Semiology of his headache might suggest the possibility of paroxysmal hemicrania that is unlikely to be associated with vertigo.   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

## 2021-05-28 NOTE — PROGRESS NOTES
ACUTE PHYSICAL THERAPY GOALS:  (Developed with and agreed upon by patient and/or caregiver.)  (1.) Kristy Baker will transfer from bed to chair and chair to bed with MODIFIED INDEPENDENCE using the least restrictive device within 7 treatment day(s). (2.) Kristy Baker will ambulate with MODIFIED INDEPENDENCE for 500 feet with the least restrictive device within 7 treatment day(s). (3.) Kristy Baker will perform standing static and dynamic balance activities x 25 minutes with MODIFIED INDEPENDENCE to improve safety and activity tolerance, particularly with dynamic standing balance, within 7 treatment day(s). (4.) Kristy Baker will ascend and descend 6 stairs using no hand rail(s) with MODIFIED INDEPENDENCE to improve functional mobility and safety within 7 treatment day(s).     PHYSICAL THERAPY ASSESSMENT: Initial Assessment and Daily Note PT Treatment Day # 1    Kristy Baker is a 76 y.o. male   PRIMARY DIAGNOSIS: Dizziness  Dizziness [R42]     Reason for Referral:   ICD-10: Treatment Diagnosis: Other lack of cordination (R27.8)  Difficulty in walking, Not elsewhere classified (R26.2)  Other abnormalities of gait and mobility (R26.89)  Ataxic gait (R26.0)  History of falling (Z91.81)  Dizziness and Giddiness (R42)  Unspecified Lack of Coordination (R27.9)  Cervicalgia (M54.2)  OBSERVATION: Payor: [employer] MEDICARE / Plan: [facility] [employer] MEDICARE HMO / Product Type: Managed Care Medicare /     ASSESSMENT:     REHAB RECOMMENDATIONS:   Recommendation to date pending progress:  Setting:   TBD pending work up; not safe for DC home currently  Equipment:    To Be Determined     PRIOR LEVEL OF FUNCTION:  (Prior to Hospitalization) INITIAL/CURRENT LEVEL OF FUNCTION:  (Most Recently Demonstrated)   Bed Mobility:   Independent  Sit to Stand:   Independent  Transfers:   Independent  Gait/Mobility:   Independent Bed Mobility:   Standby Assistance  Sit to Stand:   Minimal Assistance  Transfers:   Minimal Assistance  Gait/Mobility:   Minimal Assistance     ASSESSMENT:  Mr. Sam Padgett is a 76year old M who presents to hospital with on/off dizziness for past several months nad bilateral frontal headache, behind both eyes. RN reports to therapist prior to session pt has received antivert. Upon entering, pt resting in bed, agreeable to therapy. He endorses 4/10 headache pain, but different than what he presented to hospital with originally. He reports at 8am this morning when he work up he felt aching on his R side, starting posterior neck and radiating up to behind his R eye. He also states he is experiencing R eye blurriness along with the onset of this current headache. Pt able to perform bed mobility with no assistance; but did experience some mild light headedness with supien > sit which resolved with some time passing. No significant change in BP with position changes (see chart below). Sit > stand with CGA, pt with good static standing balance control. Ambulation around room with no issue, progressed gait out into hallway, and ~15 ft out pt's base of support became quite altered, pt very unsteady on his feet with decreased coordination BLE, requiring assist to maintain safety and balance control to get back to room safely. Pt reported \"feeling like I am in an ocean. \" Upon returning to room BP assessed, elevated* (see chart below). Pt also endorses worsening R eye blurriness and R hand numbness/cold feeling with onset of dizziness when ambulating. He reports this is not typical for him or what he experiences at home. RN notified. VS re-assessed after pt resting, and he did report that symptoms resolved except for R eye blurriness and headache, which is still persistent.     Position BP Heartrate   Supine 125/74 mm Hg 74 bpm   Sitting 115/87 mm Hg 75 bpm   Standing 128/81 mm Hg 79 bpm   After walking, onset of symptoms* 167/86 mm Hg 90 bpm   5 minutes post activity, resting in bed 122/86 mm Hg 77 bpm     At present level of function pt is not safe for discharge home. He is at high risk of falls and his symptoms are transient and unpredictable. Given pt's presentation of symptoms this session, concern for possible cervicogenic source, or other neurological source of symptoms. Pt reports he had been seen by ENT who referred him to a neurologist (appt pending later this summer). Further workup would be beneficial in coordinating plan of care and potential PT intervention/discharge recommendations. RN notified of pt status, MD messaged with evaluation findings/concerns. Pt presents as functioning below his baseline, with deficits in mobility including transfers, gait, balance, and activity tolerance. Pt will benefit from skilled therapy services to address stated deficits to promote return to highest level of function, independence, and safety. Will continue to follow. SUBJECTIVE:   Mr. Nessa Gamez states, \"This is different than how it is at home. This right sided headache is new. \"    SOCIAL HISTORY/LIVING ENVIRONMENT: Lives with his family in a one level double wide trailer with 6 steps to enter; currently no handrail.      OBJECTIVE:     PAIN: VITAL SIGNS: LINES/DRAINS:   Pre Treatment: Pain Screen  Pain Scale 1: Numeric (0 - 10)  Pain Intensity 1: 5  Pain Onset 1: acute  Pain Location 1: Head  Pain Orientation 1: Posterior;Right  Pain Description 1: Aching  Post Treatment: 6/10   IV  O2 Device: None (Room air)     GROSS EVALUATION:  BLE Within Functional Limits Abnormal/ Functional Abnormal/ Non-Functional (see comments) Not Tested Comments:   AROM [x] [] [] []    PROM [x] [] [] []    Strength [x] [] [] []    Balance [] [x] [] []  altered HARDY with onset of dizziness/symptoms, fall risk   Posture [x] [] [] []    Sensation [] [x] [] []  cold/numb feeling RUE with onset of dizziness during ambulation   Coordination [x] [] [] []    Tone [] [] [] [x]    Edema [] [] [] [x]    Activity Tolerance [] [] [x] []  transient dizziness/suddent onset dizziness impacting pt mobility and standing tolerance    [] [] [] []      COGNITION/  PERCEPTION: Intact Impaired   (see comments) Comments:   Orientation [x] []    Vision [] [x]  pt reports blurry vision R eye   Hearing [x] []    Command Following [x] []    Safety Awareness [x] []     [] []      MOBILITY: I Mod I S SBA CGA Min Mod Max Total  NT x2 Comments:   Bed Mobility    Rolling [] [] [] [x] [] [] [] [] [] [] []    Supine to Sit [] [] [] [x] [] [] [] [] [] [] []    Scooting [] [] [] [x] [] [] [] [] [] [] []    Sit to Supine [] [] [] [x] [] [] [] [] [] [] []    Transfers    Sit to Stand [] [] [] [] [x] [] [] [] [] [] []    Bed to Chair [] [] [] [] [x] [] [] [] [] [] []    Stand to Sit [] [] [] [] [x] [] [] [] [] [] []    I=Independent, Mod I=Modified Independent, S=Supervision, SBA=Standby Assistance, CGA=Contact Guard Assistance,   Min=Minimal Assistance, Mod=Moderate Assistance, Max=Maximal Assistance, Total=Total Assistance, NT=Not Tested  GAIT: I Mod I S SBA CGA Min Mod Max Total  NT x2 Comments:   Level of Assistance [] [] [] [] [] [x] [] [] [] [] []    Distance 30 ft    DME Gait Belt    Gait Quality Initially WNL, but onset of dizziness resulted in altered HARDY, ataxic, decreased coordination BLE, significant path deviations. Weightbearing Status N/A     I=Independent, Mod I=Modified Independent, S=Supervision, SBA=Standby Assistance, CGA=Contact Guard Assistance,   Min=Minimal Assistance, Mod=Moderate Assistance, Max=Maximal Assistance, Total=Total Assistance, NT=Not [facility]       How much difficulty does the patient currently have. .. Unable A Lot A Little None   1. Turning over in bed (including adjusting bedclothes, sheets and blankets)? [] 1   [] 2   [] 3   [x] 4   2. Sitting down on and standing up from a chair with arms ( e.g., wheelchair, bedside commode, etc.)   [] 1   [] 2   [x] 3   [] 4   3.   Moving from lying on back to sitting on the side of the bed? [] 1   [] 2   [] 3   [x] 4   How much help from another person does the patient currently need. .. Total A Lot A Little None   4. Moving to and from a bed to a chair (including a wheelchair)? [] 1   [] 2   [x] 3   [] 4   5. Need to walk in hospital room? [] 1   [x] 2   [] 3   [] 4   6. Climbing 3-5 steps with a railing? [] 1   [x] 2   [] 3   [] 4   © 2007, Trustees of [address] [place] [id number], under license to [employer]. All rights reserved     Score:  Initial: 18 Most Recent: X (Date: -- )    Interpretation of Tool:  Represents activities that are increasingly more difficult (i.e. Bed mobility, Transfers, Gait). PLAN:   FREQUENCY/DURATION: PT Plan of Care: 3 times/week for duration of hospital stay or until stated goals are met, whichever comes first.    PROBLEM LIST:   (Skilled intervention is medically necessary to address:)  1. Decreased Activity Tolerance  2. Decreased Balance  3. Decreased Coordination  4. Decreased Gait Ability  5. Decreased Strength  6. Decreased Transfer Abilities   INTERVENTIONS PLANNED:   (Benefits and precautions of physical therapy have been discussed with the patient.)  1. Therapeutic Activity  2. Therapeutic Exercise/HEP  3. Neuromuscular Re-education  4. Gait Training  5. Education     TREATMENT:     EVALUATION: Moderate Complexity : (Untimed Charge)    TREATMENT:   (     )  Therapeutic Activity (23 Minutes): Therapeutic activity included Transfer Training, Ambulation on level ground, Sitting balance  and Standing balance to improve functional Mobility, Strength, Activity tolerance and coordination.     TREATMENT GRID:  N/A    AFTER TREATMENT POSITION/PRECAUTIONS:  Bed, Needs within reach and RN notified    INTERDISCIPLINARY COLLABORATION:  MD/PA/NP, RN/PCT and PT/PTA    TOTAL TREATMENT DURATION:  PT Patient Time In/Time Out  Time In: 1148  Time Out: [address], PT

## 2021-05-28 NOTE — PROGRESS NOTES
[facility] Hospitalist Note     Admit Date:  2021  5:12 PM   Name:  Pamela Story   Age:  76 y.o.  :  1955   MRN:  [id number]   PCP:  Emir Rice DO  Treatment Team: Attending Provider: Kamilla Perez MD; Utilization Review: Marta Oelary, RN; Care Manager: Jackie Sherman, [facility] [place]; Physical Therapist: Patricia King, [facility]    HPI/Subjective:   Pamela Story is a 76 y.o. male with medical history of paroxysmal atrial fibrillation,  heart disease and stents in place, CLL, COPD and hypertension admitted with dizziness. : Patient is seen at the bedside. Reports persistent dizziness, described as spinning of the room. Patient is following ENT as outpatient for dizziness and is scheduled for neurology appointment in August.  Denies headache, shortness of breath, chest pain, nausea, vomiting, numbness, or blurry vision. Addendum: I was told by physical therapy patient c/o headache, blurred vision, numbness and dizziness with ambulation and gait instability. High risk for fall and not safe to discharge home due to transient and unpredictable symptoms. Some concern for cervicogenic source or neurological source. I will consult neurology for their input.      Assessment and Plan:     Dizziness:  Off-and-on for the past couple of months, being evaluated as outpatient  Likely exacerbated by sun and heat exhaustion  Will check orthostatic vitals  CT head and CTA head and neck benign  PT/OT  Continue meclizine and Phenergan  Echocardiogram ordered  Neurology consult    Hypertension/CAD/paroxysmal A. Fib:  Continue aspirin, amlodipine, Imdur, and sotalol    Depression:  Continue home meds    Discharge planning: Pending PT/OT eval, hopefully tomorrow    DVT ppx ordered  Code status:  Full  Estimated LOS:  Greater than 2 midnights  Risk:  high    Hospital Problems as of 2021 Date Reviewed: 2021        Codes Class Noted - Resolved POA    Dehydration ICD-10-CM: E86.0  ICD-9-CM: 276.51 5/27/2021 - Present Unknown        Heat exhaustion ICD-10-CM: T67. 5XXA  ICD-9-CM: 992.5  5/27/2021 - Present Unknown        CLL (chronic lymphocytic leukemia) (HCC) (Chronic) ICD-10-CM: C91.10  ICD-9-CM: 204.10  7/24/2018 - Present Yes        * (Principal) Dizziness ICD-10-CM: R42  ICD-9-CM: 780.4  7/22/2016 - Present Unknown        Atrial fibrillation (HCC) (Chronic) ICD-10-CM: I48.91  ICD-9-CM: 427.31  7/22/2010 - Present Yes    Overview Signed 10/16/2018 11:31 AM by Matthew Vidal MD     Left atrial appendage occlusion (10/16/18):  30 mm Watchman device. HTN (hypertension) (Chronic) ICD-10-CM: I10  ICD-9-CM: 401.9  7/22/2010 - Present Yes                10 systems reviewed and negative except as noted in HPI.   Past Medical History:   Diagnosis Date    Arrhythmia     A Fib twice since 7/2010-- ablation no problems since    Arthritis     CAD (coronary artery disease) 11/2009    stent x 1-- ablation 2010-- no problems since    Cancer [facility])     skin cancer    Chest pain     Chest pain     left-side chest pain    Chronic midline low back pain without sciatica 8/28/2017    CLL (chronic lymphocytic leukemia) ([facility] [address] 7/24/2018    COPD     prn inhalers--- severe per EHR    Coronary atherosclerosis of native coronary artery 12/30/2015    Depression     Dyspnea on exertion     with any activity; associated with nausea    GERD (gastroesophageal reflux disease) 7/22/2010    medication    Heart failure ([address]     Hematochezia     History of tobacco use 12/30/2015    HTN (hypertension) x 1 month    started on med--- controlled    Hypercholesteremia     controlled by medication    On home O2     QHS 2L     Other ill-defined conditions(799.89)     dyslipidemia/hyperlipidemia    Palpitations 12/30/2015    Paroxysmal atrial fibrillation ([facility] [address]     Presence of Watchman left atrial appendage closure device     per cath 8/31/2019- \"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 Per Dr. Warren Kelley note 9/3/2019- \" discussed this finding with experts in the field of Watchman\" - \"seen on a few occasions since inception of device\" \"nothing needed to be done\"    Psychiatric disorder     depression    PUD (peptic ulcer disease)     hx-- r/t coumadin--    Recurrent depression ([address] 2016    Tobacco use disorder 2015    Unspecified adverse effect of anesthesia     quit breathing ---Stated during ear surgery \" on the table\"--- states no problems with anesthesia many time given since since        Past Surgical History:   Procedure Laterality Date    COLONOSCOPY N/A 2019    COLONOSCOPY/  performed by Blayne Mazariegos [facility] at [facility] HX COLONOSCOPY      HX HEART CATHETERIZATION  2009    PCI x 1 to LAD  ----ablation     HX HEART CATHETERIZATION  3/2016    HX HEART CATHETERIZATION  2019    HX HEENT   and     eardrum repair-- right    HX HERNIA REPAIR      Right inguinal    HX HERNIA REPAIR  2020    HX LAP CHOLECYSTECTOMY      HX ORTHOPAEDIC Left     left shoulder surg x1    HX ORTHOPAEDIC Left     hand    HX OTHER SURGICAL      EGD    HX SHOULDER ARTHROSCOPY Right 2000    x 3 on right    SD CARDIAC SURG PROCEDURE UNLIST      LAAO 10/18      Allergies   Allergen Reactions    Fentanyl Other (comments)     States had through a PCA after shoulder surgery in , and \"throat swelled shut\" and that he \"quit breathing\". Update 11/16/10--Patient thinks the PCA gave him to much and it wasn't an allergy to drug itself. On 20 patient now states he is allergic to fentanyl. Had heart cath 19 and per note fentanyl was given. No reactions noted.     Lovastatin Other (comments)     Hullucinations    Niacin Other (comments)     Denies allery    Advicor [Niacin-Lovastatin] Anaphylaxis and Itching      Social History     Tobacco Use    Smoking status: Former Smoker     Packs/day: 1.00     Years: 45.00     Pack years: 45.00     Types: Cigarettes     Quit date: 3/4/2016     Years since quittin.2    Smokeless tobacco: Never Used   Substance Use Topics    Alcohol use: No     Alcohol/week: 0.0 standard drinks      Family History   Problem Relation Age of Onset    Heart Disease Father     Heart Attack Father     Hypertension Father     Bleeding Prob Father     Heart Surgery Father         multiple PCIs, first in his 46s    Heart Disease Brother         PCI x 2    Diabetes Mother     Cancer Mother         lung    Diabetes Brother     Diabetes Brother     Heart Attack Brother     Heart Disease Brother     Diabetes Brother     Bleeding Prob Brother         blood clots    Heart Disease Brother     No Known Problems Brother       Family history reviewed and noncontributory. Immunization History   Administered Date(s) Administered    (RETIRED) Pneumococcal Vaccine (Unspecified Type) 2010    Covid-19, MODERNA, Mrna, Lnp-s, Pf, 100mcg/0.5mL 2021, 2021    Influenza Vaccine 2015    Influenza Vaccine (Quad) PF (>6 Mo Flulaval, Fluarix, and >3 Yrs Afluria, Fluzone [id number]) 12/15/2014, 2017, 2018    Influenza Vaccine (Tri) Adjuvanted (>65 Yrs FLUAD TRI [id number]) 10/09/2019    Influenza Vaccine PF 02/15/2017    Influenza, Quadrivalent, Adjuvanted (>65 Yrs FLUAD QUAD [id number]) 10/19/2020    Pneumococcal Conjugate (PCV-13) 10/09/2019    Pneumococcal Polysaccharide (PPSV-23) 2010, 2010, 2014    Td 2003    Tdap 2010     PTA Medications:  Prior to Admission Medications   Prescriptions Last Dose Informant Patient Reported? Taking? Oxygen 2021 at Unknown time  Yes Yes   SiLPM QHS   acetaminophen (TYLENOL) 325 mg tablet 2021 at Unknown time  Yes Yes   Sig: Take 2 Tabs by mouth every four (4) hours as needed.    albuterol (Ventolin HFA) 90 mcg/actuation inhaler 2021 at Unknown time  No Yes   Sig: Take 2 Puffs by inhalation every four (4) hours as needed (prn). albuterol-ipratropium (DUO-NEB) 2.5 mg-0.5 mg/3 ml nebu 2021 at Unknown time  No Yes   Sig: 3 mL by Nebulization route every four (4) hours as needed for Wheezing. File to Medicare part B--[id number]   amLODIPine (NORVASC) 10 mg tablet 2021 at Unknown time  No Yes   Sig: TAKE 1 TABLET EVERY EVENING   aspirin 81 mg chewable tablet 2021 at Unknown time  Yes Yes   Sig: Take 1 Tab by mouth daily. fluticasone-umeclidinium-vilanterol (Trelegy Ellipta) 100-62.5-25 mcg inhaler 2021 at Unknown time  No Yes   Sig: Take 1 Puff by inhalation daily. hyoscyamine SL (LEVSIN/SL) 0.125 mg SL tablet 2021 at Unknown time  Yes Yes   Si.125 mg by SubLINGual route every four (4) hours as needed for Cramping. isosorbide mononitrate ER (IMDUR) 30 mg tablet 2021 at Unknown time  No Yes   Sig: Take 1 Tablet by mouth every morning. meclizine (ANTIVERT) 25 mg tablet 2021 at Unknown time  No Yes   Sig: TAKE 1 TABLET 3 TIMES DAILY AS NEEDED FOR DIZZINESS. INDICATIONS: SENSATION OF SPINNING OR WHIRLING   multivitamin (ONE A DAY) tablet 2021 at Unknown time  Yes Yes   Sig: Take 1 Tab by mouth daily. nitroglycerin (NITROSTAT) 0.4 mg SL tablet Unknown at Unknown time  No No   Si Tab by SubLINGual route every five (5) minutes as needed for Chest Pain. nystatin (MYCOSTATIN) 100,000 unit/mL suspension Not Taking at Unknown time  No No   Sig: Take 5 mL by mouth four (4) times daily. swish and spit   Patient not taking: Reported on 2021   pantoprazole (PROTONIX) 40 mg tablet 2021 at Unknown time  No Yes   Sig: TAKE 1 TABLET EVERY DAY   promethazine (PHENERGAN) 25 mg tablet Not Taking at Unknown time  No No   Sig: Take 0.5-1 Tabs by mouth every six (6) hours as needed for Nausea. Patient not taking: Reported on 2021   rOPINIRole (REQUIP) 1 mg tablet 2021 at Unknown time  No Yes   Sig: Take 1 Tab by mouth nightly.    sertraline (ZOLOFT) 100 mg tablet 5/27/2021 at Unknown time  No Yes   Sig: Take one tablet daily. sotaloL (BETAPACE) 80 mg tablet 5/27/2021 at Unknown time  No Yes   Sig: TAKE 1 TABLET TWICE DAILY   triamcinolone acetonide (KENALOG) 0.1 % topical cream Not Taking at Unknown time  No No   Sig: Apply  to affected area two (2) times a day. use thin layer, do not use on face, use for no longer than 2 weeks. Patient not taking: Reported on 5/27/2021      Facility-Administered Medications: None       Objective:     Patient Vitals for the past 24 hrs:   Temp Pulse Resp BP SpO2   05/28/21 1202 -- 67 -- 113/81 94 %   05/28/21 1201 -- 72 -- 117/79 93 %   05/28/21 1200 98.1 °F (36.7 °C) 65 17 126/76 96 %   05/28/21 0800 98.6 °F (37 °C) 65 18 114/78 95 %   05/28/21 0545 97.3 °F (36.3 °C) 64 18 115/69 97 %   05/28/21 0400 -- 66 -- -- --   05/28/21 0146 -- 68 -- -- --   05/27/21 2312 97.6 °F (36.4 °C) 75 18 (!) 136/91 96 %   05/27/21 2220 -- 68 18 129/83 96 %   05/27/21 1717 -- 74 18 136/82 93 %   05/27/21 1625 98 °F (36.7 °C) 83 16 (!) 184/95 99 %     Oxygen Therapy  O2 Sat (%): 94 % (05/28/21 1202)  Pulse via Oximetry: 74 beats per minute (05/27/21 1717)  O2 Device: None (Room air) (05/27/21 1625)    Estimated body mass index is 23.22 kg/m² as calculated from the following:    Height as of this encounter: 6' 1\" (1.854 m). Weight as of this encounter: 79.8 kg (176 lb). No intake or output data in the 24 hours ending 05/28/21 1221    *Note that automatically entered I/Os may not be accurate; dependent on patient compliance with collection and accurate  by assistants. Physical Exam:  General:    Well nourished. Alert. Eyes:   Normal sclerae. Extraocular movements intact. HENT:  Normocephalic, atraumatic. Moist mucous membranes  CV:   RRR. No m/r/g. Lungs:  CTAB. No wheezing, rhonchi, or rales. Abdomen: Soft, nontender, nondistended. Extremities: Warm and dry. No cyanosis or edema.   Neurologic: CN II-XII grossly intact. Sensation intact. Skin:     No rashes or jaundice. Normal coloration  Psych:  Normal mood and affect. I reviewed the labs, imaging, EKGs, telemetry, and other studies done this admission. Data Reviewed:   Recent Results (from the past 24 hour(s))   EKG, 12 LEAD, INITIAL    Collection Time: 05/27/21  4:29 PM   Result Value Ref Range    Ventricular Rate 80 BPM    Atrial Rate 80 BPM    P-R Interval 146 ms    QRS Duration 76 ms    Q-T Interval 404 ms    QTC Calculation (Bezet) 465 ms    Calculated P Axis 72 degrees    Calculated R Axis 46 degrees    Calculated T Axis 75 degrees    Diagnosis       Normal sinus rhythm  Septal infarct , age undetermined  Non-specific ST-t wave changes  When compared with ECG of 29-JUN-2020 16:05,  Septal infarct is now Present  Non-specific change in ST segment in Inferior leads  Confirmed by Val Naranjo MD (), ENRIQUE HUNTER ([id number]) on 5/27/2021 4:46:57 PM     TROPONIN-HIGH SENSITIVITY    Collection Time: 05/27/21  4:31 PM   Result Value Ref Range    Troponin-High Sensitivity 25.3 (H) 0 - 14 pg/mL   CBC WITH AUTOMATED DIFF    Collection Time: 05/27/21  4:31 PM   Result Value Ref Range    WBC 24.3 (H) 4.3 - 11.1 K/uL    RBC 4.93 4.23 - 5.6 M/uL    HGB 15.1 13.6 - 17.2 g/dL    HCT 46.0 41.1 - 50.3 %    MCV 93.3 79.6 - 97.8 FL    MCH 30.6 26.1 - 32.9 PG    MCHC 32.8 31.4 - 35.0 g/dL    RDW 12.4 11.9 - 14.6 %    PLATELET 336 496 - 114 K/uL    MPV 10.2 9.4 - 12.3 FL    ABSOLUTE NRBC 0.00 0.0 - 0.2 K/uL    NEUTROPHILS 26 (L) 43 - 78 %    LYMPHOCYTES 67 (H) 13 - 44 %    MONOCYTES 5 4.0 - 12.0 %    EOSINOPHILS 1 0.5 - 7.8 %    BASOPHILS 1 0.0 - 2.0 %    IMMATURE GRANULOCYTES 0 0.0 - 5.0 %    ABS. NEUTROPHILS 6.3 1.7 - 8.2 K/UL    ABS. LYMPHOCYTES 16.4 (H) 0.5 - 4.6 K/UL    ABS. MONOCYTES 1.2 0.1 - 1.3 K/UL    ABS. EOSINOPHILS 0.2 0.0 - 0.8 K/UL    ABS. BASOPHILS 0.2 0.0 - 0.2 K/UL    ABS. IMM.  GRANS. 0.0 0.0 - 0.5 K/UL    RBC COMMENTS SLIGHT  ANISOCYTOSIS + POIKILOCYTOSIS        WBC COMMENTS MODERATE      PLATELET COMMENTS ADEQUATE      DF AUTOMATED     METABOLIC PANEL, COMPREHENSIVE    Collection Time: 05/27/21  4:31 PM   Result Value Ref Range    Sodium 143 136 - 145 mmol/L    Potassium 3.8 3.5 - 5.1 mmol/L    Chloride 111 (H) 98 - 107 mmol/L    CO2 26 21 - 32 mmol/L    Anion gap 6 (L) 7 - 16 mmol/L    Glucose 89 65 - 100 mg/dL    BUN 10 8 - 23 MG/DL    Creatinine 0.91 0.8 - 1.5 MG/DL    GFR est AA >60 >60 ml/min/1.73m2    GFR est non-AA >60 >60 ml/min/1.73m2    Calcium 8.4 8.3 - 10.4 MG/DL    Bilirubin, total 0.7 0.2 - 1.1 MG/DL    ALT (SGPT) 17 12 - 65 U/L    AST (SGOT) 18 15 - 37 U/L    Alk.  phosphatase 85 50 - 136 U/L    Protein, total 6.9 6.3 - 8.2 g/dL    Albumin 4.0 3.2 - 4.6 g/dL    Globulin 2.9 2.3 - 3.5 g/dL    A-G Ratio 1.4 1.2 - 3.5     LIPASE    Collection Time: 05/27/21  4:31 PM   Result Value Ref Range    Lipase 107 73 - 393 U/L   MAGNESIUM    Collection Time: 05/27/21  4:31 PM   Result Value Ref Range    Magnesium 2.1 1.8 - 2.4 mg/dL   CK    Collection Time: 05/27/21  4:31 PM   Result Value Ref Range    CK 59 21 - 215 U/L   TROPONIN-HIGH SENSITIVITY    Collection Time: 05/27/21  7:14 PM   Result Value Ref Range    Troponin-High Sensitivity 19.6 (H) 0 - 14 pg/mL   URINALYSIS W/ RFLX MICROSCOPIC    Collection Time: 05/27/21  8:14 PM   Result Value Ref Range    Color YELLOW      Appearance CLEAR      Specific gravity 1.011 1.001 - 1.023      pH (UA) 5.5 5.0 - 9.0      Protein Negative NEG mg/dL    Glucose Negative mg/dL    Ketone Negative NEG mg/dL    Bilirubin Negative NEG      Blood Negative NEG      Urobilinogen 0.2 0.2 - 1.0 EU/dL    Nitrites Negative NEG      Leukocyte Esterase Negative NEG     METABOLIC PANEL, BASIC    Collection Time: 05/28/21  5:04 AM   Result Value Ref Range    Sodium 145 136 - 145 mmol/L    Potassium 3.9 3.5 - 5.1 mmol/L    Chloride 112 (H) 98 - 107 mmol/L    CO2 29 21 - 32 mmol/L    Anion gap 4 (L) 7 - 16 mmol/L    Glucose 95 65 - 100 mg/dL    BUN 11 8 - 23 MG/DL    Creatinine 0.79 (L) 0.8 - 1.5 MG/DL    GFR est AA >60 >60 ml/min/1.73m2    GFR est non-AA >60 >60 ml/min/1.73m2    Calcium 8.6 8.3 - 10.4 MG/DL   MAGNESIUM    Collection Time: 05/28/21  5:04 AM   Result Value Ref Range    Magnesium 2.4 1.8 - 2.4 mg/dL   CBC W/O DIFF    Collection Time: 05/28/21  5:04 AM   Result Value Ref Range    WBC 15.2 (H) 4.3 - 11.1 K/uL    RBC 4.84 4.23 - 5.6 M/uL    HGB 14.8 13.6 - 17.2 g/dL    HCT 45.3 41.1 - 50.3 %    MCV 93.6 79.6 - 97.8 FL    MCH 30.6 26.1 - 32.9 PG    MCHC 32.7 31.4 - 35.0 g/dL    RDW 12.5 11.9 - 14.6 %    PLATELET 885 907 - 858 K/uL    MPV 10.2 9.4 - 12.3 FL    ABSOLUTE NRBC 0.04 0.0 - 0.2 K/uL       All Micro Results     None          Current Facility-Administered Medications   Medication Dose Route Frequency    enoxaparin (LOVENOX) injection 40 mg  40 mg SubCUTAneous Q24H    sodium chloride (NS) flush 5-10 mL  5-10 mL IntraVENous Q8H    sodium chloride (NS) flush 5-10 mL  5-10 mL IntraVENous PRN    acetaminophen (TYLENOL) tablet 650 mg  650 mg Oral Q6H PRN    Or    acetaminophen (TYLENOL) suppository 650 mg  650 mg Rectal Q6H PRN    polyethylene glycol (MIRALAX) packet 17 g  17 g Oral DAILY PRN    promethazine (PHENERGAN) tablet 12.5 mg  12.5 mg Oral Q6H PRN    Or    ondansetron (ZOFRAN) injection 4 mg  4 mg IntraVENous Q6H PRN    amLODIPine (NORVASC) tablet 10 mg  10 mg Oral DAILY    aspirin chewable tablet 81 mg  81 mg Oral DAILY    isosorbide mononitrate ER (IMDUR) tablet 30 mg  30 mg Oral 7am    meclizine (ANTIVERT) tablet 25 mg  25 mg Oral Q6H PRN    pantoprazole (PROTONIX) tablet 40 mg  40 mg Oral ACB    rOPINIRole (REQUIP) tablet 1 mg  1 mg Oral QHS    sertraline (ZOLOFT) tablet 100 mg  100 mg Oral DAILY    sotaloL (BETAPACE) tablet 80 mg  80 mg Oral Q12H       Other Studies:  Results for orders placed or performed during the hospital encounter of 05/27/21   2D ECHO COMPLETE ADULT (TTE) W OR WO CONTR    Narrative    [employer]. [address], [address] [facility]  [phone]    Transthoracic Echocardiogram  2D, M-mode, Doppler, and Color Doppler    Patient: Cam Lewis  MR #: [id number]  : 1955  Age: 76 years  Gender: Male  Study date: 28-May-2021  Account #: [de-identified]  Height: 72 in  Weight: 175.6 lb  BSA: 2.02 mï¾²  Status:Routine  Location: 703  BP: 115/ 69    Allergies: FENTANYL, LOVASTATIN, NIACIN, NIACIN-LOVASTATIN    Sonographer:  Niya Sandoval [facility]  Group:  [facility]  Referring Physician:  Angeles Dominguez MD  Reading Physician:  Cristina Russell MD [facility]    INDICATIONS: Syncope of truly unknown origin.; Dizziness. PROCEDURE: This was a routine study. A transthoracic echocardiogram was  performed. The study included complete 2D imaging, M-mode, complete spectral  Doppler, and color Doppler. Intravenous contrast (Definity) was administered. Image quality was adequate. LEFT VENTRICLE: Size was normal. Systolic function was at the lower limits of  normal. Ejection fraction was estimated in the range of 50 % to 55 %. There  were no regional wall motion abnormalities. Wall thickness was normal. Avg.  E/e': 7.5. Left ventricular diastolic function parameters were normal.    RIGHT VENTRICLE: The size was normal. Systolic function was normal. The  tricuspid jet envelope definition was inadequate for estimation of RV   systolic  pressure. LEFT ATRIUM: Size was normal.    RIGHT ATRIUM: Size was normal.    SYSTEMIC VEINS: IVC: The inferior vena cava was normal in size and course. AORTIC VALVE: The valve was structurally normal, tri-commissural. There was   no  evidence for stenosis. There was no insufficiency. MITRAL VALVE: Valve structure was normal. There was no evidence for stenosis. There was trivial regurgitation. TRICUSPID VALVE: The valve structure was normal. There was no evidence for  stenosis. There was trivial regurgitation.     PULMONIC VALVE: Not well visualized. There was no evidence for stenosis. There  was no insufficiency. PERICARDIUM: There was no pericardial effusion. AORTA: The root exhibited normal size. SUMMARY:    -  Left ventricle: Systolic function was at the lower limits of normal.  Ejection fraction was estimated in the range of 50 % to 55 %. There were no  regional wall motion abnormalities. SYSTEM MEASUREMENT TABLES    2D mode  LA Dimension (2D): 3.9 cm  Left Atrium Systolic Volume Index; Method of Disks, Biplane; 2D mode;: 27.5  ml/m2  IVS/LVPW (2D): 0.9  IVSd (2D): 0.8 cm  LVIDd (2D): 5.6 cm  LVIDs (2D): 4.2 cm  LVPWd (2D): 1 cm    Unspecified Scan Mode  Peak Grad; Mean; Antegrade Flow: 7 mm[Hg]  Vmax; Antegrade Flow: 122 cm/s    Prepared and signed by    Lennie West. Deloris Chu MD [facility]  Signed 28-May-2021 12:15:56         XR CHEST SNGL V    Result Date: 5/27/2021  Portable chest x-ray CLINICAL INDICATION: Dizziness FINDINGS: Single AP view the chest compared to a similar exam dated 6/29/2020 show the lungs to be well-expanded and clear. No pleural effusion or pneumothorax. The cardiac silhouette and mediastinum are unremarkable. The bones are normal.     No acute cardiopulmonary abnormality. CT HEAD WO CONT    Result Date: 5/27/2021  CT of the head without contrast. CLINICAL INDICATION: Dizziness PROCEDURE: Serial thin section axial images are obtained from the cranial vertex through the skull base without the administration of intravenous contrast. Radiation dose reduction techniques were used for this study. Our CT scanners use one or all of the following: Automated exposure control, adjusted of the mA and/or kV according to patient size, iterative reconstruction COMPARISON: Head CT dated 7/22/2016. FINDINGS: There is no acute intracranial hemorrhage, mass, or mass effect. No abnormal extra-axial fluid collections identified. There is no hydrocephalus. The basilar cisterns are widely patent.  The gray-white matter brain parenchymal interface is well-maintained. No skull fracture or aggressive osseous lesion noted. The mastoid air cells and included paranasal sinuses are clear. No acute intracranial abnormality. CTA HEAD NECK W WO CONT    Result Date: 5/27/2021  CT ANGIOGRAM OF THE HEAD HISTORY: CVA, dizziness. COMPARISON: CT dated 5/27/2021 TECHNIQUE: CT angiogram of the head was performed utilizing helical imaging during the infusion of 100 cc of Isovue-370. Multiplanar MIP reconstructions were obtained. 3-D post-processed images were created on an independent workstation. The images were sent to [employer] Dose reduction techniques used: Automated exposure control, adjustment of the mAs and/or kVp according to patient's size, and iterative reconstruction techniques. FINDINGS: *  COMMENTS: No intracranial mass effects, fluid collections or hydrocephalus. No infarct seen. *  INTRACRANIAL INTERNAL CAROTID ARTERIES: Unremarkable. *  ANTERIOR CEREBRAL ARTERY: Unremarkable. *  ANTERIOR COMMUNICATING ARTERY: Present. *  MIDDLE CEREBRAL ARTERIES: Unremarkable. *  VERTEBROBASILAR ARTERIES: Normal in caliber. *  RIGHT POSTERIOR COMMUNICATING ARTERY: Not visualized. *  LEFT POSTERIOR COMMUNICATING ARTERY: Not visualized. *  CEREBRAL VEINS: Opacified and unremarkable CT ANGIOGRAM OF THE NECK TECHNIQUE: Helical imaging was performed utilizing 4.05RS slice thickness during the same infusion of Isovue-370. Multiplanar reconstruction and 3D volume rendered imaging were obtained on a separate workstation FINDINGS: *  ARCH: The origins of the great vessels are unremarkable. *  CCA: The common carotid arteries are unremarkable bilaterally. *  RIGHT ICA: There is  calcified proximal ICA  plaque. The minimum diameter stenosis is 3 mm. The reference distal diameter of the right ICA is 4 mm. By NASCET criteria, this represent 25 percent stenosis. *  LEFT ICA: There is  calcified proximal ICA  plaque. The minimum diameter stenosis is 4 mm.  The reference distal diameter of the left ICA is 4 mm. By NASCET criteria, this represent 0 percent stenosis. *  VERTEBRAL ARTERIES: Patent bilaterally without significant stenosis. *  OTHER COMMENTS: COPD. 3D rendering and multi-planar vessel images better show the course and orientation of the carotid arteries in the neck. No evidence of hemodynamically significant stenosis. COPD. Date of Dictation: 5/27/2021 6:29 PM      SARS-CoV-2 Lab Results  \"Novel Coronavirus\" Test: No results found for: COV2NT   \"Emergent Disease\" Test: No results found for: EDPR  \"SARS-COV-2\" Test: No results found for: XGCOVT  Rapid Test: No results found for: COVR            Part of this note was written by using a voice dictation software and the note has been proof read but may still contain some grammatical/other typographical errors.     Signed:  Marika Enamorado MD

## 2021-05-28 NOTE — PROGRESS NOTES
05/27/21 2258   Dual Skin Pressure Injury Assessment   Dual Skin Pressure Injury Assessment WDL   Second Care Provider (Based on Facility Policy) Abe Macario RN   Skin Integumentary   Skin Integumentary (WDL) X    Pressure  Injury Documentation No Pressure Injury Noted-Pressure Ulcer Prevention Initiated   Skin Color Appropriate for ethnicity; Ecchymosis (comment)   Skin Condition/Temp Dry; Warm   Skin Integrity Abrasion; Excoriation;Scars (comment)  (arms, buttocks)   Turgor Tenting   Hair Growth Present   Nails X   Varicosities Absent   Exceptions to WDL Thick

## 2021-05-28 NOTE — PROGRESS NOTES
AMlodipine held this AM due to pt c/o dizziness vertigo like episodelier  Dr Omar Campuzano notified  /78

## 2021-05-28 NOTE — PROGRESS NOTES
Chart screened by  for potential discharge needs or concerns. None identified at this time. PT eval pending. Please notify/consult  if any discharge needs arise. Care Management Interventions  PCP Verified by CM:  Yes  Last Visit to PCP: 03/08/21  Discharge Durable Medical Equipment: No  Physical Therapy Consult: Yes  Occupational Therapy Consult: No  Speech Therapy Consult: No  Current Support Network: Own Home, Lives with Spouse  Confirm Follow Up Transport: Family  Discharge Location  Discharge Placement: Unable to determine at this time

## 2021-05-28 NOTE — PROGRESS NOTES
TRANSFER - IN REPORT:    Verbal report received from Lino Prado on Merlinda Allan  being received from ED(unit) for routine progression of care      Report consisted of patients Situation, Background, Assessment and   Recommendations(SBAR). Information from the following report(s) SBAR, Kardex, ED Summary, Recent Results and Med Rec Status was reviewed with the receiving nurse. Opportunity for questions and clarification was provided. Assessment completed upon patients arrival to unit and care assumed.

## 2021-05-29 ENCOUNTER — APPOINTMENT (OUTPATIENT)
Dept: MRI IMAGING | Age: 66
End: 2021-05-29
Attending: NURSE PRACTITIONER
Payer: MEDICARE

## 2021-05-29 PROCEDURE — 99218 HC RM OBSERVATION: CPT

## 2021-05-29 PROCEDURE — APPSS30 APP SPLIT SHARED TIME 16-30 MINUTES: Performed by: NURSE PRACTITIONER

## 2021-05-29 PROCEDURE — A9576 INJ PROHANCE MULTIPACK: HCPCS | Performed by: FAMILY MEDICINE

## 2021-05-29 PROCEDURE — 96372 THER/PROPH/DIAG INJ SC/IM: CPT

## 2021-05-29 PROCEDURE — 74011250637 HC RX REV CODE- 250/637: Performed by: HOSPITALIST

## 2021-05-29 PROCEDURE — 74011636320 HC RX REV CODE- 636/320: Performed by: FAMILY MEDICINE

## 2021-05-29 PROCEDURE — 70553 MRI BRAIN STEM W/O & W/DYE: CPT

## 2021-05-29 PROCEDURE — 74011250637 HC RX REV CODE- 250/637: Performed by: FAMILY MEDICINE

## 2021-05-29 PROCEDURE — 74011250636 HC RX REV CODE- 250/636: Performed by: HOSPITALIST

## 2021-05-29 PROCEDURE — 74011250636 HC RX REV CODE- 250/636: Performed by: FAMILY MEDICINE

## 2021-05-29 RX ORDER — SODIUM CHLORIDE 0.9 % (FLUSH) 0.9 %
10 SYRINGE (ML) INJECTION
Status: COMPLETED | OUTPATIENT
Start: 2021-05-29 | End: 2021-05-29

## 2021-05-29 RX ORDER — CARBAMAZEPINE 200 MG/1
100 TABLET ORAL 2 TIMES DAILY
Status: DISCONTINUED | OUTPATIENT
Start: 2021-05-29 | End: 2021-05-30

## 2021-05-29 RX ADMIN — CARBAMAZEPINE 100 MG: 200 TABLET ORAL at 08:16

## 2021-05-29 RX ADMIN — PANTOPRAZOLE SODIUM 40 MG: 40 TABLET, DELAYED RELEASE ORAL at 07:34

## 2021-05-29 RX ADMIN — SOTALOL HYDROCHLORIDE 80 MG: 80 TABLET ORAL at 21:40

## 2021-05-29 RX ADMIN — Medication 5 ML: at 06:23

## 2021-05-29 RX ADMIN — ACETAMINOPHEN 650 MG: 325 TABLET ORAL at 15:05

## 2021-05-29 RX ADMIN — Medication 5 ML: at 15:07

## 2021-05-29 RX ADMIN — MECLIZINE 25 MG: 12.5 TABLET ORAL at 15:06

## 2021-05-29 RX ADMIN — ACETAMINOPHEN 650 MG: 325 TABLET ORAL at 08:34

## 2021-05-29 RX ADMIN — Medication 10 ML: at 10:03

## 2021-05-29 RX ADMIN — SERTRALINE 100 MG: 50 TABLET, FILM COATED ORAL at 08:14

## 2021-05-29 RX ADMIN — Medication 10 ML: at 21:40

## 2021-05-29 RX ADMIN — SOTALOL HYDROCHLORIDE 80 MG: 80 TABLET ORAL at 08:14

## 2021-05-29 RX ADMIN — ENOXAPARIN SODIUM 40 MG: 40 INJECTION SUBCUTANEOUS at 08:15

## 2021-05-29 RX ADMIN — CARBAMAZEPINE 100 MG: 200 TABLET ORAL at 17:46

## 2021-05-29 RX ADMIN — ASPIRIN 81 MG: 81 TABLET, CHEWABLE ORAL at 08:16

## 2021-05-29 RX ADMIN — AMLODIPINE BESYLATE 10 MG: 10 TABLET ORAL at 08:16

## 2021-05-29 RX ADMIN — ROPINIROLE HYDROCHLORIDE 1 MG: 1 TABLET, FILM COATED ORAL at 21:40

## 2021-05-29 RX ADMIN — MECLIZINE 25 MG: 12.5 TABLET ORAL at 08:34

## 2021-05-29 RX ADMIN — ISOSORBIDE MONONITRATE 30 MG: 30 TABLET, EXTENDED RELEASE ORAL at 07:34

## 2021-05-29 RX ADMIN — GADOTERIDOL 16 ML: 279.3 INJECTION, SOLUTION INTRAVENOUS at 10:03

## 2021-05-29 NOTE — PROGRESS NOTES
Problem: Patient Education: Go to Patient Education Activity  Goal: Patient/Family Education  5/29/2021 0705 by Jessee Gu RN  Outcome: Progressing Towards Goal  5/28/2021 1718 by Jessee Gu RN  Outcome: Progressing Towards Goal

## 2021-05-29 NOTE — PROGRESS NOTES
Neurology Daily Progress Note     Assessment:     76year old male seen for right sided sivakumar cranial headache associated with vertigo. Hx of CLL and a.fib s/p watchman device. ESR and CRP negative. CTA of head/neck negative for LVO, high grade stenosis, or evidence of vasculitic changes. Differential includes: vertiginous migraine v. nervus intermedius neuralgia v. Less likely cerebral ischemia. MRI of brain pending. Plan:     Continue Carbamazepine 100 mg po BID    MRI of brain pending    Subjective: Interval history:    Continues to endorse headache and vertigo, improving. Associated with phonophobia and nausea. MRI of brain pending. History:    Preet Lucio is a 76 y.o. male who is being seen for vertigo. Review of systems negative with exception of pertinent positives and negatives noted above.        Objective:     Vitals:    05/28/21 2000 05/29/21 0000 05/29/21 0400 05/29/21 0653   BP: 131/82 113/76 122/77 132/84   Pulse: 73 75 68 72   Resp: 18 16 20 20   Temp: 98.1 °F (36.7 °C) 98.2 °F (36.8 °C) 98.3 °F (36.8 °C) 98.3 °F (36.8 °C)   SpO2: 92% 97% 97% 91%   Weight:       Height:              Current Facility-Administered Medications:     carBAMazepine (TEGretol) tablet 100 mg, 100 mg, Oral, BID, Ten Atkins MD, 100 mg at 05/29/21 0816    enoxaparin (LOVENOX) injection 40 mg, 40 mg, SubCUTAneous, Q24H, Ten Atkins MD, 40 mg at 05/29/21 0815    sodium chloride (NS) flush 5-10 mL, 5-10 mL, IntraVENous, Q8H, Adalgisa Soto III, MD, 5 mL at 05/29/21 4648    sodium chloride (NS) flush 5-10 mL, 5-10 mL, IntraVENous, PRN, Adalgisa Soto III, MD    acetaminophen (TYLENOL) tablet 650 mg, 650 mg, Oral, Q6H PRN, 650 mg at 05/29/21 0834 **OR** acetaminophen (TYLENOL) suppository 650 mg, 650 mg, Rectal, Q6H PRN, Sandra Boston MD    polyethylene glycol (MIRALAX) packet 17 g, 17 g, Oral, DAILY PRN, Sandra Boston MD    promethazine (PHENERGAN) tablet 12.5 mg, 12.5 mg, Oral, Q6H PRN **OR** ondansetron (ZOFRAN) injection 4 mg, 4 mg, IntraVENous, Q6H PRN, Vargas Wright MD    amLODIPine (NORVASC) tablet 10 mg, 10 mg, Oral, DAILY, Lynne LÓPEZ MD, 10 mg at 05/29/21 0816    aspirin chewable tablet 81 mg, 81 mg, Oral, DAILY, Vargas Wright MD, 81 mg at 05/29/21 0816    isosorbide mononitrate ER (IMDUR) tablet 30 mg, 30 mg, Oral, 7am, Lynne LÓPEZ MD, 30 mg at 05/29/21 0734    meclizine (ANTIVERT) tablet 25 mg, 25 mg, Oral, Q6H PRN, Vargas Wright MD, 25 mg at 05/29/21 0834    pantoprazole (PROTONIX) tablet 40 mg, 40 mg, Oral, ACB, Vargas Wright MD, 40 mg at 05/29/21 0734    rOPINIRole (REQUIP) tablet 1 mg, 1 mg, Oral, QHS, Vargas Wright MD, 1 mg at 05/28/21 2130    sertraline (ZOLOFT) tablet 100 mg, 100 mg, Oral, DAILY, Vargas Wright MD, 100 mg at 05/29/21 0814    sotaloL (BETAPACE) tablet 80 mg, 80 mg, Oral, Q12H, Vargas Wright MD, 80 mg at 05/29/21 0814    No results found for this or any previous visit (from the past 12 hour(s)). Physical Exam:  General - Well developed, well nourished, in  apparent distress. Pleasant and conversent. HEENT - Normocephalic, atraumatic. Conjunctiva are clear. Neck - Supple without masses  Cardiovascular - irregular rate and rhythm. Normal S1, S2 without murmurs, rubs, or gallops. Lungs - Clear to auscultation. Neurological examination - Comprehension, attention, memory and reasoning are intact. Language and speech are normal.   On cranial nerve examination, (II, III, IV, VI) pupils are equal, round, and reactive to light. Visual acuity is adequate. Visual fields are full to finger confrontation. Extraocular motility is normal. (V, VII) Face is symmetric and sensation is intact to light touch. (VIII) Hearing is intact. (IX, X) Palate and uvula elevate symmetrically. Voice is normal. (XI) Shoulder shrug is strong and equal bilaterally. (XII)Tongue is midline. Motor examination - There is normal muscle tone and bulk.  Power is full throughout. Muscle stretch reflexes are normoactive and there are no pathological reflexes present. Plantar response is flexor bilaterally. Sensation is intact to light touch, pinprick, vibration and proprioception in all extremities. Cerebellar examination is normal finger/nose and heel/shin.        Signed By: KELLY Hebert     May 29, 2021      Neuro  Patient seen and examined  Is symptomatically doing better  Differential diagnosis is as above  With regards to the patient's CLL individuals with that condition may have a number of generalized issues associated with it however inner ear is not to my knowledge one of them nonetheless extremely important to continue follow-up with oncology in that regard, particularly as he reports that his indices are shifting towards perhaps more active disease

## 2021-05-30 ENCOUNTER — HOME HEALTH ADMISSION (OUTPATIENT)
Dept: HOME HEALTH SERVICES | Facility: HOME HEALTH | Age: 66
End: 2021-05-30

## 2021-05-30 VITALS
SYSTOLIC BLOOD PRESSURE: 104 MMHG | DIASTOLIC BLOOD PRESSURE: 70 MMHG | OXYGEN SATURATION: 94 % | HEIGHT: 73 IN | TEMPERATURE: 98.5 F | HEART RATE: 65 BPM | WEIGHT: 176 LBS | BODY MASS INDEX: 23.33 KG/M2 | RESPIRATION RATE: 20 BRPM

## 2021-05-30 LAB
ANION GAP SERPL CALC-SCNC: 4 MMOL/L (ref 7–16)
BASOPHILS # BLD: 0 K/UL (ref 0–0.2)
BASOPHILS NFR BLD: 0 % (ref 0–2)
BUN SERPL-MCNC: 10 MG/DL (ref 8–23)
CALCIUM SERPL-MCNC: 8.5 MG/DL (ref 8.3–10.4)
CHLORIDE SERPL-SCNC: 110 MMOL/L (ref 98–107)
CO2 SERPL-SCNC: 28 MMOL/L (ref 21–32)
CREAT SERPL-MCNC: 0.76 MG/DL (ref 0.8–1.5)
DIFFERENTIAL METHOD BLD: ABNORMAL
EOSINOPHIL # BLD: 0.3 K/UL (ref 0–0.8)
EOSINOPHIL NFR BLD: 2 % (ref 0.5–7.8)
ERYTHROCYTE [DISTWIDTH] IN BLOOD BY AUTOMATED COUNT: 12.4 % (ref 11.9–14.6)
GLUCOSE SERPL-MCNC: 112 MG/DL (ref 65–100)
HCT VFR BLD AUTO: 43.4 % (ref 41.1–50.3)
HGB BLD-MCNC: 14.4 G/DL (ref 13.6–17.2)
IMM GRANULOCYTES # BLD AUTO: 0 K/UL (ref 0–0.5)
IMM GRANULOCYTES NFR BLD AUTO: 0 % (ref 0–5)
LYMPHOCYTES # BLD: 8.9 K/UL (ref 0.5–4.6)
LYMPHOCYTES NFR BLD: 63 % (ref 13–44)
MCH RBC QN AUTO: 30.8 PG (ref 26.1–32.9)
MCHC RBC AUTO-ENTMCNC: 33.2 G/DL (ref 31.4–35)
MCV RBC AUTO: 92.9 FL (ref 79.6–97.8)
MONOCYTES # BLD: 0.9 K/UL (ref 0.1–1.3)
MONOCYTES NFR BLD: 6 % (ref 4–12)
NEUTS SEG # BLD: 4.1 K/UL (ref 1.7–8.2)
NEUTS SEG NFR BLD: 29 % (ref 43–78)
NRBC # BLD: 0.02 K/UL (ref 0–0.2)
PLATELET # BLD AUTO: 216 K/UL (ref 150–450)
PLATELET COMMENTS,PCOM: ADEQUATE
PMV BLD AUTO: 10.1 FL (ref 9.4–12.3)
POTASSIUM SERPL-SCNC: 3.7 MMOL/L (ref 3.5–5.1)
RBC # BLD AUTO: 4.67 M/UL (ref 4.23–5.6)
RBC MORPH BLD: ABNORMAL
SODIUM SERPL-SCNC: 142 MMOL/L (ref 138–145)
WBC # BLD AUTO: 14.2 K/UL (ref 4.3–11.1)
WBC MORPH BLD: ABNORMAL

## 2021-05-30 PROCEDURE — 74011250637 HC RX REV CODE- 250/637: Performed by: HOSPITALIST

## 2021-05-30 PROCEDURE — 99218 HC RM OBSERVATION: CPT

## 2021-05-30 PROCEDURE — 74011250636 HC RX REV CODE- 250/636: Performed by: FAMILY MEDICINE

## 2021-05-30 PROCEDURE — 80048 BASIC METABOLIC PNL TOTAL CA: CPT

## 2021-05-30 PROCEDURE — APPSS30 APP SPLIT SHARED TIME 16-30 MINUTES: Performed by: NURSE PRACTITIONER

## 2021-05-30 PROCEDURE — 96372 THER/PROPH/DIAG INJ SC/IM: CPT

## 2021-05-30 PROCEDURE — 36415 COLL VENOUS BLD VENIPUNCTURE: CPT

## 2021-05-30 PROCEDURE — 99232 SBSQ HOSP IP/OBS MODERATE 35: CPT | Performed by: PSYCHIATRY & NEUROLOGY

## 2021-05-30 PROCEDURE — 85025 COMPLETE CBC W/AUTO DIFF WBC: CPT

## 2021-05-30 PROCEDURE — 74011250636 HC RX REV CODE- 250/636: Performed by: HOSPITALIST

## 2021-05-30 RX ORDER — TOPIRAMATE 25 MG/1
25 TABLET ORAL
Qty: 7 TABLET | Refills: 0 | Status: SHIPPED | OUTPATIENT
Start: 2021-05-30 | End: 2021-06-06

## 2021-05-30 RX ORDER — TOPIRAMATE 50 MG/1
50 TABLET, FILM COATED ORAL
Qty: 30 TABLET | Refills: 0 | Status: SHIPPED | OUTPATIENT
Start: 2021-06-07 | End: 2021-06-15 | Stop reason: SDUPTHER

## 2021-05-30 RX ORDER — TOPIRAMATE 25 MG/1
25 TABLET ORAL
Status: DISCONTINUED | OUTPATIENT
Start: 2021-05-30 | End: 2021-05-30 | Stop reason: HOSPADM

## 2021-05-30 RX ADMIN — SOTALOL HYDROCHLORIDE 80 MG: 80 TABLET ORAL at 09:25

## 2021-05-30 RX ADMIN — ISOSORBIDE MONONITRATE 30 MG: 30 TABLET, EXTENDED RELEASE ORAL at 05:18

## 2021-05-30 RX ADMIN — ACETAMINOPHEN 650 MG: 325 TABLET ORAL at 09:25

## 2021-05-30 RX ADMIN — MECLIZINE 25 MG: 12.5 TABLET ORAL at 09:24

## 2021-05-30 RX ADMIN — PANTOPRAZOLE SODIUM 40 MG: 40 TABLET, DELAYED RELEASE ORAL at 05:18

## 2021-05-30 RX ADMIN — AMLODIPINE BESYLATE 10 MG: 10 TABLET ORAL at 09:24

## 2021-05-30 RX ADMIN — SERTRALINE 100 MG: 50 TABLET, FILM COATED ORAL at 09:24

## 2021-05-30 RX ADMIN — ASPIRIN 81 MG: 81 TABLET, CHEWABLE ORAL at 09:25

## 2021-05-30 RX ADMIN — ENOXAPARIN SODIUM 40 MG: 40 INJECTION SUBCUTANEOUS at 09:24

## 2021-05-30 NOTE — DISCHARGE SUMMARY
Hospitalist Discharge Summary     Admit Date:  2021  5:12 PM   DC note date: 2021  Name:  Ramón Martinez   Age:  76 y.o.  :  1955   MRN:  [id number]   PCP:  Sheron Del Real DO  Treatment Team: Attending Provider: Sandy Morejon MD; Utilization Review: Christofer Pace RN; Care Manager: Johanna Serrano LMSW; Consulting Provider: Kevin Mckeon MD    Problem List for this Hospitalization:  Hospital Problems as of 2021 Date Reviewed: 2021        Codes Class Noted - Resolved POA    Dehydration ICD-10-CM: E86.0  ICD-9-CM: 276.51  2021 - Present Unknown        Heat exhaustion ICD-10-CM: T67. 5XXA  ICD-9-CM: 992.5  2021 - Present Unknown        CLL (chronic lymphocytic leukemia) (HCC) (Chronic) ICD-10-CM: C91.10  ICD-9-CM: 204.10  2018 - Present Yes        * (Principal) Dizziness ICD-10-CM: R42  ICD-9-CM: 780.4  2016 - Present Unknown        Atrial fibrillation (HCC) (Chronic) ICD-10-CM: I48.91  ICD-9-CM: 427.31  2010 - Present Yes    Overview Signed 10/16/2018 11:31 AM by Antionette Mackay MD     Left atrial appendage occlusion (10/16/18):  30 mm Watchman device.               HTN (hypertension) (Chronic) ICD-10-CM: I10  ICD-9-CM: 401.9  2010 - Present Yes            Admission HPI from 2021:    \" Ramón Martinez is a 76 y.o. male with medical history of paroxysmal atrial fibrillation,  heart disease and stents in place, CLL, COPD and hypertension.  Has had off-and-on dizziness for the past several months.  Has been dizzy for the past 3 days off and on.  Current symptoms started 2 hours prior to arrival.  Gets very dizzy with room spinning.  Cannot walk.      Wife states around 1300 patient went out to mow the yard.  Did not drink any water or bring any water with him.  After about 30 minutes they went and checked on him and found him laying on the ground beside the [employer] if he became dizzy on the lawnmower and stopped and laid on the ground or if he fell or passed out.  Denies hitting his head.       She states patient has seen ENT and is scheduled for a neurology appointment in August for frequent dizziness and headache.  Has also had an MRI of the brain in October to rule out tumor.  Chart review shows that he was just seen by Cardiology yesterday     Denies any chest pain shortness of breath or heart palpitations.  Has bilateral frontal headache and pain behind both eyes.  No neck pain.  No shortness of breath.  No nausea numbness or diaphoresis.  Patient is on meclizine and Phenergan.     In ER he had CT head and CTA head and neck, both without acute findings. CXR and UA are ok. BP and pulse stable, although he is still symptomatic. Denies ear pain  \"    Hospital Course:  Aleksandra Kern a 76 y. o. male with medical history of paroxysmal atrial fibrillation,  heart disease and stents in place, CLL, COPD and hypertension admitted with right-sided hemicranial headache associated with dizziness. Orthostatic negative. CT head negative, CTA of head and neck negative for LVO, high-grade stenosis or evidence of vasculitic changes. MRI brain with no acute  findings. Neurology consulted and recommend possible differential includes vertiginous migraine versus nervus intermedius neuralgia versus less likely cerebral ischemia. Patient started on carbamazepine 100 mg p.o. twice daily on 5/29 for possible nervus intermedius neuralgia. Neurology discontinued carbamazepine, as patient with persistent headache and recommend patient likely has vertiginous migraine and recommend topiramate 25 mg at night for 7 days and then increase dose to 50 mg at night. Patient to follow-up with neurology as outpatient. Physical therapy recommend SNF vs rehab but patient refused. Case management consulted to arrange home health. Patient endorses improvement in headache and dizziness. Patient is stable to discharge home today.     Disposition: Home Health Care [facility]  Activity: Activity as tolerated  Diet: DIET REGULAR  Code Status: Full Code    Follow Up Orders: Follow-up Appointments   Procedures    FOLLOW UP VISIT Appointment in: One Week Neurology and PCP     Neurology and PCP     Standing Status:   Standing     Number of Occurrences:   1     Order Specific Question:   Appointment in     Answer: One Week       Follow-up Information     Follow up With Specialties Details Why Contact Ariana    Luis Wie  Family Medicine   [address]  [phone]            Discharge meds at bottom of this note. Plan was discussed with patient. All questions answered. Patient was stable at time of discharge. Given instructions to call a physician or return if any concerns. Discharge summary and encounter summary was sent to PCP electronically via \"Comm Mgt\" link in [facility], if possible. Diagnostic Imaging/Tests:   XR CHEST SNGL V    Result Date: 5/27/2021  Portable chest x-ray CLINICAL INDICATION: Dizziness FINDINGS: Single AP view the chest compared to a similar exam dated 6/29/2020 show the lungs to be well-expanded and clear. No pleural effusion or pneumothorax. The cardiac silhouette and mediastinum are unremarkable. The bones are normal.     No acute cardiopulmonary abnormality. MRI BRAIN W WO CONT    Result Date: 5/30/2021  MRI BRAIN WITHOUT AND WITH CONTRAST 5/29/2021 HISTORY: Atrial fibrillation. CLL. COPD. Intermittent dizziness for several months. TECHNIQUE: Sagittal and axial T1-weighted, axial T2-weighted, axial FLAIR, axial T2-weighted gradient-echo, axial diffusion weighted images with ADC maps and postcontrast axial and coronal T1-weighted images of the brain. 16cc of ProHance gadolinium contrast was administered intravenously without adverse event to evaluate for pathological leptomeningeal or parenchymal enhancement.  COMPARISON: October 13, 2020 FINDINGS: There is no acute infarction, acute intracranial hemorrhage, hydrocephalus, intra-axial mass, or extra-axial hematoma. On the T2-weighted and FLAIR sequences, there are stable white matter hyperintensities scattered throughout the brain. This is not an uncommon finding which may be present with asymptomatic patients, with migraine headaches or with mild chronic small vessel ischemic disease. There is no abnormal parenchymal or leptomeningeal enhancement. Stable appearance of the brain with nonspecific white matter findings compatible with mild chronic small vessel ischemic disease. CT HEAD WO CONT    Result Date: 5/27/2021  CT of the head without contrast. CLINICAL INDICATION: Dizziness PROCEDURE: Serial thin section axial images are obtained from the cranial vertex through the skull base without the administration of intravenous contrast. Radiation dose reduction techniques were used for this study. Our CT scanners use one or all of the following: Automated exposure control, adjusted of the mA and/or kV according to patient size, iterative reconstruction COMPARISON: Head CT dated 7/22/2016. FINDINGS: There is no acute intracranial hemorrhage, mass, or mass effect. No abnormal extra-axial fluid collections identified. There is no hydrocephalus. The basilar cisterns are widely patent. The gray-white matter brain parenchymal interface is well-maintained. No skull fracture or aggressive osseous lesion noted. The mastoid air cells and included paranasal sinuses are clear. No acute intracranial abnormality. CTA HEAD NECK W WO CONT    Result Date: 5/27/2021  CT ANGIOGRAM OF THE HEAD HISTORY: CVA, dizziness. COMPARISON: CT dated 5/27/2021 TECHNIQUE: CT angiogram of the head was performed utilizing helical imaging during the infusion of 100 cc of Isovue-370. Multiplanar MIP reconstructions were obtained. 3-D post-processed images were created on an independent workstation. The images were sent to [employer]. . Dose reduction techniques used:  Automated exposure control, adjustment of the mAs and/or kVp according to patient's size, and iterative reconstruction techniques. FINDINGS: *  COMMENTS: No intracranial mass effects, fluid collections or hydrocephalus. No infarct seen. *  INTRACRANIAL INTERNAL CAROTID ARTERIES: Unremarkable. *  ANTERIOR CEREBRAL ARTERY: Unremarkable. *  ANTERIOR COMMUNICATING ARTERY: Present. *  MIDDLE CEREBRAL ARTERIES: Unremarkable. *  VERTEBROBASILAR ARTERIES: Normal in caliber. *  RIGHT POSTERIOR COMMUNICATING ARTERY: Not visualized. *  LEFT POSTERIOR COMMUNICATING ARTERY: Not visualized. *  CEREBRAL VEINS: Opacified and unremarkable CT ANGIOGRAM OF THE NECK TECHNIQUE: Helical imaging was performed utilizing 8.55HR slice thickness during the same infusion of Isovue-370. Multiplanar reconstruction and 3D volume rendered imaging were obtained on a separate workstation FINDINGS: *  ARCH: The origins of the great vessels are unremarkable. *  CCA: The common carotid arteries are unremarkable bilaterally. *  RIGHT ICA: There is  calcified proximal ICA  plaque. The minimum diameter stenosis is 3 mm. The reference distal diameter of the right ICA is 4 mm. By NASCET criteria, this represent 25 percent stenosis. *  LEFT ICA: There is  calcified proximal ICA  plaque. The minimum diameter stenosis is 4 mm. The reference distal diameter of the left ICA is 4 mm. By NASCET criteria, this represent 0 percent stenosis. *  VERTEBRAL ARTERIES: Patent bilaterally without significant stenosis. *  OTHER COMMENTS: COPD. 3D rendering and multi-planar vessel images better show the course and orientation of the carotid arteries in the neck. No evidence of hemodynamically significant stenosis. COPD.  Date of Dictation: 5/27/2021 6:29 PM      Echocardiogram results:  Results for orders placed or performed during the hospital encounter of 05/27/21   2D ECHO COMPLETE ADULT (TTE) W  [facility] [address]  [phone]    Transthoracic Echocardiogram  2D, M-mode, Doppler, and Color Doppler    Patient: Niko Francisco  MR #: [id number]  : 1955  Age: 76 years  Gender: Male  Study date: 28-May-2021  Account #: [de-identified]  Height: 72 in  Weight: 175.6 lb  BSA: 2.02 mï¾²  Status:Routine  Location: 703  BP: 115/ 69    Allergies: FENTANYL, LOVASTATIN, NIACIN, NIACIN-LOVASTATIN    Sonographer:  Aydee Gloria RDCS  Group:  [facility]  Referring Physician:  Ellie Fofana MD  Reading Physician:  Lennie West. Deloris Chu MD [facility]    INDICATIONS: Syncope of truly unknown origin.; Dizziness. PROCEDURE: This was a routine study. A transthoracic echocardiogram was  performed. The study included complete 2D imaging, M-mode, complete spectral  Doppler, and color Doppler. Intravenous contrast (Definity) was administered. Image quality was adequate. LEFT VENTRICLE: Size was normal. Systolic function was at the lower limits of  normal. Ejection fraction was estimated in the range of 50 % to 55 %. There  were no regional wall motion abnormalities. Wall thickness was normal. Avg.  E/e': 7.5. Left ventricular diastolic function parameters were normal.    RIGHT VENTRICLE: The size was normal. Systolic function was normal. The  tricuspid jet envelope definition was inadequate for estimation of RV   systolic  pressure. LEFT ATRIUM: Size was normal.    RIGHT ATRIUM: Size was normal.    SYSTEMIC VEINS: IVC: The inferior vena cava was normal in size and course. AORTIC VALVE: The valve was structurally normal, tri-commissural. There was   no  evidence for stenosis. There was no insufficiency. MITRAL VALVE: Valve structure was normal. There was no evidence for stenosis. There was trivial regurgitation. TRICUSPID VALVE: The valve structure was normal. There was no evidence for  stenosis. There was trivial regurgitation. PULMONIC VALVE: Not well visualized.  There was no evidence for stenosis. There  was no insufficiency. PERICARDIUM: There was no pericardial effusion. AORTA: The root exhibited normal size. SUMMARY:    -  Left ventricle: Systolic function was at the lower limits of normal.  Ejection fraction was estimated in the range of 50 % to 55 %. There were no  regional wall motion abnormalities. SYSTEM MEASUREMENT TABLES    2D mode  LA Dimension (2D): 3.9 cm  Left Atrium Systolic Volume Index; Method of Disks, Biplane; 2D mode;: 27.5  ml/m2  IVS/LVPW (2D): 0.9  IVSd (2D): 0.8 cm  LVIDd (2D): 5.6 cm  LVIDs (2D): 4.2 cm  LVPWd (2D): 1 cm    Unspecified Scan Mode  Peak Grad; Mean; Antegrade Flow: 7 mm[Hg]  Vmax; Antegrade Flow: 122 cm/s    Prepared and signed by    Braulio Rodriguez MD [facility]  Signed 28-May-2021 12:15:56         Procedures done this admission:  * No surgery found *    All Micro Results     None          SARS-CoV-2 Lab Results  \"Novel Coronavirus\" Test: No results found for: COV2NT   \"Emergent Disease\" Test: No results found for: EDPR  \"SARS-COV-2\" Test: No results found for: XGCOVT  Rapid Test: No results found for: COVR         Labs: Results:       BMP, Mg, Phos Recent Labs     05/30/21  1009 05/28/21  0504 05/27/21  1631    145 143   K 3.7 3.9 3.8   * 112* 111*   CO2 28 29 26   AGAP 4* 4* 6*   BUN 10 11 10   CREA 0.76* 0.79* 0.91   CA 8.5 8.6 8.4   * 95 89   MG  --  2.4 2.1      CBC Recent Labs     05/30/21  1009 05/28/21  0504 05/27/21  1631   WBC 14.2* 15.2* 24.3*   RBC 4.67 4.84 4.93   HGB 14.4 14.8 15.1   HCT 43.4 45.3 46.0    211 272   GRANS  --   --  26*   LYMPH  --   --  67*   EOS  --   --  1   MONOS  --   --  5   BASOS  --   --  1   IG  --   --  0   ANEU  --   --  6.3   ABL  --   --  16.4*   CHARLY  --   --  0.2   ABM  --   --  1.2   ABB  --   --  0.2   AIG  --   --  0.0      LFT Recent Labs     05/27/21  1631   ALT 17   AP 85   TP 6.9   ALB 4.0   GLOB 2.9   AGRAT 1.4      Cardiac Testing Lab Results   Component Value Date/Time  09/08/2018 06:35 AM    BNP 90 03/07/2013 11:30 PM    BNP 28 12/09/2011 05:42 AM    BNP 29 07/22/2010 04:10 PM    CK 59 05/27/2021 04:31 PM    CK 53 03/07/2013 10:23 PM    CK 70 12/01/2009 10:32 AM    CK - MB 0.8 03/07/2013 10:23 PM    CK - MB <0.5 12/01/2009 10:32 AM    CK-MB Index 1.5 03/07/2013 10:23 PM    CK-MB Index CANNOT BE CALCULATED 12/01/2009 10:32 AM    Troponin-I <0.05 12/09/2011 05:42 AM    Troponin-I, Qt. <0.02 (L) 08/30/2019 10:32 PM    Troponin-I, Qt. <0.02 (L) 08/30/2019 05:17 PM    Troponin-I, Qt. <0.02 (L) 08/30/2019 11:36 AM      Coagulation Tests Lab Results   Component Value Date/Time    Prothrombin time 13.8 06/29/2020 03:00 PM    Prothrombin time 14.1 12/06/2018 08:47 AM    Prothrombin time 12.6 10/15/2018 10:18 AM    INR 1.0 06/29/2020 03:00 PM    INR 1.1 12/06/2018 08:47 AM    INR 1.0 10/15/2018 10:18 AM    aPTT 24.3 03/07/2013 10:23 PM    aPTT 30.9 11/03/2010 10:25 AM    aPTT 27.3 07/22/2010 06:10 PM      A1c Lab Results   Component Value Date/Time    Hemoglobin A1c 5.1 07/23/2016 07:27 AM      Lipid Panel Lab Results   Component Value Date/Time    Cholesterol, total 194 03/08/2021 10:21 AM    HDL Cholesterol 48 03/08/2021 10:21 AM    LDL, calculated 118 (H) 03/08/2021 10:21 AM    LDL, calculated 68.2 08/31/2019 04:54 AM    VLDL, calculated 28 03/08/2021 10:21 AM    VLDL, calculated 31.8 (H) 08/31/2019 04:54 AM    Triglyceride 158 (H) 03/08/2021 10:21 AM    CHOL/HDL Ratio 3.7 08/31/2019 04:54 AM      Thyroid Panel Lab Results   Component Value Date/Time    TSH 2.170 03/08/2021 10:21 AM    TSH 1.950 07/24/2018 09:21 AM    TSH 0.835 02/05/2018 10:49 AM    TSH 0.920 10/06/2010 01:45 PM        Most Recent UA Lab Results   Component Value Date/Time    Color YELLOW 05/27/2021 08:14 PM    Appearance CLEAR 05/27/2021 08:14 PM    Specific gravity 1.009 06/29/2020 03:00 PM    pH (UA) 5.5 05/27/2021 08:14 PM    Protein Negative 05/27/2021 08:14 PM    Glucose Negative 05/27/2021 08:14 PM Ketone Negative 05/27/2021 08:14 PM    Bilirubin Negative 05/27/2021 08:14 PM    Blood Negative 05/27/2021 08:14 PM    Urobilinogen 0.2 05/27/2021 08:14 PM    Nitrites Negative 05/27/2021 08:14 PM    Leukocyte Esterase Negative 05/27/2021 08:14 PM    WBC 0-3 09/06/2018 12:27 PM    RBC 0-3 09/06/2018 12:27 PM    Epithelial cells 0-3 09/06/2018 12:27 PM    Bacteria 0 09/06/2018 12:27 PM    Casts 3-5 09/06/2018 12:27 PM        Allergies   Allergen Reactions    Fentanyl Other (comments)     States had through a PCA after shoulder surgery in 2000, and \"throat swelled shut\" and that he \"quit breathing\". Update 11/16/10--Patient thinks the PCA gave him to much and it wasn't an allergy to drug itself. On 6/29/20 patient now states he is allergic to fentanyl. Had heart cath 8/30/19 and per note fentanyl was given. No reactions noted.     Lovastatin Other (comments)     Hullucinations    Niacin Other (comments)     Denies allery    Advicor [Niacin-Lovastatin] Anaphylaxis and Itching     Immunization History   Administered Date(s) Administered    (RETIRED) Pneumococcal Vaccine (Unspecified Type) 07/23/2010    Covid-19, MODERNA, Mrna, Lnp-s, Pf, 100mcg/0.5mL 02/18/2021, 03/25/2021    Influenza Vaccine 09/28/2015    Influenza Vaccine (Quad) PF (>6 Mo Flulaval, Fluarix, and >3 Yrs Afluria, Fluzone [id number]) 12/15/2014, 12/21/2017, 09/11/2018    Influenza Vaccine (Tri) Adjuvanted (>65 Yrs FLUAD TRI [id number]) 10/09/2019    Influenza Vaccine PF 02/15/2017    Influenza, Quadrivalent, Adjuvanted (>65 Yrs FLUAD QUAD [id number]) 10/19/2020    Pneumococcal Conjugate (PCV-13) 10/09/2019    Pneumococcal Polysaccharide (PPSV-23) 01/01/2010, 07/01/2010, 09/01/2014    Td 01/01/2003    Tdap 01/01/2010       All Labs from Last 24 Hrs:  Recent Results (from the past 24 hour(s))   CBC WITH AUTOMATED DIFF    Collection Time: 05/30/21 10:09 AM   Result Value Ref Range    WBC 14.2 (H) 4.3 - 11.1 K/uL    RBC 4.67 4.23 - 5.6 M/uL    HGB 14.4 13.6 - 17.2 g/dL    HCT 43.4 41.1 - 50.3 %    MCV 92.9 79.6 - 97.8 FL    MCH 30.8 26.1 - 32.9 PG    MCHC 33.2 31.4 - 35.0 g/dL    RDW 12.4 11.9 - 14.6 %    PLATELET 265 032 - 028 K/uL    MPV 10.1 9.4 - 12.3 FL    ABSOLUTE NRBC 0.02 0.0 - 0.2 K/uL    DF PENDING    METABOLIC PANEL, BASIC    Collection Time: 05/30/21 10:09 AM   Result Value Ref Range    Sodium 142 138 - 145 mmol/L    Potassium 3.7 3.5 - 5.1 mmol/L    Chloride 110 (H) 98 - 107 mmol/L    CO2 28 21 - 32 mmol/L    Anion gap 4 (L) 7 - 16 mmol/L    Glucose 112 (H) 65 - 100 mg/dL    BUN 10 8 - 23 MG/DL    Creatinine 0.76 (L) 0.8 - 1.5 MG/DL    GFR est AA >60 >60 ml/min/1.73m2    GFR est non-AA >60 >60 ml/min/1.73m2    Calcium 8.5 8.3 - 10.4 MG/DL       Discharge Exam:  Patient Vitals for the past 24 hrs:   Temp Pulse Resp BP SpO2   05/30/21 0806 98.1 °F (36.7 °C) 66 18 106/66 95 %   05/30/21 0525 97.9 °F (36.6 °C) 72 18 133/88 90 %   05/30/21 0049 98.4 °F (36.9 °C) 69 16 115/72 97 %   05/29/21 2011 98.6 °F (37 °C) 69 20 123/78 93 %   05/29/21 1527 98.3 °F (36.8 °C) 67 20 115/77 93 %   05/29/21 1130 97.5 °F (36.4 °C) 73 18 110/81 92 %     Oxygen Therapy  O2 Sat (%): 95 % (05/30/21 0806)  Pulse via Oximetry: 74 beats per minute (05/27/21 1717)  O2 Device: None (Room air) (05/27/21 1625)    Estimated body mass index is 23.22 kg/m² as calculated from the following:    Height as of this encounter: 6' 1\" (1.854 m). Weight as of this encounter: 79.8 kg (176 lb). Intake/Output Summary (Last 24 hours) at 5/30/2021 1059  Last data filed at 5/29/2021 2011  Gross per 24 hour   Intake 1128 ml   Output --   Net 1128 ml       *Note that automatically entered I/Os may not be accurate; dependent on patient compliance with collection and accurate  by assistants. General:    Well nourished. Alert. Eyes:   Normal sclerae. Extraocular movements intact. ENT:  Normocephalic, atraumatic. Moist mucous membranes  CV:   Regular rate and rhythm.   No murmur, rub, or gallop. Lungs:  Clear to auscultation bilaterally. No wheezing, rhonchi, or rales. Abdomen: Soft, nontender, nondistended. Extremities: Warm and dry. No cyanosis or edema. Neurologic: CN II-XII grossly intact. No gross focal deficits   Skin:     No rashes or jaundice. Psych:  Normal mood and affect. Current Med List in Hospital:   Current Facility-Administered Medications   Medication Dose Route Frequency    topiramate (TOPAMAX) tablet 25 mg  25 mg Oral QHS    enoxaparin (LOVENOX) injection 40 mg  40 mg SubCUTAneous Q24H    sodium chloride (NS) flush 5-10 mL  5-10 mL IntraVENous Q8H    sodium chloride (NS) flush 5-10 mL  5-10 mL IntraVENous PRN    acetaminophen (TYLENOL) tablet 650 mg  650 mg Oral Q6H PRN    Or    acetaminophen (TYLENOL) suppository 650 mg  650 mg Rectal Q6H PRN    polyethylene glycol (MIRALAX) packet 17 g  17 g Oral DAILY PRN    promethazine (PHENERGAN) tablet 12.5 mg  12.5 mg Oral Q6H PRN    Or    ondansetron (ZOFRAN) injection 4 mg  4 mg IntraVENous Q6H PRN    amLODIPine (NORVASC) tablet 10 mg  10 mg Oral DAILY    aspirin chewable tablet 81 mg  81 mg Oral DAILY    isosorbide mononitrate ER (IMDUR) tablet 30 mg  30 mg Oral 7am    meclizine (ANTIVERT) tablet 25 mg  25 mg Oral Q6H PRN    pantoprazole (PROTONIX) tablet 40 mg  40 mg Oral ACB    rOPINIRole (REQUIP) tablet 1 mg  1 mg Oral QHS    sertraline (ZOLOFT) tablet 100 mg  100 mg Oral DAILY    sotaloL (BETAPACE) tablet 80 mg  80 mg Oral Q12H       Discharge Info:   Current Discharge Medication List      START taking these medications    Details   !! topiramate (TOPAMAX) 25 mg tablet Take 1 Tablet by mouth nightly for 7 days. Qty: 7 Tablet, Refills: 0  Start date: 5/30/2021, End date: 6/6/2021      !! topiramate (TOPAMAX) 50 mg tablet Take 1 Tablet by mouth nightly for 30 days. Qty: 30 Tablet, Refills: 0  Start date: 6/7/2021, End date: 7/7/2021       !! - Potential duplicate medications found.  Please discuss with provider. CONTINUE these medications which have NOT CHANGED    Details   isosorbide mononitrate ER (IMDUR) 30 mg tablet Take 1 Tablet by mouth every morning. Qty: 30 Tablet, Refills: 11      amLODIPine (NORVASC) 10 mg tablet TAKE 1 TABLET EVERY EVENING  Qty: 90 Tab, Refills: 1    Associated Diagnoses: Essential hypertension      fluticasone-umeclidinium-vilanterol (Trelegy Ellipta) 100-62.5-25 mcg inhaler Take 1 Puff by inhalation daily. Qty: 3 Inhaler, Refills: 3    Associated Diagnoses: COPD, very severe (HCC)      meclizine (ANTIVERT) 25 mg tablet TAKE 1 TABLET 3 TIMES DAILY AS NEEDED FOR DIZZINESS. INDICATIONS: SENSATION OF SPINNING OR WHIRLING  Qty: 90 Tab, Refills: 1    Associated Diagnoses: Benign paroxysmal positional vertigo due to bilateral vestibular disorder      pantoprazole (PROTONIX) 40 mg tablet TAKE 1 TABLET EVERY DAY  Qty: 90 Tab, Refills: 3    Associated Diagnoses: Gastroesophageal reflux disease with esophagitis      sertraline (ZOLOFT) 100 mg tablet Take one tablet daily. Qty: 90 Tab, Refills: 3    Associated Diagnoses: Recurrent depression (HCC)      rOPINIRole (REQUIP) 1 mg tablet Take 1 Tab by mouth nightly. Qty: 90 Tab, Refills: 3    Associated Diagnoses: Restless legs      sotaloL (BETAPACE) 80 mg tablet TAKE 1 TABLET TWICE DAILY  Qty: 180 Tab, Refills: 1      Oxygen 2LPM QHS      albuterol-ipratropium (DUO-NEB) 2.5 mg-0.5 mg/3 ml nebu 3 mL by Nebulization route every four (4) hours as needed for Wheezing. File to Medicare part B--[id number]  Qty: 60 Nebule, Refills: 11      albuterol (Ventolin HFA) 90 mcg/actuation inhaler Take 2 Puffs by inhalation every four (4) hours as needed (prn). Qty: 3 Inhaler, Refills: 3      acetaminophen (TYLENOL) 325 mg tablet Take 2 Tabs by mouth every four (4) hours as needed. hyoscyamine SL (LEVSIN/SL) 0.125 mg SL tablet 0.125 mg by SubLINGual route every four (4) hours as needed for Cramping.       aspirin 81 mg chewable tablet Take 1 Tab by mouth daily.      multivitamin (ONE A DAY) tablet Take 1 Tab by mouth daily. promethazine (PHENERGAN) 25 mg tablet Take 0.5-1 Tabs by mouth every six (6) hours as needed for Nausea. Qty: 30 Tab, Refills: 2      nitroglycerin (NITROSTAT) 0.4 mg SL tablet 1 Tab by SubLINGual route every five (5) minutes as needed for Chest Pain. Qty: 25 Tab, Refills: 11               Time spent in patient discharge planning and coordination 37 minutes.     Signed:  Laly Miles MD

## 2021-05-30 NOTE — PROGRESS NOTES
Problem: Chronic Obstructive Pulmonary Disease (COPD)  Goal: *Oxygen saturation during activity within specified parameters  Outcome: Progressing Towards Goal  Goal: *Able to remain out of bed as prescribed  Outcome: Progressing Towards Goal  Goal: *Absence of hypoxia  Outcome: Progressing Towards Goal  Goal: *Optimize nutritional status  Outcome: Progressing Towards Goal     Problem: Patient Education: Go to Patient Education Activity  Goal: Patient/Family Education  Outcome: Progressing Towards Goal     Problem: Falls - Risk of  Goal: *Absence of Falls  Description: Document Shalom Fall Risk and appropriate interventions in the flowsheet.   Outcome: Progressing Towards Goal  Note: Fall Risk Interventions:            Medication Interventions: Patient to call before getting OOB, Evaluate medications/consider consulting pharmacy, Bed/chair exit alarm         History of Falls Interventions: Bed/chair exit alarm

## 2021-05-30 NOTE — PROGRESS NOTES
Neurology Daily Progress Note     Assessment:     Probable vertiginous migraine. Presented with right sided sivakumar cranial headache associated with vertigo. Hx of CLL and a.fib s/p watchman device. ESR and CRP negative. CTA of head/neck negative for LVO, high grade stenosis, or evidence of vasculitic changes. Orthostatic BP negative. MRI of brain negative for acute infarct; chronic small vessel disease. Continues to endorse headache, discontinue Carbamazepine. Will trial on toprimate 25 mg po nightly for 7 days, then increase to 50 mg po nightly. No additional neurological workup is needed at this time. Will sign off. He can follow up in the outpatient clinic after discharge. Plan:     Discontinue Carbamazepine     Start topiramate 25 mg po qhs for 7 days, then increase to 50 mg po nightly. Neurology follow up in 3-4 weeks after discharge with Dr. Anant Gauthier or Louie Brady NP in the outpatient clinic at [address] [place] [address]. Subjective: Interval history:    Continues to endorse headache, vertigo has resolved. MRI of brain negative for acute infarct. Discontinue carbamazepine. Start topiramate  25 mg po qhs for 7 days, then increase to 50 mg po nightly. Medication and side effects discussed with patient. History:    Sal Solorzano is a 76 y.o. male who is being seen for vertigo. Review of systems negative with exception of pertinent positives and negatives noted above.        Objective:     Vitals:    05/29/21 2011 05/30/21 0049 05/30/21 0525 05/30/21 0806   BP: 123/78 115/72 133/88 106/66   Pulse: 69 69 72 66   Resp: 20 16 18 18   Temp: 98.6 °F (37 °C) 98.4 °F (36.9 °C) 97.9 °F (36.6 °C) 98.1 °F (36.7 °C)   SpO2: 93% 97% 90% 95%   Weight:       Height:              Current Facility-Administered Medications:     topiramate (TOPAMAX) tablet 25 mg, 25 mg, Oral, QHS, Nichole Huerta APRN    enoxaparin (LOVENOX) injection 40 mg, 40 mg, SubCUTAneous, Q24H, Wilbert, [place] MD Lillian, 40 mg at 05/30/21 0924    sodium chloride (NS) flush 5-10 mL, 5-10 mL, IntraVENous, Q8H, DialAliya III, MD, 10 mL at 05/29/21 2140    sodium chloride (NS) flush 5-10 mL, 5-10 mL, IntraVENous, PRN, Aliya Soto III, MD    acetaminophen (TYLENOL) tablet 650 mg, 650 mg, Oral, Q6H PRN, 650 mg at 05/30/21 0925 **OR** acetaminophen (TYLENOL) suppository 650 mg, 650 mg, Rectal, Q6H PRN, Jaclyn Casas MD    polyethylene glycol (MIRALAX) packet 17 g, 17 g, Oral, DAILY PRN, Jaclyn Casas MD    promethazine (PHENERGAN) tablet 12.5 mg, 12.5 mg, Oral, Q6H PRN **OR** ondansetron (ZOFRAN) injection 4 mg, 4 mg, IntraVENous, Q6H PRN, Jaclyn Casas MD    amLODIPine (NORVASC) tablet 10 mg, 10 mg, Oral, DAILY, Jaclyn Casas MD, 10 mg at 05/30/21 7048    aspirin chewable tablet 81 mg, 81 mg, Oral, DAILY, Jaclyn Casas MD, 81 mg at 05/30/21 5176    isosorbide mononitrate ER (IMDUR) tablet 30 mg, 30 mg, Oral, 7am, Jaclyn Casas MD, 30 mg at 05/30/21 0518    meclizine (ANTIVERT) tablet 25 mg, 25 mg, Oral, Q6H PRN, Jaclyn Casas MD, 25 mg at 05/30/21 0924    pantoprazole (PROTONIX) tablet 40 mg, 40 mg, Oral, ACB, Jaclyn Casas MD, 40 mg at 05/30/21 0518    rOPINIRole (REQUIP) tablet 1 mg, 1 mg, Oral, QHS, Jaclyn Casas MD, 1 mg at 05/29/21 2140    sertraline (ZOLOFT) tablet 100 mg, 100 mg, Oral, DAILY, Jaclyn Casas MD, 100 mg at 05/30/21 1945    sotaloL (BETAPACE) tablet 80 mg, 80 mg, Oral, Q12H, Jaclyn Casas MD, 80 mg at 05/30/21 9401    Recent Results (from the past 12 hour(s))   CBC WITH AUTOMATED DIFF    Collection Time: 05/30/21 10:09 AM   Result Value Ref Range    WBC 14.2 (H) 4.3 - 11.1 K/uL    RBC 4.67 4.23 - 5.6 M/uL    HGB 14.4 13.6 - 17.2 g/dL    HCT 43.4 41.1 - 50.3 %    MCV 92.9 79.6 - 97.8 FL    MCH 30.8 26.1 - 32.9 PG    MCHC 33.2 31.4 - 35.0 g/dL    RDW 12.4 11.9 - 14.6 %    PLATELET 260 370 - 611 K/uL    MPV 10.1 9.4 - 12.3 FL    ABSOLUTE NRBC 0.02 0.0 - 0.2 K/uL    DF PENDING METABOLIC PANEL, BASIC    Collection Time: 05/30/21 10:09 AM   Result Value Ref Range    Sodium 142 138 - 145 mmol/L    Potassium 3.7 3.5 - 5.1 mmol/L    Chloride 110 (H) 98 - 107 mmol/L    CO2 28 21 - 32 mmol/L    Anion gap 4 (L) 7 - 16 mmol/L    Glucose 112 (H) 65 - 100 mg/dL    BUN 10 8 - 23 MG/DL    Creatinine 0.76 (L) 0.8 - 1.5 MG/DL    GFR est AA >60 >60 ml/min/1.73m2    GFR est non-AA >60 >60 ml/min/1.73m2    Calcium 8.5 8.3 - 10.4 MG/DL         Physical Exam:  General - Well developed, well nourished, in  apparent distress. Pleasant and conversent. HEENT - Normocephalic, atraumatic. Conjunctiva are clear. Neck - Supple without masses  Cardiovascular - irregular rate and rhythm. Normal S1, S2 without murmurs, rubs, or gallops. Lungs - Clear to auscultation. Neurological examination - Comprehension, attention, memory and reasoning are intact. Language and speech are normal.   On cranial nerve examination, (II, III, IV, VI) pupils are equal, round, and reactive to light. Visual acuity is adequate. Visual fields are full to finger confrontation. Extraocular motility is normal. (V, VII) Face is symmetric and sensation is intact to light touch. (VIII) Hearing is intact. (IX, X) Palate and uvula elevate symmetrically. Voice is normal. (XI) Shoulder shrug is strong and equal bilaterally. (XII)Tongue is midline. Motor examination - There is normal muscle tone and bulk. Power is full throughout. Muscle stretch reflexes are normoactive and there are no pathological reflexes present. Plantar response is flexor bilaterally. Sensation is intact to light touch, pinprick, vibration and proprioception in all extremities. Cerebellar examination is normal finger/nose and heel/mckinney. Signed By: KELLY Ramirez     May 30, 2021      Neuro attending    Patient seen and examined.   Is basically at neurologic baseline and I agree with the  formulation above and concur with a clinical trial of low-dose topiramate if that does not work carbamazepine which is useful for vertiginous paroxysms may be tried in  Teaching the patient the Bradley-Hallpike maneuver additionally may be of assistance if this can be accomplished by physical therapy

## 2021-05-30 NOTE — PROGRESS NOTES
Problem: Patient Education: Go to Patient Education Activity  Goal: Patient/Family Education  5/30/2021 0930 by Jayshree John, RN  Outcome: Resolved/Met  5/30/2021 0727 by Jayshree John, RN  Outcome: Progressing Towards Goal

## 2021-05-30 NOTE — PROGRESS NOTES
Chart review complete, pt recommended by therapy for STR, pt denies going to STR at this time, pt reluctantly agreed to home care with [facility] for RN, PT/OT, orders/referral completed. No other dc needs noted at this time. Care Management Interventions  PCP Verified by CM:  Yes  Last Visit to PCP: 03/08/21  Discharge Durable Medical Equipment: No  Physical Therapy Consult: Yes  Occupational Therapy Consult: No  Speech Therapy Consult: No  Current Support Network: Own Home, Lives with Spouse  Confirm Follow Up Transport: Family  The Plan for Transition of Care is Related to the Following Treatment Goals : home with home care to increase mobility  The Patient and/or Patient Representative was Provided with a Choice of Provider and Agrees with the Discharge Plan?: Yes  Name of the Patient Representative Who was Provided with a Choice of Provider and Agrees with the Discharge Plan: pt  Freedom of Choice List was Provided with Basic Dialogue that Supports the Patient's Individualized Plan of Care/Goals, Treatment Preferences and Shares the Quality Data Associated with the Providers?: Yes  Discharge Location  Discharge Placement: Home with home health

## 2021-05-30 NOTE — PROGRESS NOTES
Problem: Chronic Obstructive Pulmonary Disease (COPD)  Goal: *Able to remain out of bed as prescribed  5/30/2021 0930 by Crow Rosas, RN  Outcome: Resolved/Met  5/30/2021 0727 by Crow Rosas, RN  Outcome: Progressing Towards Goal

## 2021-05-30 NOTE — PROGRESS NOTES
INITIAL SPIRITUAL ASSESSMENT     NOTED:    Ep[employer]opalian    SPOUSE -  SENDY    LIVES IN [facility]    FULL CODE    NO ACP ON FILE    OBSERVATION PATIENT    RECURRENT DEPRESSION      WILL ASSESS HOW WE CAN BEST SERVE THIS FAMILY

## 2021-07-21 ENCOUNTER — HOSPITAL ENCOUNTER (OUTPATIENT)
Dept: LAB | Age: 66
Discharge: HOME | End: 2021-07-21
Payer: MEDICARE

## 2021-07-21 DIAGNOSIS — I48.0 PAROXYSMAL ATRIAL FIBRILLATION (HCC): ICD-10-CM

## 2021-07-21 LAB
ANION GAP SERPL CALC-SCNC: 8 MMOL/L (ref 7–16)
BASOPHILS # BLD: 0.1 K/UL (ref 0–0.2)
BASOPHILS NFR BLD: 0 % (ref 0–2)
BUN SERPL-MCNC: 11 MG/DL (ref 8–23)
CALCIUM SERPL-MCNC: 9.1 MG/DL (ref 8.3–10.4)
CHLORIDE SERPL-SCNC: 113 MMOL/L (ref 98–107)
CO2 SERPL-SCNC: 23 MMOL/L (ref 21–32)
CREAT SERPL-MCNC: 1.34 MG/DL (ref 0.8–1.5)
DIFFERENTIAL METHOD BLD: ABNORMAL
EOSINOPHIL # BLD: 0.2 K/UL (ref 0–0.8)
EOSINOPHIL NFR BLD: 1 % (ref 0.5–7.8)
ERYTHROCYTE [DISTWIDTH] IN BLOOD BY AUTOMATED COUNT: 13 % (ref 11.9–14.6)
GLUCOSE SERPL-MCNC: 147 MG/DL (ref 65–100)
HCT VFR BLD AUTO: 48.4 % (ref 41.1–50.3)
HGB BLD-MCNC: 15.8 G/DL (ref 13.6–17.2)
IMM GRANULOCYTES # BLD AUTO: 0 K/UL (ref 0–0.5)
IMM GRANULOCYTES NFR BLD AUTO: 0 % (ref 0–5)
LYMPHOCYTES # BLD: 19.2 K/UL (ref 0.5–4.6)
LYMPHOCYTES NFR BLD: 78 % (ref 13–44)
MAGNESIUM SERPL-MCNC: 2.4 MG/DL (ref 1.8–2.4)
MCH RBC QN AUTO: 29.8 PG (ref 26.1–32.9)
MCHC RBC AUTO-ENTMCNC: 32.6 G/DL (ref 31.4–35)
MCV RBC AUTO: 91.1 FL (ref 79.6–97.8)
MONOCYTES # BLD: 0.8 K/UL (ref 0.1–1.3)
MONOCYTES NFR BLD: 3 % (ref 4–12)
NEUTS SEG # BLD: 4.4 K/UL (ref 1.7–8.2)
NEUTS SEG NFR BLD: 18 % (ref 43–78)
NRBC # BLD: 0 K/UL (ref 0–0.2)
PLATELET # BLD AUTO: 286 K/UL (ref 150–450)
PMV BLD AUTO: 10.4 FL (ref 9.4–12.3)
POTASSIUM SERPL-SCNC: 3.4 MMOL/L (ref 3.5–5.1)
RBC # BLD AUTO: 5.31 M/UL (ref 4.23–5.6)
SODIUM SERPL-SCNC: 144 MMOL/L (ref 136–145)
WBC # BLD AUTO: 24.8 K/UL (ref 4.3–11.1)

## 2021-07-21 PROCEDURE — 83735 ASSAY OF MAGNESIUM: CPT

## 2021-07-21 PROCEDURE — 80048 BASIC METABOLIC PNL TOTAL CA: CPT

## 2021-07-21 PROCEDURE — 85025 COMPLETE CBC W/AUTO DIFF WBC: CPT

## 2021-07-21 PROCEDURE — 36415 COLL VENOUS BLD VENIPUNCTURE: CPT

## 2021-07-28 ENCOUNTER — ANESTHESIA EVENT (OUTPATIENT)
Dept: CARDIAC CATH/INVASIVE PROCEDURES | Age: 66
End: 2021-07-28
Payer: MEDICARE

## 2021-07-28 NOTE — PROGRESS NOTES
Patient pre-assessment complete for Atrial fib ablation with Dr Kaila Castellon scheduled for 21 at 9:30am, arrival time 7:30am. Patient verified using . Patient instructed to bring all home medications in labeled bottles on the day of procedure. NPO status reinforced. Instructed they can take all other medications excluding vitamins & supplements. Patient verbalizes understanding of all instructions & denies any questions at this time.

## 2021-07-29 ENCOUNTER — HOSPITAL ENCOUNTER (OUTPATIENT)
Age: 66
Setting detail: OUTPATIENT SURGERY
Discharge: HOME | End: 2021-07-29
Attending: INTERNAL MEDICINE | Admitting: INTERNAL MEDICINE
Payer: MEDICARE

## 2021-07-29 ENCOUNTER — ANESTHESIA (OUTPATIENT)
Dept: CARDIAC CATH/INVASIVE PROCEDURES | Age: 66
End: 2021-07-29
Payer: MEDICARE

## 2021-07-29 ENCOUNTER — HOSPITAL ENCOUNTER (OUTPATIENT)
Dept: CARDIAC CATH/INVASIVE PROCEDURES | Age: 66
Discharge: HOME | End: 2021-07-29
Payer: MEDICARE

## 2021-07-29 VITALS
BODY MASS INDEX: 24.25 KG/M2 | RESPIRATION RATE: 16 BRPM | OXYGEN SATURATION: 91 % | WEIGHT: 183 LBS | HEIGHT: 73 IN | HEART RATE: 74 BPM | SYSTOLIC BLOOD PRESSURE: 126 MMHG | DIASTOLIC BLOOD PRESSURE: 85 MMHG | TEMPERATURE: 98 F

## 2021-07-29 VITALS — WEIGHT: 173 LBS | BODY MASS INDEX: 22.93 KG/M2 | HEIGHT: 73 IN

## 2021-07-29 DIAGNOSIS — K21.00 GASTROESOPHAGEAL REFLUX DISEASE WITH ESOPHAGITIS: Chronic | ICD-10-CM

## 2021-07-29 DIAGNOSIS — I48.0 PAF (PAROXYSMAL ATRIAL FIBRILLATION) (HCC): ICD-10-CM

## 2021-07-29 DIAGNOSIS — I48.19 PERSISTENT ATRIAL FIBRILLATION (HCC): ICD-10-CM

## 2021-07-29 LAB
ACT BLD: 307 SECS (ref 70–128)
ANION GAP SERPL CALC-SCNC: 3 MMOL/L (ref 7–16)
ATRIAL RATE: 71 BPM
BUN SERPL-MCNC: 13 MG/DL (ref 8–23)
CALCIUM SERPL-MCNC: 8.9 MG/DL (ref 8.3–10.4)
CALCULATED P AXIS, ECG09: 91 DEGREES
CALCULATED R AXIS, ECG10: 66 DEGREES
CALCULATED T AXIS, ECG11: 69 DEGREES
CHLORIDE SERPL-SCNC: 115 MMOL/L (ref 98–107)
CO2 SERPL-SCNC: 23 MMOL/L (ref 21–32)
CREAT SERPL-MCNC: 1.05 MG/DL (ref 0.8–1.5)
DIAGNOSIS, 93000: NORMAL
GLUCOSE SERPL-MCNC: 91 MG/DL (ref 65–100)
P-R INTERVAL, ECG05: 162 MS
POTASSIUM SERPL-SCNC: 4.2 MMOL/L (ref 3.5–5.1)
Q-T INTERVAL, ECG07: 424 MS
QRS DURATION, ECG06: 76 MS
QTC CALCULATION (BEZET), ECG08: 460 MS
SODIUM SERPL-SCNC: 141 MMOL/L (ref 138–145)
VENTRICULAR RATE, ECG03: 71 BPM

## 2021-07-29 PROCEDURE — C1894 INTRO/SHEATH, NON-LASER: HCPCS | Performed by: INTERNAL MEDICINE

## 2021-07-29 PROCEDURE — 93655 ICAR CATH ABLTJ DSCRT ARRHYT: CPT | Performed by: INTERNAL MEDICINE

## 2021-07-29 PROCEDURE — 93623 PRGRMD STIMJ&PACG IV RX NFS: CPT | Performed by: INTERNAL MEDICINE

## 2021-07-29 PROCEDURE — 77030013794 HC KT TRNSDUC BLD EDWD -B: Performed by: INTERNAL MEDICINE

## 2021-07-29 PROCEDURE — C1730 CATH, EP, 19 OR FEW ELECT: HCPCS | Performed by: INTERNAL MEDICINE

## 2021-07-29 PROCEDURE — C1732 CATH, EP, DIAG/ABL, 3D/VECT: HCPCS | Performed by: INTERNAL MEDICINE

## 2021-07-29 PROCEDURE — C1893 INTRO/SHEATH, FIXED,NON-PEEL: HCPCS | Performed by: INTERNAL MEDICINE

## 2021-07-29 PROCEDURE — 93662 INTRACARDIAC ECG (ICE): CPT

## 2021-07-29 PROCEDURE — 77030037088 HC TUBE ENDOTRACH ORAL NSL COVD-A: Performed by: ANESTHESIOLOGY

## 2021-07-29 PROCEDURE — 93656 COMPRE EP EVAL ABLTJ ATR FIB: CPT | Performed by: INTERNAL MEDICINE

## 2021-07-29 PROCEDURE — 74011250636 HC RX REV CODE- 250/636: Performed by: NURSE ANESTHETIST, CERTIFIED REGISTERED

## 2021-07-29 PROCEDURE — 74011250636 HC RX REV CODE- 250/636: Performed by: INTERNAL MEDICINE

## 2021-07-29 PROCEDURE — 93622 COMP EP EVAL L VENTR PAC&REC: CPT | Performed by: INTERNAL MEDICINE

## 2021-07-29 PROCEDURE — 77030027107 HC PTCH EXT REF CARTO3 J&J -F: Performed by: INTERNAL MEDICINE

## 2021-07-29 PROCEDURE — 77030019908 HC STETH ESOPH SIMS -A: Performed by: ANESTHESIOLOGY

## 2021-07-29 PROCEDURE — 93325 DOPPLER ECHO COLOR FLOW MAPG: CPT | Performed by: INTERNAL MEDICINE

## 2021-07-29 PROCEDURE — 77030039425 HC BLD LARYNG TRULITE DISP TELE -A: Performed by: ANESTHESIOLOGY

## 2021-07-29 PROCEDURE — 93005 ELECTROCARDIOGRAM TRACING: CPT | Performed by: INTERNAL MEDICINE

## 2021-07-29 PROCEDURE — 77030020506 HC NDL TRNSPTL NRG BAYL -F: Performed by: INTERNAL MEDICINE

## 2021-07-29 PROCEDURE — 77030035291 HC TBNG PMP SMARTABLATE J&J -B: Performed by: INTERNAL MEDICINE

## 2021-07-29 PROCEDURE — 77030002912 HC SUT ETHBND J&J -A: Performed by: INTERNAL MEDICINE

## 2021-07-29 PROCEDURE — 76060000034 HC ANESTHESIA 1.5 TO 2 HR: Performed by: INTERNAL MEDICINE

## 2021-07-29 PROCEDURE — C1759 CATH, INTRA ECHOCARDIOGRAPHY: HCPCS | Performed by: INTERNAL MEDICINE

## 2021-07-29 PROCEDURE — 93312 ECHO TRANSESOPHAGEAL: CPT

## 2021-07-29 PROCEDURE — 93312 ECHO TRANSESOPHAGEAL: CPT | Performed by: INTERNAL MEDICINE

## 2021-07-29 PROCEDURE — 76210000006 HC OR PH I REC 0.5 TO 1 HR: Performed by: INTERNAL MEDICINE

## 2021-07-29 PROCEDURE — 74011000250 HC RX REV CODE- 250: Performed by: NURSE ANESTHETIST, CERTIFIED REGISTERED

## 2021-07-29 PROCEDURE — 93320 DOPPLER ECHO COMPLETE: CPT | Performed by: INTERNAL MEDICINE

## 2021-07-29 PROCEDURE — 93662 INTRACARDIAC ECG (ICE): CPT | Performed by: INTERNAL MEDICINE

## 2021-07-29 PROCEDURE — 85347 COAGULATION TIME ACTIVATED: CPT

## 2021-07-29 PROCEDURE — 77030013687 HC GD NDL BARD -B: Performed by: INTERNAL MEDICINE

## 2021-07-29 PROCEDURE — 93613 INTRACARDIAC EPHYS 3D MAPG: CPT | Performed by: INTERNAL MEDICINE

## 2021-07-29 PROCEDURE — 80048 BASIC METABOLIC PNL TOTAL CA: CPT

## 2021-07-29 RX ORDER — ROCURONIUM BROMIDE 10 MG/ML
INJECTION, SOLUTION INTRAVENOUS AS NEEDED
Status: DISCONTINUED | OUTPATIENT
Start: 2021-07-29 | End: 2021-07-29 | Stop reason: HOSPADM

## 2021-07-29 RX ORDER — PANTOPRAZOLE SODIUM 40 MG/1
40 TABLET, DELAYED RELEASE ORAL EVERY 12 HOURS
Qty: 60 TABLET | Refills: 0 | Status: SHIPPED | OUTPATIENT
Start: 2021-07-29 | End: 2022-01-17 | Stop reason: SDUPTHER

## 2021-07-29 RX ORDER — SODIUM CHLORIDE, SODIUM LACTATE, POTASSIUM CHLORIDE, CALCIUM CHLORIDE 600; 310; 30; 20 MG/100ML; MG/100ML; MG/100ML; MG/100ML
100 INJECTION, SOLUTION INTRAVENOUS CONTINUOUS
Status: DISCONTINUED | OUTPATIENT
Start: 2021-07-29 | End: 2021-07-29 | Stop reason: HOSPADM

## 2021-07-29 RX ORDER — SUCRALFATE 1 G/1
1 TABLET ORAL 4 TIMES DAILY
Qty: 120 TABLET | Refills: 0 | Status: SHIPPED
Start: 2021-07-29 | End: 2021-09-10

## 2021-07-29 RX ORDER — SUCRALFATE 1 G/1
1 TABLET ORAL 4 TIMES DAILY
Qty: 120 TABLET | Refills: 0 | Status: SHIPPED | OUTPATIENT
Start: 2021-07-29 | End: 2021-07-29 | Stop reason: SDUPTHER

## 2021-07-29 RX ORDER — FENTANYL CITRATE 50 UG/ML
INJECTION, SOLUTION INTRAMUSCULAR; INTRAVENOUS AS NEEDED
Status: DISCONTINUED | OUTPATIENT
Start: 2021-07-29 | End: 2021-07-29 | Stop reason: HOSPADM

## 2021-07-29 RX ORDER — SODIUM CHLORIDE, SODIUM LACTATE, POTASSIUM CHLORIDE, CALCIUM CHLORIDE 600; 310; 30; 20 MG/100ML; MG/100ML; MG/100ML; MG/100ML
INJECTION, SOLUTION INTRAVENOUS
Status: DISCONTINUED | OUTPATIENT
Start: 2021-07-29 | End: 2021-07-29 | Stop reason: HOSPADM

## 2021-07-29 RX ORDER — HYDROMORPHONE HYDROCHLORIDE 2 MG/ML
0.5 INJECTION, SOLUTION INTRAMUSCULAR; INTRAVENOUS; SUBCUTANEOUS
Status: DISCONTINUED | OUTPATIENT
Start: 2021-07-29 | End: 2021-07-29 | Stop reason: HOSPADM

## 2021-07-29 RX ORDER — SODIUM CHLORIDE, SODIUM LACTATE, POTASSIUM CHLORIDE, CALCIUM CHLORIDE 600; 310; 30; 20 MG/100ML; MG/100ML; MG/100ML; MG/100ML
50 INJECTION, SOLUTION INTRAVENOUS CONTINUOUS
Status: DISCONTINUED | OUTPATIENT
Start: 2021-07-29 | End: 2021-07-29 | Stop reason: HOSPADM

## 2021-07-29 RX ORDER — ALBUTEROL SULFATE 0.83 MG/ML
2.5 SOLUTION RESPIRATORY (INHALATION) AS NEEDED
Status: DISCONTINUED | OUTPATIENT
Start: 2021-07-29 | End: 2021-07-29 | Stop reason: HOSPADM

## 2021-07-29 RX ORDER — HEPARIN SODIUM 1000 [USP'U]/ML
INJECTION, SOLUTION INTRAVENOUS; SUBCUTANEOUS AS NEEDED
Status: DISCONTINUED | OUTPATIENT
Start: 2021-07-29 | End: 2021-07-29 | Stop reason: HOSPADM

## 2021-07-29 RX ORDER — NEOSTIGMINE METHYLSULFATE 1 MG/ML
INJECTION, SOLUTION INTRAVENOUS AS NEEDED
Status: DISCONTINUED | OUTPATIENT
Start: 2021-07-29 | End: 2021-07-29 | Stop reason: HOSPADM

## 2021-07-29 RX ORDER — HEPARIN SODIUM 5000 [USP'U]/100ML
INJECTION, SOLUTION INTRAVENOUS
Status: DISCONTINUED | OUTPATIENT
Start: 2021-07-29 | End: 2021-07-29 | Stop reason: HOSPADM

## 2021-07-29 RX ORDER — SODIUM CHLORIDE 0.9 % (FLUSH) 0.9 %
5 SYRINGE (ML) INJECTION AS NEEDED
Status: DISCONTINUED | OUTPATIENT
Start: 2021-07-29 | End: 2021-07-29 | Stop reason: HOSPADM

## 2021-07-29 RX ORDER — COLCHICINE 0.6 MG/1
0.6 TABLET ORAL DAILY
Qty: 30 TABLET | Refills: 0 | Status: SHIPPED | OUTPATIENT
Start: 2021-07-29 | End: 2021-07-29 | Stop reason: SDUPTHER

## 2021-07-29 RX ORDER — LIDOCAINE HYDROCHLORIDE 20 MG/ML
INJECTION, SOLUTION EPIDURAL; INFILTRATION; INTRACAUDAL; PERINEURAL AS NEEDED
Status: DISCONTINUED | OUTPATIENT
Start: 2021-07-29 | End: 2021-07-29 | Stop reason: HOSPADM

## 2021-07-29 RX ORDER — PROTAMINE SULFATE 10 MG/ML
INJECTION, SOLUTION INTRAVENOUS AS NEEDED
Status: DISCONTINUED | OUTPATIENT
Start: 2021-07-29 | End: 2021-07-29 | Stop reason: HOSPADM

## 2021-07-29 RX ORDER — COLCHICINE 0.6 MG/1
0.6 TABLET ORAL DAILY
Qty: 30 TABLET | Refills: 0 | Status: SHIPPED
Start: 2021-07-29 | End: 2021-09-10

## 2021-07-29 RX ORDER — OXYCODONE HYDROCHLORIDE 5 MG/1
5 TABLET ORAL
Status: DISCONTINUED | OUTPATIENT
Start: 2021-07-29 | End: 2021-07-29 | Stop reason: HOSPADM

## 2021-07-29 RX ORDER — GLYCOPYRROLATE 0.2 MG/ML
INJECTION INTRAMUSCULAR; INTRAVENOUS AS NEEDED
Status: DISCONTINUED | OUTPATIENT
Start: 2021-07-29 | End: 2021-07-29 | Stop reason: HOSPADM

## 2021-07-29 RX ORDER — ADENOSINE 3 MG/ML
INJECTION, SOLUTION INTRAVENOUS AS NEEDED
Status: DISCONTINUED | OUTPATIENT
Start: 2021-07-29 | End: 2021-07-29 | Stop reason: HOSPADM

## 2021-07-29 RX ORDER — PROPOFOL 10 MG/ML
INJECTION, EMULSION INTRAVENOUS AS NEEDED
Status: DISCONTINUED | OUTPATIENT
Start: 2021-07-29 | End: 2021-07-29 | Stop reason: HOSPADM

## 2021-07-29 RX ORDER — PANTOPRAZOLE SODIUM 40 MG/1
40 TABLET, DELAYED RELEASE ORAL EVERY 12 HOURS
Qty: 60 TABLET | Refills: 0 | Status: SHIPPED | OUTPATIENT
Start: 2021-07-29 | End: 2021-07-29 | Stop reason: SDUPTHER

## 2021-07-29 RX ADMIN — PROTAMINE SULFATE 20 MG: 10 INJECTION, SOLUTION INTRAVENOUS at 11:13

## 2021-07-29 RX ADMIN — PROTAMINE SULFATE 10 MG: 10 INJECTION, SOLUTION INTRAVENOUS at 11:12

## 2021-07-29 RX ADMIN — GLYCOPYRROLATE 0.4 MG: 0.2 INJECTION, SOLUTION INTRAMUSCULAR; INTRAVENOUS at 11:12

## 2021-07-29 RX ADMIN — Medication 3 MG: at 11:12

## 2021-07-29 RX ADMIN — PROTAMINE SULFATE 10 MG: 10 INJECTION, SOLUTION INTRAVENOUS at 11:16

## 2021-07-29 RX ADMIN — SODIUM CHLORIDE, SODIUM LACTATE, POTASSIUM CHLORIDE, AND CALCIUM CHLORIDE: 600; 310; 30; 20 INJECTION, SOLUTION INTRAVENOUS at 09:53

## 2021-07-29 RX ADMIN — HEPARIN SODIUM 7500 UNITS: 1000 INJECTION, SOLUTION INTRAVENOUS; SUBCUTANEOUS at 10:29

## 2021-07-29 RX ADMIN — HEPARIN SODIUM 40 ML/HR: 5000 INJECTION, SOLUTION INTRAVENOUS at 10:45

## 2021-07-29 RX ADMIN — FENTANYL CITRATE 50 MCG: 50 INJECTION INTRAMUSCULAR; INTRAVENOUS at 11:27

## 2021-07-29 RX ADMIN — FENTANYL CITRATE 100 MCG: 50 INJECTION INTRAMUSCULAR; INTRAVENOUS at 10:03

## 2021-07-29 RX ADMIN — HEPARIN SODIUM 7500 UNITS: 1000 INJECTION, SOLUTION INTRAVENOUS; SUBCUTANEOUS at 10:45

## 2021-07-29 RX ADMIN — PROTAMINE SULFATE 20 MG: 10 INJECTION, SOLUTION INTRAVENOUS at 11:17

## 2021-07-29 RX ADMIN — PHENYLEPHRINE HYDROCHLORIDE 100 MCG: 10 INJECTION INTRAVENOUS at 10:13

## 2021-07-29 RX ADMIN — PROTAMINE SULFATE 20 MG: 10 INJECTION, SOLUTION INTRAVENOUS at 11:18

## 2021-07-29 RX ADMIN — PROTAMINE SULFATE 20 MG: 10 INJECTION, SOLUTION INTRAVENOUS at 11:14

## 2021-07-29 RX ADMIN — LIDOCAINE HYDROCHLORIDE 80 MG: 20 INJECTION, SOLUTION EPIDURAL; INFILTRATION; INTRACAUDAL; PERINEURAL at 10:03

## 2021-07-29 RX ADMIN — ROCURONIUM BROMIDE 50 MG: 10 INJECTION, SOLUTION INTRAVENOUS at 10:04

## 2021-07-29 RX ADMIN — PROPOFOL 150 MG: 10 INJECTION, EMULSION INTRAVENOUS at 10:03

## 2021-07-29 NOTE — ANESTHESIA POSTPROCEDURE EVALUATION
Procedure(s):  ABLATION A-FIB  W COMPLETE EP STUDY.     general    Anesthesia Post Evaluation      Multimodal analgesia: multimodal analgesia used between 6 hours prior to anesthesia start to PACU discharge  Patient location during evaluation: PACU  Patient participation: complete - patient participated  Level of consciousness: awake  Pain management: adequate  Airway patency: patent  Anesthetic complications: no  Cardiovascular status: acceptable  Respiratory status: acceptable, spontaneous ventilation and nonlabored ventilation  Hydration status: acceptable  Post anesthesia nausea and vomiting:  none      INITIAL Post-op Vital signs:   Vitals Value Taken Time   /76 07/29/21 1204   Temp 36.7 °C (98 °F) 07/29/21 1204   Pulse 75 07/29/21 1204   Resp 16 07/29/21 1204   SpO2 91 % 07/29/21 1204

## 2021-07-29 NOTE — PROCEDURES
Pre-Electrophysiology Diagnosis  1. Paroxysmal Atrial fibrillation     Procedure Performed  1. EPS afib ablation/pulmonary vein isolation  2. Left atrial pacing recording from the coronary sinus. 3. 3-D Electroanatomical mapping  4. Intracardiac echo  5. Ablation of second arrhythmia  6. LV pacing and recording  7. Transesophageal echo  8. Drug infusion    Anesthesia: General     Estimated Blood Loss: Less than 10 mL     Specimens: * No specimens in log *    Procedure in Detail:  The patient was brought to the electrophysiology lab in the fasting state. The patient was intubated by anesthesiology and an esophageal temperature probe inserted and advanced to a location directly posterior to the LA at the level of the pulmonary veins. The esophageal temperature probe was repositioned throughout the case to a location as close the the ablation catheter as possible. If the esophageal temperature increased 0.5 degrees Celsius ablation was stopped until the temperature returned to baseline. A tranesophageal echocardiogram was performed directly prior to the procedure and was negative for a DELIO thrombus (see full report in chart). A Ref-Star CARTO patch was placed, the patient was then prepped and draped in sterile fashion. Venous access was obtained under ultrasound guidance x4 using modified Seldinger technique, with placement of one 8Fr short sidearm sheath and two 8.5 Fr SL-O 63cm long braided sheaths in the right femoral vein and placement of a 10Fr sheath into the left femoral vein. An intra-cardiac echo probe was prepped, and then inserted into an 10 Fr short sheath and used to localize the fossa ovalis and create LA and pulmonary vein anatomy utilizing [employer] [facility]. A [employer] Pentaray catheter was then inserted into an 8.5 SLO Fr sheath and used to create an electroanatomical map of the right atrium, including attempts at His localization and the os of the CS.   Then a Smart Touch porous irrigated BW 3.5mm ablation catheter was used to map out the body of the CS. A decapolar [employer] [place] CS catheter was inserted via an 8Fr sheath and positioned in the mid coronary sinus. Next, a trans-septal needle was inserted into the SLO and was used to perform a trans-septal puncture with assistance from ICE (intra-cardiac echo) as well as the [address] [place] [address],[address] map and the right atrial impedence map. The SLO sheath was advanced into the LA. Total weight based heparin bolus was administered just after transeptal access, and systemic blood pressure monitored invasively. The patient was then placed on our heparin weight based nomogram for targeted ACT of 300-350 during the ablation procedure. A second trans-septal access was obtained with the ablation catheter. The Pentaray catheter was then inserted into the LA via the SL-O sheath and was used to create a detail electroanatomical voltage map of the left atrium including the pulmonary veins to identify the pulmonary vein sleeves and further guide additional ablation as pictured below. The Pentaray was used to map pulmonary vein potentials and target ablation. An 8 Fr, 3.5mm tip saline irrigated bidirectional D/F curve Thermo-cool SmartTouch catheter was used for RF ablation and ablation was performed at 35-50W with reduction in power as needed if ablation was performed in close proximity to the esophagus. There was clear reconnection from prior ablation within the right pulmonary veins. Antral pulmonary vein isolation was then performed. Entrance and exit block was demonstrated by left atrial pacing and within the pulmonary veins. Lack of any conducted atrial EGMs into the pulmonary veins was documented. Adenosine was injected (6-12 mg) to demonstrate the lack of early reconnection with the Pentaray in the PVs. There was no early reconnection with adenosine.  The ablation catheter was inserted into the LV, documented LV electrograms and was used to pace the LV for the EP study and for rescue pacing during adenosine infusion. Post PVI, the Pentaray was used to perform a second LA voltage map post ablation to demonstrate pulmonary vein isolation and post ablation voltage as pictured below. Baseline LA Voltage Map      Post Ablation LA Voltage Map      Cavotricuspid Isthmus ablation (right atrial flutter)  Linear ablation across the cavo-tricuspid isthmus was performed starting with 1:2 A:V EGMs along the isthmus at 6pm Croatian. The local electrogram activation sequence, differential pacing maneuvers and electrogram timing was used to demonstrate bidirectional block along the cavotricuspid isthmus. Post-Ablation trans-isthmus time: 143 msec    Next, baseline recordings and a diagnostic EP study was performed. The coronary sinus multipolar catheter was used to pace the left atrium during the EP study. The LA CS electrograms were documented and interpreted during the procedure. A comprehensive EP study was performed with 1:1 AV decremental pacing, atrial extrastimuli and ventricular pacing to assess retrograde conduction. The patient did not have sustained slow pathway conduction or evidence of an accessory pathway. Ventricular pacing revealed retrograde AV conduction which was concentric and decremental.    At the completion of the ablation and EPS, all catheters were removed, 100mg Protamine was administered and sheaths were pulled after placing a figure of 8 stitch. The patient tolerated the procedure well with no acute complications recognized. The patient left the EP lab in stable condition, in normal sinus rhythm. Just prior to pulling shealths, the ICE catheter was used to obtain ultrasound images and revealed no evidence of pericardial effusion.     Baseline Intervals    QRS duration: 89 msec  DE interval: 203 msec  RR interval: 802 msec    EP Study    AV Wenchebach: 330 msec  AV lucy ERP: 280 msec  VA Wenchebach: 167 msec    Complications: None    Summary:   1. Successful pulmonary vein isolation of reconnected right pulmonary veins. 2. Creation of a line of bidirectional block at the cavotricuspid isthmus. 3.         Comprehensive EP study. 4. Pt tolerated the procedure well. 5. Family updated. David Arechiga MD, MS  Clinical Cardiac Electrophysiology  7/29/2021  11:18 AM

## 2021-07-29 NOTE — PROGRESS NOTES
Patient received to [address] [place] [address] room # 17  Ambulatory from [facility]. Patient scheduled for Afib ablation today with Dr Lawrence Moore. Procedure reviewed & questions answered, voiced good understanding consent obtained & placed on chart. All medications and medical history reviewed. Will prep patient per orders. Patient & family updated on plan of care. The patient has a fraility score of 3-MANAGING WELL, based on ability to perform ADLs by self.

## 2021-07-29 NOTE — PROGRESS NOTES
Pt ambulated in hallway and back to bed. Bilateral groin punctures C/D/I without bleeding or hematoma.

## 2021-07-29 NOTE — Clinical Note
under hemodynamic and ICE, utilizing a standard needle, Van 71cm via a guiding sheath. Needle inserted.

## 2021-07-29 NOTE — ANESTHESIA PREPROCEDURE EVALUATION
Anesthetic History   No history of anesthetic complications            Review of Systems / Medical History  Patient summary reviewed, nursing notes reviewed and pertinent labs reviewed    Pulmonary    COPD (quit smoking 2 yrs ago): moderate      Smoker         Neuro/Psych              Cardiovascular    Hypertension: well controlled        Dysrhythmias (9 yrs s/p ablation procedure) : atrial fibrillation  CAD, cardiac stents and hyperlipidemia    Exercise tolerance: >4 METS  Comments: Stent times one in 2010, stopped Plavix this spring    TTE May 2021:  LVEF 55%.  No signif valve abnl    DELIO Watchman device placed in 2018    Cath 2019- fistula from Cx to Left atrial appendage   GI/Hepatic/Renal     GERD: well controlled      PUD (remote hx)     Endo/Other        Arthritis and cancer (CLL)     Other Findings            Physical Exam    Airway  Mallampati: II  TM Distance: > 6 cm  Neck ROM: decreased range of motion   Mouth opening: Normal     Cardiovascular    Rhythm: irregular  Rate: normal         Dental    Dentition: Edentulous     Pulmonary  Breath sounds clear to auscultation               Abdominal  GI exam deferred       Other Findings            Anesthetic Plan    ASA: 3  Anesthesia type: general          Induction: Intravenous  Anesthetic plan and risks discussed with: Patient and Spouse

## 2021-07-29 NOTE — Clinical Note
Sheath #1: Sheath: inserted. Sheath inserted/placed in the left femoral  vein. Upon evaluation of the common femoral artery stick using fluoroscopy, the access site puncture was within the safe zone.

## 2021-07-29 NOTE — Clinical Note
Sheath #2: Sheath: inserted. Sheath inserted/placed in the right femoral  vein. Upon evaluation of the common femoral artery stick using fluoroscopy, the access site puncture was within the safe zone.

## 2021-07-29 NOTE — Clinical Note
Phil positioned in the [address]. LA geometry complete. Phil positioned in the LSPV and LIPV.

## 2021-07-29 NOTE — ROUTINE PROCESS
Patient received to [address] [place] [address] room # 17  Ambulatory from [facility]. Patient scheduled for A-Fib ablation today with Dr Eileen Arndt. Procedure reviewed & questions answered, voiced good understanding consent obtained & placed on chart. All medications and medical history reviewed. Will prep patient per orders. Patient & family updated on plan of care. The patient has a fraility score of 3-MANAGING WELL, based on reports a fall one month ago due to low BP. Does not use a cane at present time.

## 2021-07-29 NOTE — Clinical Note
Sheath #4: Sheath: inserted. Sheath inserted/placed in the right femoral  vein. Upon evaluation of the common femoral artery stick using fluoroscopy, the access site puncture was within the safe zone.

## 2021-07-29 NOTE — Clinical Note
Sheath #all: Sheath: removed. Hemostasis achieved. Upon evaluation of the common femoral artery stick using fluoroscopy, the access site puncture was within the safe zone.

## 2021-07-29 NOTE — Clinical Note
Sheath #3: Sheath: inserted. Sheath inserted/placed in the right femoral  vein. Upon evaluation of the common femoral artery stick using fluoroscopy, the access site puncture was within the safe zone.

## 2021-07-29 NOTE — PROGRESS NOTES
Sutures removed from bilateral groin. Site C/D/I without bleeding or hematoma. Bilateral sites redressed with gauze and tegaderm.

## 2021-07-29 NOTE — Clinical Note
Bilateral groin clipped prepped with ChloraPrep and draped. Wet prep solution applied at: 1005. Wet prep solution dried at: 1015. Wet prep elapsed drying time: 10 mins.

## 2021-07-29 NOTE — DISCHARGE INSTRUCTIONS
Ablation Discharge Instructions    *Check the puncture site frequently for swelling or bleeding. If you see any bleeding, lie down and apply pressure over the area with a clean town or washcloth. Notify your doctor for any redness, swelling, drainage or oozing from the puncture site. Notify your doctor for any fever or chills. *If the leg with the puncture becomes cold, numb or painful, call [facility]iology at  [phone]. *Activity should be limited for the next 48 hours. Climb stairs as little as possible and avoid any stooping, bending or strenuous activity for 48 hours. No heavy lifting (anything over 10 pounds) for three days. *Do not drive for 48 hours. *You may resume your usual diet. Drink more fluids than usual.    *Have a responsible person drive you home and stay with you for at least 24 hours after your ablation. *You may remove the bandage from your Right, Left Groin in 24 hours. You may shower in 24 hours. No tub baths, hot tubs or swimming for one week. Do not place any lotions, creams, powders, ointments over the puncture site for one week. You may place a clean band-aid over the puncture site each day for 5 days. Change this daily. Sedation for a Medical Procedure: Care Instructions     You were given a sedative medication during your visit. While many of the effects will have worn   off before you leave; you may continue to feel some effects for several hours. Common side effects from sedation include:  · Feeling sleepy. (Your doctors and nurses will make sure you are not too sleepy to go home.)  · Nausea and vomiting. This usually does not last long. · Feeling tired. How can you care for yourself at home? Activity    · Don't do anything for 24 hours that requires attention to detail. It takes time for the medicine effects to completely wear off. · Do not make important legal decisions for 24 hours.      · Do not sign any legal documents for 24 hours. · Do not drink alcohol today     · For your safety, you should not drive or operate heavy machinery for the remainder of the day     · Rest when you feel tired. Getting enough sleep will help you recover. Diet    · You can eat your normal diet, unless your doctor gives you other instructions. If your stomach is upset, try clear liquids and bland, low-fat foods like plain toast or rice. · Drink plenty of fluids (unless your doctor tells you not to). · Don't drink alcohol for 24 hours. Medicines    · Be safe with medicines. Read and follow all instructions on the label. · If the doctor gave you a prescription medicine for pain, take it as prescribed. · If you are not taking a prescription pain medicine, ask your doctor if you can take an over-the-counter medicine. · If you think your pain medicine is making you sick to your stomach:  · Take your medicine after meals (unless your doctor has told you not to). · Ask your doctor for a different pain medicine. I have read the above instructions and have had the opportunity to ask questions.       Patient: ________________________   Date: _____________    Witness: _______________________   Date: _____________

## 2021-07-29 NOTE — PERIOP NOTES
TRANSFER - OUT REPORT:    Verbal report given to  on Alize Mcgowan  being transferred to Cath Lab recovery for routine post - op       Report consisted of patients Situation, Background, Assessment and   Recommendations(SBAR). Information from the following report(s) SBAR, Kardex, Procedure Summary and Intake/Output was reviewed with the receiving nurse. Lines:   Peripheral IV 07/29/21 Anterior;Right Hand (Active)   Site Assessment Clean, dry, & intact 07/29/21 0808   Phlebitis Assessment 0 07/29/21 0808   Dressing Status Clean, dry, & intact 07/29/21 0808   Dressing Type Transparent 07/29/21 0808   Hub Color/Line Status Pink 07/29/21 0808       Peripheral IV 07/29/21 Left Antecubital (Active)   Site Assessment Clean, dry, & intact 07/29/21 1132   Phlebitis Assessment 0 07/29/21 1132   Infiltration Assessment 0 07/29/21 1132   Dressing Status Clean, dry, & intact 07/29/21 1132   Dressing Type Transparent;Tape 07/29/21 1132   Hub Color/Line Status Patent 07/29/21 1132        Opportunity for questions and clarification was provided. Patient transported with:   Tech    VTE prophylaxis orders have been written for Alize Mcgowan. Patient and family given floor number and nurses name. Family updated re: pt status after security code verified.

## 2021-07-29 NOTE — PROGRESS NOTES
TRANSFER - IN REPORT:    Verbal report received from [address] [place] [address],[address], RN on Juan José Patterson being received from PACU for routine progression of care      Report consisted of patients Situation, Background, Assessment and Recommendations(SBAR). Information from the following report(s) Procedure Summary was reviewed with the receiving nurse. Opportunity for questions and clarification was provided. Assessment completed upon patients arrival to unit and care assumed.

## 2021-07-29 NOTE — Clinical Note
"Evelia Simmons CMA      " No contrast administered during procedure. Amount: 0 mL.

## 2021-09-08 PROBLEM — Z98.890 S/P ABLATION OF ATRIAL FIBRILLATION: Status: ACTIVE | Noted: 2021-09-08

## 2021-09-08 PROBLEM — E86.0 DEHYDRATION: Status: RESOLVED | Noted: 2021-05-27 | Resolved: 2021-09-08

## 2021-09-08 PROBLEM — Z86.79 S/P ABLATION OF ATRIAL FIBRILLATION: Status: ACTIVE | Noted: 2021-09-08

## 2021-09-08 PROBLEM — T67.5XXA HEAT EXHAUSTION: Status: RESOLVED | Noted: 2021-05-27 | Resolved: 2021-09-08

## 2021-12-08 PROBLEM — R05.9 COUGH: Status: ACTIVE | Noted: 2021-12-08

## 2021-12-08 PROBLEM — E78.2 MIXED HYPERLIPIDEMIA: Status: ACTIVE | Noted: 2021-12-08

## 2021-12-28 ENCOUNTER — HOSPITAL ENCOUNTER (OUTPATIENT)
Dept: LAB | Age: 66
Discharge: HOME | End: 2021-12-28
Payer: MEDICARE

## 2021-12-28 DIAGNOSIS — C91.10 CLL (CHRONIC LYMPHOCYTIC LEUKEMIA) (HCC): ICD-10-CM

## 2021-12-28 LAB
ALBUMIN SERPL-MCNC: 4.2 G/DL (ref 3.2–4.6)
ALBUMIN/GLOB SERPL: 1.3 {RATIO} (ref 1.2–3.5)
ALP SERPL-CCNC: 116 U/L (ref 50–136)
ALT SERPL-CCNC: 31 U/L (ref 12–65)
ANION GAP SERPL CALC-SCNC: 4 MMOL/L (ref 7–16)
AST SERPL-CCNC: 26 U/L (ref 15–37)
BASOPHILS # BLD: 0.1 K/UL (ref 0–0.2)
BASOPHILS NFR BLD: 1 % (ref 0–2)
BILIRUB SERPL-MCNC: 0.4 MG/DL (ref 0.2–1.1)
BUN SERPL-MCNC: 8 MG/DL (ref 8–23)
CALCIUM SERPL-MCNC: 9.5 MG/DL (ref 8.3–10.4)
CHLORIDE SERPL-SCNC: 104 MMOL/L (ref 98–107)
CHOLEST SERPL-MCNC: 117 MG/DL
CO2 SERPL-SCNC: 30 MMOL/L (ref 21–32)
CREAT SERPL-MCNC: 1.2 MG/DL (ref 0.8–1.5)
DIFFERENTIAL METHOD BLD: ABNORMAL
EOSINOPHIL # BLD: 0.2 K/UL (ref 0–0.8)
EOSINOPHIL NFR BLD: 1 % (ref 0.5–7.8)
ERYTHROCYTE [DISTWIDTH] IN BLOOD BY AUTOMATED COUNT: 12.1 % (ref 11.9–14.6)
GLOBULIN SER CALC-MCNC: 3.3 G/DL (ref 2.3–3.5)
GLUCOSE SERPL-MCNC: 106 MG/DL (ref 65–100)
HCT VFR BLD AUTO: 50.2 %
HDLC SERPL-MCNC: 54 MG/DL (ref 40–60)
HDLC SERPL: 2.2 {RATIO}
HGB BLD-MCNC: 16.1 G/DL (ref 13.6–17.2)
IMM GRANULOCYTES # BLD AUTO: 0 K/UL (ref 0–0.5)
IMM GRANULOCYTES NFR BLD AUTO: 0 % (ref 0–5)
LDLC SERPL CALC-MCNC: 44 MG/DL
LYMPHOCYTES # BLD: 13.8 K/UL (ref 0.5–4.6)
LYMPHOCYTES NFR BLD: 71 % (ref 13–44)
MCH RBC QN AUTO: 29 PG (ref 26.1–32.9)
MCHC RBC AUTO-ENTMCNC: 32.1 G/DL (ref 31.4–35)
MCV RBC AUTO: 90.5 FL (ref 79.6–97.8)
MONOCYTES # BLD: 0.8 K/UL (ref 0.1–1.3)
MONOCYTES NFR BLD: 4 % (ref 4–12)
NEUTS SEG # BLD: 4.5 K/UL (ref 1.7–8.2)
NEUTS SEG NFR BLD: 24 % (ref 43–78)
NRBC # BLD: 0 K/UL (ref 0–0.2)
PLATELET # BLD AUTO: 282 K/UL (ref 150–450)
PMV BLD AUTO: 9.6 FL (ref 9.4–12.3)
POTASSIUM SERPL-SCNC: 4 MMOL/L (ref 3.5–5.1)
PROT SERPL-MCNC: 7.5 G/DL (ref 6.3–8.2)
RBC # BLD AUTO: 5.55 M/UL (ref 4.23–5.6)
SODIUM SERPL-SCNC: 138 MMOL/L (ref 136–145)
TRIGL SERPL-MCNC: 95 MG/DL (ref 35–150)
VLDLC SERPL CALC-MCNC: 19 MG/DL (ref 6–23)
WBC # BLD AUTO: 19.4 K/UL (ref 4.3–11.1)

## 2021-12-28 PROCEDURE — 80061 LIPID PANEL: CPT

## 2021-12-28 PROCEDURE — 36415 COLL VENOUS BLD VENIPUNCTURE: CPT

## 2021-12-28 PROCEDURE — 85025 COMPLETE CBC W/AUTO DIFF WBC: CPT

## 2021-12-28 PROCEDURE — 80053 COMPREHEN METABOLIC PANEL: CPT

## 2022-03-18 PROBLEM — Z86.79 S/P ABLATION OF ATRIAL FIBRILLATION: Status: ACTIVE | Noted: 2021-09-08

## 2022-03-18 PROBLEM — J44.9 COPD, VERY SEVERE (HCC): Status: ACTIVE | Noted: 2017-08-28

## 2022-03-18 PROBLEM — Z98.890 S/P ABLATION OF ATRIAL FIBRILLATION: Status: ACTIVE | Noted: 2021-09-08

## 2022-03-18 PROBLEM — G25.81 RESTLESS LEGS: Status: ACTIVE | Noted: 2020-09-03

## 2022-03-18 PROBLEM — K40.91 RECURRENT RIGHT INGUINAL HERNIA: Status: ACTIVE | Noted: 2020-06-16

## 2022-03-19 PROBLEM — R05.9 COUGH: Status: ACTIVE | Noted: 2021-12-08

## 2022-03-19 PROBLEM — C91.10 CLL (CHRONIC LYMPHOCYTIC LEUKEMIA) (HCC): Status: ACTIVE | Noted: 2018-07-24

## 2022-03-20 PROBLEM — G89.29 CHRONIC MIDLINE LOW BACK PAIN WITHOUT SCIATICA: Status: ACTIVE | Noted: 2017-08-28

## 2022-03-20 PROBLEM — M54.50 CHRONIC MIDLINE LOW BACK PAIN WITHOUT SCIATICA: Status: ACTIVE | Noted: 2017-08-28

## 2022-03-20 PROBLEM — E78.2 MIXED HYPERLIPIDEMIA: Status: ACTIVE | Noted: 2021-12-08

## 2022-04-20 ENCOUNTER — HOSPITAL ENCOUNTER (OUTPATIENT)
Dept: LAB | Age: 67
Discharge: HOME | End: 2022-04-20
Payer: MEDICARE

## 2022-04-20 DIAGNOSIS — C91.10 CLL (CHRONIC LYMPHOCYTIC LEUKEMIA) (HCC): ICD-10-CM

## 2022-04-20 LAB
ALBUMIN SERPL-MCNC: 4.1 G/DL (ref 3.2–4.6)
ALBUMIN/GLOB SERPL: 1.3 {RATIO} (ref 1.2–3.5)
ALP SERPL-CCNC: 100 U/L (ref 50–136)
ALT SERPL-CCNC: 32 U/L (ref 12–65)
ANION GAP SERPL CALC-SCNC: 4 MMOL/L (ref 7–16)
AST SERPL-CCNC: 23 U/L (ref 15–37)
BASOPHILS # BLD: 0.2 K/UL (ref 0–0.2)
BASOPHILS NFR BLD: 1 % (ref 0–2)
BILIRUB SERPL-MCNC: 0.6 MG/DL (ref 0.2–1.1)
BUN SERPL-MCNC: 10 MG/DL (ref 8–23)
CALCIUM SERPL-MCNC: 9.6 MG/DL (ref 8.3–10.4)
CHLORIDE SERPL-SCNC: 105 MMOL/L (ref 98–107)
CO2 SERPL-SCNC: 31 MMOL/L (ref 21–32)
CREAT SERPL-MCNC: 1.1 MG/DL (ref 0.8–1.5)
DIFFERENTIAL METHOD BLD: ABNORMAL
EOSINOPHIL # BLD: 0.2 K/UL (ref 0–0.8)
EOSINOPHIL NFR BLD: 1 % (ref 0.5–7.8)
ERYTHROCYTE [DISTWIDTH] IN BLOOD BY AUTOMATED COUNT: 13.3 % (ref 11.9–14.6)
GLOBULIN SER CALC-MCNC: 3.1 G/DL (ref 2.3–3.5)
GLUCOSE SERPL-MCNC: 117 MG/DL (ref 65–100)
HCT VFR BLD AUTO: 48.2 %
HGB BLD-MCNC: 15.4 G/DL (ref 13.6–17.2)
IMM GRANULOCYTES # BLD AUTO: 0.2 K/UL (ref 0–0.5)
IMM GRANULOCYTES NFR BLD AUTO: 1 % (ref 0–5)
LYMPHOCYTES # BLD: 10.4 K/UL (ref 0.5–4.6)
LYMPHOCYTES NFR BLD: 65 % (ref 13–44)
MCH RBC QN AUTO: 28.6 PG (ref 26.1–32.9)
MCHC RBC AUTO-ENTMCNC: 32 G/DL (ref 31.4–35)
MCV RBC AUTO: 89.6 FL (ref 79.6–97.8)
MONOCYTES # BLD: 0.8 K/UL (ref 0.1–1.3)
MONOCYTES NFR BLD: 5 % (ref 4–12)
NEUTS SEG # BLD: 4.4 K/UL (ref 1.7–8.2)
NEUTS SEG NFR BLD: 27 % (ref 43–78)
NRBC # BLD: 0.02 K/UL (ref 0–0.2)
PLATELET # BLD AUTO: 267 K/UL (ref 150–450)
PLATELET COMMENTS,PCOM: ADEQUATE
PMV BLD AUTO: 9.5 FL (ref 9.4–12.3)
POTASSIUM SERPL-SCNC: 3.9 MMOL/L (ref 3.5–5.1)
PROT SERPL-MCNC: 7.2 G/DL (ref 6.3–8.2)
RBC # BLD AUTO: 5.38 M/UL (ref 4.23–5.6)
RBC MORPH BLD: ABNORMAL
SODIUM SERPL-SCNC: 140 MMOL/L (ref 136–145)
WBC # BLD AUTO: 16.2 K/UL (ref 4.3–11.1)
WBC MORPH BLD: ABNORMAL

## 2022-04-20 PROCEDURE — 80053 COMPREHEN METABOLIC PANEL: CPT

## 2022-04-20 PROCEDURE — 36415 COLL VENOUS BLD VENIPUNCTURE: CPT

## 2022-04-20 PROCEDURE — 85025 COMPLETE CBC W/AUTO DIFF WBC: CPT

## 2022-06-28 RX ORDER — SERTRALINE HYDROCHLORIDE 100 MG/1
TABLET, FILM COATED ORAL
Qty: 90 TABLET | Refills: 1 | Status: SHIPPED | OUTPATIENT
Start: 2022-06-28

## 2022-06-30 ENCOUNTER — OFFICE VISIT (OUTPATIENT)
Dept: INTERNAL MEDICINE CLINIC | Facility: CLINIC | Age: 67
End: 2022-06-30
Payer: MEDICARE

## 2022-06-30 VITALS
SYSTOLIC BLOOD PRESSURE: 108 MMHG | BODY MASS INDEX: 22.16 KG/M2 | HEIGHT: 73 IN | HEART RATE: 69 BPM | OXYGEN SATURATION: 94 % | DIASTOLIC BLOOD PRESSURE: 62 MMHG

## 2022-06-30 DIAGNOSIS — H65.01 NON-RECURRENT ACUTE SEROUS OTITIS MEDIA OF RIGHT EAR: ICD-10-CM

## 2022-06-30 DIAGNOSIS — J02.9 SORE THROAT: Primary | ICD-10-CM

## 2022-06-30 DIAGNOSIS — J02.9 PHARYNGITIS, UNSPECIFIED ETIOLOGY: ICD-10-CM

## 2022-06-30 LAB
GROUP A STREP ANTIGEN, POC: NEGATIVE
VALID INTERNAL CONTROL, POC: YES

## 2022-06-30 PROCEDURE — 3017F COLORECTAL CA SCREEN DOC REV: CPT | Performed by: NURSE PRACTITIONER

## 2022-06-30 PROCEDURE — 1036F TOBACCO NON-USER: CPT | Performed by: NURSE PRACTITIONER

## 2022-06-30 PROCEDURE — 99213 OFFICE O/P EST LOW 20 MIN: CPT | Performed by: NURSE PRACTITIONER

## 2022-06-30 PROCEDURE — 1123F ACP DISCUSS/DSCN MKR DOCD: CPT | Performed by: NURSE PRACTITIONER

## 2022-06-30 PROCEDURE — 87880 STREP A ASSAY W/OPTIC: CPT | Performed by: NURSE PRACTITIONER

## 2022-06-30 PROCEDURE — G8420 CALC BMI NORM PARAMETERS: HCPCS | Performed by: NURSE PRACTITIONER

## 2022-06-30 PROCEDURE — G8427 DOCREV CUR MEDS BY ELIG CLIN: HCPCS | Performed by: NURSE PRACTITIONER

## 2022-06-30 RX ORDER — DOXYCYCLINE HYCLATE 100 MG
100 TABLET ORAL 2 TIMES DAILY
Qty: 14 TABLET | Refills: 0 | Status: SHIPPED | OUTPATIENT
Start: 2022-06-30 | End: 2022-07-07

## 2022-06-30 RX ORDER — PROMETHAZINE HYDROCHLORIDE AND CODEINE PHOSPHATE 6.25; 1 MG/5ML; MG/5ML
5 SYRUP ORAL
COMMUNITY
End: 2022-09-19 | Stop reason: ALTCHOICE

## 2022-06-30 RX ORDER — LIDOCAINE HYDROCHLORIDE 20 MG/ML
15 SOLUTION OROPHARYNGEAL PRN
Qty: 100 ML | Refills: 0 | Status: SHIPPED | OUTPATIENT
Start: 2022-06-30

## 2022-06-30 RX ORDER — PROMETHAZINE HYDROCHLORIDE AND CODEINE PHOSPHATE 6.25; 1 MG/5ML; MG/5ML
10 SYRUP ORAL 4 TIMES DAILY PRN
Status: CANCELLED | OUTPATIENT
Start: 2022-06-30

## 2022-06-30 RX ORDER — ALBUTEROL SULFATE 90 UG/1
2 AEROSOL, METERED RESPIRATORY (INHALATION) EVERY 4 HOURS PRN
Qty: 18 G | Refills: 11 | Status: SHIPPED | OUTPATIENT
Start: 2022-06-30

## 2022-06-30 ASSESSMENT — PATIENT HEALTH QUESTIONNAIRE - PHQ9
SUM OF ALL RESPONSES TO PHQ QUESTIONS 1-9: 0
2. FEELING DOWN, DEPRESSED OR HOPELESS: 0
SUM OF ALL RESPONSES TO PHQ9 QUESTIONS 1 & 2: 0
1. LITTLE INTEREST OR PLEASURE IN DOING THINGS: 0

## 2022-06-30 ASSESSMENT — ENCOUNTER SYMPTOMS
COUGH: 0
DIARRHEA: 0
WHEEZING: 0
RHINORRHEA: 0
ABDOMINAL PAIN: 0
NAUSEA: 0
SWOLLEN GLANDS: 0
SORE THROAT: 1
SINUS PAIN: 0
VOMITING: 0

## 2022-06-30 NOTE — PROGRESS NOTES
Derian Aquino (:  1955) is a 78 y.o. male,Established patient, here for evaluation of the following chief complaint(s):  Pharyngitis, Nasal Congestion, and Fatigue         ASSESSMENT/PLAN:  1. Sore throat  -     AMB POC RAPID STREP A  2. Non-recurrent acute serous otitis media of right ear  3. Pharyngitis, unspecified etiology      No follow-ups on file. Negative strep, negative home covid  Pharyngitis and right ear infection  Start abx  Use lidocaine before meals and bedtime  hydrate well  F/u if worsening or no improvement      Subjective   SUBJECTIVE/OBJECTIVE:  Patient is here for sore throat and congestion that started a few days ago. He did a home covid test 2 days ago negative and no known exposure. URI   This is a new problem. The current episode started in the past 7 days. There has been no fever. Associated symptoms include congestion, ear pain, headaches and a sore throat. Pertinent negatives include no abdominal pain, coughing, diarrhea, joint pain, joint swelling, nausea, plugged ear sensation, rhinorrhea, sinus pain, swollen glands, vomiting or wheezing. Review of Systems   Constitutional: Negative for fever. HENT: Positive for congestion, ear pain and sore throat. Negative for rhinorrhea and sinus pain. Respiratory: Negative for cough and wheezing. Gastrointestinal: Negative for abdominal pain, diarrhea, nausea and vomiting. Musculoskeletal: Negative for joint pain. Neurological: Positive for headaches. All other systems reviewed and are negative. Objective   Physical Exam  Vitals reviewed. Constitutional:       Appearance: Normal appearance. He is not ill-appearing. HENT:      Head: Normocephalic. Right Ear: External ear normal. A middle ear effusion is present. Tympanic membrane is not injected or retracted. Left Ear: External ear normal. A middle ear effusion is present. Tympanic membrane is injected.       Mouth/Throat:      Mouth: Mucous membranes are moist.      Pharynx: Posterior oropharyngeal erythema present. No oropharyngeal exudate. Eyes:      Extraocular Movements: Extraocular movements intact. Pupils: Pupils are equal, round, and reactive to light. Cardiovascular:      Rate and Rhythm: Normal rate and regular rhythm. Pulmonary:      Effort: Pulmonary effort is normal.      Breath sounds: Normal breath sounds. Musculoskeletal:      Cervical back: Neck supple. Lymphadenopathy:      Cervical: No cervical adenopathy. Neurological:      General: No focal deficit present. Mental Status: He is alert and oriented to person, place, and time. An electronic signature was used to authenticate this note.     --Suni Tinajero, APRN - CNP

## 2022-07-19 RX ORDER — ISOSORBIDE MONONITRATE 30 MG/1
TABLET, EXTENDED RELEASE ORAL
Qty: 90 TABLET | Refills: 3 | Status: SHIPPED | OUTPATIENT
Start: 2022-07-19

## 2022-07-19 RX ORDER — SOTALOL HYDROCHLORIDE 80 MG/1
TABLET ORAL
Qty: 180 TABLET | Refills: 3 | Status: SHIPPED | OUTPATIENT
Start: 2022-07-19

## 2022-07-19 RX ORDER — ATORVASTATIN CALCIUM 40 MG/1
TABLET, FILM COATED ORAL
Qty: 90 TABLET | Refills: 3 | Status: SHIPPED | OUTPATIENT
Start: 2022-07-19

## 2022-08-17 RX ORDER — PANTOPRAZOLE SODIUM 40 MG/1
TABLET, DELAYED RELEASE ORAL
Qty: 90 TABLET | Refills: 1 | Status: SHIPPED | OUTPATIENT
Start: 2022-08-17

## 2022-08-31 ENCOUNTER — OFFICE VISIT (OUTPATIENT)
Dept: CARDIOLOGY CLINIC | Age: 67
End: 2022-08-31
Payer: MEDICARE

## 2022-08-31 VITALS
WEIGHT: 162 LBS | HEART RATE: 71 BPM | DIASTOLIC BLOOD PRESSURE: 70 MMHG | BODY MASS INDEX: 21.37 KG/M2 | SYSTOLIC BLOOD PRESSURE: 118 MMHG

## 2022-08-31 DIAGNOSIS — I48.0 PAF (PAROXYSMAL ATRIAL FIBRILLATION) (HCC): ICD-10-CM

## 2022-08-31 DIAGNOSIS — I25.10 ATHEROSCLEROSIS OF NATIVE CORONARY ARTERY OF NATIVE HEART WITHOUT ANGINA PECTORIS: ICD-10-CM

## 2022-08-31 DIAGNOSIS — I10 PRIMARY HYPERTENSION: Primary | ICD-10-CM

## 2022-08-31 DIAGNOSIS — E78.2 MIXED HYPERLIPIDEMIA: ICD-10-CM

## 2022-08-31 DIAGNOSIS — R06.02 SHORTNESS OF BREATH: ICD-10-CM

## 2022-08-31 PROCEDURE — 99214 OFFICE O/P EST MOD 30 MIN: CPT | Performed by: INTERNAL MEDICINE

## 2022-08-31 PROCEDURE — G8420 CALC BMI NORM PARAMETERS: HCPCS | Performed by: INTERNAL MEDICINE

## 2022-08-31 PROCEDURE — 1036F TOBACCO NON-USER: CPT | Performed by: INTERNAL MEDICINE

## 2022-08-31 PROCEDURE — 3017F COLORECTAL CA SCREEN DOC REV: CPT | Performed by: INTERNAL MEDICINE

## 2022-08-31 PROCEDURE — 93000 ELECTROCARDIOGRAM COMPLETE: CPT | Performed by: INTERNAL MEDICINE

## 2022-08-31 PROCEDURE — G8428 CUR MEDS NOT DOCUMENT: HCPCS | Performed by: INTERNAL MEDICINE

## 2022-08-31 PROCEDURE — 1123F ACP DISCUSS/DSCN MKR DOCD: CPT | Performed by: INTERNAL MEDICINE

## 2022-08-31 NOTE — PROGRESS NOTES
[facility] [place] CARDIOLOGY  [address], [address]  PHONE: [phone]      22    NAME:  Jim Burgess  : 1955  MRN: [id number]       SUBJECTIVE:   Jim Burgess is a 75 y.o. male seen for a follow up visit regarding the following:     Chief Complaint   Patient presents with    Irregular Heart Beat         HPI:  Increased palpitations and ramirez the last 3 weeks - can only walk 20 feet- last 30 minutes at a time. Better with rest.  No cp. No orthopnea or pnd. No palpitations or syncope. Past Medical History, Past Surgical History, Family history, Social History, and Medications were all reviewed with the patient today and updated as necessary. Current Outpatient Medications   Medication Sig Dispense Refill    pantoprazole (PROTONIX) 40 MG tablet TAKE 1 TABLET EVERY DAY 90 tablet 1    isosorbide mononitrate (IMDUR) 30 MG extended release tablet TAKE 1 TABLET EVERY MORNING 90 tablet 3    atorvastatin (LIPITOR) 40 MG tablet TAKE 1 TABLET EVERY DAY 90 tablet 3    sotalol (BETAPACE) 80 MG tablet TAKE 1 TABLET TWICE DAILY 180 tablet 3    promethazine-codeine (PHENERGAN WITH CODEINE) 6.25-10 MG/5ML syrup 5 mLs.       Multiple Vitamin (MULTIVITAMIN ADULT PO) Take 1 tablet by mouth daily      Aspirin-Salicylamide-Caffeine (BC HEADACHE POWDER PO) Take by mouth as needed      albuterol sulfate HFA (PROVENTIL;VENTOLIN;PROAIR) 108 (90 Base) MCG/ACT inhaler Inhale 2 puffs into the lungs every 4 hours as needed for Wheezing or Shortness of Breath 18 g 11    lidocaine viscous hcl (XYLOCAINE) 2 % SOLN solution Take 15 mLs by mouth as needed for Irritation Gargle before meals and bedtime 100 mL 0    sertraline (ZOLOFT) 100 MG tablet TAKE 1 TABLET EVERY DAY 90 tablet 1    acetaminophen (TYLENOL) 325 MG tablet Take 650 mg by mouth every 4 hours as needed      amLODIPine (NORVASC) 10 MG tablet TAKE 1 TABLET EVERY EVENING      aspirin 81 MG chewable tablet Take 81 mg by mouth daily fluticasone-umeclidin-vilant (TRELEGY ELLIPTA) 100-62.5-25 MCG/INH AEPB Inhale 1 puff into the lungs daily      Hyoscyamine Sulfate SL 0.125 MG SUBL Place 0.125 mg under the tongue every 4 hours as needed      ipratropium-albuterol (DUONEB) 0.5-2.5 (3) MG/3ML SOLN nebulizer solution Inhale 3 mLs into the lungs every 4 hours as needed      meclizine (ANTIVERT) 25 MG tablet TAKE 1 TABLET THREE TIMES DAILY AS NEEDED FOR DIZZINESS      nitroGLYCERIN (NITROSTAT) 0.4 MG SL tablet Place 0.4 mg under the tongue      rOPINIRole (REQUIP) 1 MG tablet Take 1 mg by mouth       No current facility-administered medications for this visit. Social History     Tobacco Use    Smoking status: Former     Packs/day: 1.00     Types: Cigarettes     Quit date: 3/4/2016     Years since quittin.4    Smokeless tobacco: Never   Substance Use Topics    Alcohol use: No     Alcohol/week: 0.0 standard drinks              PHYSICAL EXAM:   /70   Pulse 71   Wt 162 lb (73.5 kg)   BMI 21.37 kg/m²    Constitutional: Oriented to person, place, and time. Appears well-developed and well-nourished. Head: Normocephalic and atraumatic. Neck: Neck supple. Cardiovascular: Normal rate and regular rhythm with no murmur -No JVP  Pulmonary/Chest: Breath sounds normal.   Abdominal: Soft. Musculoskeletal: No edema. Neurological: Alert and oriented to person, place, and time. Skin: Skin is warm and dry. Psychiatric: Normal mood and affect. Vitals reviewed. Ekg-Sinus  Rhythm   Nonspecific T-abnormality. hr 71    Wt Readings from Last 3 Encounters:   22 162 lb (73.5 kg)   22 168 lb (76.2 kg)   22 169 lb (76.7 kg)       Medical problems and test results were reviewed with the patient today. ASSESSMENT and PLAN    1. Atrial fibrillation ([address]- s/p af ablation times 2 and WATCHMAN  Unknown- monitor  - EKG 12 lead    2. Primary hypertension  Stable. Continue norvasc       3.  Mixed hyperlipidemia  Stable. Continue lipitor      4. Atherosclerosis of native coronary artery of native heart without angina pectoris  Unknown- stress test       Return for follow-up after testing.          Eric Cordova MD  8/31/2022  3:02 PM

## 2022-09-01 RX ORDER — AMLODIPINE BESYLATE 10 MG/1
TABLET ORAL
Qty: 90 TABLET | Refills: 1 | Status: SHIPPED | OUTPATIENT
Start: 2022-09-01 | End: 2022-10-24

## 2022-09-16 ENCOUNTER — OFFICE VISIT (OUTPATIENT)
Dept: CARDIOLOGY CLINIC | Age: 67
End: 2022-09-16
Payer: MEDICARE

## 2022-09-16 VITALS
WEIGHT: 160.8 LBS | BODY MASS INDEX: 21.31 KG/M2 | HEIGHT: 73 IN | DIASTOLIC BLOOD PRESSURE: 60 MMHG | SYSTOLIC BLOOD PRESSURE: 100 MMHG | HEART RATE: 62 BPM

## 2022-09-16 DIAGNOSIS — I10 PRIMARY HYPERTENSION: ICD-10-CM

## 2022-09-16 DIAGNOSIS — I48.21 PERMANENT ATRIAL FIBRILLATION (HCC): Primary | ICD-10-CM

## 2022-09-16 DIAGNOSIS — I25.10 ATHEROSCLEROSIS OF NATIVE CORONARY ARTERY OF NATIVE HEART WITHOUT ANGINA PECTORIS: ICD-10-CM

## 2022-09-16 DIAGNOSIS — E78.5 DYSLIPIDEMIA: ICD-10-CM

## 2022-09-16 PROCEDURE — 3017F COLORECTAL CA SCREEN DOC REV: CPT | Performed by: INTERNAL MEDICINE

## 2022-09-16 PROCEDURE — 99213 OFFICE O/P EST LOW 20 MIN: CPT | Performed by: INTERNAL MEDICINE

## 2022-09-16 PROCEDURE — 1036F TOBACCO NON-USER: CPT | Performed by: INTERNAL MEDICINE

## 2022-09-16 PROCEDURE — G8428 CUR MEDS NOT DOCUMENT: HCPCS | Performed by: INTERNAL MEDICINE

## 2022-09-16 PROCEDURE — G8420 CALC BMI NORM PARAMETERS: HCPCS | Performed by: INTERNAL MEDICINE

## 2022-09-16 PROCEDURE — 1123F ACP DISCUSS/DSCN MKR DOCD: CPT | Performed by: INTERNAL MEDICINE

## 2022-09-16 NOTE — PROGRESS NOTES
[facility] CARDIOLOGY  [address] [facility], [address]  PHONE: [phone]      22    NAME:  Regla Ch  : 1955  MRN: [id number]       SUBJECTIVE:   Regla Ch is a 75 y.o. male seen for a follow up visit regarding the following:     Chief Complaint   Patient presents with    Atrial Fibrillation    Coronary Artery Disease         HPI:    No cp or ramirez. No orthopnea or pnd. No palpitations or syncope. Past Medical History, Past Surgical History, Family history, Social History, and Medications were all reviewed with the patient today and updated as necessary.      Current Outpatient Medications   Medication Sig Dispense Refill    amLODIPine (NORVASC) 10 MG tablet TAKE 1 TABLET EVERY EVENING 90 tablet 1    pantoprazole (PROTONIX) 40 MG tablet TAKE 1 TABLET EVERY DAY 90 tablet 1    isosorbide mononitrate (IMDUR) 30 MG extended release tablet TAKE 1 TABLET EVERY MORNING 90 tablet 3    atorvastatin (LIPITOR) 40 MG tablet TAKE 1 TABLET EVERY DAY 90 tablet 3    sotalol (BETAPACE) 80 MG tablet TAKE 1 TABLET TWICE DAILY 180 tablet 3    Multiple Vitamin (MULTIVITAMIN ADULT PO) Take 1 tablet by mouth daily      Aspirin-Salicylamide-Caffeine (BC HEADACHE POWDER PO) Take by mouth as needed      albuterol sulfate HFA (PROVENTIL;VENTOLIN;PROAIR) 108 (90 Base) MCG/ACT inhaler Inhale 2 puffs into the lungs every 4 hours as needed for Wheezing or Shortness of Breath 18 g 11    sertraline (ZOLOFT) 100 MG tablet TAKE 1 TABLET EVERY DAY 90 tablet 1    acetaminophen (TYLENOL) 325 MG tablet Take 650 mg by mouth every 4 hours as needed      aspirin 81 MG chewable tablet Take 81 mg by mouth daily      fluticasone-umeclidin-vilant (TRELEGY ELLIPTA) 100-62.5-25 MCG/INH AEPB Inhale 1 puff into the lungs daily      Hyoscyamine Sulfate SL 0.125 MG SUBL Place 0.125 mg under the tongue every 4 hours as needed      ipratropium-albuterol (DUONEB) 0.5-2.5 (3) MG/3ML SOLN nebulizer solution Inhale 3 mLs into the lungs every 4 hours as needed      meclizine (ANTIVERT) 25 MG tablet TAKE 1 TABLET THREE TIMES DAILY AS NEEDED FOR DIZZINESS      nitroGLYCERIN (NITROSTAT) 0.4 MG SL tablet Place 0.4 mg under the tongue      rOPINIRole (REQUIP) 1 MG tablet Take 1 mg by mouth      promethazine-codeine (PHENERGAN WITH CODEINE) 6.25-10 MG/5ML syrup 5 mLs. (Patient not taking: Reported on 2022)      lidocaine viscous hcl (XYLOCAINE) 2 % SOLN solution Take 15 mLs by mouth as needed for Irritation Gargle before meals and bedtime (Patient not taking: Reported on 2022) 100 mL 0     No current facility-administered medications for this visit. Social History     Tobacco Use    Smoking status: Former     Packs/day: 1.00     Types: Cigarettes     Quit date: 3/4/2016     Years since quittin.5    Smokeless tobacco: Never   Substance Use Topics    Alcohol use: No     Alcohol/week: 0.0 standard drinks              PHYSICAL EXAM:   /60   Pulse 62   Ht 6' 1\" (1.854 m)   Wt 160 lb 12.8 oz (72.9 kg)   BMI 21.22 kg/m²    Constitutional: Oriented to person, place, and time. Appears well-developed and well-nourished. Head: Normocephalic and atraumatic. Neck: Neck supple. Cardiovascular: Normal rate and regular rhythm with no murmur -No JVP  Pulmonary/Chest: Breath sounds normal.   Abdominal: Soft. Musculoskeletal: No edema. Neurological: Alert and oriented to person, place, and time. Skin: Skin is warm and dry. Psychiatric: Normal mood and affect. Vitals reviewed. Wt Readings from Last 3 Encounters:   22 160 lb 12.8 oz (72.9 kg)   22 162 lb (73.5 kg)   22 162 lb (73.5 kg)       Medical problems and test results were reviewed with the patient today. ASSESSMENT and PLAN    1. Paroxysmal atrial fibrillation (HCC)  Stable. Continue betapace  No af on monitor    2. Primary hypertension  Stable. Continue norvasc      3. Dyslipidemia  Stable. Continue lipitor    4. Atherosclerosis of native coronary artery of native heart without angina pectoris  Stable. Continue asa  Apical defect likely artifact on nuc- no ischemia         Return in about 2 months (around 11/16/2022).          Fredrick Echavarria MD  9/16/2022  2:59 PM

## 2022-09-19 ENCOUNTER — OFFICE VISIT (OUTPATIENT)
Dept: INTERNAL MEDICINE CLINIC | Facility: CLINIC | Age: 67
End: 2022-09-19
Payer: MEDICARE

## 2022-09-19 VITALS
DIASTOLIC BLOOD PRESSURE: 74 MMHG | OXYGEN SATURATION: 94 % | WEIGHT: 160 LBS | RESPIRATION RATE: 17 BRPM | HEIGHT: 73 IN | BODY MASS INDEX: 21.2 KG/M2 | HEART RATE: 68 BPM | SYSTOLIC BLOOD PRESSURE: 120 MMHG

## 2022-09-19 DIAGNOSIS — R05.9 COUGH: ICD-10-CM

## 2022-09-19 DIAGNOSIS — H61.23 BILATERAL IMPACTED CERUMEN: ICD-10-CM

## 2022-09-19 DIAGNOSIS — J44.9 COPD, VERY SEVERE (HCC): Primary | ICD-10-CM

## 2022-09-19 DIAGNOSIS — I48.21 PERMANENT ATRIAL FIBRILLATION (HCC): ICD-10-CM

## 2022-09-19 DIAGNOSIS — I10 PRIMARY HYPERTENSION: ICD-10-CM

## 2022-09-19 PROCEDURE — 1123F ACP DISCUSS/DSCN MKR DOCD: CPT | Performed by: FAMILY MEDICINE

## 2022-09-19 PROCEDURE — G8420 CALC BMI NORM PARAMETERS: HCPCS | Performed by: FAMILY MEDICINE

## 2022-09-19 PROCEDURE — G8427 DOCREV CUR MEDS BY ELIG CLIN: HCPCS | Performed by: FAMILY MEDICINE

## 2022-09-19 PROCEDURE — 3023F SPIROM DOC REV: CPT | Performed by: FAMILY MEDICINE

## 2022-09-19 PROCEDURE — 99214 OFFICE O/P EST MOD 30 MIN: CPT | Performed by: FAMILY MEDICINE

## 2022-09-19 PROCEDURE — 3017F COLORECTAL CA SCREEN DOC REV: CPT | Performed by: FAMILY MEDICINE

## 2022-09-19 PROCEDURE — 1036F TOBACCO NON-USER: CPT | Performed by: FAMILY MEDICINE

## 2022-09-19 PROCEDURE — 69210 REMOVE IMPACTED EAR WAX UNI: CPT | Performed by: FAMILY MEDICINE

## 2022-09-19 RX ORDER — HYDROCODONE BITARTRATE AND HOMATROPINE METHYLBROMIDE ORAL SOLUTION 5; 1.5 MG/5ML; MG/5ML
2.5 LIQUID ORAL EVERY 4 HOURS PRN
Qty: 240 ML | Refills: 0 | Status: SHIPPED | OUTPATIENT
Start: 2022-09-19 | End: 2022-10-19

## 2022-09-19 RX ORDER — ALBUTEROL SULFATE 2.5 MG/.5ML
2.5 SOLUTION RESPIRATORY (INHALATION) EVERY 4 HOURS PRN
Qty: 360 EACH | Refills: 1 | Status: SHIPPED | OUTPATIENT
Start: 2022-09-19 | End: 2022-09-26 | Stop reason: CLARIF

## 2022-09-19 ASSESSMENT — PATIENT HEALTH QUESTIONNAIRE - PHQ9
1. LITTLE INTEREST OR PLEASURE IN DOING THINGS: 1
SUM OF ALL RESPONSES TO PHQ QUESTIONS 1-9: 1
SUM OF ALL RESPONSES TO PHQ QUESTIONS 1-9: 1
10. IF YOU CHECKED OFF ANY PROBLEMS, HOW DIFFICULT HAVE THESE PROBLEMS MADE IT FOR YOU TO DO YOUR WORK, TAKE CARE OF THINGS AT HOME, OR GET ALONG WITH OTHER PEOPLE: 0
6. FEELING BAD ABOUT YOURSELF - OR THAT YOU ARE A FAILURE OR HAVE LET YOURSELF OR YOUR FAMILY DOWN: 0
2. FEELING DOWN, DEPRESSED OR HOPELESS: 0
SUM OF ALL RESPONSES TO PHQ QUESTIONS 1-9: 1
3. TROUBLE FALLING OR STAYING ASLEEP: 0
4. FEELING TIRED OR HAVING LITTLE ENERGY: 0
SUM OF ALL RESPONSES TO PHQ9 QUESTIONS 1 & 2: 1
9. THOUGHTS THAT YOU WOULD BE BETTER OFF DEAD, OR OF HURTING YOURSELF: 0
7. TROUBLE CONCENTRATING ON THINGS, SUCH AS READING THE NEWSPAPER OR WATCHING TELEVISION: 0
SUM OF ALL RESPONSES TO PHQ QUESTIONS 1-9: 1
5. POOR APPETITE OR OVEREATING: 0
8. MOVING OR SPEAKING SO SLOWLY THAT OTHER PEOPLE COULD HAVE NOTICED. OR THE OPPOSITE, BEING SO FIGETY OR RESTLESS THAT YOU HAVE BEEN MOVING AROUND A LOT MORE THAN USUAL: 0

## 2022-09-20 NOTE — PROGRESS NOTES
Kenia Petersen DO  [employer]iplomate of the [employer] of [address] [place] [address] Internal Medicine      Danielle Dwyer (: 1955) is a 75 y.o. male, here for evaluation of the following chief complaint(s): Insomnia       ASSESSMENT/PLAN:  1. COPD, very severe ([address]  -     albuterol (PROVENTIL) 2.5 MG/0.5ML NEBU nebulizer solution; Take 0.5 mLs by nebulization every 4 hours as needed for Wheezing, Disp-360 each, R-1Normal  2. Cough  -     HYDROcodone homatropine (HYCODAN) 5-1.5 MG/5ML solution; Take 2.5 mLs by mouth every 4 hours as needed (Cough) for up to 30 days. , Disp-240 mL, R-0Normal  3. Primary hypertension  4. Permanent atrial fibrillation ([address]  5. Bilateral impacted cerumen       -Continue with trelegy and albuterol on prn basis for SOB/wheezing. Follow up with Pulmonology as scheduled. -Hycodan prn for severe cough. Advised him how to take medication properly. Discussed the potential side effects--including addiction--and benefits of the medication. He is to call with any problems or concerns.  -Tolerating medication well for HTN. Achieving desired therapeutic response with   Key Anti-Hypertensive Meds            amLODIPine (NORVASC) 10 MG tablet (Taking)    Sig: TAKE 1 TABLET EVERY EVENING    sotalol (BETAPACE) 80 MG tablet (Taking)    Sig: TAKE 1 TABLET TWICE DAILY         --will continue. Will periodically review and adjust if needed. Encourage home monitoring. -HR controlled, continued follow up with Cardiology as scheduled.  -Cerumen impaction was observed. An ear irrigation was performed on both ear(s). Irrigation proved to be beneficial in clearing a moderate amount of cerumen. Cerumen removed with lavage/currette. Patient tolerated well. Ear canal is now visible and clear after the procedure. The procedure lasted 10 minutes. Instructions for home care to prevent wax buildup are given.           SUBJECTIVE/OBJECTIVE:  HPI:  -Continues to have persistent breathing issues with SOB with exertion. Recent negative cardiac workup. Using inhalers as directed. Needs albuterol refill. No mucopurulent productive sputum or hemoptysis.  -Has chronic cough and takes hycodan on prn basis for severe symptoms. -HTN: Home BP Monitoring: no. taking medications as instructed, no medication side effects noted. He denies chest pain, palpitations, exertional pain or pressure.  -No significant palpitations/pre or syncopal symptoms.  -Thinks he has cerumen impaction due to decreased hearing and getting some ear wax out of ears. ROS negative except as noted above today. Social History     Tobacco Use    Smoking status: Former     Packs/day: 1.00     Types: Cigarettes     Quit date: 3/4/2016     Years since quittin.5    Smokeless tobacco: Never   Substance Use Topics    Alcohol use: No     Alcohol/week: 0.0 standard drinks    Drug use: No     Vitals:    22 1458   BP: 120/74   Site: Left Upper Arm   Position: Sitting   Pulse: 68   Resp: 17   SpO2: 94%   Weight: 160 lb (72.6 kg)   Height: 6' 1\" (1.854 m)      Body mass index is 21.11 kg/m². Physical Exam  Vitals reviewed. HENT:      Right Ear: There is impacted cerumen. Left Ear: There is impacted cerumen. Cardiovascular:      Rate and Rhythm: Normal rate and regular rhythm. Pulmonary:      Effort: Pulmonary effort is normal.      Breath sounds: Normal breath sounds. Skin:     General: Skin is warm and dry. Neurological:      Mental Status: He is alert. An electronic signature was used to authenticate this note.   DO juve Stewart

## 2022-09-26 ENCOUNTER — PATIENT MESSAGE (OUTPATIENT)
Dept: INTERNAL MEDICINE CLINIC | Facility: CLINIC | Age: 67
End: 2022-09-26

## 2022-09-26 RX ORDER — ALBUTEROL SULFATE 2.5 MG/3ML
2.5 SOLUTION RESPIRATORY (INHALATION) EVERY 6 HOURS PRN
Qty: 360 EACH | Refills: 3 | Status: SHIPPED | OUTPATIENT
Start: 2022-09-26

## 2022-09-27 NOTE — TELEPHONE ENCOUNTER
From: [facility]  To: Dr. Inge Horn Brothers  Sent: 9/26/2022 4:00 PM EDT  Subject: Albuterol Sulfate    I received my Albuterol prescription and it says to dilute before using, could you please send a prescription to where I wouldnt have to dilute. I sure would appreciate it.   Thank You

## 2022-09-28 RX ORDER — MECLIZINE HYDROCHLORIDE 25 MG/1
TABLET ORAL
Qty: 90 TABLET | Refills: 1 | Status: SHIPPED | OUTPATIENT
Start: 2022-09-28

## 2022-10-24 ENCOUNTER — OFFICE VISIT (OUTPATIENT)
Dept: INTERNAL MEDICINE CLINIC | Facility: CLINIC | Age: 67
End: 2022-10-24
Payer: MEDICARE

## 2022-10-24 VITALS
WEIGHT: 158 LBS | DIASTOLIC BLOOD PRESSURE: 60 MMHG | SYSTOLIC BLOOD PRESSURE: 100 MMHG | RESPIRATION RATE: 17 BRPM | HEIGHT: 73 IN | OXYGEN SATURATION: 96 % | HEART RATE: 80 BPM | BODY MASS INDEX: 20.94 KG/M2

## 2022-10-24 DIAGNOSIS — M54.41 ACUTE RIGHT-SIDED LOW BACK PAIN WITH RIGHT-SIDED SCIATICA: Primary | ICD-10-CM

## 2022-10-24 DIAGNOSIS — I95.2 HYPOTENSION DUE TO DRUGS: ICD-10-CM

## 2022-10-24 PROBLEM — R05.9 COUGH: Status: RESOLVED | Noted: 2021-12-08 | Resolved: 2022-10-24

## 2022-10-24 PROCEDURE — 99214 OFFICE O/P EST MOD 30 MIN: CPT | Performed by: FAMILY MEDICINE

## 2022-10-24 PROCEDURE — 3017F COLORECTAL CA SCREEN DOC REV: CPT | Performed by: FAMILY MEDICINE

## 2022-10-24 PROCEDURE — G8484 FLU IMMUNIZE NO ADMIN: HCPCS | Performed by: FAMILY MEDICINE

## 2022-10-24 PROCEDURE — G8420 CALC BMI NORM PARAMETERS: HCPCS | Performed by: FAMILY MEDICINE

## 2022-10-24 PROCEDURE — G8427 DOCREV CUR MEDS BY ELIG CLIN: HCPCS | Performed by: FAMILY MEDICINE

## 2022-10-24 PROCEDURE — 1036F TOBACCO NON-USER: CPT | Performed by: FAMILY MEDICINE

## 2022-10-24 PROCEDURE — 1123F ACP DISCUSS/DSCN MKR DOCD: CPT | Performed by: FAMILY MEDICINE

## 2022-10-24 RX ORDER — TIZANIDINE 4 MG/1
4 TABLET ORAL 4 TIMES DAILY PRN
Qty: 40 TABLET | Refills: 0 | Status: SHIPPED | OUTPATIENT
Start: 2022-10-24

## 2022-10-24 RX ORDER — AMLODIPINE BESYLATE 10 MG/1
5 TABLET ORAL DAILY
Qty: 90 TABLET | Refills: 1
Start: 2022-10-24

## 2022-10-24 RX ORDER — METHYLPREDNISOLONE 4 MG/1
TABLET ORAL
Qty: 1 KIT | Refills: 0 | Status: SHIPPED | OUTPATIENT
Start: 2022-10-24 | End: 2022-10-30

## 2022-10-24 ASSESSMENT — PATIENT HEALTH QUESTIONNAIRE - PHQ9
3. TROUBLE FALLING OR STAYING ASLEEP: 0
2. FEELING DOWN, DEPRESSED OR HOPELESS: 0
7. TROUBLE CONCENTRATING ON THINGS, SUCH AS READING THE NEWSPAPER OR WATCHING TELEVISION: 0
1. LITTLE INTEREST OR PLEASURE IN DOING THINGS: 0
SUM OF ALL RESPONSES TO PHQ9 QUESTIONS 1 & 2: 0
SUM OF ALL RESPONSES TO PHQ QUESTIONS 1-9: 0
4. FEELING TIRED OR HAVING LITTLE ENERGY: 0
10. IF YOU CHECKED OFF ANY PROBLEMS, HOW DIFFICULT HAVE THESE PROBLEMS MADE IT FOR YOU TO DO YOUR WORK, TAKE CARE OF THINGS AT HOME, OR GET ALONG WITH OTHER PEOPLE: 0
8. MOVING OR SPEAKING SO SLOWLY THAT OTHER PEOPLE COULD HAVE NOTICED. OR THE OPPOSITE, BEING SO FIGETY OR RESTLESS THAT YOU HAVE BEEN MOVING AROUND A LOT MORE THAN USUAL: 0
5. POOR APPETITE OR OVEREATING: 0
SUM OF ALL RESPONSES TO PHQ QUESTIONS 1-9: 0
SUM OF ALL RESPONSES TO PHQ QUESTIONS 1-9: 0
6. FEELING BAD ABOUT YOURSELF - OR THAT YOU ARE A FAILURE OR HAVE LET YOURSELF OR YOUR FAMILY DOWN: 0
SUM OF ALL RESPONSES TO PHQ QUESTIONS 1-9: 0
9. THOUGHTS THAT YOU WOULD BE BETTER OFF DEAD, OR OF HURTING YOURSELF: 0

## 2022-10-24 NOTE — PROGRESS NOTES
Valentino Ink, DO  [employer]iplomate of the [employer] of [address] [place] [address] Internal Medicine      Andre Son (: 1955) is a 75 y.o. male, here for evaluation of the following chief complaint(s):  Back Pain and Tingling       ASSESSMENT/PLAN:  1. Acute right-sided low back pain with right-sided sciatica  -     methylPREDNISolone (MEDROL DOSEPACK) 4 MG tablet; Take by mouth., Disp-1 kit, R-0Normal  -     tiZANidine (ZANAFLEX) 4 MG tablet; Take 1 tablet by mouth 4 times daily as needed (Muscle spasm), Disp-40 tablet, R-0Normal  2. Hypotension due to drugs  -     amLODIPine (NORVASC) 10 MG tablet; Take 0.5 tablets by mouth daily, Disp-90 tablet, R-1NO PRINT     --Instructed on how to take the steroids properly as well as potential side effects of the medication. Advised to let me know for any problems. -Muscle relaxers on prn basis. Instructed on how to take properly. Encouraged regular stretching.  -Decrease amlodipine to 5mg. Encouraged home readings. Will continue to adjust as needed. SUBJECTIVE/OBJECTIVE:  HPI:  -Comes in today with chief complaint of having severe right-sided lower back pain with radiation into his right thigh. He does have a history of previous lumbar degenerative disc disease. States he woke up Thursday morning with this. No history of trauma. He denies any urinary incontinence. No saddle anesthesia. Has tried some Canelo's low back pain relief with minimal improvement.  -Blood pressure continues to run on the low side. He denies any chest pain associated with this. No pre or syncopal symptoms. ROS negative except as noted above today.     Social History     Tobacco Use    Smoking status: Former     Packs/day: 1.00     Types: Cigarettes     Quit date: 3/4/2016     Years since quittin.6    Smokeless tobacco: Never   Substance Use Topics    Alcohol use: No     Alcohol/week: 0.0 standard drinks    Drug use: No     Vitals:    10/24/22 1423   BP: 100/60 Site: Left Upper Arm   Position: Sitting   Pulse: 80   Resp: 17   SpO2: 96%   Weight: 158 lb (71.7 kg)   Height: 6' 1\" (1.854 m)      Body mass index is 20.85 kg/m². Physical Exam  Vitals reviewed. Cardiovascular:      Rate and Rhythm: Normal rate and regular rhythm. Pulmonary:      Effort: Pulmonary effort is normal.      Breath sounds: Normal breath sounds. Skin:     General: Skin is warm and dry. Neurological:      General: No focal deficit present. Mental Status: He is alert. An electronic signature was used to authenticate this note.   Benjamin Page DO

## 2022-12-13 DIAGNOSIS — M54.41 ACUTE RIGHT-SIDED LOW BACK PAIN WITH RIGHT-SIDED SCIATICA: ICD-10-CM

## 2022-12-16 RX ORDER — TIZANIDINE 4 MG/1
4 TABLET ORAL EVERY 8 HOURS PRN
Qty: 45 TABLET | Refills: 2 | Status: SHIPPED | OUTPATIENT
Start: 2022-12-16

## 2022-12-21 ENCOUNTER — OFFICE VISIT (OUTPATIENT)
Dept: INTERNAL MEDICINE CLINIC | Facility: CLINIC | Age: 67
End: 2022-12-21
Payer: MEDICARE

## 2022-12-21 VITALS
DIASTOLIC BLOOD PRESSURE: 76 MMHG | OXYGEN SATURATION: 90 % | RESPIRATION RATE: 17 BRPM | HEIGHT: 73 IN | HEART RATE: 80 BPM | SYSTOLIC BLOOD PRESSURE: 110 MMHG | WEIGHT: 160 LBS | BODY MASS INDEX: 21.2 KG/M2

## 2022-12-21 DIAGNOSIS — C91.10 CLL (CHRONIC LYMPHOCYTIC LEUKEMIA) (HCC): ICD-10-CM

## 2022-12-21 DIAGNOSIS — I10 PRIMARY HYPERTENSION: ICD-10-CM

## 2022-12-21 DIAGNOSIS — J44.9 COPD, VERY SEVERE (HCC): ICD-10-CM

## 2022-12-21 DIAGNOSIS — R05.3 CHRONIC COUGH: ICD-10-CM

## 2022-12-21 DIAGNOSIS — Z00.00 MEDICARE ANNUAL WELLNESS VISIT, SUBSEQUENT: Primary | ICD-10-CM

## 2022-12-21 DIAGNOSIS — Z12.5 SPECIAL SCREENING FOR MALIGNANT NEOPLASM OF PROSTATE: ICD-10-CM

## 2022-12-21 LAB
ALBUMIN SERPL-MCNC: 4.2 G/DL (ref 3.2–4.6)
ALBUMIN/GLOB SERPL: 1.7 {RATIO} (ref 0.4–1.6)
ALP SERPL-CCNC: 103 U/L (ref 50–136)
ALT SERPL-CCNC: 29 U/L (ref 12–65)
ANION GAP SERPL CALC-SCNC: 6 MMOL/L (ref 2–11)
AST SERPL-CCNC: 19 U/L (ref 15–37)
BILIRUB DIRECT SERPL-MCNC: 0.2 MG/DL
BILIRUB SERPL-MCNC: 0.9 MG/DL (ref 0.2–1.1)
BUN SERPL-MCNC: 8 MG/DL (ref 8–23)
CALCIUM SERPL-MCNC: 9.5 MG/DL (ref 8.3–10.4)
CHLORIDE SERPL-SCNC: 107 MMOL/L (ref 101–110)
CHOLEST SERPL-MCNC: 109 MG/DL
CO2 SERPL-SCNC: 31 MMOL/L (ref 21–32)
CREAT SERPL-MCNC: 1 MG/DL (ref 0.8–1.5)
ERYTHROCYTE [DISTWIDTH] IN BLOOD BY AUTOMATED COUNT: 12.9 % (ref 11.9–14.6)
GLOBULIN SER CALC-MCNC: 2.5 G/DL (ref 2.8–4.5)
GLUCOSE SERPL-MCNC: 95 MG/DL (ref 65–100)
HCT VFR BLD AUTO: 48.2 % (ref 41.1–50.3)
HDLC SERPL-MCNC: 43 MG/DL (ref 40–60)
HDLC SERPL: 2.5 {RATIO}
HGB BLD-MCNC: 15.3 G/DL (ref 13.6–17.2)
LDLC SERPL CALC-MCNC: 43.4 MG/DL
MCH RBC QN AUTO: 29.5 PG (ref 26.1–32.9)
MCHC RBC AUTO-ENTMCNC: 31.7 G/DL (ref 31.4–35)
MCV RBC AUTO: 92.9 FL (ref 82–102)
NRBC # BLD: 0 K/UL (ref 0–0.2)
PLATELET # BLD AUTO: 261 K/UL (ref 150–450)
PMV BLD AUTO: 10.2 FL (ref 9.4–12.3)
POTASSIUM SERPL-SCNC: 4 MMOL/L (ref 3.5–5.1)
PROT SERPL-MCNC: 6.7 G/DL (ref 6.3–8.2)
RBC # BLD AUTO: 5.19 M/UL (ref 4.23–5.6)
SODIUM SERPL-SCNC: 144 MMOL/L (ref 133–143)
TRIGL SERPL-MCNC: 113 MG/DL (ref 35–150)
VLDLC SERPL CALC-MCNC: 22.6 MG/DL (ref 6–23)
WBC # BLD AUTO: 15.7 K/UL (ref 4.3–11.1)

## 2022-12-21 PROCEDURE — G8484 FLU IMMUNIZE NO ADMIN: HCPCS | Performed by: FAMILY MEDICINE

## 2022-12-21 PROCEDURE — 3017F COLORECTAL CA SCREEN DOC REV: CPT | Performed by: FAMILY MEDICINE

## 2022-12-21 PROCEDURE — 3078F DIAST BP <80 MM HG: CPT | Performed by: FAMILY MEDICINE

## 2022-12-21 PROCEDURE — 3074F SYST BP LT 130 MM HG: CPT | Performed by: FAMILY MEDICINE

## 2022-12-21 PROCEDURE — G0439 PPPS, SUBSEQ VISIT: HCPCS | Performed by: FAMILY MEDICINE

## 2022-12-21 PROCEDURE — 1123F ACP DISCUSS/DSCN MKR DOCD: CPT | Performed by: FAMILY MEDICINE

## 2022-12-21 RX ORDER — HYDROCODONE BITARTRATE AND HOMATROPINE METHYLBROMIDE ORAL SOLUTION 5; 1.5 MG/5ML; MG/5ML
2.5 LIQUID ORAL EVERY 4 HOURS PRN
Qty: 240 ML | Refills: 0 | Status: SHIPPED | OUTPATIENT
Start: 2022-12-21 | End: 2023-01-20

## 2022-12-21 ASSESSMENT — PATIENT HEALTH QUESTIONNAIRE - PHQ9
10. IF YOU CHECKED OFF ANY PROBLEMS, HOW DIFFICULT HAVE THESE PROBLEMS MADE IT FOR YOU TO DO YOUR WORK, TAKE CARE OF THINGS AT HOME, OR GET ALONG WITH OTHER PEOPLE: 0
6. FEELING BAD ABOUT YOURSELF - OR THAT YOU ARE A FAILURE OR HAVE LET YOURSELF OR YOUR FAMILY DOWN: 0
SUM OF ALL RESPONSES TO PHQ9 QUESTIONS 1 & 2: 0
2. FEELING DOWN, DEPRESSED OR HOPELESS: 0
8. MOVING OR SPEAKING SO SLOWLY THAT OTHER PEOPLE COULD HAVE NOTICED. OR THE OPPOSITE, BEING SO FIGETY OR RESTLESS THAT YOU HAVE BEEN MOVING AROUND A LOT MORE THAN USUAL: 0
1. LITTLE INTEREST OR PLEASURE IN DOING THINGS: 0
5. POOR APPETITE OR OVEREATING: 0
9. THOUGHTS THAT YOU WOULD BE BETTER OFF DEAD, OR OF HURTING YOURSELF: 0
SUM OF ALL RESPONSES TO PHQ QUESTIONS 1-9: 0
7. TROUBLE CONCENTRATING ON THINGS, SUCH AS READING THE NEWSPAPER OR WATCHING TELEVISION: 0
3. TROUBLE FALLING OR STAYING ASLEEP: 0
SUM OF ALL RESPONSES TO PHQ QUESTIONS 1-9: 0
4. FEELING TIRED OR HAVING LITTLE ENERGY: 0
SUM OF ALL RESPONSES TO PHQ QUESTIONS 1-9: 0
SUM OF ALL RESPONSES TO PHQ QUESTIONS 1-9: 0

## 2022-12-21 ASSESSMENT — LIFESTYLE VARIABLES
HOW OFTEN DO YOU HAVE A DRINK CONTAINING ALCOHOL: NEVER
HOW MANY STANDARD DRINKS CONTAINING ALCOHOL DO YOU HAVE ON A TYPICAL DAY: PATIENT DOES NOT DRINK

## 2022-12-21 NOTE — PROGRESS NOTES
Medicare Annual Wellness Visit    Melva Mix is here for Medicare AWV and COPD    Assessment & Plan   Medicare annual wellness visit, subsequent  COPD, very severe ([facility] [address]  CLL (chronic lymphocytic leukemia) ([facility] [address]  Primary hypertension  -     Basic Metabolic Panel; Future  -     Hepatic Function Panel; Future  -     Lipid Panel; Future  -     CBC; Future  Special screening for malignant neoplasm of prostate  -     PSA Screening; Future  Cough  -     HYDROcodone homatropine (HYCODAN) 5-1.5 MG/5ML solution; Take 2.5 mLs by mouth every 4 hours as needed (Cough) for up to 30 days. , Disp-240 mL, R-0Normal        COPD remains fairly stable. He has chronic shortness of breath and dyspnea on exertion. Does use his inhalers as directed. Blood pressure is actually remained stable. Has not had any further lows. He is due for follow-up on his CBC as far as CLL is concerned. He does have chronic severe cough due to his COPD. He does take Hycodan cough syrup for this which provides him good relief and he tolerates it well without significant adverse effects. Labs as ordered. Pros and cons of PSA testing for prostate screening were discussed. The patient does wish to proceed. Recommendations for Preventive Services Due: see orders and patient instructions/AVS.  Recommended screening schedule for the next 5-10 years is provided to the patient in written form: see Patient Instructions/AVS.     Return for Medicare Annual Wellness Visit in 1 year. Patient's complete Health Risk Assessment and screening values have been reviewed and are found in Flowsheets. The following problems were reviewed today and where indicated follow up appointments were made and/or referrals ordered. Positive Risk Factor Screenings with Interventions:                Opioid Risk: (Low risk score <55) Opioid risk score: 7    Patient is low risk for opioid use disorder or overdose.   Last PDMP Nathalie Sweeney as Reviewed:  Review User Review Instant Review Result   MAUREEN CEE 12/21/2022  2:05 PM     Reviewed PDMP [1]           Weight and Activity:  Physical Activity: Inactive    Days of Exercise per Week: 0 days    Minutes of Exercise per Session: 0 min     On average, how many days per week do you engage in moderate to strenuous exercise (like a brisk walk)?: 0 days  Have you lost any weight without trying in the past 3 months?: No  Body mass index: 21.11  Encourage healty eating. Dentist Screen:  Have you seen the dentist within the past year?: (!) No     Encourage dental screening  Vision Screen:  Do you have difficulty driving, watching TV, or doing any of your daily activities because of your eyesight?: No  Have you had an eye exam within the past year?: (!) No  No results found. Encourage yearly eye exam.  Safety:  Do you have either shower bars, grab bars, non-slip mats or non-slip surfaces in your shower or bathtub?: (!) No  Do all of your stairways have a railing or banister?: (!) No  Interventions:  Appropriate safety interventions as indicated. Advanced Directives:  Do you have a Living Will?: (!) No    Encourage living will planning           Objective   Vitals:    12/21/22 1331   BP: 110/76   Site: Left Upper Arm   Position: Sitting   Pulse: 80   Resp: 17   SpO2: 90%   Weight: 160 lb (72.6 kg)   Height: 6' 1\" (1.854 m)      Body mass index is 21.11 kg/m². Physical Exam  Constitutional:       General: He is not in acute distress. Appearance: He is not ill-appearing. HENT:      Head: Normocephalic. Cardiovascular:      Heart sounds: Normal heart sounds. Pulmonary:      Effort: Pulmonary effort is normal.      Comments: Decreased BS in bases  Skin:     General: Skin is warm and dry. Neurological:      Mental Status: He is alert.             Allergies   Allergen Reactions    Fentanyl Other (See Comments)     States had through a PCA after shoulder surgery in 2000, and \"throat swelled shut\" and that he \"quit breathing\". Update 11/16/10--Patient thinks the PCA gave him to much and it wasn't an allergy to drug itself. On 6/29/20 patient now states he is allergic to fentanyl. Had heart cath 8/30/19 and per note fentanyl was given. No reactions noted. Lovastatin Other (See Comments)     Hullucinations    Niacin Other (See Comments)     Denies allery    Niacin And Related Anaphylaxis and Itching    Codeine      Other reaction(s): Itching-Allergy     Prior to Visit Medications    Medication Sig Taking? Authorizing Provider   HYDROcodone homatropine (HYCODAN) 5-1.5 MG/5ML solution Take 2.5 mLs by mouth every 4 hours as needed (Cough) for up to 30 days.  Yes Dian Crane, DO   tiZANidine (ZANAFLEX) 4 MG tablet Take 1 tablet by mouth every 8 hours as needed (Muscle spasm) Yes Donovan Jonesers, DO   amLODIPine (NORVASC) 10 MG tablet Take 0.5 tablets by mouth daily Yes Donovan Jonesers, DO   meclizine (ANTIVERT) 25 MG tablet TAKE 1 TABLET THREE TIMES DAILY AS NEEDED FOR DIZZINESS Yes Donovan Shade T Kareners, DO   albuterol (PROVENTIL) (2.5 MG/3ML) 0.083% nebulizer solution Take 3 mLs by nebulization every 6 hours as needed for Wheezing Yes Dian Crane, DO   pantoprazole (PROTONIX) 40 MG tablet TAKE 1 TABLET EVERY DAY Yes Dian Jonesers, DO   isosorbide mononitrate (IMDUR) 30 MG extended release tablet TAKE 1 TABLET EVERY MORNING Yes Jabier Narvaez MD   atorvastatin (LIPITOR) 40 MG tablet TAKE 1 TABLET EVERY DAY Yes Jabier Narvaez MD   sotalol (BETAPACE) 80 MG tablet TAKE 1 TABLET TWICE DAILY Yes Jabier Narvaez MD   Multiple Vitamin (MULTIVITAMIN ADULT PO) Take 1 tablet by mouth daily Yes Historical Provider, MD   Aspirin-Salicylamide-Caffeine (BC HEADACHE POWDER PO) Take by mouth as needed Yes Historical Provider, MD   albuterol sulfate HFA (PROVENTIL;VENTOLIN;PROAIR) 108 (90 Base) MCG/ACT inhaler Inhale 2 puffs into the lungs every 4 hours as needed for Wheezing or Shortness of Breath Yes [facility] LOW Barnard CNP   sertraline (ZOLOFT) 100 MG tablet TAKE 1 TABLET EVERY DAY Yes Moira Mallory T Brothers, DO   acetaminophen (TYLENOL) 325 MG tablet Take 650 mg by mouth every 4 hours as needed Yes Ar Automatic Reconciliation   aspirin 81 MG chewable tablet Take 81 mg by mouth daily Yes Ar Automatic Reconciliation   fluticasone-umeclidin-vilant (TRELEGY ELLIPTA) 100-62.5-25 MCG/INH AEPB Inhale 1 puff into the lungs daily Yes Ar Automatic Reconciliation   Hyoscyamine Sulfate SL 0.125 MG SUBL Place 0.125 mg under the tongue every 4 hours as needed Yes Ar Automatic Reconciliation   ipratropium-albuterol (DUONEB) 0.5-2.5 (3) MG/3ML SOLN nebulizer solution Inhale 3 mLs into the lungs every 4 hours as needed Yes Ar Automatic Reconciliation   nitroGLYCERIN (NITROSTAT) 0.4 MG SL tablet Place 0.4 mg under the tongue Yes Ar Automatic Reconciliation   rOPINIRole (REQUIP) 1 MG tablet Take 1 mg by mouth Yes Ar Automatic Reconciliation   lidocaine viscous hcl (XYLOCAINE) 2 % SOLN solution Take 15 mLs by mouth as needed for Irritation Gargle before meals and bedtime  Patient not taking: No sig reported  LOW Villegas CNPTeam (Including outside providers/suppliers regularly involved in providing care):   Patient Care Team:  Telly Stark DO as PCP - General  Telly Stark DO as PCP - [facility]  Vannesa Ibarra MD as Consulting Physician     Reviewed and updated this visit:  Tobacco  Allergies  Meds  Problems  Med Hx  Surg Hx  Soc Hx  Fam Hx

## 2022-12-21 NOTE — PATIENT INSTRUCTIONS
Learning About Being Active as an Older Adult  Why is being active important as you get older? Being active is one of the best things you can do for your health. And it's never too late to start. Being active--or getting active, if you aren't already--has definite benefits. It can:  Give you more energy,  Keep your mind sharp. Improve balance to reduce your risk of falls. Help you manage chronic illness with fewer medicines. No matter how old you are, how fit you are, or what health problems you have, there is a form of activity that will work for you. And the more physical activity you can do, the better your overall health will be. What kinds of activity can help you stay healthy? Being more active will make your daily activities easier. Physical activity includes planned exercise and things you do in daily life. There are four types of activity:  Aerobic. Doing aerobic activity makes your heart and lungs strong. Includes walking, dancing, and gardening. Aim for at least 2½ hours spread throughout the week. It improves your energy and can help you sleep better. Muscle-strengthening. This type of activity can help maintain muscle and strengthen bones. Includes climbing stairs, using resistance bands, and lifting or carrying heavy loads. Aim for at least twice a week. It can help protect the knees and other joints. Stretching. Stretching gives you better range of motion in joints and muscles. Includes upper arm stretches, calf stretches, and gentle yoga. Aim for at least twice a week, preferably after your muscles are warmed up from other activities. It can help you function better in daily life. Balancing. This helps you stay coordinated and have good posture. Includes heel-to-toe walking, rosalia chi, and certain types of yoga. Aim for at least 3 days a week. It can reduce your risk of falling.   Even if you have a hard time meeting the recommendations, it's better to be more active than less active. All activity done in each category counts toward your weekly total. You'd be surprised how daily things like carrying groceries, keeping up with grandchildren, and taking the stairs can add up. What keeps you from being active? If you've had a hard time being more active, you're not alone. Maybe you remember being able to do more. Or maybe you've never thought of yourself as being active. It's frustrating when you can't do the things you want. Being more active can help. What's holding you back? Getting started. Have a goal, but break it into easy tasks. Small steps build into big accomplishments. Staying motivated. If you feel like skipping your activity, remember your goal. Maybe you want to move better and stay independent. Every activity gets you one step closer. Not feeling your best.  Start with 5 minutes of an activity you enjoy. Prove to yourself you can do it. As you get comfortable, increase your time. You may not be where you want to be. But you're in the process of getting there. Everyone starts somewhere. How can you find safe ways to stay active? Talk with your doctor about any physical challenges you're facing. Make a plan with your doctor if you have a health problem or aren't sure how to get started with activity. If you're already active, ask your doctor if there is anything you should change to stay safe as your body and health change. If you tend to feel dizzy after you take medicine, avoid activity at that time. Try being active before you take your medicine. This will reduce your risk of falls. If you plan to be active at home, make sure to clear your space before you get started. Remove things like TV cords, coffee tables, and throw rugs. It's safest to have plenty of space to move freely. The key to getting more active is to take it slow and steady. Try to improve only a little bit at a time.  Pick just one area to improve on at first. And if an activity hurts, stop and talk to your doctor. Where can you learn more? Go to http://www.atkinson.com/ and enter P600 to learn more about \"Learning About Being Active as an Older Adult. \"  Current as of: October 10, 2022               Content Version: 13.5  © 1103-8731 Healthwise, Incorporated. Care instructions adapted under license by [facility] ([facility]). If you have questions about a medical condition or this instruction, always ask your healthcare professional. [place] [address] any warranty or liability for your use of this information. Learning About Dental Care for Older Adults  Dental care for older adults: Overview  Dental care for older people is much the same as for younger adults. But older adults do have concerns that younger adults do not. Older adults may have problems with gum disease and decay on the roots of their teeth. They may need missing teeth replaced or broken fillings fixed. Or they may have dentures that need to be cared for. Some older adults may have trouble holding a toothbrush. You can help remind the person you are caring for to brush and floss their teeth or to clean their dentures. In some cases, you may need to do the brushing and other dental care tasks. People who have trouble using their hands or who have dementia may need this extra help. How can you help with dental care? Normal dental care  To keep the teeth and gums healthy:  Brush the teeth with fluoride toothpaste twice a day--in the morning and at night--and floss at least once a day. Plaque can quickly build up on the teeth of older adults. Watch for the signs of gum disease. These signs include gums that bleed after brushing or after eating hard foods, such as apples. See a dentist regularly. Many experts recommend checkups every 6 months. Keep the dentist up to date on any new medications the person is taking.   Encourage a balanced diet that includes whole grains, vegetables, and fruits, and that is low in saturated fat and sodium. Encourage the person you're caring for not to use tobacco products. They can affect dental and general health. Many older adults have a fixed income and feel that they can't afford dental care. But most towns and [facility] have programs in which dentists help older adults by lowering fees. Contact your area's public health offices or  for information about dental care in your area. Using a toothbrush  Older adults with arthritis sometimes have trouble brushing their teeth because they can't easily hold the toothbrush. Their hands and fingers may be stiff, painful, or weak. If this is the case, you can: Offer an electric toothbrush. Enlarge the handle of a non-electric toothbrush by wrapping a sponge, an elastic bandage, or adhesive tape around it. Push the toothbrush handle through a ball made of rubber or soft foam.  Make the handle longer and thicker by taping Popsicle sticks or tongue depressors to it. You may also be able to buy special toothbrushes, toothpaste dispensers, and floss holders. Your doctor may recommend a soft-bristle toothbrush if the person you care for bleeds easily. Bleeding can happen because of a health problem or from certain medicines. A toothpaste for sensitive teeth may help if the person you care for has sensitive teeth. How do you brush and floss someone's teeth? If the person you are caring for has a hard time cleaning their teeth on their own, you may need to brush and floss their teeth for them. It may be easiest to have the person sit and face away from you, and to sit or stand behind them. That way you can steady their head against your arm as you reach around to floss and brush their teeth. Choose a place that has good lighting and is comfortable for both of you. Before you begin, gather your supplies. You will need gloves, floss, a toothbrush, and a container to hold water if you are not near a sink.  Wash and dry your hands well and put on gloves. Start by flossing:  Gently work a piece of floss between each of the teeth toward the gums. A plastic flossing tool may make this easier, and they are available at most [facility]. Curve the floss around each tooth into a U-shape and gently slide it under the gum line. Move the floss firmly up and down several times to scrape off the plaque. After you've finished flossing, throw away the used floss and begin brushing:  Wet the brush and apply toothpaste. Place the brush at a 45-degree angle where the teeth meet the gums. Press firmly, and move the brush in small circles over the surface of the teeth. Be careful not to brush too hard. Vigorous brushing can make the gums pull away from the teeth and can scratch the tooth enamel. Brush all surfaces of the teeth, on the tongue side and on the cheek side. Pay special attention to the front teeth and all surfaces of the back teeth. Brush chewing surfaces with short back-and-forth strokes. After you've finished, help the person rinse the remaining toothpaste from their mouth. Where can you learn more? Go to http://www.woods.com/ and enter F944 to learn more about \"Learning About Dental Care for Older Adults. \"  Current as of: June 16, 2022               Content Version: 13.5  © 7603-0287 Healthwise, Incorporated. Care instructions adapted under license by [facility] ([facility]). If you have questions about a medical condition or this instruction, always ask your healthcare professional. [place] [address] any warranty or liability for your use of this information. Learning About Vision Tests  What are vision tests? The four most common vision tests are visual acuity tests, refraction, visual field tests, and color vision tests. Visual acuity (sharpness) tests  These tests are used: To see if you need glasses or contact lenses. To monitor an eye problem. To check an eye injury.   Visual acuity tests are done as part of routine exams. You may also have this test when you get your 's license or apply for some types of jobs. Visual field tests  These tests are used: To check for vision loss in any area of your range of vision. To screen for certain eye diseases. To look for nerve damage after a stroke, head injury, or other problem that could reduce blood flow to the brain. Refraction and color tests  A refraction test is done to find the right prescription for glasses and contact lenses. A color vision test is done to check for color blindness. Color vision is often tested as part of a routine exam. You may also have this test when you apply for a job where recognizing different colors is important, such as , electronics, or the [employer]. How are vision tests done? Visual acuity test   You cover one eye at a time. You read aloud from a wall chart across the room. You read aloud from a small card that you hold in your hand. Refraction   You look into a special device. The device puts lenses of different strengths in front of each eye to see how strong your glasses or contact lenses need to be. Visual field tests   Your doctor may have you look through special machines. Or your doctor may simply have you stare straight ahead while they move a finger into and out of your field of vision. Color vision test   You look at pieces of printed test patterns in various colors. You say what number or symbol you see. Your doctor may have you trace the number or symbol using a pointer. How do these tests feel? There is very little chance of having a problem from this test. If dilating drops are used for a vision test, they may make the eyes sting and cause a medicine taste in the mouth. Follow-up care is a key part of your treatment and safety. Be sure to make and go to all appointments, and call your doctor if you are having problems.  It's also a good idea to know your test results and keep a list of the medicines you take. Where can you learn more? Go to http://www.atkinson.com/ and enter G551 to learn more about \"Learning About Vision Tests. \"  Current as of: October 12, 2022               Content Version: 13.5  © 4614-2215 Healthwise, [employer]. Care instructions adapted under license by [facility] [address] If you have questions about a medical condition or this instruction, always ask your healthcare professional. [place] [address] any warranty or liability for your use of this information. Advance Directives: Care Instructions  Overview  An advance directive is a legal way to state your wishes at the end of your life. It tells your family and your doctor what to do if you can't say what you want. There are two main types of advance directives. You can change them any time your wishes change. Living will. This form tells your family and your doctor your wishes about life support and other treatment. The form is also called a declaration. Medical power of . This form lets you name a person to make treatment decisions for you when you can't speak for yourself. This person is called a health care agent (health care proxy, health care surrogate). The form is also called a durable power of  for health care. If you do not have an advance directive, decisions about your medical care may be made by a family member, or by a doctor or a  who doesn't know you. It may help to think of an advance directive as a gift to the people who care for you. If you have one, they won't have to make tough decisions by themselves. For more information, including forms for your state, see the [address] website (www.caringinfo.org/planning/advance-directives/). Follow-up care is a key part of your treatment and safety. Be sure to make and go to all appointments, and call your doctor if you are having problems.  It's also a good idea to know your test results and keep a list of the medicines you take. What should you include in an advance directive? Many states have a unique advance directive form. (It may ask you to address specific issues.) Or you might use a universal form that's approved by many states. If your form doesn't tell you what to address, it may be hard to know what to include in your advance directive. Use the questions below to help you get started. Who do you want to make decisions about your medical care if you are not able to? What life-support measures do you want if you have a serious illness that gets worse over time or can't be cured? What are you most afraid of that might happen? (Maybe you're afraid of having pain, losing your independence, or being kept alive by machines.)  Where would you prefer to die? (Your home? A hospital? A nursing home?)  Do you want to donate your organs when you die? Do you want certain Voodoo practices performed before you die? When should you call for help? Be sure to contact your doctor if you have any questions. Where can you learn more? Go to http://OneRoomRate.com.atkinson.com/ and enter R264 to learn more about \"Advance Directives: Care Instructions. \"  Current as of: June 16, 2022               Content Version: 13.5  © 1267-5687 Healthwise, Incorporated. Care instructions adapted under license by [facility] ([facility]). If you have questions about a medical condition or this instruction, always ask your healthcare professional. [place] [address] any warranty or liability for your use of this information. Personalized Preventive Plan for Julia Messenger - 12/21/2022  Medicare offers a range of preventive health benefits. Some of the tests and screenings are paid in full while other may be subject to a deductible, co-insurance, and/or copay.     Some of these benefits include a comprehensive review of your medical history including lifestyle, illnesses that may run in your family, and various assessments and screenings as appropriate. After reviewing your medical record and screening and assessments performed today your provider may have ordered immunizations, labs, imaging, and/or referrals for you. A list of these orders (if applicable) as well as your Preventive Care list are included within your After Visit Summary for your review. Other Preventive Recommendations:    A preventive eye exam performed by an eye specialist is recommended every 1-2 years to screen for glaucoma; cataracts, macular degeneration, and other eye disorders. A preventive dental visit is recommended every 6 months. Try to get at least 150 minutes of exercise per week or 10,000 steps per day on a pedometer . Order or download the FREE \"Exercise & Physical Activity: Your Everyday Guide\" from The [employer]. Call 6-405.327.9991 or search The [employer] online. You need 9199-2441 mg of calcium and 5366-0062 IU of vitamin D per day. It is possible to meet your calcium requirement with diet alone, but a vitamin D supplement is usually necessary to meet this goal.  When exposed to the sun, use a sunscreen that protects against both UVA and UVB radiation with an SPF of 30 or greater. Reapply every 2 to 3 hours or after sweating, drying off with a towel, or swimming. Always wear a seat belt when traveling in a car. Always wear a helmet when riding a bicycle or motorcycle.

## 2022-12-22 LAB — PSA SERPL-MCNC: 2.1 NG/ML

## 2022-12-25 ENCOUNTER — PATIENT MESSAGE (OUTPATIENT)
Dept: INTERNAL MEDICINE CLINIC | Facility: CLINIC | Age: 67
End: 2022-12-25

## 2022-12-25 DIAGNOSIS — H43.391 VITREOUS FLOATERS OF RIGHT EYE: Primary | ICD-10-CM

## 2022-12-27 NOTE — TELEPHONE ENCOUNTER
From: Julia Olivera  To: Dr. Derrick Elizabeth  Sent: 12/25/2022 8:08 PM EST  Subject: Eye sight     After my visit to your office I have a black dot that I see when I move my right eye.  This is aggravating to see all the time, could this be a problem with my sight if so who would I have to see  Edson Harvey

## 2023-01-23 DIAGNOSIS — I95.2 HYPOTENSION DUE TO DRUGS: ICD-10-CM

## 2023-01-23 RX ORDER — AMLODIPINE BESYLATE 10 MG/1
5 TABLET ORAL DAILY
Qty: 90 TABLET | Refills: 1 | OUTPATIENT
Start: 2023-01-23

## 2023-01-23 RX ORDER — MECLIZINE HYDROCHLORIDE 25 MG/1
TABLET ORAL
Qty: 90 TABLET | Refills: 1 | Status: SHIPPED | OUTPATIENT
Start: 2023-01-23

## 2023-01-24 RX ORDER — AMLODIPINE BESYLATE 5 MG/1
5 TABLET ORAL DAILY
Qty: 90 TABLET | Refills: 1 | Status: SHIPPED | OUTPATIENT
Start: 2023-01-24

## 2023-01-25 ENCOUNTER — OFFICE VISIT (OUTPATIENT)
Dept: CARDIOLOGY CLINIC | Age: 68
End: 2023-01-25
Payer: MEDICARE

## 2023-01-25 VITALS
BODY MASS INDEX: 21.2 KG/M2 | DIASTOLIC BLOOD PRESSURE: 80 MMHG | WEIGHT: 160 LBS | SYSTOLIC BLOOD PRESSURE: 126 MMHG | HEART RATE: 70 BPM | HEIGHT: 73 IN

## 2023-01-25 DIAGNOSIS — I48.21 PERMANENT ATRIAL FIBRILLATION (HCC): Primary | ICD-10-CM

## 2023-01-25 DIAGNOSIS — E78.5 DYSLIPIDEMIA: ICD-10-CM

## 2023-01-25 DIAGNOSIS — I25.10 CORONARY ARTERY DISEASE INVOLVING NATIVE CORONARY ARTERY OF NATIVE HEART WITHOUT ANGINA PECTORIS: ICD-10-CM

## 2023-01-25 DIAGNOSIS — I10 PRIMARY HYPERTENSION: ICD-10-CM

## 2023-01-25 PROBLEM — I20.0 ACCELERATING ANGINA (HCC): Status: ACTIVE | Noted: 2023-01-25

## 2023-01-25 LAB
ANION GAP SERPL CALC-SCNC: 8 MMOL/L (ref 2–11)
BUN SERPL-MCNC: 10 MG/DL (ref 8–23)
CALCIUM SERPL-MCNC: 9.3 MG/DL (ref 8.3–10.4)
CHLORIDE SERPL-SCNC: 105 MMOL/L (ref 101–110)
CO2 SERPL-SCNC: 29 MMOL/L (ref 21–32)
CREAT SERPL-MCNC: 1 MG/DL (ref 0.8–1.5)
ERYTHROCYTE [DISTWIDTH] IN BLOOD BY AUTOMATED COUNT: 12.9 % (ref 11.9–14.6)
GLUCOSE SERPL-MCNC: 95 MG/DL (ref 65–100)
HCT VFR BLD AUTO: 47.4 % (ref 41.1–50.3)
HGB BLD-MCNC: 14.9 G/DL (ref 13.6–17.2)
MCH RBC QN AUTO: 29.4 PG (ref 26.1–32.9)
MCHC RBC AUTO-ENTMCNC: 31.4 G/DL (ref 31.4–35)
MCV RBC AUTO: 93.7 FL (ref 82–102)
NRBC # BLD: 0 K/UL (ref 0–0.2)
PLATELET # BLD AUTO: 239 K/UL (ref 150–450)
PMV BLD AUTO: 10.8 FL (ref 9.4–12.3)
POTASSIUM SERPL-SCNC: 4.3 MMOL/L (ref 3.5–5.1)
RBC # BLD AUTO: 5.06 M/UL (ref 4.23–5.6)
SODIUM SERPL-SCNC: 142 MMOL/L (ref 133–143)
WBC # BLD AUTO: 19.1 K/UL (ref 4.3–11.1)

## 2023-01-25 PROCEDURE — 3079F DIAST BP 80-89 MM HG: CPT | Performed by: INTERNAL MEDICINE

## 2023-01-25 PROCEDURE — G8484 FLU IMMUNIZE NO ADMIN: HCPCS | Performed by: INTERNAL MEDICINE

## 2023-01-25 PROCEDURE — 3074F SYST BP LT 130 MM HG: CPT | Performed by: INTERNAL MEDICINE

## 2023-01-25 PROCEDURE — 3017F COLORECTAL CA SCREEN DOC REV: CPT | Performed by: INTERNAL MEDICINE

## 2023-01-25 PROCEDURE — G8427 DOCREV CUR MEDS BY ELIG CLIN: HCPCS | Performed by: INTERNAL MEDICINE

## 2023-01-25 PROCEDURE — 1123F ACP DISCUSS/DSCN MKR DOCD: CPT | Performed by: INTERNAL MEDICINE

## 2023-01-25 PROCEDURE — G8420 CALC BMI NORM PARAMETERS: HCPCS | Performed by: INTERNAL MEDICINE

## 2023-01-25 PROCEDURE — 1036F TOBACCO NON-USER: CPT | Performed by: INTERNAL MEDICINE

## 2023-01-25 PROCEDURE — 99214 OFFICE O/P EST MOD 30 MIN: CPT | Performed by: INTERNAL MEDICINE

## 2023-01-25 NOTE — PROGRESS NOTES
[facility] CARDIOLOGY  [address], [address]  PHONE: [phone]      23    NAME:  Eliud Song  : 1955  MRN: [id number]       SUBJECTIVE:   Eliud Song is a 75 y.o. male seen for a follow up visit regarding the following:     Chief Complaint   Patient presents with    Atrial Fibrillation    Hypertension    Coronary Artery Disease         HPI:    Increased cp and ramirez- burning. . No orthopnea or pnd. No palpitations or syncope. Past Medical History, Past Surgical History, Family history, Social History, and Medications were all reviewed with the patient today and updated as necessary.      Current Outpatient Medications   Medication Sig Dispense Refill    amLODIPine (NORVASC) 5 MG tablet Take 1 tablet by mouth daily 90 tablet 1    meclizine (ANTIVERT) 25 MG tablet TAKE 1 TABLET THREE TIMES DAILY AS NEEDED FOR DIZZINESS 90 tablet 1    tiZANidine (ZANAFLEX) 4 MG tablet Take 1 tablet by mouth every 8 hours as needed (Muscle spasm) 45 tablet 2    albuterol (PROVENTIL) (2.5 MG/3ML) 0.083% nebulizer solution Take 3 mLs by nebulization every 6 hours as needed for Wheezing 360 each 3    pantoprazole (PROTONIX) 40 MG tablet TAKE 1 TABLET EVERY DAY 90 tablet 1    atorvastatin (LIPITOR) 40 MG tablet TAKE 1 TABLET EVERY DAY 90 tablet 3    sotalol (BETAPACE) 80 MG tablet TAKE 1 TABLET TWICE DAILY 180 tablet 3    Multiple Vitamin (MULTIVITAMIN ADULT PO) Take 1 tablet by mouth daily      sertraline (ZOLOFT) 100 MG tablet TAKE 1 TABLET EVERY DAY 90 tablet 1    acetaminophen (TYLENOL) 325 MG tablet Take 650 mg by mouth every 4 hours as needed      aspirin 81 MG chewable tablet Take 81 mg by mouth daily      nitroGLYCERIN (NITROSTAT) 0.4 MG SL tablet Place 0.4 mg under the tongue      rOPINIRole (REQUIP) 1 MG tablet Take 1 mg by mouth      isosorbide mononitrate (IMDUR) 30 MG extended release tablet TAKE 1 TABLET EVERY MORNING (Patient not taking: Reported on 2023) 90 tablet 3 Aspirin-Salicylamide-Caffeine (BC HEADACHE POWDER PO) Take by mouth as needed (Patient not taking: Reported on 2023)      albuterol sulfate HFA (PROVENTIL;VENTOLIN;PROAIR) 108 (90 Base) MCG/ACT inhaler Inhale 2 puffs into the lungs every 4 hours as needed for Wheezing or Shortness of Breath (Patient not taking: Reported on 2023) 18 g 11    lidocaine viscous hcl (XYLOCAINE) 2 % SOLN solution Take 15 mLs by mouth as needed for Irritation Gargle before meals and bedtime (Patient not taking: No sig reported) 100 mL 0    fluticasone-umeclidin-vilant (TRELEGY ELLIPTA) 100-62.5-25 MCG/INH AEPB Inhale 1 puff into the lungs daily (Patient not taking: Reported on 2023)      Hyoscyamine Sulfate SL 0.125 MG SUBL Place 0.125 mg under the tongue every 4 hours as needed (Patient not taking: Reported on 2023)      ipratropium-albuterol (DUONEB) 0.5-2.5 (3) MG/3ML SOLN nebulizer solution Inhale 3 mLs into the lungs every 4 hours as needed (Patient not taking: Reported on 2023)       No current facility-administered medications for this visit. Social History     Tobacco Use    Smoking status: Former     Packs/day: 1.00     Types: Cigarettes     Quit date: 3/4/2016     Years since quittin.8    Smokeless tobacco: Never   Substance Use Topics    Alcohol use: No     Alcohol/week: 0.0 standard drinks              PHYSICAL EXAM:   /80   Pulse 70   Ht 6' 1\" (1.854 m)   Wt 160 lb (72.6 kg)   BMI 21.11 kg/m²    Constitutional: Oriented to person, place, and time. Appears well-developed and well-nourished. Head: Normocephalic and atraumatic. Neck: Neck supple. Cardiovascular: Normal rate and regular rhythm with no murmur -No JVP  Pulmonary/Chest: Breath sounds normal.   Abdominal: Soft. Musculoskeletal: No edema. Neurological: Alert and oriented to person, place, and time. Skin: Skin is warm and dry. Psychiatric: Normal mood and affect. Vitals reviewed.       Wt Readings from Last 3 Encounters:   01/25/23 160 lb (72.6 kg)   12/21/22 160 lb (72.6 kg)   10/24/22 158 lb (71.7 kg)       Medical problems and test results were reviewed with the patient today. ASSESSMENT and PLAN    1. Permanent atrial fibrillation (HCC)  Stable. Continue betapace      2. Dyslipidemia  Stable. Continue lipitor      3. Primary hypertension  Stable. Continue norvsasc      4. Coronary artery disease involving native coronary artery of native heart without angina pectoris   worse. Left heart catheterization with possible angioplasty and alternative therapies discussed. Risks and benefits of the procedure including bleeding, arterial injury, infection, MI, stoke, death, emergent cabg, acute renal failure, contrast allergic reaction were discussed and questions answered. - CBC; Future  - Basic Metabolic Panel; Future  - Case Request Cardiac Cath Lab       No follow-ups on file.          Harris Braga MD  1/25/2023  3:40 PM 98.7

## 2023-01-31 NOTE — PROGRESS NOTES
Patient pre-assessment complete for Alexey Zapata scheduled for [facility], arrival time 0600. Patient verified using . Patient instructed to bring a list of all home medications on the day of procedure. NPO status reinforced. Patient informed to take a full dose aspirin 325mg  or 81 mg x 4 on the day of procedure. Instructed they can take all other medications excluding vitamins & supplements. Patient verbalizes understanding of all instructions & denies any questions at this time.

## 2023-02-01 ENCOUNTER — HOSPITAL ENCOUNTER (OUTPATIENT)
Age: 68
Setting detail: OUTPATIENT SURGERY
Discharge: HOME | End: 2023-02-01
Attending: INTERNAL MEDICINE | Admitting: INTERNAL MEDICINE
Payer: MEDICARE

## 2023-02-01 VITALS
TEMPERATURE: 98.1 F | BODY MASS INDEX: 21.2 KG/M2 | HEART RATE: 90 BPM | RESPIRATION RATE: 14 BRPM | HEIGHT: 73 IN | SYSTOLIC BLOOD PRESSURE: 112 MMHG | OXYGEN SATURATION: 90 % | WEIGHT: 160 LBS | DIASTOLIC BLOOD PRESSURE: 79 MMHG

## 2023-02-01 DIAGNOSIS — I20.0 ACCELERATING ANGINA (HCC): ICD-10-CM

## 2023-02-01 LAB
ECHO BSA: 1.93 M2
EKG ATRIAL RATE: 65 BPM
EKG DIAGNOSIS: NORMAL
EKG P AXIS: 81 DEGREES
EKG P-R INTERVAL: 162 MS
EKG Q-T INTERVAL: 438 MS
EKG QRS DURATION: 74 MS
EKG QTC CALCULATION (BAZETT): 455 MS
EKG R AXIS: 51 DEGREES
EKG T AXIS: 70 DEGREES
EKG VENTRICULAR RATE: 65 BPM

## 2023-02-01 PROCEDURE — 93005 ELECTROCARDIOGRAM TRACING: CPT | Performed by: INTERNAL MEDICINE

## 2023-02-01 PROCEDURE — 2500000003 HC RX 250 WO HCPCS: Performed by: INTERNAL MEDICINE

## 2023-02-01 PROCEDURE — 99152 MOD SED SAME PHYS/QHP 5/>YRS: CPT | Performed by: INTERNAL MEDICINE

## 2023-02-01 PROCEDURE — C1894 INTRO/SHEATH, NON-LASER: HCPCS | Performed by: INTERNAL MEDICINE

## 2023-02-01 PROCEDURE — 6360000004 HC RX CONTRAST MEDICATION: Performed by: INTERNAL MEDICINE

## 2023-02-01 PROCEDURE — 6360000002 HC RX W HCPCS: Performed by: INTERNAL MEDICINE

## 2023-02-01 PROCEDURE — 2709999900 HC NON-CHARGEABLE SUPPLY: Performed by: INTERNAL MEDICINE

## 2023-02-01 PROCEDURE — 93458 L HRT ARTERY/VENTRICLE ANGIO: CPT | Performed by: INTERNAL MEDICINE

## 2023-02-01 RX ORDER — 0.9 % SODIUM CHLORIDE 0.9 %
1000 INTRAVENOUS SOLUTION INTRAVENOUS ONCE
Status: DISCONTINUED | OUTPATIENT
Start: 2023-02-01 | End: 2023-02-01 | Stop reason: HOSPADM

## 2023-02-01 RX ORDER — MIDAZOLAM HYDROCHLORIDE 1 MG/ML
INJECTION INTRAMUSCULAR; INTRAVENOUS PRN
Status: DISCONTINUED | OUTPATIENT
Start: 2023-02-01 | End: 2023-02-01 | Stop reason: HOSPADM

## 2023-02-01 RX ORDER — SODIUM CHLORIDE 0.9 % (FLUSH) 0.9 %
5-40 SYRINGE (ML) INJECTION EVERY 12 HOURS SCHEDULED
Status: CANCELLED | OUTPATIENT
Start: 2023-02-01

## 2023-02-01 RX ORDER — RANOLAZINE 500 MG/1
500 TABLET, EXTENDED RELEASE ORAL 2 TIMES DAILY
Qty: 180 TABLET | Refills: 3 | OUTPATIENT
Start: 2023-02-01

## 2023-02-01 RX ORDER — ASPIRIN 81 MG/1
324 TABLET, CHEWABLE ORAL ONCE
Status: DISCONTINUED | OUTPATIENT
Start: 2023-02-01 | End: 2023-02-01 | Stop reason: HOSPADM

## 2023-02-01 RX ORDER — ACETAMINOPHEN 325 MG/1
650 TABLET ORAL EVERY 4 HOURS PRN
Status: CANCELLED | OUTPATIENT
Start: 2023-02-01

## 2023-02-01 RX ORDER — HEPARIN SODIUM 200 [USP'U]/100ML
INJECTION, SOLUTION INTRAVENOUS CONTINUOUS PRN
Status: DISCONTINUED | OUTPATIENT
Start: 2023-02-01 | End: 2023-02-01 | Stop reason: HOSPADM

## 2023-02-01 RX ORDER — SODIUM CHLORIDE 9 MG/ML
INJECTION, SOLUTION INTRAVENOUS PRN
Status: CANCELLED | OUTPATIENT
Start: 2023-02-01

## 2023-02-01 RX ORDER — SODIUM CHLORIDE 0.9 % (FLUSH) 0.9 %
5-40 SYRINGE (ML) INJECTION PRN
Status: CANCELLED | OUTPATIENT
Start: 2023-02-01

## 2023-02-01 RX ORDER — LIDOCAINE HYDROCHLORIDE 10 MG/ML
INJECTION, SOLUTION INFILTRATION; PERINEURAL PRN
Status: DISCONTINUED | OUTPATIENT
Start: 2023-02-01 | End: 2023-02-01 | Stop reason: HOSPADM

## 2023-02-01 NOTE — PROGRESS NOTES
Patient began oozing from groin site. Firm, direct pressure held. Sand bag in place. Patient to remain supine for one hour post manual pressure held. Patient verbalized understanding.

## 2023-02-01 NOTE — PROGRESS NOTES
Assisted OOB & ambulated to BR & on unit. Tolerated activity without difficulty. Right groin site without bleeding or hematoma. Discharge instructions given per orders, voiced good understanding of groin site care, medications & follow up care. Denies any questions.

## 2023-02-01 NOTE — PROGRESS NOTES
6 FR arterial sheath removed from right groin using sterile technique. Manual pressure held over site for 17 minutes. Hemostasis achieved. Right groin without bleeding without hematoma. Sterile gauze placed over site and secured with tegaderm. 5lb sandbag placed over site. Patient instructed to keep right leg straight and still. Patient verbalizes understanding.

## 2023-02-01 NOTE — PROGRESS NOTES
TRANSFER - OUT REPORT:    Verbal report given to RN on Randal Mcfarland  being transferred to [address] [place] [address] for routine progression of patient care       Report consisted of patient's Situation, Background, Assessment and   Recommendations(SBAR). Information from the following report(s) Nurse Handoff Report was reviewed with the receiving nurse. Clifford Assessment: No data recorded  Lines:   Peripheral IV 02/01/23 Right Antecubital (Active)       Peripheral IV 02/01/23 Left Antecubital (Active)        Opportunity for questions and clarification was provided.       Patient transported with:  Registered Nurse    KELSI Mendiola  Diagnostic  RFA - 6 fr sheath left in place    4 mg Versed

## 2023-02-01 NOTE — PROGRESS NOTES
Assisted OOB & ambulated to BR & on unit. Tolerated activity without difficulty. Right groin site without bleeding or hematoma.

## 2023-02-01 NOTE — PROGRESS NOTES
Pt arrived, ambulated to room with no visible problems, planned LHC for  [facility] [place]. Consent signed, Procedure discussed with pt all questions answered voiced understanding. Medications and history discussed with pt.     Pt prepped per orders The patient has a fraility score of 3-MANAGING WELL, based on no need for assistance     Patient took Aspirin 324mg today at 0600 prior to arrival.

## 2023-02-01 NOTE — PROGRESS NOTES
Report received from Lester, Cath Lab RN. Procedural finding communicated. Intra procedural medication administration reviewed. Progression of care discussed. Patient received into CPRU room 5, Post cardiac catheterization. Access site without bleeding or swelling. Yes    Patient instructed to limit movement of right lower extremity. Routine post procedural vital signs & site assessment initiated.

## 2023-02-01 NOTE — DISCHARGE INSTRUCTIONS
HEART CATHETERIZATION/ANGIOGRAPHY DISCHARGE INSTRUCTIONS    Check puncture site frequently for swelling or bleeding. If there is any bleeding, apply pressure over the area with a clean towel or washcloth and call 911. Notify your doctor for any redness, swelling, drainage, or oozing from the puncture site. Notify your doctor for any fever or chills. If the extremity becomes cold, numb, or painful call Dr. Shawnee Fletcher at [phone]. Activity should be limited for the next 48 hours. No heavy lifting, pushing, pulling  or strenuous activity for 48 hours. No heavy lifting (anything over 10 pounds) for 3 days. You may resume your usual diet. Drink more fluids than usual.  Have a responsible person drive you home and stay with you for at least 24 hours after your heart catheterization/angiography. You may remove bandage from your right groin in 24 hours. You may shower in 24 hours. No tub baths, hot tubs, or swimming for 1 week. Do not place any lotions, creams, powders, or ointments over puncture site for 1 week. You may place a clean band-aid over the puncture site each day for 5 days. Change daily. Sedation for a Medical Procedure: Care Instructions     You were given a sedative medication during your visit. While many of the effects will have worn   off before you leave; you may continue to feel some effects for several hours. Common side effects from sedation include:  Feeling sleepy. (Your doctors and nurses will make sure you are not too sleepy to go home.)  Nausea and vomiting. This usually does not last long. Feeling tired. How can you care for yourself at home? Activity    Don't do anything for 24 hours that requires attention to detail. It takes time for the medicine effects to completely wear off. Do not make important legal decisions for 24 hours. Do not sign any legal documents for 24 hours.      Do not drink alcohol today     For your safety, you should not drive or operate heavy machinery for the remainder of the day     Rest when you feel tired. Getting enough sleep will help you recover. Diet    You can eat your normal diet, unless your doctor gives you other instructions. If your stomach is upset, try clear liquids and bland, low-fat foods like plain toast or rice. Drink plenty of fluids (unless your doctor tells you not to). Don't drink alcohol for 24 hours. Medicines    Be safe with medicines. Read and follow all instructions on the label. If the doctor gave you a prescription medicine for pain, take it as prescribed. If you are not taking a prescription pain medicine, ask your doctor if you can take an over-the-counter medicine. If you think your pain medicine is making you sick to your stomach: Take your medicine after meals (unless your doctor has told you not to). Ask your doctor for a different pain medicine. I have read the above instructions and have had the opportunity to ask questions.       Patient: ________________________   Date: _____________    Witness: _______________________   Date: _____________

## 2023-02-02 RX ORDER — SERTRALINE HYDROCHLORIDE 100 MG/1
TABLET, FILM COATED ORAL
Qty: 90 TABLET | Refills: 1 | Status: SHIPPED | OUTPATIENT
Start: 2023-02-02

## 2023-02-22 ENCOUNTER — OFFICE VISIT (OUTPATIENT)
Dept: CARDIOLOGY CLINIC | Age: 68
End: 2023-02-22
Payer: MEDICARE

## 2023-02-22 VITALS
HEIGHT: 73 IN | BODY MASS INDEX: 20.94 KG/M2 | DIASTOLIC BLOOD PRESSURE: 60 MMHG | HEART RATE: 72 BPM | WEIGHT: 158 LBS | SYSTOLIC BLOOD PRESSURE: 112 MMHG

## 2023-02-22 DIAGNOSIS — I48.21 PERMANENT ATRIAL FIBRILLATION (HCC): Primary | ICD-10-CM

## 2023-02-22 DIAGNOSIS — I10 PRIMARY HYPERTENSION: ICD-10-CM

## 2023-02-22 DIAGNOSIS — E78.5 DYSLIPIDEMIA: ICD-10-CM

## 2023-02-22 DIAGNOSIS — I25.10 CORONARY ARTERY DISEASE INVOLVING NATIVE CORONARY ARTERY OF NATIVE HEART WITHOUT ANGINA PECTORIS: ICD-10-CM

## 2023-02-22 PROCEDURE — G8427 DOCREV CUR MEDS BY ELIG CLIN: HCPCS | Performed by: INTERNAL MEDICINE

## 2023-02-22 PROCEDURE — G8484 FLU IMMUNIZE NO ADMIN: HCPCS | Performed by: INTERNAL MEDICINE

## 2023-02-22 PROCEDURE — 3017F COLORECTAL CA SCREEN DOC REV: CPT | Performed by: INTERNAL MEDICINE

## 2023-02-22 PROCEDURE — 99214 OFFICE O/P EST MOD 30 MIN: CPT | Performed by: INTERNAL MEDICINE

## 2023-02-22 PROCEDURE — 3078F DIAST BP <80 MM HG: CPT | Performed by: INTERNAL MEDICINE

## 2023-02-22 PROCEDURE — 1123F ACP DISCUSS/DSCN MKR DOCD: CPT | Performed by: INTERNAL MEDICINE

## 2023-02-22 PROCEDURE — 1036F TOBACCO NON-USER: CPT | Performed by: INTERNAL MEDICINE

## 2023-02-22 PROCEDURE — 3074F SYST BP LT 130 MM HG: CPT | Performed by: INTERNAL MEDICINE

## 2023-02-22 PROCEDURE — G8420 CALC BMI NORM PARAMETERS: HCPCS | Performed by: INTERNAL MEDICINE

## 2023-02-22 RX ORDER — RANOLAZINE 500 MG/1
500 TABLET, EXTENDED RELEASE ORAL 2 TIMES DAILY
Qty: 60 TABLET | Refills: 11 | Status: SHIPPED | OUTPATIENT
Start: 2023-02-22

## 2023-02-22 NOTE — PROGRESS NOTES
[facility] CARDIOLOGY  [address] [facility], [address]  PHONE: [phone]      23    NAME:  Cece Murillo  : 1955  MRN: [id number]       SUBJECTIVE:   Cece Murillo is a 75 y.o. male seen for a follow up visit regarding the following:     Chief Complaint   Patient presents with    Follow-Up from Hospital    Coronary Artery Disease         HPI:    No cp. No orthopnea or pnd. No palpitations or syncope. Mahmood better but persists. Past Medical History, Past Surgical History, Family history, Social History, and Medications were all reviewed with the patient today and updated as necessary.      Current Outpatient Medications   Medication Sig Dispense Refill    ranolazine (RANEXA) 500 MG extended release tablet Take 1 tablet by mouth 2 times daily 60 tablet 11    sertraline (ZOLOFT) 100 MG tablet TAKE 1 TABLET EVERY DAY 90 tablet 1    amLODIPine (NORVASC) 5 MG tablet Take 1 tablet by mouth daily 90 tablet 1    meclizine (ANTIVERT) 25 MG tablet TAKE 1 TABLET THREE TIMES DAILY AS NEEDED FOR DIZZINESS 90 tablet 1    tiZANidine (ZANAFLEX) 4 MG tablet Take 1 tablet by mouth every 8 hours as needed (Muscle spasm) 45 tablet 2    albuterol (PROVENTIL) (2.5 MG/3ML) 0.083% nebulizer solution Take 3 mLs by nebulization every 6 hours as needed for Wheezing 360 each 3    pantoprazole (PROTONIX) 40 MG tablet TAKE 1 TABLET EVERY DAY 90 tablet 1    isosorbide mononitrate (IMDUR) 30 MG extended release tablet TAKE 1 TABLET EVERY MORNING 90 tablet 3    atorvastatin (LIPITOR) 40 MG tablet TAKE 1 TABLET EVERY DAY 90 tablet 3    sotalol (BETAPACE) 80 MG tablet TAKE 1 TABLET TWICE DAILY 180 tablet 3    Multiple Vitamin (MULTIVITAMIN ADULT PO) Take 1 tablet by mouth daily      albuterol sulfate HFA (PROVENTIL;VENTOLIN;PROAIR) 108 (90 Base) MCG/ACT inhaler Inhale 2 puffs into the lungs every 4 hours as needed for Wheezing or Shortness of Breath 18 g 11    acetaminophen (TYLENOL) 325 MG tablet Take 650 mg by mouth every 4 hours as needed      aspirin 81 MG chewable tablet Take 81 mg by mouth daily      Hyoscyamine Sulfate SL 0.125 MG SUBL Place 0.125 mg under the tongue every 4 hours as needed      ipratropium-albuterol (DUONEB) 0.5-2.5 (3) MG/3ML SOLN nebulizer solution Inhale 3 mLs into the lungs every 4 hours as needed      nitroGLYCERIN (NITROSTAT) 0.4 MG SL tablet Place 0.4 mg under the tongue      rOPINIRole (REQUIP) 1 MG tablet Take 1 mg by mouth nightly       No current facility-administered medications for this visit. Social History     Tobacco Use    Smoking status: Former     Packs/day: 1.00     Types: Cigarettes     Quit date: 3/4/2016     Years since quittin.9    Smokeless tobacco: Never   Substance Use Topics    Alcohol use: No     Alcohol/week: 0.0 standard drinks              PHYSICAL EXAM:   /60   Pulse 72   Ht 6' 1\" (1.854 m)   Wt 158 lb (71.7 kg)   BMI 20.85 kg/m²    Constitutional: Oriented to person, place, and time. Appears well-developed and well-nourished. Head: Normocephalic and atraumatic. Neck: Neck supple. Cardiovascular: Normal rate and regular rhythm with no murmur -No JVP  Pulmonary/Chest: Breath sounds normal.   Abdominal: Soft. Musculoskeletal: No edema. Neurological: Alert and oriented to person, place, and time. Skin: Skin is warm and dry. Psychiatric: Normal mood and affect. Vitals reviewed. Wt Readings from Last 3 Encounters:   23 158 lb (71.7 kg)   23 160 lb (72.6 kg)   23 160 lb (72.6 kg)       Medical problems and test results were reviewed with the patient today. ASSESSMENT and PLAN    1. Permanent atrial fibrillation (HCC)  Stable. Continue betapace      2. Primary hypertension  Stable. Continue imdur      3. Dyslipidemia  Stable. Continue lipitor      4. Coronary artery disease involving native coronary artery of native heart without angina pectoris  Stable. Continue asa         Return in about 3 months (around 5/22/2023).          Link MD Milagros  2/22/2023  4:17 PM

## 2023-03-14 ENCOUNTER — OFFICE VISIT (OUTPATIENT)
Dept: INTERNAL MEDICINE CLINIC | Facility: CLINIC | Age: 68
End: 2023-03-14
Payer: MEDICARE

## 2023-03-14 VITALS
SYSTOLIC BLOOD PRESSURE: 130 MMHG | HEART RATE: 79 BPM | WEIGHT: 161 LBS | HEIGHT: 73 IN | OXYGEN SATURATION: 91 % | DIASTOLIC BLOOD PRESSURE: 62 MMHG | TEMPERATURE: 98.2 F | BODY MASS INDEX: 21.34 KG/M2

## 2023-03-14 DIAGNOSIS — J01.90 ACUTE NON-RECURRENT SINUSITIS, UNSPECIFIED LOCATION: Primary | ICD-10-CM

## 2023-03-14 DIAGNOSIS — J44.9 COPD, VERY SEVERE (HCC): ICD-10-CM

## 2023-03-14 PROCEDURE — G8484 FLU IMMUNIZE NO ADMIN: HCPCS | Performed by: NURSE PRACTITIONER

## 2023-03-14 PROCEDURE — G8420 CALC BMI NORM PARAMETERS: HCPCS | Performed by: NURSE PRACTITIONER

## 2023-03-14 PROCEDURE — 3017F COLORECTAL CA SCREEN DOC REV: CPT | Performed by: NURSE PRACTITIONER

## 2023-03-14 PROCEDURE — 3023F SPIROM DOC REV: CPT | Performed by: NURSE PRACTITIONER

## 2023-03-14 PROCEDURE — 3075F SYST BP GE 130 - 139MM HG: CPT | Performed by: NURSE PRACTITIONER

## 2023-03-14 PROCEDURE — 99213 OFFICE O/P EST LOW 20 MIN: CPT | Performed by: NURSE PRACTITIONER

## 2023-03-14 PROCEDURE — G8427 DOCREV CUR MEDS BY ELIG CLIN: HCPCS | Performed by: NURSE PRACTITIONER

## 2023-03-14 PROCEDURE — 3078F DIAST BP <80 MM HG: CPT | Performed by: NURSE PRACTITIONER

## 2023-03-14 PROCEDURE — 1036F TOBACCO NON-USER: CPT | Performed by: NURSE PRACTITIONER

## 2023-03-14 PROCEDURE — 1123F ACP DISCUSS/DSCN MKR DOCD: CPT | Performed by: NURSE PRACTITIONER

## 2023-03-14 RX ORDER — AMOXICILLIN AND CLAVULANATE POTASSIUM 875; 125 MG/1; MG/1
1 TABLET, FILM COATED ORAL 2 TIMES DAILY
Qty: 14 TABLET | Refills: 0 | Status: SHIPPED | OUTPATIENT
Start: 2023-03-14 | End: 2023-03-21

## 2023-03-14 SDOH — ECONOMIC STABILITY: TRANSPORTATION INSECURITY
IN THE PAST 12 MONTHS, HAS LACK OF TRANSPORTATION KEPT YOU FROM MEETINGS, WORK, OR FROM GETTING THINGS NEEDED FOR DAILY LIVING?: NO

## 2023-03-14 SDOH — ECONOMIC STABILITY: FOOD INSECURITY: WITHIN THE PAST 12 MONTHS, YOU WORRIED THAT YOUR FOOD WOULD RUN OUT BEFORE YOU GOT MONEY TO BUY MORE.: SOMETIMES TRUE

## 2023-03-14 SDOH — ECONOMIC STABILITY: FOOD INSECURITY: WITHIN THE PAST 12 MONTHS, THE FOOD YOU BOUGHT JUST DIDN'T LAST AND YOU DIDN'T HAVE MONEY TO GET MORE.: PATIENT DECLINED

## 2023-03-14 SDOH — ECONOMIC STABILITY: INCOME INSECURITY: HOW HARD IS IT FOR YOU TO PAY FOR THE VERY BASICS LIKE FOOD, HOUSING, MEDICAL CARE, AND HEATING?: NOT VERY HARD

## 2023-03-14 SDOH — ECONOMIC STABILITY: HOUSING INSECURITY
IN THE LAST 12 MONTHS, WAS THERE A TIME WHEN YOU DID NOT HAVE A STEADY PLACE TO SLEEP OR SLEPT IN A SHELTER (INCLUDING NOW)?: NO

## 2023-03-14 ASSESSMENT — PATIENT HEALTH QUESTIONNAIRE - PHQ9
SUM OF ALL RESPONSES TO PHQ QUESTIONS 1-9: 0
SUM OF ALL RESPONSES TO PHQ QUESTIONS 1-9: 0
1. LITTLE INTEREST OR PLEASURE IN DOING THINGS: 0
2. FEELING DOWN, DEPRESSED OR HOPELESS: 0
SUM OF ALL RESPONSES TO PHQ QUESTIONS 1-9: 0
SUM OF ALL RESPONSES TO PHQ QUESTIONS 1-9: 0
SUM OF ALL RESPONSES TO PHQ9 QUESTIONS 1 & 2: 0

## 2023-03-14 ASSESSMENT — ENCOUNTER SYMPTOMS
COUGH: 1
SHORTNESS OF BREATH: 1
SINUS PRESSURE: 1
SORE THROAT: 1

## 2023-03-14 NOTE — PROGRESS NOTES
Andre Son (:  1955) is a 75 y.o. male,Established patient, here for evaluation of the following chief complaint(s):  Sinusitis         ASSESSMENT/PLAN:  1. Acute non-recurrent sinusitis, unspecified location  2. COPD, very severe ([address]    No follow-ups on file. Patient with sinusitis, congestion x 5 days  Lungs clear, afebrile  Recommend abx in 2-3 days if no improvement, he feels better today than yesterday  Discussed side effects of meds, take a probiotic and eat yogurt if he starts antibiotic      Subjective   SUBJECTIVE/OBJECTIVE:  Patient is here for sinus congestion that started last Wednesday. He had vertigo 2 days that resolved. Then he got more sick on Friday. He has had congestion and green mucous. Sinusitis  This is a new problem. The current episode started in the past 7 days. There has been no fever. Associated symptoms include chills, congestion, coughing (resolved), ear pain, headaches, shortness of breath (chronic, unchanged), sinus pressure and a sore throat. Pertinent negatives include no sneezing. Treatments tried: cough syrup with codeine. Review of Systems   Constitutional:  Positive for chills. HENT:  Positive for congestion, ear pain, sinus pressure and sore throat. Negative for sneezing. Respiratory:  Positive for cough (resolved) and shortness of breath (chronic, unchanged). Neurological:  Positive for headaches. Objective   Physical Exam  Vitals reviewed. Constitutional:       Appearance: Normal appearance. HENT:      Head: Normocephalic. Right Ear: External ear normal.      Left Ear: External ear normal.      Nose: Congestion present. Mouth/Throat:      Mouth: Mucous membranes are moist.      Pharynx: No oropharyngeal exudate or posterior oropharyngeal erythema. Eyes:      Extraocular Movements: Extraocular movements intact. Pupils: Pupils are equal, round, and reactive to light.    Cardiovascular:      Rate and Rhythm: Normal rate and regular rhythm. Pulmonary:      Effort: Pulmonary effort is normal.      Breath sounds: Normal breath sounds. No wheezing or rhonchi. Musculoskeletal:      Cervical back: Neck supple. Skin:     General: Skin is warm and dry. Neurological:      General: No focal deficit present. Mental Status: He is alert and oriented to person, place, and time. Psychiatric:         Mood and Affect: Mood normal.         Behavior: Behavior normal.                An electronic signature was used to authenticate this note.     --Mamta Friedman, LOW - CNP

## 2023-04-04 RX ORDER — MECLIZINE HYDROCHLORIDE 25 MG/1
TABLET ORAL
Qty: 90 TABLET | Refills: 1 | Status: SHIPPED | OUTPATIENT
Start: 2023-04-04

## 2023-04-05 RX ORDER — PANTOPRAZOLE SODIUM 40 MG/1
TABLET, DELAYED RELEASE ORAL
Qty: 90 TABLET | Refills: 1 | Status: SHIPPED | OUTPATIENT
Start: 2023-04-05

## 2023-05-08 ENCOUNTER — OFFICE VISIT (OUTPATIENT)
Dept: INTERNAL MEDICINE CLINIC | Facility: CLINIC | Age: 68
End: 2023-05-08
Payer: MEDICARE

## 2023-05-08 VITALS
OXYGEN SATURATION: 98 % | SYSTOLIC BLOOD PRESSURE: 120 MMHG | BODY MASS INDEX: 21.2 KG/M2 | HEART RATE: 86 BPM | HEIGHT: 73 IN | DIASTOLIC BLOOD PRESSURE: 72 MMHG | RESPIRATION RATE: 17 BRPM | WEIGHT: 160 LBS

## 2023-05-08 DIAGNOSIS — F51.04 PSYCHOPHYSIOLOGICAL INSOMNIA: ICD-10-CM

## 2023-05-08 DIAGNOSIS — N62 SUBAREOLAR GYNECOMASTIA IN MALE: Primary | ICD-10-CM

## 2023-05-08 DIAGNOSIS — N64.4 BREAST PAIN, RIGHT: ICD-10-CM

## 2023-05-08 PROCEDURE — 3074F SYST BP LT 130 MM HG: CPT | Performed by: FAMILY MEDICINE

## 2023-05-08 PROCEDURE — 3017F COLORECTAL CA SCREEN DOC REV: CPT | Performed by: FAMILY MEDICINE

## 2023-05-08 PROCEDURE — 3078F DIAST BP <80 MM HG: CPT | Performed by: FAMILY MEDICINE

## 2023-05-08 PROCEDURE — 1123F ACP DISCUSS/DSCN MKR DOCD: CPT | Performed by: FAMILY MEDICINE

## 2023-05-08 PROCEDURE — 99214 OFFICE O/P EST MOD 30 MIN: CPT | Performed by: FAMILY MEDICINE

## 2023-05-08 PROCEDURE — 1036F TOBACCO NON-USER: CPT | Performed by: FAMILY MEDICINE

## 2023-05-08 PROCEDURE — G8420 CALC BMI NORM PARAMETERS: HCPCS | Performed by: FAMILY MEDICINE

## 2023-05-08 PROCEDURE — G8427 DOCREV CUR MEDS BY ELIG CLIN: HCPCS | Performed by: FAMILY MEDICINE

## 2023-05-08 RX ORDER — DOXEPIN HYDROCHLORIDE 25 MG/1
25 CAPSULE ORAL NIGHTLY
Qty: 10 CAPSULE | Refills: 0 | Status: SHIPPED | OUTPATIENT
Start: 2023-05-08

## 2023-05-08 ASSESSMENT — PATIENT HEALTH QUESTIONNAIRE - PHQ9
2. FEELING DOWN, DEPRESSED OR HOPELESS: 0
SUM OF ALL RESPONSES TO PHQ QUESTIONS 1-9: 0
SUM OF ALL RESPONSES TO PHQ QUESTIONS 1-9: 0
9. THOUGHTS THAT YOU WOULD BE BETTER OFF DEAD, OR OF HURTING YOURSELF: 0
SUM OF ALL RESPONSES TO PHQ QUESTIONS 1-9: 0
5. POOR APPETITE OR OVEREATING: 0
1. LITTLE INTEREST OR PLEASURE IN DOING THINGS: 0
3. TROUBLE FALLING OR STAYING ASLEEP: 0
8. MOVING OR SPEAKING SO SLOWLY THAT OTHER PEOPLE COULD HAVE NOTICED. OR THE OPPOSITE, BEING SO FIGETY OR RESTLESS THAT YOU HAVE BEEN MOVING AROUND A LOT MORE THAN USUAL: 0
4. FEELING TIRED OR HAVING LITTLE ENERGY: 0
10. IF YOU CHECKED OFF ANY PROBLEMS, HOW DIFFICULT HAVE THESE PROBLEMS MADE IT FOR YOU TO DO YOUR WORK, TAKE CARE OF THINGS AT HOME, OR GET ALONG WITH OTHER PEOPLE: 0
7. TROUBLE CONCENTRATING ON THINGS, SUCH AS READING THE NEWSPAPER OR WATCHING TELEVISION: 0
SUM OF ALL RESPONSES TO PHQ9 QUESTIONS 1 & 2: 0
SUM OF ALL RESPONSES TO PHQ QUESTIONS 1-9: 0
6. FEELING BAD ABOUT YOURSELF - OR THAT YOU ARE A FAILURE OR HAVE LET YOURSELF OR YOUR FAMILY DOWN: 0

## 2023-05-08 NOTE — PROGRESS NOTES
Eugenia Hale DO  Diplomate of the [employer] of [address] [place] [address] Internal Medicine      Jose Luis Multani (: 1955) is a 75 y.o. male, here for evaluation of the following chief complaint(s):  Breast Pain       ASSESSMENT/PLAN:  1. Subareolar gynecomastia in male  -     DONAVAN DIGITAL DIAGNOSTIC W OR WO CAD RIGHT; Future  -     US BREAST COMPLETE RIGHT; Future  2. Breast pain, right  -     DONAVAN DIGITAL DIAGNOSTIC W OR WO CAD RIGHT; Future  -     US BREAST COMPLETE RIGHT; Future  3. Psychophysiological insomnia  -     doxepin (SINEQUAN) 25 MG capsule; Take 1 capsule by mouth nightly, Disp-10 capsule, R-0Normal     -Mammogram/ultrasound as ordered.  -Labs pending results.  -Increase doxepin to 25mg and monitor for response. Seep hygiene. SUBJECTIVE/OBJECTIVE:  HPI:  -Patient comes in today with chief complaint of having right breast pain. He states he noticed it this weekend. Feels like it is very tender. Feels like he is having some enlargement as well. No discharge. He has no history of trauma.  -Continues to have persistent problems with insomnia. States is difficult for him to turn off his mind at night. He was previously on doxepin. He was initially on 10 mg and then did increase it to 2 tablets which seem to work possibly a little bit better. He is currently not taking anything and having difficulty sleeping. He would like to retry this. ROS negative except as noted above today.     Social History     Tobacco Use    Smoking status: Former     Packs/day: 1.00     Years: 40.00     Pack years: 40.00     Types: Cigarettes     Quit date: 3/4/2016     Years since quittin.1    Smokeless tobacco: Never   Substance Use Topics    Alcohol use: No     Alcohol/week: 0.0 standard drinks    Drug use: No     Vitals:    23 1117   BP: 120/72   Site: Left Upper Arm   Position: Sitting   Pulse: 86   Resp: 17   SpO2: 98%   Weight: 160 lb (72.6 kg)   Height: 6' 1\" (1.854 m)      Body

## 2023-05-15 ENCOUNTER — HOSPITAL ENCOUNTER (OUTPATIENT)
Dept: MAMMOGRAPHY | Age: 68
Discharge: HOME | End: 2023-05-18
Payer: MEDICARE

## 2023-05-15 DIAGNOSIS — N64.4 BREAST PAIN, RIGHT: ICD-10-CM

## 2023-05-15 DIAGNOSIS — N62 SUBAREOLAR GYNECOMASTIA IN MALE: ICD-10-CM

## 2023-05-15 PROCEDURE — 76642 ULTRASOUND BREAST LIMITED: CPT

## 2023-05-15 PROCEDURE — 77066 DX MAMMO INCL CAD BI: CPT

## 2023-05-17 ENCOUNTER — PATIENT MESSAGE (OUTPATIENT)
Dept: INTERNAL MEDICINE CLINIC | Facility: CLINIC | Age: 68
End: 2023-05-17

## 2023-05-17 DIAGNOSIS — N62 GYNECOMASTIA, MALE: Primary | ICD-10-CM

## 2023-05-17 DIAGNOSIS — F51.04 PSYCHOPHYSIOLOGICAL INSOMNIA: ICD-10-CM

## 2023-05-18 RX ORDER — DOXEPIN HYDROCHLORIDE 25 MG/1
25 CAPSULE ORAL NIGHTLY
Qty: 30 CAPSULE | Refills: 5 | Status: SHIPPED | OUTPATIENT
Start: 2023-05-18

## 2023-06-05 ENCOUNTER — PATIENT MESSAGE (OUTPATIENT)
Dept: INTERNAL MEDICINE CLINIC | Facility: CLINIC | Age: 68
End: 2023-06-05

## 2023-06-05 DIAGNOSIS — R05.3 CHRONIC COUGH: Primary | ICD-10-CM

## 2023-06-05 NOTE — TELEPHONE ENCOUNTER
From: Jesús Hunter  To: Dr. Natalie Palafox Brothers  Sent: 6/5/2023 1:24 PM EDT  Subject: Cough medicine     Can I get a refill of my Hydrocodone/Homatropine 240ml I am completely out.  Send to [facility] please

## 2023-06-06 RX ORDER — HYDROCODONE BITARTRATE AND HOMATROPINE METHYLBROMIDE ORAL SOLUTION 5; 1.5 MG/5ML; MG/5ML
2.5 LIQUID ORAL EVERY 6 HOURS PRN
Qty: 240 ML | Refills: 0 | Status: SHIPPED | OUTPATIENT
Start: 2023-06-06 | End: 2023-07-06

## 2023-06-06 NOTE — TELEPHONE ENCOUNTER
From: Kristofer Diez  To: Dr. Giron Number  Sent: 5/17/2023 9:29 PM EDT  Subject: Mammogram     Should I set up an appointment to discuss my next steps after my tests?

## 2023-06-20 ENCOUNTER — NURSE ONLY (OUTPATIENT)
Dept: INTERNAL MEDICINE CLINIC | Facility: CLINIC | Age: 68
End: 2023-06-20

## 2023-06-20 DIAGNOSIS — N62 GYNECOMASTIA, MALE: ICD-10-CM

## 2023-06-20 LAB — HCG SERPL QL: NEGATIVE

## 2023-06-21 LAB
ESTRADIOL SERPL-MCNC: 29.29 PG/ML
LH SERPL-ACNC: 6.3 MIU/ML

## 2023-06-22 LAB — TESTOST SERPL-MCNC: 696 NG/DL (ref 264–916)

## 2023-06-27 ASSESSMENT — PATIENT HEALTH QUESTIONNAIRE - PHQ9
3. TROUBLE FALLING OR STAYING ASLEEP: 3
4. FEELING TIRED OR HAVING LITTLE ENERGY: SEVERAL DAYS
1. LITTLE INTEREST OR PLEASURE IN DOING THINGS: 1
1. LITTLE INTEREST OR PLEASURE IN DOING THINGS: SEVERAL DAYS
6. FEELING BAD ABOUT YOURSELF - OR THAT YOU ARE A FAILURE OR HAVE LET YOURSELF OR YOUR FAMILY DOWN: NOT AT ALL
7. TROUBLE CONCENTRATING ON THINGS, SUCH AS READING THE NEWSPAPER OR WATCHING TELEVISION: SEVERAL DAYS
2. FEELING DOWN, DEPRESSED OR HOPELESS: SEVERAL DAYS
SUM OF ALL RESPONSES TO PHQ QUESTIONS 1-9: 8
8. MOVING OR SPEAKING SO SLOWLY THAT OTHER PEOPLE COULD HAVE NOTICED. OR THE OPPOSITE - BEING SO FIDGETY OR RESTLESS THAT YOU HAVE BEEN MOVING AROUND A LOT MORE THAN USUAL: NOT AT ALL
SUM OF ALL RESPONSES TO PHQ QUESTIONS 1-9: 8
2. FEELING DOWN, DEPRESSED OR HOPELESS: 1
10. IF YOU CHECKED OFF ANY PROBLEMS, HOW DIFFICULT HAVE THESE PROBLEMS MADE IT FOR YOU TO DO YOUR WORK, TAKE CARE OF THINGS AT HOME, OR GET ALONG WITH OTHER PEOPLE: SOMEWHAT DIFFICULT
SUM OF ALL RESPONSES TO PHQ QUESTIONS 1-9: 8
6. FEELING BAD ABOUT YOURSELF - OR THAT YOU ARE A FAILURE OR HAVE LET YOURSELF OR YOUR FAMILY DOWN: 0
7. TROUBLE CONCENTRATING ON THINGS, SUCH AS READING THE NEWSPAPER OR WATCHING TELEVISION: 1
10. IF YOU CHECKED OFF ANY PROBLEMS, HOW DIFFICULT HAVE THESE PROBLEMS MADE IT FOR YOU TO DO YOUR WORK, TAKE CARE OF THINGS AT HOME, OR GET ALONG WITH OTHER PEOPLE: 1
4. FEELING TIRED OR HAVING LITTLE ENERGY: 1
5. POOR APPETITE OR OVEREATING: 1
8. MOVING OR SPEAKING SO SLOWLY THAT OTHER PEOPLE COULD HAVE NOTICED. OR THE OPPOSITE, BEING SO FIGETY OR RESTLESS THAT YOU HAVE BEEN MOVING AROUND A LOT MORE THAN USUAL: 0
9. THOUGHTS THAT YOU WOULD BE BETTER OFF DEAD, OR OF HURTING YOURSELF: 0
SUM OF ALL RESPONSES TO PHQ9 QUESTIONS 1 & 2: 2
5. POOR APPETITE OR OVEREATING: SEVERAL DAYS
9. THOUGHTS THAT YOU WOULD BE BETTER OFF DEAD, OR OF HURTING YOURSELF: NOT AT ALL
3. TROUBLE FALLING OR STAYING ASLEEP: NEARLY EVERY DAY

## 2023-06-28 ENCOUNTER — OFFICE VISIT (OUTPATIENT)
Dept: INTERNAL MEDICINE CLINIC | Facility: CLINIC | Age: 68
End: 2023-06-28
Payer: MEDICARE

## 2023-06-28 VITALS
OXYGEN SATURATION: 92 % | WEIGHT: 162 LBS | DIASTOLIC BLOOD PRESSURE: 70 MMHG | HEIGHT: 73 IN | SYSTOLIC BLOOD PRESSURE: 110 MMHG | BODY MASS INDEX: 21.47 KG/M2 | HEART RATE: 68 BPM | RESPIRATION RATE: 17 BRPM

## 2023-06-28 DIAGNOSIS — C91.10 CLL (CHRONIC LYMPHOCYTIC LEUKEMIA) (HCC): ICD-10-CM

## 2023-06-28 DIAGNOSIS — F51.04 PSYCHOPHYSIOLOGICAL INSOMNIA: ICD-10-CM

## 2023-06-28 DIAGNOSIS — F33.41 RECURRENT MAJOR DEPRESSIVE DISORDER, IN PARTIAL REMISSION (HCC): ICD-10-CM

## 2023-06-28 DIAGNOSIS — N62 GYNECOMASTIA: Primary | ICD-10-CM

## 2023-06-28 DIAGNOSIS — J44.9 COPD, VERY SEVERE (HCC): ICD-10-CM

## 2023-06-28 DIAGNOSIS — R07.89 RIGHT-SIDED CHEST WALL PAIN: ICD-10-CM

## 2023-06-28 LAB
ALBUMIN SERPL-MCNC: 4.1 G/DL (ref 3.2–4.6)
ALBUMIN/GLOB SERPL: 1.5 (ref 0.4–1.6)
ALP SERPL-CCNC: 102 U/L (ref 50–136)
ALT SERPL-CCNC: 24 U/L (ref 12–65)
ANION GAP SERPL CALC-SCNC: 7 MMOL/L (ref 2–11)
AST SERPL-CCNC: 19 U/L (ref 15–37)
BASOPHILS # BLD: 0.2 K/UL (ref 0–0.2)
BASOPHILS NFR BLD: 1 % (ref 0–2)
BILIRUB SERPL-MCNC: 0.8 MG/DL (ref 0.2–1.1)
BUN SERPL-MCNC: 14 MG/DL (ref 8–23)
CALCIUM SERPL-MCNC: 9.8 MG/DL (ref 8.3–10.4)
CHLORIDE SERPL-SCNC: 107 MMOL/L (ref 101–110)
CO2 SERPL-SCNC: 26 MMOL/L (ref 21–32)
CREAT SERPL-MCNC: 1 MG/DL (ref 0.8–1.5)
DIFFERENTIAL METHOD BLD: ABNORMAL
EOSINOPHIL # BLD: 0.3 K/UL (ref 0–0.8)
EOSINOPHIL NFR BLD: 2 % (ref 0.5–7.8)
ERYTHROCYTE [DISTWIDTH] IN BLOOD BY AUTOMATED COUNT: 13.2 % (ref 11.9–14.6)
GLOBULIN SER CALC-MCNC: 2.8 G/DL (ref 2.8–4.5)
GLUCOSE SERPL-MCNC: 113 MG/DL (ref 65–100)
HCT VFR BLD AUTO: 45.5 % (ref 41.1–50.3)
HGB BLD-MCNC: 14.4 G/DL (ref 13.6–17.2)
IMM GRANULOCYTES # BLD AUTO: 0 K/UL (ref 0–0.5)
IMM GRANULOCYTES NFR BLD AUTO: 0 % (ref 0–5)
LYMPHOCYTES # BLD: 11.6 K/UL (ref 0.5–4.6)
LYMPHOCYTES NFR BLD: 68 % (ref 13–44)
MCH RBC QN AUTO: 28.5 PG (ref 26.1–32.9)
MCHC RBC AUTO-ENTMCNC: 31.6 G/DL (ref 31.4–35)
MCV RBC AUTO: 90.1 FL (ref 82–102)
MONOCYTES # BLD: 1 K/UL (ref 0.1–1.3)
MONOCYTES NFR BLD: 6 % (ref 4–12)
NEUTS SEG # BLD: 3.9 K/UL (ref 1.7–8.2)
NEUTS SEG NFR BLD: 23 % (ref 43–78)
NRBC # BLD: 0 K/UL (ref 0–0.2)
PLATELET # BLD AUTO: 247 K/UL (ref 150–450)
PLATELET COMMENT: ADEQUATE
PMV BLD AUTO: 10.8 FL (ref 9.4–12.3)
POTASSIUM SERPL-SCNC: 4 MMOL/L (ref 3.5–5.1)
PROT SERPL-MCNC: 6.9 G/DL (ref 6.3–8.2)
RBC # BLD AUTO: 5.05 M/UL (ref 4.23–5.6)
RBC MORPH BLD: ABNORMAL
SODIUM SERPL-SCNC: 140 MMOL/L (ref 133–143)
WBC # BLD AUTO: 17 K/UL (ref 4.3–11.1)
WBC MORPH BLD: ABNORMAL

## 2023-06-28 PROCEDURE — 99214 OFFICE O/P EST MOD 30 MIN: CPT | Performed by: FAMILY MEDICINE

## 2023-06-28 PROCEDURE — 3074F SYST BP LT 130 MM HG: CPT | Performed by: FAMILY MEDICINE

## 2023-06-28 PROCEDURE — 3023F SPIROM DOC REV: CPT | Performed by: FAMILY MEDICINE

## 2023-06-28 PROCEDURE — 1123F ACP DISCUSS/DSCN MKR DOCD: CPT | Performed by: FAMILY MEDICINE

## 2023-06-28 PROCEDURE — 3017F COLORECTAL CA SCREEN DOC REV: CPT | Performed by: FAMILY MEDICINE

## 2023-06-28 PROCEDURE — G8427 DOCREV CUR MEDS BY ELIG CLIN: HCPCS | Performed by: FAMILY MEDICINE

## 2023-06-28 PROCEDURE — 1036F TOBACCO NON-USER: CPT | Performed by: FAMILY MEDICINE

## 2023-06-28 PROCEDURE — G8420 CALC BMI NORM PARAMETERS: HCPCS | Performed by: FAMILY MEDICINE

## 2023-06-28 PROCEDURE — 3078F DIAST BP <80 MM HG: CPT | Performed by: FAMILY MEDICINE

## 2023-06-28 RX ORDER — DOXEPIN HYDROCHLORIDE 10 MG/1
10-20 CAPSULE ORAL NIGHTLY
Qty: 60 CAPSULE | Refills: 5 | Status: SHIPPED | OUTPATIENT
Start: 2023-06-28

## 2023-06-28 RX ORDER — DOXEPIN HYDROCHLORIDE 10 MG/1
10-20 CAPSULE ORAL NIGHTLY
Qty: 60 CAPSULE | Refills: 5 | Status: SHIPPED | OUTPATIENT
Start: 2023-06-28 | End: 2023-06-28 | Stop reason: SDUPTHER

## 2023-06-28 RX ORDER — ALBUTEROL SULFATE 90 UG/1
2 AEROSOL, METERED RESPIRATORY (INHALATION) EVERY 4 HOURS PRN
Qty: 18 G | Refills: 11 | Status: SHIPPED | OUTPATIENT
Start: 2023-06-28

## 2023-06-29 DIAGNOSIS — R93.89 ABNORMAL CHEST X-RAY: Primary | ICD-10-CM

## 2023-06-29 DIAGNOSIS — N62 GYNECOMASTIA: ICD-10-CM

## 2023-07-14 ENCOUNTER — HOSPITAL ENCOUNTER (OUTPATIENT)
Dept: CT IMAGING | Age: 68
End: 2023-07-14
Attending: FAMILY MEDICINE
Payer: MEDICARE

## 2023-07-14 DIAGNOSIS — R93.89 ABNORMAL CHEST X-RAY: ICD-10-CM

## 2023-07-14 PROCEDURE — 71250 CT THORAX DX C-: CPT

## 2023-07-17 DIAGNOSIS — R91.8 LUNG NODULES: Primary | ICD-10-CM

## 2023-07-19 ENCOUNTER — OFFICE VISIT (OUTPATIENT)
Dept: PULMONOLOGY | Age: 68
End: 2023-07-19
Payer: MEDICARE

## 2023-07-19 VITALS
HEART RATE: 89 BPM | BODY MASS INDEX: 21.62 KG/M2 | WEIGHT: 163.1 LBS | HEIGHT: 73 IN | OXYGEN SATURATION: 90 % | SYSTOLIC BLOOD PRESSURE: 116 MMHG | RESPIRATION RATE: 19 BRPM | DIASTOLIC BLOOD PRESSURE: 74 MMHG | TEMPERATURE: 97.7 F

## 2023-07-19 DIAGNOSIS — Z87.891 PERSONAL HISTORY OF TOBACCO USE: ICD-10-CM

## 2023-07-19 DIAGNOSIS — R91.8 LUNG NODULES: ICD-10-CM

## 2023-07-19 DIAGNOSIS — J44.9 COPD, VERY SEVERE (HCC): Primary | ICD-10-CM

## 2023-07-19 DIAGNOSIS — J98.11 ATELECTASIS: ICD-10-CM

## 2023-07-19 LAB
EXPIRATORY TIME: NORMAL
FEF 25-75% %PRED-PRE: NORMAL
FEF 25-75% PRED: NORMAL
FEF 25-75%-PRE: NORMAL
FEV1 %PRED-PRE: 17 %
FEV1 PRED: NORMAL
FEV1/FVC %PRED-PRE: NORMAL
FEV1/FVC PRED: NORMAL
FEV1/FVC: 41 %
FEV1: 0.64 L
FVC %PRED-PRE: 31 %
FVC PRED: NORMAL
FVC: 1.56 L
PEF %PRED-PRE: NORMAL
PEF PRED: NORMAL
PEF-PRE: NORMAL

## 2023-07-19 PROCEDURE — G8427 DOCREV CUR MEDS BY ELIG CLIN: HCPCS | Performed by: NURSE PRACTITIONER

## 2023-07-19 PROCEDURE — 1036F TOBACCO NON-USER: CPT | Performed by: NURSE PRACTITIONER

## 2023-07-19 PROCEDURE — G8420 CALC BMI NORM PARAMETERS: HCPCS | Performed by: NURSE PRACTITIONER

## 2023-07-19 PROCEDURE — 94010 BREATHING CAPACITY TEST: CPT | Performed by: INTERNAL MEDICINE

## 2023-07-19 PROCEDURE — 3074F SYST BP LT 130 MM HG: CPT | Performed by: NURSE PRACTITIONER

## 2023-07-19 PROCEDURE — 99215 OFFICE O/P EST HI 40 MIN: CPT | Performed by: NURSE PRACTITIONER

## 2023-07-19 PROCEDURE — 3023F SPIROM DOC REV: CPT | Performed by: NURSE PRACTITIONER

## 2023-07-19 PROCEDURE — 3078F DIAST BP <80 MM HG: CPT | Performed by: NURSE PRACTITIONER

## 2023-07-19 PROCEDURE — 1123F ACP DISCUSS/DSCN MKR DOCD: CPT | Performed by: NURSE PRACTITIONER

## 2023-07-19 PROCEDURE — 3017F COLORECTAL CA SCREEN DOC REV: CPT | Performed by: NURSE PRACTITIONER

## 2023-07-19 RX ORDER — SOTALOL HYDROCHLORIDE 80 MG/1
TABLET ORAL
Qty: 180 TABLET | Refills: 3 | Status: SHIPPED | OUTPATIENT
Start: 2023-07-19

## 2023-07-19 RX ORDER — BUDESONIDE 0.5 MG/2ML
500 INHALANT ORAL 2 TIMES DAILY
Qty: 120 ML | Refills: 11 | Status: SHIPPED | OUTPATIENT
Start: 2023-07-19

## 2023-07-19 RX ORDER — IPRATROPIUM BROMIDE AND ALBUTEROL SULFATE 2.5; .5 MG/3ML; MG/3ML
1 SOLUTION RESPIRATORY (INHALATION) 4 TIMES DAILY
Qty: 360 ML | Refills: 11 | Status: SHIPPED | OUTPATIENT
Start: 2023-07-19

## 2023-07-19 RX ORDER — ISOSORBIDE MONONITRATE 30 MG/1
TABLET, EXTENDED RELEASE ORAL
Qty: 90 TABLET | Refills: 3 | Status: SHIPPED | OUTPATIENT
Start: 2023-07-19

## 2023-07-19 RX ORDER — ATORVASTATIN CALCIUM 40 MG/1
TABLET, FILM COATED ORAL
Qty: 90 TABLET | Refills: 3 | Status: SHIPPED | OUTPATIENT
Start: 2023-07-19

## 2023-07-19 ASSESSMENT — PULMONARY FUNCTION TESTS
FEV1: 0.64
FEV1_PERCENT_PREDICTED_PRE: 17
FVC_PERCENT_PREDICTED_PRE: 31
FEV1/FVC: 41
FVC: 1.56

## 2023-07-19 NOTE — PATIENT INSTRUCTIONS
I have ordered CT of chest in 1 year. You should receive a call from scheduling within 2-3 business days. If you do not hear from them, please call [phone] to schedule your test.     May use albuterol inhaler 2 puffs every 4 hours IF NEEDED for relief of wheezing or cough    Use nebulizer with Duoneb (albuterol-ipratropium) 4 times daily. Add budesonide (Pulmicort) to this twice daily.

## 2023-07-19 NOTE — PROGRESS NOTES
Name:  Chris Martinez  YOB: 1955   MRN: [id number]      Office Visit: 7/19/2023        ASSESSMENT AND PLAN:  (Medical Decision Making)    Impression: 75 y.o. male who is seen for bilateral lung nodules and COPD. Last seen in our office 4/2021.    1. COPD, very severe ([address])  --There has been significant decline in spirometry since his last visit, now FEV1 is 70% of predicted. He needs to be on triple therapy. He previously was on Trelegy, but could not afford it. He currently is on albuterol as needed. We will arrange for him to be on DuoNeb 4 times daily and Pulmicort twice daily and his nebulizer. I have stressed the importance of compliance. --Alpha-1 antitrypsin cheek swab today. --Needs overnight oximetry on room air for requalification. He has been on O2 at 2 L/min with sleep with [employer], but needs a different DME company.    - Spirometry Without Bronchodilator  - Alpha-1-Antitrypsin; Future  - DME - DURABLE MEDICAL EQUIPMENT  - DME - DURABLE MEDICAL EQUIPMENT  - ipratropium 0.5 mg-albuterol 2.5 mg (DUONEB) 0.5-2.5 (3) MG/3ML SOLN nebulizer solution; Take 3 mLs by nebulization 4 times daily File to Medicare part B--J44.9  Dispense: 360 mL; Refill: 11  - budesonide (PULMICORT) 0.5 MG/2ML nebulizer suspension; Take 2 mLs by nebulization 2 times daily File to Medicare part B-J44.9  Dispense: 120 mL; Refill: 11    2. Lung nodules  --Small bilateral nodules, 2 to 5 mm. High risk for lung cancer due to positive family history of lung cancer and personal history of CLL and significant smoking history. Will need follow-up CT of his chest in 1 year. - CT CHEST WO CONTRAST; Future    3. Atelectasis  --Mild. Age of this is unknown. Hopefully getting him on regular treatments of bronchodilators will help with this. 4. Personal history of tobacco use  --40-pack-year history of cigarette smoking, quit in 2016.   We will obtain follow-up CT of his chest in 1 year for his nodules, and then

## 2023-07-24 ENCOUNTER — OFFICE VISIT (OUTPATIENT)
Dept: SURGERY | Age: 68
End: 2023-07-24
Payer: MEDICARE

## 2023-07-24 VITALS
WEIGHT: 165 LBS | RESPIRATION RATE: 16 BRPM | SYSTOLIC BLOOD PRESSURE: 130 MMHG | HEIGHT: 73 IN | OXYGEN SATURATION: 98 % | HEART RATE: 82 BPM | DIASTOLIC BLOOD PRESSURE: 84 MMHG | BODY MASS INDEX: 21.87 KG/M2

## 2023-07-24 DIAGNOSIS — N62 GYNECOMASTIA: ICD-10-CM

## 2023-07-24 PROCEDURE — 3079F DIAST BP 80-89 MM HG: CPT | Performed by: SURGERY

## 2023-07-24 PROCEDURE — 3075F SYST BP GE 130 - 139MM HG: CPT | Performed by: SURGERY

## 2023-07-24 PROCEDURE — 99214 OFFICE O/P EST MOD 30 MIN: CPT | Performed by: SURGERY

## 2023-07-24 PROCEDURE — G8427 DOCREV CUR MEDS BY ELIG CLIN: HCPCS | Performed by: SURGERY

## 2023-07-24 PROCEDURE — 1036F TOBACCO NON-USER: CPT | Performed by: SURGERY

## 2023-07-24 PROCEDURE — G8420 CALC BMI NORM PARAMETERS: HCPCS | Performed by: SURGERY

## 2023-07-24 PROCEDURE — 1123F ACP DISCUSS/DSCN MKR DOCD: CPT | Performed by: SURGERY

## 2023-07-24 PROCEDURE — 3017F COLORECTAL CA SCREEN DOC REV: CPT | Performed by: SURGERY

## 2023-07-24 NOTE — PROGRESS NOTES
H&P/Consult Note/Progress Note/Office Note:   Olga Elias  MRN: [id number]  :1955  Age:67 y.o.    HPI:  Mr Erica Urias was referred by Ty Lara for evaluation of gynecomastia. He reports pain in his right breast with swelling since approximately 2023. Nothing particular makes it better or worse. He was a daily drinker for more than 20 years but quit 3 years ago. He has no personal or family history of breast carcinoma. He had the below ultrasound and mammogram performed which identified no evidence of malignancy. : Olga Elias is a 75 y.o. male who             5/15/23 bilat dx mammo with CAD and bilat breast US  Hx: Subacute right retroareolar palpable lump and mild to moderate pain. No prior breast surgery, personal or family breast cancer. Patient has had CLL. Standard views bilaterally demonstrate mostly fatty tissue compatible with male anatomy. There is mild right and minimal left retroareolar flame shape densities   but no suspicious mass, architectural distortion, abnormal calcification cluster or skin thickening on either side. Ultrasound: Real time targeted sonography was performed of the right retroareolar area of symptoms by the technologist, demonstrating benign gynecomastia. Left breast was scanned at the same level demonstrating minimal similar finding. IMPRESSION:  Benign gynecomastia. No evidence of malignancy. Recommend clinical follow-up. Results and recommendations today were reported to the patient at the time of interpretation. BI-RADS Assessment Category 2: Benign finding        He saw Dr Abbie Carrillo in  for unrelated right groin pain after prior recurrent right inguinal hernia surgery with mesh on 20.        Past Medical History:   Diagnosis Date    Arrhythmia     A Fib twice since 2010-- ablation no problems since    Arthritis     CAD (coronary artery disease) 2009    stent x 1-- ablation -- no problems since    Cancer [facility])     skin

## 2023-07-27 DIAGNOSIS — J44.9 COPD, VERY SEVERE (HCC): ICD-10-CM

## 2023-08-04 ENCOUNTER — OFFICE VISIT (OUTPATIENT)
Dept: INTERNAL MEDICINE CLINIC | Facility: CLINIC | Age: 68
End: 2023-08-04
Payer: MEDICARE

## 2023-08-04 VITALS
BODY MASS INDEX: 22.26 KG/M2 | TEMPERATURE: 98.6 F | DIASTOLIC BLOOD PRESSURE: 78 MMHG | HEIGHT: 73 IN | WEIGHT: 168 LBS | SYSTOLIC BLOOD PRESSURE: 114 MMHG | OXYGEN SATURATION: 93 % | HEART RATE: 84 BPM

## 2023-08-04 DIAGNOSIS — M54.41 ACUTE RIGHT-SIDED LOW BACK PAIN WITH RIGHT-SIDED SCIATICA: ICD-10-CM

## 2023-08-04 DIAGNOSIS — M54.42 ACUTE LEFT-SIDED LOW BACK PAIN WITH LEFT-SIDED SCIATICA: Primary | ICD-10-CM

## 2023-08-04 PROCEDURE — G8427 DOCREV CUR MEDS BY ELIG CLIN: HCPCS | Performed by: FAMILY MEDICINE

## 2023-08-04 PROCEDURE — 1123F ACP DISCUSS/DSCN MKR DOCD: CPT | Performed by: FAMILY MEDICINE

## 2023-08-04 PROCEDURE — G8420 CALC BMI NORM PARAMETERS: HCPCS | Performed by: FAMILY MEDICINE

## 2023-08-04 PROCEDURE — 3074F SYST BP LT 130 MM HG: CPT | Performed by: FAMILY MEDICINE

## 2023-08-04 PROCEDURE — 3017F COLORECTAL CA SCREEN DOC REV: CPT | Performed by: FAMILY MEDICINE

## 2023-08-04 PROCEDURE — 3078F DIAST BP <80 MM HG: CPT | Performed by: FAMILY MEDICINE

## 2023-08-04 PROCEDURE — 1036F TOBACCO NON-USER: CPT | Performed by: FAMILY MEDICINE

## 2023-08-04 PROCEDURE — 99213 OFFICE O/P EST LOW 20 MIN: CPT | Performed by: FAMILY MEDICINE

## 2023-08-04 RX ORDER — TIZANIDINE 4 MG/1
4 TABLET ORAL EVERY 8 HOURS PRN
Qty: 45 TABLET | Refills: 2 | Status: SHIPPED | OUTPATIENT
Start: 2023-08-04

## 2023-08-04 RX ORDER — METHYLPREDNISOLONE 4 MG/1
TABLET ORAL
Qty: 1 KIT | Refills: 0 | Status: SHIPPED | OUTPATIENT
Start: 2023-08-04

## 2023-08-04 NOTE — PROGRESS NOTES
Zoe Su DO  Diplomate of the [employer] [address] Internal Medicine      Chris Martinez (: 1955) is a 75 y.o. male, here for evaluation of the following chief complaint(s):  Pain (Left hip stress strain x 5 days)       ASSESSMENT/PLAN:  1. Acute left-sided low back pain with left-sided sciatica  -     methylPREDNISolone (MEDROL DOSEPACK) 4 MG tablet; Take by mouth., Disp-1 kit, R-0Normal  2. Acute right-sided low back pain with right-sided sciatica  -     tiZANidine (ZANAFLEX) 4 MG tablet; Take 1 tablet by mouth every 8 hours as needed (Muscle spasm), Disp-45 tablet, R-2Normal     --Instructed on how to take the steroids properly as well as potential side effects of the medication. Advised to let me know for any problems. -Muscle relaxers prn.  -For acute pain, rest, intermittent application of moist heat--no more than 15minutes at a time. treatment plan to include a home back care exercise program with flexion exercise routine. Proper lifting with avoidance of heavy lifting. Consider Physical Therapy and imaging studies if not improving. SUBJECTIVE/OBJECTIVE:  HPI:  -Patient comes in today with chief complaint of having left lower back pain that is radiating into his hip and down his leg. He states on Monday of this week he was sitting on a stool and stood up from it and felt a sharp pain. No other history of trauma. States pain is now radiating into his groin and left hip and into his thigh. No urinary incontinence. No saddle anesthesia. He does have a history of lumbar degenerative changes. ROS negative except as noted above today. Social History     Tobacco Use    Smoking status: Former     Packs/day: 1.00     Years: 40.00     Pack years: 40.00     Types: Cigarettes     Quit date: 3/4/2016     Years since quittin.4    Smokeless tobacco: Never   Substance Use Topics    Alcohol use: No     Alcohol/week: 0.0 standard drinks    Drug use:  No

## 2023-08-10 ENCOUNTER — PREP FOR PROCEDURE (OUTPATIENT)
Dept: SURGERY | Age: 68
End: 2023-08-10

## 2023-08-11 ENCOUNTER — PREP FOR PROCEDURE (OUTPATIENT)
Dept: SURGERY | Age: 68
End: 2023-08-11

## 2023-08-14 RX ORDER — SODIUM CHLORIDE 0.9 % (FLUSH) 0.9 %
5-40 SYRINGE (ML) INJECTION PRN
Status: CANCELLED | OUTPATIENT
Start: 2023-08-14

## 2023-08-14 RX ORDER — SODIUM CHLORIDE 9 MG/ML
INJECTION, SOLUTION INTRAVENOUS PRN
Status: CANCELLED | OUTPATIENT
Start: 2023-08-14

## 2023-08-14 RX ORDER — SODIUM CHLORIDE 0.9 % (FLUSH) 0.9 %
5-40 SYRINGE (ML) INJECTION EVERY 12 HOURS SCHEDULED
Status: CANCELLED | OUTPATIENT
Start: 2023-08-14

## 2023-08-16 ENCOUNTER — OFFICE VISIT (OUTPATIENT)
Age: 68
End: 2023-08-16
Payer: MEDICARE

## 2023-08-16 VITALS
SYSTOLIC BLOOD PRESSURE: 110 MMHG | HEART RATE: 80 BPM | BODY MASS INDEX: 22 KG/M2 | HEIGHT: 73 IN | WEIGHT: 166 LBS | DIASTOLIC BLOOD PRESSURE: 70 MMHG

## 2023-08-16 DIAGNOSIS — E78.2 MIXED HYPERLIPIDEMIA: ICD-10-CM

## 2023-08-16 DIAGNOSIS — I48.21 PERMANENT ATRIAL FIBRILLATION (HCC): ICD-10-CM

## 2023-08-16 DIAGNOSIS — I10 PRIMARY HYPERTENSION: ICD-10-CM

## 2023-08-16 DIAGNOSIS — I25.10 CORONARY ARTERY DISEASE INVOLVING NATIVE CORONARY ARTERY OF NATIVE HEART WITHOUT ANGINA PECTORIS: Primary | ICD-10-CM

## 2023-08-16 PROCEDURE — 1036F TOBACCO NON-USER: CPT | Performed by: INTERNAL MEDICINE

## 2023-08-16 PROCEDURE — 3078F DIAST BP <80 MM HG: CPT | Performed by: INTERNAL MEDICINE

## 2023-08-16 PROCEDURE — 3017F COLORECTAL CA SCREEN DOC REV: CPT | Performed by: INTERNAL MEDICINE

## 2023-08-16 PROCEDURE — G8420 CALC BMI NORM PARAMETERS: HCPCS | Performed by: INTERNAL MEDICINE

## 2023-08-16 PROCEDURE — 3074F SYST BP LT 130 MM HG: CPT | Performed by: INTERNAL MEDICINE

## 2023-08-16 PROCEDURE — G8428 CUR MEDS NOT DOCUMENT: HCPCS | Performed by: INTERNAL MEDICINE

## 2023-08-16 PROCEDURE — 1123F ACP DISCUSS/DSCN MKR DOCD: CPT | Performed by: INTERNAL MEDICINE

## 2023-08-16 PROCEDURE — 99214 OFFICE O/P EST MOD 30 MIN: CPT | Performed by: INTERNAL MEDICINE

## 2023-08-16 NOTE — PROGRESS NOTES
[facility] CARDIOLOGY  [address] [address]  PHONE: [phone]      23    NAME:  Olga Elias  : 1955  MRN: [id number]       SUBJECTIVE:   Olga Elias is a 70 y.o. male seen for a follow up visit regarding the following:     Chief Complaint   Patient presents with    Results    Atrial Fibrillation         HPI:    Mahmood persists and now on O2. . No orthopnea or pnd. No palpitations or syncope. Occasional cp better with akuakameron chavezter. Past Medical History, Past Surgical History, Family history, Social History, and Medications were all reviewed with the patient today and updated as necessary.      Current Outpatient Medications   Medication Sig Dispense Refill    atorvastatin (LIPITOR) 40 MG tablet Take 1 tablet by mouth daily      tiZANidine (ZANAFLEX) 4 MG tablet Take 1 tablet by mouth every 8 hours as needed (Muscle spasm) 45 tablet 2    isosorbide mononitrate (IMDUR) 30 MG extended release tablet TAKE 1 TABLET EVERY MORNING 90 tablet 3    sotalol (BETAPACE) 80 MG tablet TAKE 1 TABLET TWICE DAILY 180 tablet 3    ipratropium 0.5 mg-albuterol 2.5 mg (DUONEB) 0.5-2.5 (3) MG/3ML SOLN nebulizer solution Take 3 mLs by nebulization 4 times daily File to Medicare part B--J44.9 360 mL 11    budesonide (PULMICORT) 0.5 MG/2ML nebulizer suspension Take 2 mLs by nebulization 2 times daily File to Medicare part B-J44.9 120 mL 11    albuterol sulfate HFA (PROVENTIL;VENTOLIN;PROAIR) 108 (90 Base) MCG/ACT inhaler Inhale 2 puffs into the lungs every 4 hours as needed for Wheezing or Shortness of Breath 18 g 11    doxepin (SINEQUAN) 10 MG capsule Take 1-2 capsules by mouth nightly 60 capsule 5    meclizine (ANTIVERT) 25 MG tablet TAKE 1 TABLET THREE TIMES DAILY AS NEEDED FOR DIZZINESS 90 tablet 1    amLODIPine (NORVASC) 5 MG tablet Take 1 tablet by mouth daily 90 tablet 1    pantoprazole (PROTONIX) 40 MG tablet TAKE 1 TABLET EVERY DAY 90 tablet 1    sertraline (ZOLOFT) 100 MG tablet

## 2023-08-21 ENCOUNTER — PREP FOR PROCEDURE (OUTPATIENT)
Dept: SURGERY | Age: 68
End: 2023-08-21

## 2023-08-21 ENCOUNTER — CLINICAL DOCUMENTATION (OUTPATIENT)
Dept: SURGERY | Age: 68
End: 2023-08-21

## 2023-08-21 NOTE — PROGRESS NOTES
Dr. Kellie Nielsen reviewed chart, recent ED notes, and states \"ok to reschedule surgery 4 weeks after sx.? Surgeon's office notified.

## 2023-08-28 ENCOUNTER — OFFICE VISIT (OUTPATIENT)
Dept: INTERNAL MEDICINE CLINIC | Facility: CLINIC | Age: 68
End: 2023-08-28
Payer: MEDICARE

## 2023-08-28 VITALS
WEIGHT: 170 LBS | TEMPERATURE: 98.6 F | HEIGHT: 73 IN | BODY MASS INDEX: 22.53 KG/M2 | HEART RATE: 63 BPM | OXYGEN SATURATION: 93 % | DIASTOLIC BLOOD PRESSURE: 60 MMHG | SYSTOLIC BLOOD PRESSURE: 120 MMHG

## 2023-08-28 DIAGNOSIS — J44.9 COPD, VERY SEVERE (HCC): Primary | ICD-10-CM

## 2023-08-28 DIAGNOSIS — J44.1 COPD EXACERBATION (HCC): ICD-10-CM

## 2023-08-28 LAB
ERYTHROCYTE [DISTWIDTH] IN BLOOD BY AUTOMATED COUNT: 14.5 % (ref 11.9–14.6)
HCT VFR BLD AUTO: 43.2 % (ref 41.1–50.3)
HGB BLD-MCNC: 13.5 G/DL (ref 13.6–17.2)
MCH RBC QN AUTO: 28.7 PG (ref 26.1–32.9)
MCHC RBC AUTO-ENTMCNC: 31.3 G/DL (ref 31.4–35)
MCV RBC AUTO: 91.9 FL (ref 82–102)
NRBC # BLD: 0 K/UL (ref 0–0.2)
PLATELET # BLD AUTO: 270 K/UL (ref 150–450)
PMV BLD AUTO: 9.8 FL (ref 9.4–12.3)
RBC # BLD AUTO: 4.7 M/UL (ref 4.23–5.6)
WBC # BLD AUTO: 17.4 K/UL (ref 4.3–11.1)

## 2023-08-28 PROCEDURE — 3078F DIAST BP <80 MM HG: CPT | Performed by: NURSE PRACTITIONER

## 2023-08-28 PROCEDURE — 3074F SYST BP LT 130 MM HG: CPT | Performed by: NURSE PRACTITIONER

## 2023-08-28 PROCEDURE — 3017F COLORECTAL CA SCREEN DOC REV: CPT | Performed by: NURSE PRACTITIONER

## 2023-08-28 PROCEDURE — 99214 OFFICE O/P EST MOD 30 MIN: CPT | Performed by: NURSE PRACTITIONER

## 2023-08-28 PROCEDURE — 3023F SPIROM DOC REV: CPT | Performed by: NURSE PRACTITIONER

## 2023-08-28 PROCEDURE — G8420 CALC BMI NORM PARAMETERS: HCPCS | Performed by: NURSE PRACTITIONER

## 2023-08-28 PROCEDURE — 1123F ACP DISCUSS/DSCN MKR DOCD: CPT | Performed by: NURSE PRACTITIONER

## 2023-08-28 PROCEDURE — G8427 DOCREV CUR MEDS BY ELIG CLIN: HCPCS | Performed by: NURSE PRACTITIONER

## 2023-08-28 PROCEDURE — 1036F TOBACCO NON-USER: CPT | Performed by: NURSE PRACTITIONER

## 2023-08-28 RX ORDER — AMOXICILLIN AND CLAVULANATE POTASSIUM 875; 125 MG/1; MG/1
1 TABLET, FILM COATED ORAL 2 TIMES DAILY
Qty: 14 TABLET | Refills: 0 | Status: SHIPPED | OUTPATIENT
Start: 2023-08-28 | End: 2023-09-04

## 2023-08-28 ASSESSMENT — ENCOUNTER SYMPTOMS
SHORTNESS OF BREATH: 1
RHINORRHEA: 1
WHEEZING: 1
SORE THROAT: 0
COUGH: 1

## 2023-08-28 NOTE — PROGRESS NOTES
Iza Leonard (:  1955) is a 70 y.o. male,Established patient, here for evaluation of the following chief complaint(s):  Cough (2 weeks now bad bacterial infection/)         ASSESSMENT/PLAN:  1. COPD, very severe ([address])  2. COPD exacerbation ([address])      No follow-ups on file. Patient with COPD exacerbation  Some improvement with doxycycline  Reviewed ER imaging  Check lab and start augmentin  Repeat chest xray, diminished breath sounds bilateral  Continue his oxygen and his nebulizer trx        Subjective   SUBJECTIVE/OBJECTIVE:  Patient is here for cough. He is on oxygen with severe COPD. He had new neb med that seemed to trigger the cough. He stopped and is back on albuterol neb. He went to ER last week and got chest xray and doxy. It seemed to help but he finished abx and cough persists. Cough  This is a new problem. The current episode started 1 to 4 weeks ago. The problem has been unchanged. The cough is Productive of sputum. Associated symptoms include nasal congestion, rhinorrhea, shortness of breath and wheezing. Pertinent negatives include no chest pain, chills, fever, myalgias, postnasal drip or sore throat. Nothing aggravates the symptoms. Review of Systems   Constitutional:  Negative for chills and fever. HENT:  Positive for rhinorrhea. Negative for postnasal drip and sore throat. Respiratory:  Positive for cough, shortness of breath and wheezing. Cardiovascular:  Negative for chest pain. Musculoskeletal:  Negative for myalgias. Objective   Physical Exam  Vitals reviewed. Constitutional:       Appearance: Normal appearance. HENT:      Head: Normocephalic. Right Ear: External ear normal.      Left Ear: External ear normal.   Eyes:      Extraocular Movements: Extraocular movements intact. Pupils: Pupils are equal, round, and reactive to light. Cardiovascular:      Rate and Rhythm: Normal rate and regular rhythm.    Pulmonary:      Effort: Pulmonary

## 2023-08-29 DIAGNOSIS — J44.1 COPD EXACERBATION (HCC): ICD-10-CM

## 2023-08-29 LAB
ALBUMIN SERPL-MCNC: 3.9 G/DL (ref 3.2–4.6)
ALBUMIN/GLOB SERPL: 1.6 (ref 0.4–1.6)
ALP SERPL-CCNC: 94 U/L (ref 50–136)
ALT SERPL-CCNC: 23 U/L (ref 12–65)
ANION GAP SERPL CALC-SCNC: 4 MMOL/L (ref 2–11)
AST SERPL-CCNC: 19 U/L (ref 15–37)
BILIRUB SERPL-MCNC: 1 MG/DL (ref 0.2–1.1)
BUN SERPL-MCNC: 11 MG/DL (ref 8–23)
CALCIUM SERPL-MCNC: 9.7 MG/DL (ref 8.3–10.4)
CHLORIDE SERPL-SCNC: 110 MMOL/L (ref 101–110)
CO2 SERPL-SCNC: 31 MMOL/L (ref 21–32)
CREAT SERPL-MCNC: 1 MG/DL (ref 0.8–1.5)
GLOBULIN SER CALC-MCNC: 2.4 G/DL (ref 2.8–4.5)
GLUCOSE SERPL-MCNC: 95 MG/DL (ref 65–100)
POTASSIUM SERPL-SCNC: 4.4 MMOL/L (ref 3.5–5.1)
PROT SERPL-MCNC: 6.3 G/DL (ref 6.3–8.2)
SODIUM SERPL-SCNC: 145 MMOL/L (ref 133–143)

## 2023-09-06 ENCOUNTER — OFFICE VISIT (OUTPATIENT)
Dept: INTERNAL MEDICINE CLINIC | Facility: CLINIC | Age: 68
End: 2023-09-06
Payer: MEDICARE

## 2023-09-06 VITALS
DIASTOLIC BLOOD PRESSURE: 70 MMHG | HEART RATE: 81 BPM | OXYGEN SATURATION: 92 % | WEIGHT: 167 LBS | SYSTOLIC BLOOD PRESSURE: 112 MMHG | BODY MASS INDEX: 22.13 KG/M2 | TEMPERATURE: 98.8 F | HEIGHT: 73 IN

## 2023-09-06 DIAGNOSIS — J44.1 COPD EXACERBATION (HCC): ICD-10-CM

## 2023-09-06 DIAGNOSIS — J44.9 COPD, VERY SEVERE (HCC): Primary | ICD-10-CM

## 2023-09-06 PROCEDURE — 3023F SPIROM DOC REV: CPT | Performed by: NURSE PRACTITIONER

## 2023-09-06 PROCEDURE — 99213 OFFICE O/P EST LOW 20 MIN: CPT | Performed by: NURSE PRACTITIONER

## 2023-09-06 PROCEDURE — 3078F DIAST BP <80 MM HG: CPT | Performed by: NURSE PRACTITIONER

## 2023-09-06 PROCEDURE — 3074F SYST BP LT 130 MM HG: CPT | Performed by: NURSE PRACTITIONER

## 2023-09-06 PROCEDURE — 1123F ACP DISCUSS/DSCN MKR DOCD: CPT | Performed by: NURSE PRACTITIONER

## 2023-09-06 PROCEDURE — G8420 CALC BMI NORM PARAMETERS: HCPCS | Performed by: NURSE PRACTITIONER

## 2023-09-06 PROCEDURE — 1036F TOBACCO NON-USER: CPT | Performed by: NURSE PRACTITIONER

## 2023-09-06 PROCEDURE — 3017F COLORECTAL CA SCREEN DOC REV: CPT | Performed by: NURSE PRACTITIONER

## 2023-09-06 PROCEDURE — G8427 DOCREV CUR MEDS BY ELIG CLIN: HCPCS | Performed by: NURSE PRACTITIONER

## 2023-09-06 NOTE — PROGRESS NOTES
Betty Sender (:  1955) is a 70 y.o. male,Established patient, here for evaluation of the following chief complaint(s):  COPD (Follow up )         ASSESSMENT/PLAN:  1. COPD, very severe ([address])  -     XR CHEST (2 VW); Future  2. COPD exacerbation (HCC)  -     XR CHEST (2 VW); Future      No follow-ups on file. Copd exacerbation improved  Questionable pneumonia on xray  Feeling good, repeat xray in 4 weeks to see resolution  F/u with PCP or as needed  Use mucinex for cough, recommend he retry duoneb as it may help       Subjective   SUBJECTIVE/OBJECTIVE:  Patient is here for follow up. He feels good. He is taking pulmicort and albuterol, not on duo-neb, it makes him cough. He finished abx and feels good. He still does have cough but not as bad      Review of Systems       Objective   Physical Exam  Constitutional:       Appearance: Normal appearance. He is not ill-appearing. HENT:      Head: Normocephalic. Right Ear: External ear normal.      Left Ear: External ear normal.   Eyes:      Extraocular Movements: Extraocular movements intact. Pupils: Pupils are equal, round, and reactive to light. Cardiovascular:      Rate and Rhythm: Normal rate. Pulmonary:      Effort: Pulmonary effort is normal.      Breath sounds: No wheezing or rhonchi. Musculoskeletal:      Cervical back: Neck supple. Neurological:      Mental Status: He is alert. An electronic signature was used to authenticate this note.     --Michael Hanson, LOW - CNP

## 2023-09-11 RX ORDER — PANTOPRAZOLE SODIUM 40 MG/1
TABLET, DELAYED RELEASE ORAL
Qty: 90 TABLET | Refills: 1 | Status: SHIPPED | OUTPATIENT
Start: 2023-09-11

## 2023-09-11 RX ORDER — SERTRALINE HYDROCHLORIDE 100 MG/1
TABLET, FILM COATED ORAL
Qty: 90 TABLET | Refills: 1 | Status: SHIPPED | OUTPATIENT
Start: 2023-09-11

## 2023-09-11 RX ORDER — AMLODIPINE BESYLATE 5 MG/1
5 TABLET ORAL DAILY
Qty: 90 TABLET | Refills: 1 | Status: SHIPPED | OUTPATIENT
Start: 2023-09-11

## 2023-09-13 NOTE — PROGRESS NOTES
[facility] Hospitalist Note     Admit Date:  2021  5:12 PM   Name:  Preet Lucio   Age:  76 y.o.  :  1955   MRN:  [id number]   PCP:  Levi Walton DO  Treatment Team: Attending Provider: Custer Bence, MD; Utilization Review: Heather Song RN; Care Manager: Aiden Reis [facility]; Consulting Provider: Sabrina Gill MD    HPI/Subjective:   Preet Lucio is a 76 y.o. male with medical history of paroxysmal atrial fibrillation,  heart disease and stents in place, CLL, COPD and hypertension admitted with right-sided hemicranial headache associated with dizziness. Orthostatic negative. CT head negative, CTA of head and neck negative for LVO, high-grade stenosis or evidence of vasculitic changes. Neurology consulted and recommend possible differential includes routine DDx migraine versus nervus intermedius neuralgia versus less likely cerebral ischemia. Patient is started on carbamazepine 100 mg p.o. twice daily on  for possible nervus intermedius neuralgia     : Patient is seen at the bedside. Endorses persistent hemicranial headache and dizziness associated with nausea. MRI brain pending. Physical therapy saw patient and recommend patient is high risk for fall and not safe for discharge to home due to transient and unpredictable symptoms. Discussed with patient about possibility of discharge to rehab versus SNF. Patient refused stating his wife cannot take care of him at home as well has kids nearby to help him. He would like to discharge to home whenever he is ready.     Assessment and Plan:     Dizziness:  CT head and CTA head and neck benign  MRI brain pending  Echocardiogram with ejection fraction 50 to 55%  Neurology consulted and recommend possible differential includes vertiginous migraine versus nervus intermedius neuralgia versus less likely cerebral ischemia  Patient is started on carbamazepine 100 mg p.o. twice daily on  for possible nervus intermedius neura  PT/OT  Continue meclizine and Phenergan    Hypertension/CAD/paroxysmal A. Fib:  Continue aspirin, amlodipine, Imdur, and sotalol    Depression:  Continue home meds    Discharge planning: Pending MRI brain and improvement in symptoms. Hopefully in 2 to 3 days. Physical therapy recommend STR versus SNF but patient refused. Discussed with case management. DVT ppx ordered  Code status:  Full  Estimated LOS:  Greater than 2 midnights  Risk:  high    Hospital Problems as of 5/29/2021 Date Reviewed: 5/11/2021        Codes Class Noted - Resolved POA    Dehydration ICD-10-CM: E86.0  ICD-9-CM: 276.51  5/27/2021 - Present Unknown        Heat exhaustion ICD-10-CM: T67. 5XXA  ICD-9-CM: 992.5  5/27/2021 - Present Unknown        CLL (chronic lymphocytic leukemia) (HCC) (Chronic) ICD-10-CM: C91.10  ICD-9-CM: 204.10  7/24/2018 - Present Yes        * (Principal) Dizziness ICD-10-CM: R42  ICD-9-CM: 780.4  7/22/2016 - Present Unknown        Atrial fibrillation (HCC) (Chronic) ICD-10-CM: I48.91  ICD-9-CM: 427.31  7/22/2010 - Present Yes    Overview Signed 10/16/2018 11:31 AM by James Alejo MD     Left atrial appendage occlusion (10/16/18):  30 mm Watchman device. HTN (hypertension) (Chronic) ICD-10-CM: I10  ICD-9-CM: 401.9  7/22/2010 - Present Yes                10 systems reviewed and negative except as noted in HPI.   Past Medical History:   Diagnosis Date    Arrhythmia     A Fib twice since 7/2010-- ablation no problems since    Arthritis     CAD (coronary artery disease) 11/2009    stent x 1-- ablation 2010-- no problems since    Cancer [facility])     skin cancer    Chest pain     Chest pain     left-side chest pain    Chronic midline low back pain without sciatica 8/28/2017    CLL (chronic lymphocytic leukemia) ([facility] [address] 7/24/2018    COPD     prn inhalers--- severe per EHR    Coronary atherosclerosis of native coronary artery 12/30/2015    Depression     Dyspnea on exertion     with any activity; associated with nausea    GERD (gastroesophageal reflux disease) 2010    medication    Heart failure ([address]     Hematochezia     History of tobacco use 2015    HTN (hypertension) x 1 month    started on med--- controlled    Hypercholesteremia     controlled by medication    On home O2     QHS 2L     Other ill-defined conditions(799.89)     dyslipidemia/hyperlipidemia    Palpitations 2015    Paroxysmal atrial fibrillation ([address]     Presence of Watchman left atrial appendage closure device     per cath 2019- \"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  Per Dr. Mejia Owusu note 9/3/2019- \" discussed this finding with experts in the field of Watchman\" - \"seen on a few occasions since inception of device\" \"nothing needed to be done\"    Psychiatric disorder     depression    PUD (peptic ulcer disease)     hx-- r/t coumadin--    Recurrent depression ([address] 2016    Tobacco use disorder 2015    Unspecified adverse effect of anesthesia     quit breathing ---Stated during ear surgery \" on the table\"--- states no problems with anesthesia many time given since since        Past Surgical History:   Procedure Laterality Date    COLONOSCOPY N/A 2019    COLONOSCOPY/  performed by Kalyani Agrawal MD at [facility] HX COLONOSCOPY      HX HEART CATHETERIZATION  2009    PCI x 1 to LAD  ----ablation     HX HEART CATHETERIZATION  3/2016    HX HEART CATHETERIZATION  2019    HX HEENT   and     eardrum repair-- right    HX HERNIA REPAIR      Right inguinal    HX HERNIA REPAIR  2020    HX LAP CHOLECYSTECTOMY      HX ORTHOPAEDIC Left     left shoulder surg x1    HX ORTHOPAEDIC Left 2012    hand    HX OTHER SURGICAL      EGD    HX SHOULDER ARTHROSCOPY Right 2000    x 3 on right    LA CARDIAC SURG PROCEDURE UNLIST      LAAO 10/18      Allergies   Allergen Reactions    Fentanyl Other (comments)     States had through a PCA after shoulder surgery in , and \"throat swelled shut\" and that he \"quit breathing\". Update 11/16/10--Patient thinks the PCA gave him to much and it wasn't an allergy to drug itself. On 20 patient now states he is allergic to fentanyl. Had heart cath 19 and per note fentanyl was given. No reactions noted.  Lovastatin Other (comments)     Hullucinations    Niacin Other (comments)     Denies allery    Advicor [Niacin-Lovastatin] Anaphylaxis and Itching      Social History     Tobacco Use    Smoking status: Former Smoker     Packs/day: 1.00     Years: 45.00     Pack years: 45.00     Types: Cigarettes     Quit date: 3/4/2016     Years since quittin.2    Smokeless tobacco: Never Used   Substance Use Topics    Alcohol use: No     Alcohol/week: 0.0 standard drinks      Family History   Problem Relation Age of Onset    Heart Disease Father     Heart Attack Father     Hypertension Father     Bleeding Prob Father     Heart Surgery Father         multiple PCIs, first in his 46s    Heart Disease Brother         PCI x 2    Diabetes Mother     Cancer Mother         lung    Diabetes Brother     Diabetes Brother     Heart Attack Brother     Heart Disease Brother     Diabetes Brother     Bleeding Prob Brother         blood clots    Heart Disease Brother     No Known Problems Brother       Family history reviewed and noncontributory.   Immunization History   Administered Date(s) Administered    (RETIRED) Pneumococcal Vaccine (Unspecified Type) 2010    Covid-19, MODERNA, Mrna, Lnp-s, Pf, 100mcg/0.5mL 2021, 2021    Influenza Vaccine 2015    Influenza Vaccine (Quad) PF (>6 Mo Flulaval, Fluarix, and >3 Yrs Afluria, Fluzone [id number]) 12/15/2014, 2017, 2018    Influenza Vaccine (Tri) Adjuvanted (>65 Yrs FLUAD TRI [id number]) 10/09/2019    Influenza Vaccine PF 02/15/2017    Influenza, Quadrivalent, Adjuvanted (>65 Yrs FLUAD QUAD [id number]) 10/19/2020    Pneumococcal Conjugate (PCV-13) 10/09/2019    Pneumococcal Polysaccharide (PPSV-23) 2010, 2010, 2014    Td 2003    Tdap 2010     PTA Medications:  Prior to Admission Medications   Prescriptions Last Dose Informant Patient Reported? Taking? Oxygen 2021 at Unknown time  Yes Yes   SiLPM QHS   acetaminophen (TYLENOL) 325 mg tablet 2021 at Unknown time  Yes Yes   Sig: Take 2 Tabs by mouth every four (4) hours as needed. albuterol (Ventolin HFA) 90 mcg/actuation inhaler 2021 at Unknown time  No Yes   Sig: Take 2 Puffs by inhalation every four (4) hours as needed (prn). albuterol-ipratropium (DUO-NEB) 2.5 mg-0.5 mg/3 ml nebu 2021 at Unknown time  No Yes   Sig: 3 mL by Nebulization route every four (4) hours as needed for Wheezing. File to Medicare part B--J44.9   amLODIPine (NORVASC) 10 mg tablet 2021 at Unknown time  No Yes   Sig: TAKE 1 TABLET EVERY EVENING   aspirin 81 mg chewable tablet 2021 at Unknown time  Yes Yes   Sig: Take 1 Tab by mouth daily. fluticasone-umeclidinium-vilanterol (Trelegy Ellipta) 100-62.5-25 mcg inhaler 2021 at Unknown time  No Yes   Sig: Take 1 Puff by inhalation daily. hyoscyamine SL (LEVSIN/SL) 0.125 mg SL tablet 2021 at Unknown time  Yes Yes   Si.125 mg by SubLINGual route every four (4) hours as needed for Cramping. isosorbide mononitrate ER (IMDUR) 30 mg tablet 2021 at Unknown time  No Yes   Sig: Take 1 Tablet by mouth every morning. meclizine (ANTIVERT) 25 mg tablet 2021 at Unknown time  No Yes   Sig: TAKE 1 TABLET 3 TIMES DAILY AS NEEDED FOR DIZZINESS. INDICATIONS: SENSATION OF SPINNING OR WHIRLING   multivitamin (ONE A DAY) tablet 2021 at Unknown time  Yes Yes   Sig: Take 1 Tab by mouth daily. nitroglycerin (NITROSTAT) 0.4 mg SL tablet Unknown at Unknown time  No No   Si Tab by SubLINGual route every five (5) minutes as needed for Chest Pain. nystatin (MYCOSTATIN) 100,000 unit/mL suspension Not Taking at Unknown time  No No   Sig: Take 5 mL by mouth four (4) times daily. swish and spit   Patient not taking: Reported on 5/27/2021   pantoprazole (PROTONIX) 40 mg tablet 5/27/2021 at Unknown time  No Yes   Sig: TAKE 1 TABLET EVERY DAY   promethazine (PHENERGAN) 25 mg tablet Not Taking at Unknown time  No No   Sig: Take 0.5-1 Tabs by mouth every six (6) hours as needed for Nausea. Patient not taking: Reported on 5/27/2021   rOPINIRole (REQUIP) 1 mg tablet 5/26/2021 at Unknown time  No Yes   Sig: Take 1 Tab by mouth nightly. sertraline (ZOLOFT) 100 mg tablet 5/27/2021 at Unknown time  No Yes   Sig: Take one tablet daily. sotaloL (BETAPACE) 80 mg tablet 5/27/2021 at Unknown time  No Yes   Sig: TAKE 1 TABLET TWICE DAILY   triamcinolone acetonide (KENALOG) 0.1 % topical cream Not Taking at Unknown time  No No   Sig: Apply  to affected area two (2) times a day. use thin layer, do not use on face, use for no longer than 2 weeks. Patient not taking: Reported on 5/27/2021      Facility-Administered Medications: None       Objective:     Patient Vitals for the past 24 hrs:   Temp Pulse Resp BP SpO2   05/29/21 0653 98.3 °F (36.8 °C) 72 20 132/84 91 %   05/29/21 0400 98.3 °F (36.8 °C) 68 20 122/77 97 %   05/29/21 0000 98.2 °F (36.8 °C) 75 16 113/76 97 %   05/28/21 2000 98.1 °F (36.7 °C) 73 18 131/82 92 %   05/28/21 1600 97.7 °F (36.5 °C) 79 18 119/73 96 %   05/28/21 1202 -- 67 -- 113/81 94 %   05/28/21 1201 -- 72 -- 117/79 93 %   05/28/21 1200 98.1 °F (36.7 °C) 65 17 126/76 96 %     Oxygen Therapy  O2 Sat (%): 91 % (05/29/21 0653)  Pulse via Oximetry: 74 beats per minute (05/27/21 1717)  O2 Device: None (Room air) (05/27/21 9347)    Estimated body mass index is 23.22 kg/m² as calculated from the following:    Height as of this encounter: 6' 1\" (1.854 m). Weight as of this encounter: 79.8 kg (176 lb).       Intake/Output Summary (Last 24 hours) at 5/29/2021 [facility] filed at 5/29/2021 0458  Gross per 24 hour   Intake 120 ml   Output --   Net 120 ml       *Note that automatically entered I/Os may not be accurate; dependent on patient compliance with collection and accurate  by assistants. Physical Exam:  General:    Well nourished. Alert. Eyes:   Normal sclerae. Extraocular movements intact. HENT:  Normocephalic, atraumatic. Moist mucous membranes  CV:   RRR. No m/r/g. Lungs:  CTAB. No wheezing, rhonchi, or rales. Abdomen: Soft, nontender, nondistended. Extremities: Warm and dry. No cyanosis or edema. Neurologic: CN II-XII grossly intact. Sensation intact. Skin:     No rashes or jaundice. Normal coloration  Psych:  Normal mood and affect. I reviewed the labs, imaging, EKGs, telemetry, and other studies done this admission.   Data Reviewed:   Recent Results (from the past 24 hour(s))   SED RATE, AUTOMATED    Collection Time: 05/28/21  3:46 PM   Result Value Ref Range    Sed rate, automated 6 0 - 20 mm/hr   CRP, HIGH SENSITIVITY    Collection Time: 05/28/21  3:46 PM   Result Value Ref Range    CRP, High sensitivity 3.2 mg/L       All Micro Results     None          Current Facility-Administered Medications   Medication Dose Route Frequency    carBAMazepine (TEGretol) tablet 100 mg  100 mg Oral BID    enoxaparin (LOVENOX) injection 40 mg  40 mg SubCUTAneous Q24H    sodium chloride (NS) flush 5-10 mL  5-10 mL IntraVENous Q8H    sodium chloride (NS) flush 5-10 mL  5-10 mL IntraVENous PRN    acetaminophen (TYLENOL) tablet 650 mg  650 mg Oral Q6H PRN    Or    acetaminophen (TYLENOL) suppository 650 mg  650 mg Rectal Q6H PRN    polyethylene glycol (MIRALAX) packet 17 g  17 g Oral DAILY PRN    promethazine (PHENERGAN) tablet 12.5 mg  12.5 mg Oral Q6H PRN    Or    ondansetron (ZOFRAN) injection 4 mg  4 mg IntraVENous Q6H PRN    amLODIPine (NORVASC) tablet 10 mg  10 mg Oral DAILY    aspirin chewable tablet 81 mg  81 mg Oral DAILY    isosorbide mononitrate ER (IMDUR) tablet 30 mg  30 mg Oral 7am    meclizine (ANTIVERT) tablet 25 mg  25 mg Oral Q6H PRN    pantoprazole (PROTONIX) tablet 40 mg  40 mg Oral ACB    rOPINIRole (REQUIP) tablet 1 mg  1 mg Oral QHS    sertraline (ZOLOFT) tablet 100 mg  100 mg Oral DAILY    sotaloL (BETAPACE) tablet 80 mg  80 mg Oral Q12H       Other Studies:  Results for orders placed or performed during the hospital encounter of 21   2D ECHO COMPLETE ADULT (TTE) W OR WO CONTR    Narrative    [address] [facility], [address] [facility]  [phone]    Transthoracic Echocardiogram  2D, M-mode, Doppler, and Color Doppler    Patient: Lu Reese  MR #: [id number]  : 1955  Age: 76 years  Gender: Male  Study date: 28-May-2021  Account #: [de-identified]  Height: 72 in  Weight: 175.6 lb  BSA: 2.02 mï¾²  Status:Routine  Location: 703  BP: 115/ 69    Allergies: FENTANYL, LOVASTATIN, NIACIN, NIACIN-LOVASTATIN    Sonographer:  Yumiko Linton [facility]  Group:  [facility]  Referring Physician:  Pratibha Griffith MD  Reading Physician:  Park Ann. Aleida Garcia MD [facility]    INDICATIONS: Syncope of truly unknown origin.; Dizziness. PROCEDURE: This was a routine study. A transthoracic echocardiogram was  performed. The study included complete 2D imaging, M-mode, complete spectral  Doppler, and color Doppler. Intravenous contrast (Definity) was administered. Image quality was adequate. LEFT VENTRICLE: Size was normal. Systolic function was at the lower limits of  normal. Ejection fraction was estimated in the range of 50 % to 55 %. There  were no regional wall motion abnormalities. Wall thickness was normal. Avg.  E/e': 7.5. Left ventricular diastolic function parameters were normal.    RIGHT VENTRICLE: The size was normal. Systolic function was normal. The  tricuspid jet envelope definition was inadequate for estimation of RV   systolic  pressure.     LEFT ATRIUM: Size was normal.    RIGHT ATRIUM: Size was normal.    SYSTEMIC VEINS: IVC: The inferior vena cava was normal in size and course. AORTIC VALVE: The valve was structurally normal, tri-commissural. There was   no  evidence for stenosis. There was no insufficiency. MITRAL VALVE: Valve structure was normal. There was no evidence for stenosis. There was trivial regurgitation. TRICUSPID VALVE: The valve structure was normal. There was no evidence for  stenosis. There was trivial regurgitation. PULMONIC VALVE: Not well visualized. There was no evidence for stenosis. There  was no insufficiency. PERICARDIUM: There was no pericardial effusion. AORTA: The root exhibited normal size. SUMMARY:    -  Left ventricle: Systolic function was at the lower limits of normal.  Ejection fraction was estimated in the range of 50 % to 55 %. There were no  regional wall motion abnormalities. SYSTEM MEASUREMENT TABLES    2D mode  LA Dimension (2D): 3.9 cm  Left Atrium Systolic Volume Index; Method of Disks, Biplane; 2D mode;: 27.5  ml/m2  IVS/LVPW (2D): 0.9  IVSd (2D): 0.8 cm  LVIDd (2D): 5.6 cm  LVIDs (2D): 4.2 cm  LVPWd (2D): 1 cm    Unspecified Scan Mode  Peak Grad; Mean; Antegrade Flow: 7 mm[Hg]  Vmax; Antegrade Flow: 122 cm/s    Prepared and signed by    Matilde Lu. Juancarlos Melvin MD [facility]  Signed 28-May-2021 12:15:56         No results found. SARS-CoV-2 Lab Results  \"Novel Coronavirus\" Test: No results found for: COV2NT   \"Emergent Disease\" Test: No results found for: EDPR  \"SARS-COV-2\" Test: No results found for: XGCOVT  Rapid Test: No results found for: COVR            Part of this note was written by using a voice dictation software and the note has been proof read but may still contain some grammatical/other typographical errors.     Signed:  Shukri Camejo MD Home

## 2023-09-14 NOTE — PRE-PROCEDURE INSTRUCTIONS
Patient verified name and . Order for consent not found in EHR and  patient verifies procedure. Type 2 surgery, phone assessment complete. Orders not received. Labs per surgeon: none at present time  Labs per anesthesia protocol: cbc and cmp in Epic from 2023 with hgb results of 13.5    Patient answered medical/surgical history questions at their best of ability. All prior to admission medications documented in [facility]. Patient instructed to take the following medications the day of surgery according to anesthesia guidelines with a small sip of water: Norvasc, Aspirin, Lipitor, Imdur, Betapce, Zoloft and Protonix     On the day before surgery please take Tylenol (Acetaminophen) 1000mg in the morning and then again before bed. You may substitute for Tylenol 650 mg. Hold all vitamins 7 days prior to surgery and NSAIDS 5 days prior to surgery. Prescription meds to hold:none  Patient instructed on the following:    > Arrive at [facility] main Entrance, time of arrival to be called the day before by 1700  > NPO after midnight, unless otherwise indicated, including gum, mints, and ice chips  > Responsible adult must drive patient to the hospital, stay during surgery, and patient will need supervision 24 hours after anesthesia  > Use non- moisturizing soap in shower the night before surgery and on the morning of surgery  > All piercings must be removed prior to arrival.    > Leave all valuables (money and jewelry) at home but bring insurance card and ID on DOS.   > You may be required to pay a deductible or co-pay on the day of your procedure. You can pre-pay by calling [phone] if your surgery is at the [facility] or [phone] if your surgery is at the [facility]. > Do not wear make-up, nail polish, lotions, cologne, perfumes, powders, or oil on skin. Artificial nails are not permitted.

## 2023-09-18 ENCOUNTER — ANESTHESIA EVENT (OUTPATIENT)
Dept: SURGERY | Age: 68
End: 2023-09-18
Payer: MEDICARE

## 2023-09-19 ENCOUNTER — ANESTHESIA (OUTPATIENT)
Dept: SURGERY | Age: 68
End: 2023-09-19
Payer: MEDICARE

## 2023-09-19 ENCOUNTER — HOSPITAL ENCOUNTER (OUTPATIENT)
Age: 68
Setting detail: OUTPATIENT SURGERY
Discharge: HOME | End: 2023-09-19
Attending: SURGERY | Admitting: SURGERY
Payer: MEDICARE

## 2023-09-19 VITALS
HEART RATE: 66 BPM | TEMPERATURE: 97.2 F | RESPIRATION RATE: 18 BRPM | DIASTOLIC BLOOD PRESSURE: 71 MMHG | OXYGEN SATURATION: 94 % | HEIGHT: 73 IN | SYSTOLIC BLOOD PRESSURE: 131 MMHG | WEIGHT: 161.6 LBS | BODY MASS INDEX: 21.42 KG/M2

## 2023-09-19 DIAGNOSIS — N62 GYNECOMASTIA: Primary | ICD-10-CM

## 2023-09-19 DIAGNOSIS — Z90.11 S/P MASTECTOMY, RIGHT: ICD-10-CM

## 2023-09-19 PROCEDURE — 88307 TISSUE EXAM BY PATHOLOGIST: CPT

## 2023-09-19 PROCEDURE — 2500000003 HC RX 250 WO HCPCS: Performed by: STUDENT IN AN ORGANIZED HEALTH CARE EDUCATION/TRAINING PROGRAM

## 2023-09-19 PROCEDURE — 2580000003 HC RX 258: Performed by: STUDENT IN AN ORGANIZED HEALTH CARE EDUCATION/TRAINING PROGRAM

## 2023-09-19 PROCEDURE — 2709999900 HC NON-CHARGEABLE SUPPLY: Performed by: SURGERY

## 2023-09-19 PROCEDURE — 2500000003 HC RX 250 WO HCPCS: Performed by: ANESTHESIOLOGY

## 2023-09-19 PROCEDURE — 6360000002 HC RX W HCPCS: Performed by: ANESTHESIOLOGY

## 2023-09-19 PROCEDURE — 6370000000 HC RX 637 (ALT 250 FOR IP): Performed by: ANESTHESIOLOGY

## 2023-09-19 PROCEDURE — 3600000012 HC SURGERY LEVEL 2 ADDTL 15MIN: Performed by: SURGERY

## 2023-09-19 PROCEDURE — 3700000000 HC ANESTHESIA ATTENDED CARE: Performed by: SURGERY

## 2023-09-19 PROCEDURE — 7100000000 HC PACU RECOVERY - FIRST 15 MIN: Performed by: SURGERY

## 2023-09-19 PROCEDURE — 6360000002 HC RX W HCPCS: Performed by: SURGERY

## 2023-09-19 PROCEDURE — 6360000002 HC RX W HCPCS: Performed by: STUDENT IN AN ORGANIZED HEALTH CARE EDUCATION/TRAINING PROGRAM

## 2023-09-19 PROCEDURE — 7100000001 HC PACU RECOVERY - ADDTL 15 MIN: Performed by: SURGERY

## 2023-09-19 PROCEDURE — 7100000010 HC PHASE II RECOVERY - FIRST 15 MIN: Performed by: SURGERY

## 2023-09-19 PROCEDURE — 99024 POSTOP FOLLOW-UP VISIT: CPT | Performed by: SURGERY

## 2023-09-19 PROCEDURE — 3600000002 HC SURGERY LEVEL 2 BASE: Performed by: SURGERY

## 2023-09-19 PROCEDURE — 2580000003 HC RX 258: Performed by: ANESTHESIOLOGY

## 2023-09-19 PROCEDURE — 76942 ECHO GUIDE FOR BIOPSY: CPT | Performed by: ANESTHESIOLOGY

## 2023-09-19 PROCEDURE — 7100000011 HC PHASE II RECOVERY - ADDTL 15 MIN: Performed by: SURGERY

## 2023-09-19 PROCEDURE — 3700000001 HC ADD 15 MINUTES (ANESTHESIA): Performed by: SURGERY

## 2023-09-19 PROCEDURE — 88305 TISSUE EXAM BY PATHOLOGIST: CPT

## 2023-09-19 PROCEDURE — 19300 MASTECTOMY FOR GYNECOMASTIA: CPT | Performed by: SURGERY

## 2023-09-19 RX ORDER — SODIUM CHLORIDE 0.9 % (FLUSH) 0.9 %
5-40 SYRINGE (ML) INJECTION EVERY 12 HOURS SCHEDULED
Status: DISCONTINUED | OUTPATIENT
Start: 2023-09-19 | End: 2023-09-19 | Stop reason: HOSPADM

## 2023-09-19 RX ORDER — ACETAMINOPHEN 500 MG
1000 TABLET ORAL ONCE
Status: COMPLETED | OUTPATIENT
Start: 2023-09-19 | End: 2023-09-19

## 2023-09-19 RX ORDER — LIDOCAINE HYDROCHLORIDE 10 MG/ML
1 INJECTION, SOLUTION INFILTRATION; PERINEURAL
Status: DISCONTINUED | OUTPATIENT
Start: 2023-09-19 | End: 2023-09-19 | Stop reason: HOSPADM

## 2023-09-19 RX ORDER — OXYCODONE HYDROCHLORIDE 5 MG/1
5 TABLET ORAL
Status: DISCONTINUED | OUTPATIENT
Start: 2023-09-19 | End: 2023-09-19 | Stop reason: HOSPADM

## 2023-09-19 RX ORDER — ONDANSETRON 2 MG/ML
4 INJECTION INTRAMUSCULAR; INTRAVENOUS
Status: DISCONTINUED | OUTPATIENT
Start: 2023-09-19 | End: 2023-09-19 | Stop reason: HOSPADM

## 2023-09-19 RX ORDER — DEXAMETHASONE SODIUM PHOSPHATE 4 MG/ML
INJECTION, SOLUTION INTRA-ARTICULAR; INTRALESIONAL; INTRAMUSCULAR; INTRAVENOUS; SOFT TISSUE
Status: DISCONTINUED | OUTPATIENT
Start: 2023-09-19 | End: 2023-09-19 | Stop reason: SDUPTHER

## 2023-09-19 RX ORDER — ONDANSETRON 2 MG/ML
INJECTION INTRAMUSCULAR; INTRAVENOUS PRN
Status: DISCONTINUED | OUTPATIENT
Start: 2023-09-19 | End: 2023-09-19 | Stop reason: SDUPTHER

## 2023-09-19 RX ORDER — IPRATROPIUM BROMIDE AND ALBUTEROL SULFATE 2.5; .5 MG/3ML; MG/3ML
1 SOLUTION RESPIRATORY (INHALATION)
Status: DISCONTINUED | OUTPATIENT
Start: 2023-09-19 | End: 2023-09-19 | Stop reason: HOSPADM

## 2023-09-19 RX ORDER — MIDAZOLAM HYDROCHLORIDE 2 MG/2ML
2 INJECTION, SOLUTION INTRAMUSCULAR; INTRAVENOUS
Status: DISCONTINUED | OUTPATIENT
Start: 2023-09-19 | End: 2023-09-19 | Stop reason: HOSPADM

## 2023-09-19 RX ORDER — HALOPERIDOL 5 MG/ML
1 INJECTION INTRAMUSCULAR
Status: DISCONTINUED | OUTPATIENT
Start: 2023-09-19 | End: 2023-09-19 | Stop reason: HOSPADM

## 2023-09-19 RX ORDER — EPHEDRINE SULFATE/0.9% NACL/PF 50 MG/5 ML
SYRINGE (ML) INTRAVENOUS PRN
Status: DISCONTINUED | OUTPATIENT
Start: 2023-09-19 | End: 2023-09-19 | Stop reason: SDUPTHER

## 2023-09-19 RX ORDER — KETAMINE HYDROCHLORIDE 50 MG/ML
INJECTION, SOLUTION, CONCENTRATE INTRAMUSCULAR; INTRAVENOUS PRN
Status: DISCONTINUED | OUTPATIENT
Start: 2023-09-19 | End: 2023-09-19 | Stop reason: SDUPTHER

## 2023-09-19 RX ORDER — LIDOCAINE HYDROCHLORIDE 20 MG/ML
INJECTION, SOLUTION EPIDURAL; INFILTRATION; INTRACAUDAL; PERINEURAL PRN
Status: DISCONTINUED | OUTPATIENT
Start: 2023-09-19 | End: 2023-09-19 | Stop reason: SDUPTHER

## 2023-09-19 RX ORDER — PROPOFOL 10 MG/ML
INJECTION, EMULSION INTRAVENOUS PRN
Status: DISCONTINUED | OUTPATIENT
Start: 2023-09-19 | End: 2023-09-19 | Stop reason: SDUPTHER

## 2023-09-19 RX ORDER — DEXAMETHASONE SODIUM PHOSPHATE 4 MG/ML
INJECTION, SOLUTION INTRA-ARTICULAR; INTRALESIONAL; INTRAMUSCULAR; INTRAVENOUS; SOFT TISSUE PRN
Status: DISCONTINUED | OUTPATIENT
Start: 2023-09-19 | End: 2023-09-19 | Stop reason: SDUPTHER

## 2023-09-19 RX ORDER — BUPIVACAINE HYDROCHLORIDE AND EPINEPHRINE 5; 5 MG/ML; UG/ML
INJECTION, SOLUTION EPIDURAL; INTRACAUDAL; PERINEURAL
Status: DISCONTINUED | OUTPATIENT
Start: 2023-09-19 | End: 2023-09-19 | Stop reason: SDUPTHER

## 2023-09-19 RX ORDER — SODIUM CHLORIDE 0.9 % (FLUSH) 0.9 %
5-40 SYRINGE (ML) INJECTION PRN
Status: DISCONTINUED | OUTPATIENT
Start: 2023-09-19 | End: 2023-09-19 | Stop reason: HOSPADM

## 2023-09-19 RX ORDER — SODIUM CHLORIDE, SODIUM LACTATE, POTASSIUM CHLORIDE, CALCIUM CHLORIDE 600; 310; 30; 20 MG/100ML; MG/100ML; MG/100ML; MG/100ML
INJECTION, SOLUTION INTRAVENOUS CONTINUOUS
Status: DISCONTINUED | OUTPATIENT
Start: 2023-09-19 | End: 2023-09-19 | Stop reason: HOSPADM

## 2023-09-19 RX ORDER — HYDROCODONE BITARTRATE AND ACETAMINOPHEN 5; 325 MG/1; MG/1
1-2 TABLET ORAL EVERY 8 HOURS PRN
Qty: 28 TABLET | Refills: 0 | Status: SHIPPED | OUTPATIENT
Start: 2023-09-19 | End: 2023-09-26

## 2023-09-19 RX ORDER — BUPIVACAINE HYDROCHLORIDE 2.5 MG/ML
INJECTION, SOLUTION EPIDURAL; INFILTRATION; INTRACAUDAL PRN
Status: DISCONTINUED | OUTPATIENT
Start: 2023-09-19 | End: 2023-09-19 | Stop reason: ALTCHOICE

## 2023-09-19 RX ORDER — HYDROMORPHONE HYDROCHLORIDE 2 MG/ML
0.5 INJECTION, SOLUTION INTRAMUSCULAR; INTRAVENOUS; SUBCUTANEOUS EVERY 10 MIN PRN
Status: DISCONTINUED | OUTPATIENT
Start: 2023-09-19 | End: 2023-09-19 | Stop reason: HOSPADM

## 2023-09-19 RX ADMIN — ACETAMINOPHEN 1000 MG: 500 TABLET, FILM COATED ORAL at 06:51

## 2023-09-19 RX ADMIN — BUPIVACAINE HYDROCHLORIDE AND EPINEPHRINE BITARTRATE 30 ML: 5; .005 INJECTION, SOLUTION EPIDURAL; INTRACAUDAL; PERINEURAL at 07:27

## 2023-09-19 RX ADMIN — OXYCODONE HYDROCHLORIDE 5 MG: 5 TABLET ORAL at 08:40

## 2023-09-19 RX ADMIN — Medication 2000 MG: at 07:30

## 2023-09-19 RX ADMIN — PHENYLEPHRINE HYDROCHLORIDE 100 MCG: 10 INJECTION INTRAVENOUS at 07:25

## 2023-09-19 RX ADMIN — Medication 5 MG: at 07:36

## 2023-09-19 RX ADMIN — ONDANSETRON 4 MG: 2 INJECTION INTRAMUSCULAR; INTRAVENOUS at 07:51

## 2023-09-19 RX ADMIN — DEXAMETHASONE SODIUM PHOSPHATE 4 MG: 4 INJECTION, SOLUTION INTRAMUSCULAR; INTRAVENOUS at 07:27

## 2023-09-19 RX ADMIN — PHENYLEPHRINE HYDROCHLORIDE 100 MCG: 10 INJECTION INTRAVENOUS at 07:21

## 2023-09-19 RX ADMIN — PROPOFOL 150 MG: 10 INJECTION, EMULSION INTRAVENOUS at 07:21

## 2023-09-19 RX ADMIN — DEXAMETHASONE SODIUM PHOSPHATE 4 MG: 4 INJECTION, SOLUTION INTRAMUSCULAR; INTRAVENOUS at 07:33

## 2023-09-19 RX ADMIN — LIDOCAINE HYDROCHLORIDE 100 MG: 20 INJECTION, SOLUTION EPIDURAL; INFILTRATION; INTRACAUDAL; PERINEURAL at 07:21

## 2023-09-19 RX ADMIN — PHENYLEPHRINE HYDROCHLORIDE 200 MCG: 10 INJECTION INTRAVENOUS at 07:27

## 2023-09-19 RX ADMIN — Medication 5 MG: at 07:29

## 2023-09-19 RX ADMIN — KETAMINE HYDROCHLORIDE 20 MG: 50 INJECTION, SOLUTION INTRAMUSCULAR; INTRAVENOUS at 07:38

## 2023-09-19 RX ADMIN — SODIUM CHLORIDE, POTASSIUM CHLORIDE, SODIUM LACTATE AND CALCIUM CHLORIDE: 600; 310; 30; 20 INJECTION, SOLUTION INTRAVENOUS at 06:51

## 2023-09-19 ASSESSMENT — PAIN - FUNCTIONAL ASSESSMENT: PAIN_FUNCTIONAL_ASSESSMENT: 0-10

## 2023-09-19 ASSESSMENT — LIFESTYLE VARIABLES: SMOKING_STATUS: 1

## 2023-09-19 ASSESSMENT — COPD QUESTIONNAIRES: CAT_SEVERITY: SEVERE

## 2023-09-19 ASSESSMENT — ENCOUNTER SYMPTOMS: SHORTNESS OF BREATH: 1

## 2023-09-19 NOTE — ANESTHESIA PROCEDURE NOTES
Peripheral Block    Patient location during procedure: OR  Reason for block: post-op pain management and at surgeon's request  Start time: 9/19/2023 7:27 AM  End time: 9/19/2023 7:29 AM  Staffing  Performed: anesthesiologist   Anesthesiologist: Cheryl Cheatham MD  Performed by: Cheryl Cheatham MD  Authorized by: Cheryl Cheatham MD    Preanesthetic Checklist  Completed: patient identified, IV checked, site marked, risks and benefits discussed, surgical/procedural consents, equipment checked, pre-op evaluation, timeout performed, anesthesia consent given, oxygen available, monitors applied/VS acknowledged and blood product R/B/A discussed and consented  Peripheral Block   Patient position: sitting  Prep: ChloraPrep  Provider prep: mask and sterile gloves  Patient monitoring: cardiac monitor and continuous pulse ox  Block type: Serratus plane  Laterality: right  Injection technique: single-shot  Guidance: ultrasound guided  Local infiltration: lidocaine  Infiltration strength: 1 %  Local infiltration: lidocaine  Dose: 3 mL    Needle   Needle type: insulated echogenic nerve stimulator needle   Needle gauge: 18 G  Needle localization: ultrasound guidance  Needle length: 10 cm  Assessment   Injection assessment: negative aspiration for heme, no paresthesia on injection, local visualized surrounding nerve on ultrasound and no intravascular symptoms  Paresthesia pain: none  Slow fractionated injection: yes  Hemodynamics: stable  Real-time US image taken/store: yes  Outcomes: uncomplicated    Medications Administered  bupivacaine 0.5%-EPINEPHrine injection [id number] - Perineural   30 mL - 9/19/2023 7:27:00 AM  dexamethasone (DECADRON) injection 4 mg/mL - Perineural   4 mg - 9/19/2023 7:27:00 AM

## 2023-09-19 NOTE — H&P
ULTRASOUND    CLINICAL INDICATION: Patient reports subacute right retroareolar palpable lump  and mild to moderate pain. No prior breast surgery, personal or family breast  cancer. Patient has had CLL. COMPARISON: No prior mammography. Correlation with CT chest 8/30/2019. FINDINGS:    Mammogram: Digital diagnostic mammography was performed, and is interpreted in  conjunction with a computer assisted detection (CAD) system. Standard views bilaterally demonstrate mostly fatty tissue compatible with male  anatomy. Additional right mediolateral view was performed. There is mild right  and minimal left retroareolar flame shape densities but no suspicious mass,  architectural distortion, abnormal calcification cluster or skin thickening on  either side. Ultrasound: Real time targeted sonography was performed of the right  retroareolar area of symptoms by the technologist, demonstrating benign  gynecomastia. Left breast was scanned at the same level demonstrating minimal  similar finding. Impression  Benign gynecomastia. No evidence of malignancy. Recommend clinical follow-up. Results and recommendations today were reported  to the patient at the time of interpretation. BI-RADS Assessment Category 2: Benign finding    BD1      Admission date (for inpatients): 9/19/2023   Day of Surgery  * No surgery found *        ASSESSMENT/PLAN:  @[facility]@  Principal Problem:    Gynecomastia  Resolved Problems:    * No resolved hospital problems.  *     Patient Active Problem List    Diagnosis Date Noted    Gynecomastia 07/24/2023    Accelerating angina ([address]) 01/25/2023     Added automatically from request for surgery [id number]      Mixed hyperlipidemia 12/08/2021    S/P ablation of atrial fibrillation 09/08/2021    Restless legs 09/03/2020    Recurrent right inguinal hernia 06/16/2020    CLL (chronic lymphocytic leukemia) ([address]) 07/24/2018    COPD, very severe ([address]) 08/28/2017    Chronic midline low back pain without

## 2023-09-19 NOTE — DISCHARGE INSTRUCTIONS
Dressings/Wound Care  Leave your mastectomy dressings alone until follow-up visit. Try to keep incisions as dry as possible to lower risk of infection. Activity  No heavy lifting (>5lbs) for 6 weeks to reduce risk of swelling. No driving until you are off pain meds for 24hrs and have no pain with movements associated with driving. Meds  Pain prescription (Norco) electronically sent to your pharmacy    Follow-up  Follow-up with Dr Scar Murray assistants Gregorio Pool, or Bhavesh Palomo in 13 days in the office on a Monday. See Dr Josue Mcmahan as needed after that visit. [address]  (Call for an appt time unless one is already made for you by discharge nurse which is preferred -->[phone])    Diet  Resume Regular Diet          After general anesthesia or intravenous sedation, for 24 hours or while taking prescription Narcotics:  Limit your activities  Some people will feel drowsy or dizzy for up to a few hours after waking up. A responsible adult needs to be with you for the next 24 hours  Do not drive and operate hazardous machinery  Do not make important personal or business decisions  Do not drink alcoholic beverages  If you have not urinated within 8 hours after discharge, and you are experiencing discomfort from urinary retention, please go to the nearest ED. If you have sleep apnea and have a CPAP machine, please use it for all naps and sleeping. Please use caution when taking narcotics and any of your home medications that may cause drowsiness. *  Please give a list of your current medications to your Primary Care Provider. *  Please update this list whenever your medications are discontinued, doses are      changed, or new medications (including over-the-counter products) are added. *  Please carry medication information at all times in case of emergency situations.     These are general instructions for a healthy lifestyle:  No smoking/ No tobacco products/

## 2023-09-19 NOTE — BRIEF OP NOTE
BRIEF OPERATIVE NOTE    Date of Procedure:  9/19/2023    Preoperative Diagnosis:   Gynecomastia [N62]  Postoperative Diagnosis: same      Procedure:  Procedure(s):  Right breast subq mastectomy for symptomatic gynecomastia    Surgeon(s) and Role:     * Garcia Garza MD - Primary  Anesthesia: General  Estimated Blood Loss: <30cc  Specimens:   ID Type Source Tests Collected by Time Destination   A : Right Sub Q Mastectomy  Tissue Breast SURGICAL PATHOLOGY Garcia Garza MD 9/19/2023 0745      Findings: see op note   Complications: none  Implants: * No implants in log *

## 2023-09-19 NOTE — OP NOTE
[facility]  OPERATIVE REPORT    Name:  Pyiush Jacobo  MR#:  [id number]  :  1955  ACCOUNT #:  [de-identified]  DATE OF SERVICE:  2023    PREOPERATIVE DIAGNOSIS:  Symptomatic right gynecomastia. POSTOPERATIVE DIAGNOSIS:  Symptomatic right gynecomastia. PROCEDURE PERFORMED:  Right subcutaneous mastectomy for gynecomastia (CPT 04951). SURGEON:  Sia Hughes MD    ASSISTANT:  None. ANESTHESIA:  General.    COMPLICATIONS:  None. SPECIMENS REMOVED:  Mastectomy sent to Pathology for review. IMPLANTS:  None. ESTIMATED BLOOD LOSS:  Less than 10 mL. OPERATIVE PROCEDURE:  The patient was taken to the operating room and placed in supine position. After adequate anesthesia was given, his right breast was prepped and draped in sterile fashion with multiple layers of ChloraPrep. Time-out protocol was followed. He was given prophylactic antibiotics. An oblique incision was made through the skin with a #15 scalpel, extending around the areolar complex in the superior direction. Skin flaps were created in all directions. The nipple was dissected just deep to its surface. We dissected in all directions to create skin flaps and removed all of the breast tissue performing a subcutaneous mastectomy all the way down to the pectoralis fascia. The specimen was marked for orientation and sent to Pathology for review. Irrigation was used. Hemostasis was confirmed. A small amount of local anesthetic was given as the patient already had a field regional block placed by Anesthesia. The incision was closed with interrupted deep dermal 3-0 Vicryl sutures. The skin was closed with clips. Sterile dressings were placed consisting of 4x4s and Tegaderms. The patient tolerated the procedure well. He was taken to Recovery in good condition.         Sia Hughes MD    [id number]  D:  2023 8:05  T:  2023 9:55  JOB #:  [id number]

## 2023-10-04 ENCOUNTER — PATIENT MESSAGE (OUTPATIENT)
Dept: SURGERY | Age: 68
End: 2023-10-04

## 2023-10-05 NOTE — TELEPHONE ENCOUNTER
Pt telephoned office for \"swollen R breast.\" He is wondering if this normal 2 wk out from surgery. He admits to increasing upper extremity activities. Recommendation: pt reassured. Pt instructed to place ice pacs or frozen vegetable bag across pectoral region. He intends to keep follow up with Dr Semaj Fernando.      Nazario Ivan, APRN - CNP

## 2023-10-09 ENCOUNTER — OFFICE VISIT (OUTPATIENT)
Dept: SURGERY | Age: 68
End: 2023-10-09

## 2023-10-09 DIAGNOSIS — N62 GYNECOMASTIA: Primary | ICD-10-CM

## 2023-10-09 PROCEDURE — 99024 POSTOP FOLLOW-UP VISIT: CPT | Performed by: SURGERY

## 2023-10-09 NOTE — PROGRESS NOTES
H&P/Consult Note/Progress Note/Office Note:   Jaki Dumont  MRN: [id number]  :1955  Age:68 y.o.    HPI:  Jaki Dumont is a 70 y.o. male who was referred by Asia Esparza and is s/p right subq mastectomy for symptomatic gynecomastia on 23. His path showed benign gynecomastia. Prior to surgery he reported significant pain in his right breast with swelling since approx 2023. Nothing particular made it better or worse. He was a daily drinker for more than 20 years but quit 3 years ago. No personal or family history of breast carcinoma. He had the below unremarkable endocrine labs and US and mammogram performed which identified no evidence of malignancy. 5/15/23 bilat dx mammo with CAD and bilat breast US  Hx: Subacute right retroareolar palpable lump and mild to moderate pain. No prior breast surgery, personal or family breast cancer. Patient has had CLL. Standard views bilaterally demonstrate mostly fatty tissue compatible with male anatomy. There is mild right and minimal left retroareolar flame shape densities   but no suspicious mass, architectural distortion, abnormal calcification cluster or skin thickening on either side. Ultrasound: Real time targeted sonography was performed of the right retroareolar area of symptoms by the technologist, demonstrating benign gynecomastia. Left breast was scanned at the same level demonstrating minimal similar finding. IMPRESSION:  Benign gynecomastia. No evidence of malignancy. Recommend clinical follow-up. Results and recommendations today were reported to the patient at the time of interpretation.   BI-RADS Assessment Category 2: Benign finding        He saw Dr Pranav Mccray in  for unrelated right groin pain after prior recurrent right inguinal hernia surgery with mesh on 20.      23 Labs  LH 6.3 (1.5-9.3)  Estradiol 29.29. (<39.8)  Testosterone 696 (111-209)         Past Medical History:   Diagnosis

## 2023-10-16 ENCOUNTER — OFFICE VISIT (OUTPATIENT)
Dept: INTERNAL MEDICINE CLINIC | Facility: CLINIC | Age: 68
End: 2023-10-16
Payer: MEDICARE

## 2023-10-16 VITALS
BODY MASS INDEX: 21.6 KG/M2 | DIASTOLIC BLOOD PRESSURE: 68 MMHG | SYSTOLIC BLOOD PRESSURE: 110 MMHG | OXYGEN SATURATION: 87 % | WEIGHT: 163 LBS | HEART RATE: 70 BPM | RESPIRATION RATE: 17 BRPM | HEIGHT: 73 IN

## 2023-10-16 DIAGNOSIS — I10 PRIMARY HYPERTENSION: Primary | ICD-10-CM

## 2023-10-16 DIAGNOSIS — J44.9 COPD, VERY SEVERE (HCC): ICD-10-CM

## 2023-10-16 DIAGNOSIS — Z23 FLU VACCINE NEED: ICD-10-CM

## 2023-10-16 DIAGNOSIS — I48.21 PERMANENT ATRIAL FIBRILLATION (HCC): ICD-10-CM

## 2023-10-16 PROCEDURE — G8484 FLU IMMUNIZE NO ADMIN: HCPCS | Performed by: FAMILY MEDICINE

## 2023-10-16 PROCEDURE — 1036F TOBACCO NON-USER: CPT | Performed by: FAMILY MEDICINE

## 2023-10-16 PROCEDURE — G0008 ADMIN INFLUENZA VIRUS VAC: HCPCS | Performed by: FAMILY MEDICINE

## 2023-10-16 PROCEDURE — 1123F ACP DISCUSS/DSCN MKR DOCD: CPT | Performed by: FAMILY MEDICINE

## 2023-10-16 PROCEDURE — 3074F SYST BP LT 130 MM HG: CPT | Performed by: FAMILY MEDICINE

## 2023-10-16 PROCEDURE — 90694 VACC AIIV4 NO PRSRV 0.5ML IM: CPT | Performed by: FAMILY MEDICINE

## 2023-10-16 PROCEDURE — 3078F DIAST BP <80 MM HG: CPT | Performed by: FAMILY MEDICINE

## 2023-10-16 PROCEDURE — 3023F SPIROM DOC REV: CPT | Performed by: FAMILY MEDICINE

## 2023-10-16 PROCEDURE — G8420 CALC BMI NORM PARAMETERS: HCPCS | Performed by: FAMILY MEDICINE

## 2023-10-16 PROCEDURE — 99214 OFFICE O/P EST MOD 30 MIN: CPT | Performed by: FAMILY MEDICINE

## 2023-10-16 PROCEDURE — G8427 DOCREV CUR MEDS BY ELIG CLIN: HCPCS | Performed by: FAMILY MEDICINE

## 2023-10-16 PROCEDURE — 3017F COLORECTAL CA SCREEN DOC REV: CPT | Performed by: FAMILY MEDICINE

## 2023-10-16 NOTE — PROGRESS NOTES
Alverto López DO  Diplomate of the [employer] [address] Internal Medicine      Shashi Morales (: 1955) is a 70 y.o. male, here for evaluation of the following chief complaint(s):  Hypertension       ASSESSMENT/PLAN:  1. Primary hypertension  2. COPD, very severe ([address])  3. Permanent atrial fibrillation ([address])     -Tolerating medication well for HTN. Achieving desired therapeutic response with   Key Anti-Hypertensive Meds            amLODIPine (NORVASC) 5 MG tablet (Taking)    Sig - Route: TAKE 1 TABLET EVERY DAY - Oral    --will continue. Will periodically review and adjust if needed. Encourage home monitoring.   -Will continue with Duoneb.  -Rate controlled on sotalol. Continued follow up with Cardiology as scheduled. SUBJECTIVE/OBJECTIVE:  HPI:  -HTN: Home BP Monitoring: no. taking medications as instructed, no medication side effects noted. He denies chest pain, palpitations, exertional pain or pressure.  -Has been doing fairly well as far as COPD is concerned. He does get hypoxic with walking if he does not have his oxygen. Typically uses his oxygen at all times at home. No current mucopurulent productive sputum. Using DuoNeb as directed. -Doing well from atrial fibrillation point. Has not had any recent palpitations. Tolerating medication well. ROS negative except as noted above today.     Social History     Tobacco Use    Smoking status: Former     Packs/day: 1.00     Years: 40.00     Additional pack years: 0.00     Total pack years: 40.00     Types: Cigarettes     Quit date: 3/4/2016     Years since quittin.6    Smokeless tobacco: Never   Vaping Use    Vaping Use: Never used   Substance Use Topics    Alcohol use: No     Alcohol/week: 0.0 standard drinks of alcohol    Drug use: No     Vitals:    10/16/23 1405   BP: 110/68   Site: Left Upper Arm   Position: Sitting   Pulse: 70   Resp: 17   SpO2: (!) 87%   Weight: 163 lb (73.9 kg)   Height: 6' 1\" (1.854 m)

## 2023-10-24 ENCOUNTER — PATIENT MESSAGE (OUTPATIENT)
Dept: INTERNAL MEDICINE CLINIC | Facility: CLINIC | Age: 68
End: 2023-10-24

## 2023-10-24 DIAGNOSIS — J44.9 COPD, VERY SEVERE (HCC): Primary | ICD-10-CM

## 2023-10-24 DIAGNOSIS — R05.3 CHRONIC COUGH: ICD-10-CM

## 2023-10-24 NOTE — TELEPHONE ENCOUNTER
From: Pollo Lechuga  [facility]  Sent: 10/24/2023 1:21 PM EDT  Subject: Refill    Can you please send a request for Hydrocodone/Homatropine to [facility] for me, I am out  Thank You

## 2023-10-25 DIAGNOSIS — R05.3 CHRONIC COUGH: ICD-10-CM

## 2023-10-25 DIAGNOSIS — J44.9 COPD, VERY SEVERE (HCC): ICD-10-CM

## 2023-10-25 RX ORDER — HYDROCODONE BITARTRATE AND HOMATROPINE METHYLBROMIDE ORAL SOLUTION 5; 1.5 MG/5ML; MG/5ML
2.5 LIQUID ORAL EVERY 6 HOURS PRN
Qty: 240 ML | Refills: 0 | Status: SHIPPED | OUTPATIENT
Start: 2023-10-25 | End: 2023-10-25 | Stop reason: SDUPTHER

## 2023-10-25 RX ORDER — HYDROCODONE BITARTRATE AND HOMATROPINE METHYLBROMIDE ORAL SOLUTION 5; 1.5 MG/5ML; MG/5ML
2.5 LIQUID ORAL EVERY 6 HOURS PRN
Qty: 240 ML | Refills: 0 | Status: SHIPPED | OUTPATIENT
Start: 2023-10-25 | End: 2023-10-27 | Stop reason: SDUPTHER

## 2023-10-25 NOTE — TELEPHONE ENCOUNTER
[facility] called stating that they do not have Hycodan cough syrup in stock and don't know when they'll be able to get it. Patient is requesting it be sent to [facility] in  instead. Please advise.

## 2023-10-26 ENCOUNTER — PATIENT MESSAGE (OUTPATIENT)
Dept: INTERNAL MEDICINE CLINIC | Facility: CLINIC | Age: 68
End: 2023-10-26

## 2023-10-26 DIAGNOSIS — J44.9 COPD, VERY SEVERE (HCC): ICD-10-CM

## 2023-10-26 DIAGNOSIS — R05.3 CHRONIC COUGH: ICD-10-CM

## 2023-10-26 NOTE — TELEPHONE ENCOUNTER
From: Michelle Bassett  To: Dr. Mag [facility]  Sent: 10/26/2023 1:36 PM EDT  Subject: Cough syrup     Could you please send another prescription for my cough syrup ( Hydrocodone/ Homatropine) to [facility] they have informed me that they will let me know when it is available. [facility] price is three times what I pay for it to them.    Thank Junior Pittman

## 2023-10-27 RX ORDER — HYDROCODONE BITARTRATE AND HOMATROPINE METHYLBROMIDE ORAL SOLUTION 5; 1.5 MG/5ML; MG/5ML
2.5 LIQUID ORAL EVERY 6 HOURS PRN
Qty: 240 ML | Refills: 0 | Status: SHIPPED | OUTPATIENT
Start: 2023-10-27 | End: 2023-11-26

## 2024-01-09 RX ORDER — ALBUTEROL SULFATE 90 UG/1
2 AEROSOL, METERED RESPIRATORY (INHALATION) EVERY 4 HOURS PRN
Qty: 18 G | Refills: 11 | Status: CANCELLED | OUTPATIENT
Start: 2024-01-09

## 2024-01-09 RX ORDER — DOXEPIN HYDROCHLORIDE 10 MG/1
10-20 CAPSULE ORAL NIGHTLY
Qty: 60 CAPSULE | Refills: 5 | Status: CANCELLED | OUTPATIENT
Start: 2024-01-09

## 2024-01-09 RX ORDER — MECLIZINE HYDROCHLORIDE 25 MG/1
TABLET ORAL
Qty: 90 TABLET | Refills: 1 | Status: CANCELLED | OUTPATIENT
Start: 2024-01-09

## 2024-01-19 ENCOUNTER — OFFICE VISIT (OUTPATIENT)
Dept: INTERNAL MEDICINE CLINIC | Facility: CLINIC | Age: 69
End: 2024-01-19

## 2024-01-19 VITALS
BODY MASS INDEX: 21.34 KG/M2 | HEIGHT: 73 IN | HEART RATE: 66 BPM | RESPIRATION RATE: 16 BRPM | WEIGHT: 161 LBS | SYSTOLIC BLOOD PRESSURE: 130 MMHG | DIASTOLIC BLOOD PRESSURE: 70 MMHG | OXYGEN SATURATION: 92 %

## 2024-01-19 DIAGNOSIS — M54.41 CHRONIC RIGHT-SIDED LOW BACK PAIN WITH RIGHT-SIDED SCIATICA: ICD-10-CM

## 2024-01-19 DIAGNOSIS — I10 PRIMARY HYPERTENSION: ICD-10-CM

## 2024-01-19 DIAGNOSIS — I48.21 PERMANENT ATRIAL FIBRILLATION (HCC): ICD-10-CM

## 2024-01-19 DIAGNOSIS — Z00.00 MEDICARE ANNUAL WELLNESS VISIT, SUBSEQUENT: Primary | ICD-10-CM

## 2024-01-19 DIAGNOSIS — G89.29 CHRONIC RIGHT-SIDED LOW BACK PAIN WITH RIGHT-SIDED SCIATICA: ICD-10-CM

## 2024-01-19 DIAGNOSIS — J44.9 COPD, VERY SEVERE (HCC): ICD-10-CM

## 2024-01-19 DIAGNOSIS — C91.10 CLL (CHRONIC LYMPHOCYTIC LEUKEMIA) (HCC): ICD-10-CM

## 2024-01-19 DIAGNOSIS — Z12.5 SPECIAL SCREENING FOR MALIGNANT NEOPLASM OF PROSTATE: ICD-10-CM

## 2024-01-19 DIAGNOSIS — I25.118 ATHEROSCLEROTIC HEART DISEASE OF NATIVE CORONARY ARTERY WITH OTHER FORMS OF ANGINA PECTORIS (HCC): ICD-10-CM

## 2024-01-19 DIAGNOSIS — F33.41 RECURRENT MAJOR DEPRESSIVE DISORDER, IN PARTIAL REMISSION (HCC): ICD-10-CM

## 2024-01-19 LAB
ALBUMIN SERPL-MCNC: 4.1 G/DL (ref 3.2–4.6)
ALBUMIN/GLOB SERPL: 1.6 (ref 0.4–1.6)
ALP SERPL-CCNC: 92 U/L (ref 50–136)
ALT SERPL-CCNC: 22 U/L (ref 12–65)
ANION GAP SERPL CALC-SCNC: 3 MMOL/L (ref 2–11)
AST SERPL-CCNC: 14 U/L (ref 15–37)
BILIRUB DIRECT SERPL-MCNC: 0.2 MG/DL
BILIRUB SERPL-MCNC: 0.7 MG/DL (ref 0.2–1.1)
BUN SERPL-MCNC: 12 MG/DL (ref 8–23)
CALCIUM SERPL-MCNC: 9.7 MG/DL (ref 8.3–10.4)
CHLORIDE SERPL-SCNC: 108 MMOL/L (ref 103–113)
CHOLEST SERPL-MCNC: 99 MG/DL
CO2 SERPL-SCNC: 31 MMOL/L (ref 21–32)
CREAT SERPL-MCNC: 1 MG/DL (ref 0.8–1.5)
ERYTHROCYTE [DISTWIDTH] IN BLOOD BY AUTOMATED COUNT: 13 % (ref 11.9–14.6)
GLOBULIN SER CALC-MCNC: 2.6 G/DL (ref 2.8–4.5)
GLUCOSE SERPL-MCNC: 99 MG/DL (ref 65–100)
HCT VFR BLD AUTO: 45.2 % (ref 41.1–50.3)
HDLC SERPL-MCNC: 52 MG/DL (ref 40–60)
HDLC SERPL: 1.9
HGB BLD-MCNC: 14.4 G/DL (ref 13.6–17.2)
LDLC SERPL CALC-MCNC: 34 MG/DL
MCH RBC QN AUTO: 29.4 PG (ref 26.1–32.9)
MCHC RBC AUTO-ENTMCNC: 31.9 G/DL (ref 31.4–35)
MCV RBC AUTO: 92.4 FL (ref 82–102)
NRBC # BLD: 0 K/UL (ref 0–0.2)
PLATELET # BLD AUTO: 232 K/UL (ref 150–450)
PMV BLD AUTO: 10.6 FL (ref 9.4–12.3)
POTASSIUM SERPL-SCNC: 4.2 MMOL/L (ref 3.5–5.1)
PROT SERPL-MCNC: 6.7 G/DL (ref 6.3–8.2)
PSA SERPL-MCNC: 1.6 NG/ML
RBC # BLD AUTO: 4.89 M/UL (ref 4.23–5.6)
SODIUM SERPL-SCNC: 142 MMOL/L (ref 136–146)
TRIGL SERPL-MCNC: 65 MG/DL (ref 35–150)
VLDLC SERPL CALC-MCNC: 13 MG/DL (ref 6–23)
WBC # BLD AUTO: 16.5 K/UL (ref 4.3–11.1)

## 2024-01-19 RX ORDER — TIZANIDINE 4 MG/1
4 TABLET ORAL EVERY 8 HOURS PRN
Qty: 45 TABLET | Refills: 2 | Status: SHIPPED | OUTPATIENT
Start: 2024-01-19

## 2024-01-19 RX ORDER — PANTOPRAZOLE SODIUM 40 MG/1
40 TABLET, DELAYED RELEASE ORAL DAILY
Qty: 90 TABLET | Refills: 1 | Status: SHIPPED | OUTPATIENT
Start: 2024-01-19

## 2024-01-19 RX ORDER — AMLODIPINE BESYLATE 5 MG/1
5 TABLET ORAL DAILY
Qty: 90 TABLET | Refills: 1 | Status: SHIPPED | OUTPATIENT
Start: 2024-01-19

## 2024-01-19 RX ORDER — SERTRALINE HYDROCHLORIDE 100 MG/1
100 TABLET, FILM COATED ORAL DAILY
Qty: 90 TABLET | Refills: 1 | Status: SHIPPED | OUTPATIENT
Start: 2024-01-19

## 2024-01-19 ASSESSMENT — PATIENT HEALTH QUESTIONNAIRE - PHQ9
1. LITTLE INTEREST OR PLEASURE IN DOING THINGS: 0
3. TROUBLE FALLING OR STAYING ASLEEP: 0
SUM OF ALL RESPONSES TO PHQ QUESTIONS 1-9: 0
4. FEELING TIRED OR HAVING LITTLE ENERGY: 0
SUM OF ALL RESPONSES TO PHQ QUESTIONS 1-9: 0
6. FEELING BAD ABOUT YOURSELF - OR THAT YOU ARE A FAILURE OR HAVE LET YOURSELF OR YOUR FAMILY DOWN: 0
8. MOVING OR SPEAKING SO SLOWLY THAT OTHER PEOPLE COULD HAVE NOTICED. OR THE OPPOSITE, BEING SO FIGETY OR RESTLESS THAT YOU HAVE BEEN MOVING AROUND A LOT MORE THAN USUAL: 0
9. THOUGHTS THAT YOU WOULD BE BETTER OFF DEAD, OR OF HURTING YOURSELF: 0
SUM OF ALL RESPONSES TO PHQ QUESTIONS 1-9: 0
7. TROUBLE CONCENTRATING ON THINGS, SUCH AS READING THE NEWSPAPER OR WATCHING TELEVISION: 0
10. IF YOU CHECKED OFF ANY PROBLEMS, HOW DIFFICULT HAVE THESE PROBLEMS MADE IT FOR YOU TO DO YOUR WORK, TAKE CARE OF THINGS AT HOME, OR GET ALONG WITH OTHER PEOPLE: 0
SUM OF ALL RESPONSES TO PHQ9 QUESTIONS 1 & 2: 0
SUM OF ALL RESPONSES TO PHQ QUESTIONS 1-9: 0
5. POOR APPETITE OR OVEREATING: 0
2. FEELING DOWN, DEPRESSED OR HOPELESS: 0

## 2024-01-19 ASSESSMENT — LIFESTYLE VARIABLES
HOW MANY STANDARD DRINKS CONTAINING ALCOHOL DO YOU HAVE ON A TYPICAL DAY: PATIENT DOES NOT DRINK
HOW OFTEN DO YOU HAVE A DRINK CONTAINING ALCOHOL: NEVER

## 2024-01-19 NOTE — PROGRESS NOTES
Medicare Annual Wellness Visit    Abram MADISON Ravinder is here for Medicare AWV, Hypertension, and Insomnia    Assessment & Plan   Medicare annual wellness visit, subsequent  COPD, very severe (HCC)  Chronic right-sided low back pain with right-sided sciatica  -     tiZANidine (ZANAFLEX) 4 MG tablet; Take 1 tablet by mouth every 8 hours as needed (Muscle spasm), Disp-45 tablet, R-2Normal  CLL (chronic lymphocytic leukemia) (HCC)  Assessment & Plan:   Monitored by specialist- no acute findings meriting change in the plan  Recurrent major depressive disorder, in partial remission (HCC)  -     sertraline (ZOLOFT) 100 MG tablet; Take 1 tablet by mouth daily, Disp-90 tablet, R-1Normal  Permanent atrial fibrillation (HCC)  -     Basic Metabolic Panel; Future  -     CBC; Future  Atherosclerotic heart disease of native coronary artery with other forms of angina pectoris (HCC)  Assessment & Plan:   Monitored by specialist- no acute findings meriting change in the plan  Primary hypertension  -     amLODIPine (NORVASC) 5 MG tablet; Take 1 tablet by mouth daily, Disp-90 tablet, R-1Normal  -     Hepatic Function Panel; Future  -     Lipid Panel; Future  Special screening for malignant neoplasm of prostate  -     PSA Screening; Future      Will continue to follow up with Pulmonology.  Recommendations for Preventive Services Due: see orders and patient instructions/AVS.  Continue with Duoneb.  Tizanidine prn for low back pain.  Tolerating medication well without adverse effects and providing good therapeutic benefit for depression. Will continue with sertraline and advised to let us know for any worsening symptoms.  Currently in NSR with rate controlled.  Continue with sotalol and follow up with Cardiology.  Tolerating medication well for HTN. Achieving desired therapeutic response with   Key Anti-Hypertensive Meds            amLODIPine (NORVASC) 5 MG tablet (Taking)    Sig - Route: Take 1 tablet by mouth daily - Oral    --will continue.

## 2024-01-19 NOTE — PATIENT INSTRUCTIONS
Learning About Being Active as an Older Adult  Why is being active important as you get older?     Being active is one of the best things you can do for your health. And it's never too late to start. Being active--or getting active, if you aren't already--has definite benefits. It can:  Give you more energy,  Keep your mind sharp.  Improve balance to reduce your risk of falls.  Help you manage chronic illness with fewer medicines.  No matter how old you are, how fit you are, or what health problems you have, there is a form of activity that will work for you. And the more physical activity you can do, the better your overall health will be.  What kinds of activity can help you stay healthy?  Being more active will make your daily activities easier. Physical activity includes planned exercise and things you do in daily life. There are four types of activity:  Aerobic.  Doing aerobic activity makes your heart and lungs strong.  Includes walking, dancing, and gardening.  Aim for at least 2½ hours spread throughout the week.  It improves your energy and can help you sleep better.  Muscle-strengthening.  This type of activity can help maintain muscle and strengthen bones.  Includes climbing stairs, using resistance bands, and lifting or carrying heavy loads.  Aim for at least twice a week.  It can help protect the knees and other joints.  Stretching.  Stretching gives you better range of motion in joints and muscles.  Includes upper arm stretches, calf stretches, and gentle yoga.  Aim for at least twice a week, preferably after your muscles are warmed up from other activities.  It can help you function better in daily life.  Balancing.  This helps you stay coordinated and have good posture.  Includes heel-to-toe walking, rosalia chi, and certain types of yoga.  Aim for at least 3 days a week.  It can reduce your risk of falling.  Even if you have a hard time meeting the recommendations, it's better to be more active

## 2024-03-17 NOTE — PROGRESS NOTES
Name:  Abram Castellon  YOB: 1955   MRN: [id number]      Office Visit: 3/18/2024        ASSESSMENT AND PLAN:  (Medical Decision Making)    Impression: 68 y.o. male with very severe COPD, hypoxemia.    1. COPD, very severe (HCC)  --remains on Duoneb qid. Could not tolerate nebulized Pulmicort secondary to nervousness and tremors    2. Lung nodules  --Small bilateral nodules, 2 to 5 mm. High risk for lung cancer due to positive family history of lung cancer and personal history of CLL and significant smoking history. Due follow up CT of chest in July, 2024.    3. Personal history of tobacco use  --40-pack-year history of cigarette smoking, quit in 2016.   After above CT is completed, then would need to convert to screening CT scans.    4. Hypoxemia  --continue O2 at 2 lpm with exertion and sleep.    Follow-up and Dispositions    Return in about 6 months (around 9/18/2024) for Roselia or MD, COPD, Arrive 20 minutes prior to appt time.     Collaborating physician is Dr. Ronnie Nunez.    ADS    Roselia Rizzo, APRN - [facility]    _________________________________________________________________________    HISTORY OF PRESENT ILLNESS:    Mr. Abram Castellon is a 68 y.o. male who is seen at [facility] for  COPD and Pulmonary Nodule     The patient is a 68-year-old male who is seen for follow up of lung nodules and COPD.  He has a history of skin cancer, CLL, coronary artery disease, depression, reflux, heart failure, hypertension, hyperlipidemia, nocturnal hypoxemia, and atrial fib.  He has a 40-pack-year history of cigarette smoking, but quit smoking in 2016.  He remains on O2 at 2 L/min with sleep and exertion.  He denies any cough or wheezing.    Stopped the pulmicort in his nebulizer--couldn't tolerate it due to nervousness.  Symptoms resolved after it was discontinued.     He had CT of chest done 7/14/23 that demonstrated some mild atelectasis as well as bilateral nodules.  Follow up CT is due in

## 2024-03-18 ENCOUNTER — OFFICE VISIT (OUTPATIENT)
Dept: PULMONOLOGY | Age: 69
End: 2024-03-18
Payer: MEDICARE

## 2024-03-18 VITALS
BODY MASS INDEX: 20.67 KG/M2 | RESPIRATION RATE: 18 BRPM | HEART RATE: 65 BPM | OXYGEN SATURATION: 96 % | DIASTOLIC BLOOD PRESSURE: 78 MMHG | SYSTOLIC BLOOD PRESSURE: 130 MMHG | HEIGHT: 73 IN | WEIGHT: 156 LBS | TEMPERATURE: 98.8 F

## 2024-03-18 DIAGNOSIS — Z87.891 PERSONAL HISTORY OF TOBACCO USE: ICD-10-CM

## 2024-03-18 DIAGNOSIS — J44.9 COPD, VERY SEVERE (HCC): Primary | ICD-10-CM

## 2024-03-18 DIAGNOSIS — R09.02 HYPOXEMIA: ICD-10-CM

## 2024-03-18 DIAGNOSIS — R91.8 LUNG NODULES: ICD-10-CM

## 2024-03-18 PROCEDURE — 3017F COLORECTAL CA SCREEN DOC REV: CPT | Performed by: NURSE PRACTITIONER

## 2024-03-18 PROCEDURE — 99214 OFFICE O/P EST MOD 30 MIN: CPT | Performed by: NURSE PRACTITIONER

## 2024-03-18 PROCEDURE — 3075F SYST BP GE 130 - 139MM HG: CPT | Performed by: NURSE PRACTITIONER

## 2024-03-18 PROCEDURE — 3023F SPIROM DOC REV: CPT | Performed by: NURSE PRACTITIONER

## 2024-03-18 PROCEDURE — 1123F ACP DISCUSS/DSCN MKR DOCD: CPT | Performed by: NURSE PRACTITIONER

## 2024-03-18 PROCEDURE — G8484 FLU IMMUNIZE NO ADMIN: HCPCS | Performed by: NURSE PRACTITIONER

## 2024-03-18 PROCEDURE — 1036F TOBACCO NON-USER: CPT | Performed by: NURSE PRACTITIONER

## 2024-03-18 PROCEDURE — G8427 DOCREV CUR MEDS BY ELIG CLIN: HCPCS | Performed by: NURSE PRACTITIONER

## 2024-03-18 PROCEDURE — 3078F DIAST BP <80 MM HG: CPT | Performed by: NURSE PRACTITIONER

## 2024-03-18 PROCEDURE — G8420 CALC BMI NORM PARAMETERS: HCPCS | Performed by: NURSE PRACTITIONER

## 2024-03-28 ENCOUNTER — PATIENT MESSAGE (OUTPATIENT)
Dept: PULMONOLOGY | Age: 69
End: 2024-03-28

## 2024-04-01 NOTE — TELEPHONE ENCOUNTER
From: Abram Castellon  To: Roselia Rizzo  Sent: 3/28/2024 4:29 PM EDT  Subject: Face mask    I spoke with [facility] today and asked about possibly getting a mask instead of using the nose piece, they told me I had to get the Dr to order it. So is there anyway you could order one to see if it works to give my ears a rest because they are sore. If so Thank you.    Abram Castellon

## 2024-04-17 ENCOUNTER — OFFICE VISIT (OUTPATIENT)
Dept: INTERNAL MEDICINE CLINIC | Facility: CLINIC | Age: 69
End: 2024-04-17
Payer: MEDICARE

## 2024-04-17 VITALS
SYSTOLIC BLOOD PRESSURE: 112 MMHG | WEIGHT: 161 LBS | HEIGHT: 73 IN | DIASTOLIC BLOOD PRESSURE: 68 MMHG | RESPIRATION RATE: 16 BRPM | BODY MASS INDEX: 21.34 KG/M2

## 2024-04-17 DIAGNOSIS — J44.1 COPD WITH EXACERBATION (HCC): Primary | ICD-10-CM

## 2024-04-17 DIAGNOSIS — R05.1 ACUTE COUGH: ICD-10-CM

## 2024-04-17 DIAGNOSIS — I10 HTN (HYPERTENSION), BENIGN: ICD-10-CM

## 2024-04-17 LAB
EXP DATE SOLUTION: NORMAL
EXP DATE SWAB: NORMAL
EXPIRATION DATE: NORMAL
LOT NUMBER POC: NORMAL
LOT NUMBER SOLUTION: NORMAL
LOT NUMBER SWAB: NORMAL
SARS-COV-2 RNA, POC: NEGATIVE

## 2024-04-17 PROCEDURE — 99214 OFFICE O/P EST MOD 30 MIN: CPT | Performed by: FAMILY MEDICINE

## 2024-04-17 PROCEDURE — G8427 DOCREV CUR MEDS BY ELIG CLIN: HCPCS | Performed by: FAMILY MEDICINE

## 2024-04-17 PROCEDURE — 3074F SYST BP LT 130 MM HG: CPT | Performed by: FAMILY MEDICINE

## 2024-04-17 PROCEDURE — 87635 SARS-COV-2 COVID-19 AMP PRB: CPT | Performed by: FAMILY MEDICINE

## 2024-04-17 PROCEDURE — 3023F SPIROM DOC REV: CPT | Performed by: FAMILY MEDICINE

## 2024-04-17 PROCEDURE — 1123F ACP DISCUSS/DSCN MKR DOCD: CPT | Performed by: FAMILY MEDICINE

## 2024-04-17 PROCEDURE — G8420 CALC BMI NORM PARAMETERS: HCPCS | Performed by: FAMILY MEDICINE

## 2024-04-17 PROCEDURE — 3017F COLORECTAL CA SCREEN DOC REV: CPT | Performed by: FAMILY MEDICINE

## 2024-04-17 PROCEDURE — 1036F TOBACCO NON-USER: CPT | Performed by: FAMILY MEDICINE

## 2024-04-17 PROCEDURE — 3078F DIAST BP <80 MM HG: CPT | Performed by: FAMILY MEDICINE

## 2024-04-17 RX ORDER — PREDNISONE 20 MG/1
TABLET ORAL
Qty: 21 TABLET | Refills: 0 | Status: SHIPPED | OUTPATIENT
Start: 2024-04-17

## 2024-04-17 RX ORDER — AMOXICILLIN AND CLAVULANATE POTASSIUM 875; 125 MG/1; MG/1
1 TABLET, FILM COATED ORAL 2 TIMES DAILY
Qty: 14 TABLET | Refills: 0 | Status: SHIPPED | OUTPATIENT
Start: 2024-04-17 | End: 2024-04-24

## 2024-04-17 SDOH — ECONOMIC STABILITY: FOOD INSECURITY: WITHIN THE PAST 12 MONTHS, THE FOOD YOU BOUGHT JUST DIDN'T LAST AND YOU DIDN'T HAVE MONEY TO GET MORE.: NEVER TRUE

## 2024-04-17 SDOH — ECONOMIC STABILITY: FOOD INSECURITY: WITHIN THE PAST 12 MONTHS, YOU WORRIED THAT YOUR FOOD WOULD RUN OUT BEFORE YOU GOT MONEY TO BUY MORE.: NEVER TRUE

## 2024-04-17 SDOH — ECONOMIC STABILITY: INCOME INSECURITY: HOW HARD IS IT FOR YOU TO PAY FOR THE VERY BASICS LIKE FOOD, HOUSING, MEDICAL CARE, AND HEATING?: NOT HARD AT ALL

## 2024-04-17 NOTE — PROGRESS NOTES
Jose Luis Crane DO  Diplomate of the [employer]  [place] [facility]      Abram Castellon (: 1955) is a 68 y.o. male, here for evaluation of the following chief complaint(s):  Cough       ASSESSMENT/PLAN:  1. COPD with exacerbation (HCC)  -     amoxicillin-clavulanate (AUGMENTIN) 875-125 MG per tablet; Take 1 tablet by mouth 2 times daily for 7 days, Disp-14 tablet, R-0Normal  -     predniSONE (DELTASONE) 20 MG tablet; 60mg daily x 3 days, then 40mg x 3 days, then 20mg x 3 days, then 10mg x 3 days, then D/C., Disp-21 tablet, R-0Normal  2. HTN (hypertension), benign  3. Acute cough  -     AMB POC COVID-19 COV       -Instructed on how to take the steroids properly as well as potential side effects of the medication.  Advised to let me know for any problems.  -Instructed on the proper use of antibiotics.  Also stressed the importance of taking the full course to help prevent the risk of resistance.  Advised taking pro-biotics (yogurt, align or equivalent) while on antibiotics to reduce the risk of C. Diff infection.  -Tolerating medication well for HTN. Achieving desired therapeutic response with   Hypertension Medications       Calcium Channel Blockers       amLODIPine (NORVASC) 5 MG tablet Take 1 tablet by mouth daily       Beta Blockers Non-Selective       sotalol (BETAPACE) 80 MG tablet TAKE 1 TABLET TWICE DAILY         --will continue. Will periodically review and adjust if needed.  Encourage home monitoring.         SUBJECTIVE/OBJECTIVE:  HPI:  Patient comes in today.  He has severe COPD and is oxygen dependent.  States over the past few days, he started to have some increased shortness of breath with mucopurulent productive cough.  Has been using his inhalers as directed.  No hemoptysis.  No fevers, but getting more short of breath.  Blood pressure remains stable. He denies chest pain, palpitations, exertional pain or pressure.  ROS negative except as noted above

## 2024-04-24 ENCOUNTER — PATIENT MESSAGE (OUTPATIENT)
Dept: PULMONOLOGY | Age: 69
End: 2024-04-24

## 2024-04-25 RX ORDER — PREDNISONE 10 MG/1
10 TABLET ORAL DAILY
Qty: 30 TABLET | Refills: 1 | Status: SHIPPED | OUTPATIENT
Start: 2024-04-25

## 2024-04-25 NOTE — TELEPHONE ENCOUNTER
From: Abram Castellon  To: Roselia Rizzo  Sent: 4/24/2024 11:06 AM EDT  Subject: Medicine     Recently my family Dr had me on antibiotics and low dose prednisone . I have been on it since the 17th, since being on the steroids I have noticed my breathing has been a little better, I have had more energy, and my appetite has improved. After talking to my doctor he suggested I contact you to see if there is a way I could possibly stay on a low dose 20mg of prednisone for a while to see if everything goes like it is now. If you think it would help could you send a prescription to [email or web address] and maybe this can stay making me feel better   Thank You    Bo

## 2024-04-26 DIAGNOSIS — I10 PRIMARY HYPERTENSION: ICD-10-CM

## 2024-04-26 DIAGNOSIS — F33.41 RECURRENT MAJOR DEPRESSIVE DISORDER, IN PARTIAL REMISSION (HCC): ICD-10-CM

## 2024-04-26 RX ORDER — SERTRALINE HYDROCHLORIDE 100 MG/1
100 TABLET, FILM COATED ORAL DAILY
Qty: 90 TABLET | Refills: 1 | Status: SHIPPED | OUTPATIENT
Start: 2024-04-26

## 2024-04-26 RX ORDER — AMLODIPINE BESYLATE 5 MG/1
5 TABLET ORAL DAILY
Qty: 90 TABLET | Refills: 1 | Status: SHIPPED | OUTPATIENT
Start: 2024-04-26

## 2024-04-29 ENCOUNTER — PATIENT MESSAGE (OUTPATIENT)
Dept: INTERNAL MEDICINE CLINIC | Facility: CLINIC | Age: 69
End: 2024-04-29

## 2024-04-29 DIAGNOSIS — L23.7 POISON IVY DERMATITIS: Primary | ICD-10-CM

## 2024-05-01 RX ORDER — TRIAMCINOLONE ACETONIDE 1 MG/G
CREAM TOPICAL
Qty: 454 G | Refills: 1 | Status: SHIPPED | OUTPATIENT
Start: 2024-05-01

## 2024-05-01 RX ORDER — PREDNISONE 20 MG/1
TABLET ORAL
Qty: 21 TABLET | Refills: 0 | Status: SHIPPED | OUTPATIENT
Start: 2024-05-01

## 2024-05-01 NOTE — TELEPHONE ENCOUNTER
From: Abram Castellon  To: Dr. Jose Luis DONIS Brothers  Sent: 4/29/2024 10:14 PM EDT  Subject: Poison oak    Recently I have been cleaning around my house, and and just like a couple of years ago I have gotten into poison oak not ivy. So far it’s on my hands and arms, I am taking Benadryl and alcohol rubs to no saloni, what would you recommend taking?

## 2024-05-01 NOTE — TELEPHONE ENCOUNTER
-Please call him and let him know I have sent out another round of steroids for him due to the poison ivy as well as steroid cream.  There are increased risks of hip bone necrosis with repeated steroids, so we have to be careful.

## 2024-05-08 DIAGNOSIS — J44.9 COPD, VERY SEVERE (HCC): ICD-10-CM

## 2024-05-08 RX ORDER — IPRATROPIUM BROMIDE AND ALBUTEROL SULFATE 2.5; .5 MG/3ML; MG/3ML
SOLUTION RESPIRATORY (INHALATION)
Qty: 360 ML | Refills: 11 | Status: SHIPPED | OUTPATIENT
Start: 2024-05-08

## 2024-05-20 ENCOUNTER — PATIENT MESSAGE (OUTPATIENT)
Dept: PULMONOLOGY | Age: 69
End: 2024-05-20

## 2024-05-20 ENCOUNTER — TELEPHONE (OUTPATIENT)
Dept: PULMONOLOGY | Age: 69
End: 2024-05-20

## 2024-05-20 RX ORDER — PREDNISONE 20 MG/1
20 TABLET ORAL DAILY
Qty: 15 TABLET | Refills: 0 | Status: SHIPPED | OUTPATIENT
Start: 2024-05-20 | End: 2024-05-23

## 2024-05-20 RX ORDER — PREDNISONE 10 MG/1
10 TABLET ORAL DAILY
Qty: 30 TABLET | Refills: 1 | Status: CANCELLED | OUTPATIENT
Start: 2024-05-20

## 2024-05-20 NOTE — TELEPHONE ENCOUNTER
Electronic Rx sent.  Advised patient of steroid taper, encouraged to call back if doesn't see improvement quickly.

## 2024-05-20 NOTE — TELEPHONE ENCOUNTER
Increase Prednisone to 40 mg qd x 3 days, 30 mg qd x 3 days, 20 mg daily x 3 days, and then back to 10 mg daily.  If he fails to improve or worsen, needs to go to ER.  Thank you.

## 2024-05-20 NOTE — TELEPHONE ENCOUNTER
Pt is having a hard time breathing. On oxygen @ 2lpm. O2 is 89. Was as low as 81.   Transferred to triage

## 2024-05-20 NOTE — TELEPHONE ENCOUNTER
Took call from patient reporting sat at 81% on 2L; reports this has been his normal at waking  Around 4am woke up felt like he could not catch his breath, used 6 puffs of albuterol, went to living room and checked O2 and found it to be 81%; feels like he gets nasal congestion and is not getting enough air, has been worse in the last 4 days, at waking & with exertion. Has humidity on O2 concentrator. Says he's fine sitting in chair with O2 on; does not adjust flow rate of O2; has seen sat as low at 75 w/ acitvity, but recovers with rest; no edema, cough or wheezing. Advised the generally sats below 90% for more than 5 min may show need for ER or urgent care intervention.  Began to review appt options for today and patient noted he hasn't taken prednisone since last week because he didn't feel like it was helping, has been prescribed to take 10mg daily, has been about 4 days since last dose, advised to take a dose now.   Patient agreed and asked could msg be sent to Ms. Rizzo for recommendations. He states if he needs to report to hospital he will.

## 2024-05-21 RX ORDER — PREDNISONE 10 MG/1
10 TABLET ORAL DAILY
Qty: 30 TABLET | Refills: 1 | Status: SHIPPED | OUTPATIENT
Start: 2024-05-21 | End: 2024-05-23

## 2024-05-21 RX ORDER — PREDNISONE 10 MG/1
10 TABLET ORAL DAILY
Qty: 30 TABLET | Refills: 1 | Status: SHIPPED | OUTPATIENT
Start: 2024-05-21 | End: 2024-05-21

## 2024-05-21 NOTE — TELEPHONE ENCOUNTER
From: Abram Castellon  To: Roselia Rizzo  Sent: 5/20/2024 9:16 PM EDT  Subject: Medicine     Since you are requesting me to take 10 mgs per day of Prednisone please send a refill for multiple months to [facility]   Thank you

## 2024-05-23 RX ORDER — PREDNISONE 10 MG/1
10 TABLET ORAL DAILY
Qty: 90 TABLET | Refills: 1 | Status: SHIPPED | OUTPATIENT
Start: 2024-05-23

## 2024-05-23 NOTE — TELEPHONE ENCOUNTER
Patient requesting refill for predniSONE (DELTASONE) 10 MG tablets.   Patient Comment: Since you are requesting my to take daily please send prescription to [facility] for multiple months.  Last seen by Roselia Rizzo NP on 03/18/2024

## 2024-06-06 RX ORDER — PANTOPRAZOLE SODIUM 40 MG/1
40 TABLET, DELAYED RELEASE ORAL DAILY
Qty: 90 TABLET | Refills: 3 | Status: SHIPPED | OUTPATIENT
Start: 2024-06-06

## 2024-06-24 DIAGNOSIS — G89.29 CHRONIC RIGHT-SIDED LOW BACK PAIN WITH RIGHT-SIDED SCIATICA: ICD-10-CM

## 2024-06-24 DIAGNOSIS — M54.41 CHRONIC RIGHT-SIDED LOW BACK PAIN WITH RIGHT-SIDED SCIATICA: ICD-10-CM

## 2024-06-26 RX ORDER — TIZANIDINE 4 MG/1
4 TABLET ORAL EVERY 8 HOURS PRN
Qty: 45 TABLET | Refills: 2 | Status: SHIPPED | OUTPATIENT
Start: 2024-06-26

## 2024-07-10 ENCOUNTER — PATIENT MESSAGE (OUTPATIENT)
Dept: INTERNAL MEDICINE CLINIC | Facility: CLINIC | Age: 69
End: 2024-07-10

## 2024-07-10 DIAGNOSIS — R05.9 COUGH: ICD-10-CM

## 2024-07-10 RX ORDER — HYDROCODONE BITARTRATE AND HOMATROPINE METHYLBROMIDE ORAL SOLUTION 5; 1.5 MG/5ML; MG/5ML
2.5 LIQUID ORAL EVERY 4 HOURS PRN
Qty: 240 ML | Refills: 0 | Status: SHIPPED | OUTPATIENT
Start: 2024-07-10 | End: 2024-08-09

## 2024-07-10 NOTE — TELEPHONE ENCOUNTER
From: Abram Castellon  To: Dr. Jose Luis DONIS Brothsin  Sent: 7/10/2024 3:15 PM EDT  Subject: Cough syrup     I was wandering if you could send a refill for my cough medicine to [facility]. It is Hydrocodone-Homatropine 240ml

## 2024-07-10 NOTE — TELEPHONE ENCOUNTER
Per Dr. Crane- rx sent in. Notified patient via phone if not doing any better or worsening by Friday to let us know

## 2024-07-17 ENCOUNTER — OFFICE VISIT (OUTPATIENT)
Dept: INTERNAL MEDICINE CLINIC | Facility: CLINIC | Age: 69
End: 2024-07-17
Payer: MEDICARE

## 2024-07-17 VITALS
OXYGEN SATURATION: 94 % | DIASTOLIC BLOOD PRESSURE: 76 MMHG | BODY MASS INDEX: 20.94 KG/M2 | SYSTOLIC BLOOD PRESSURE: 110 MMHG | WEIGHT: 158 LBS | HEIGHT: 73 IN | HEART RATE: 78 BPM

## 2024-07-17 DIAGNOSIS — J44.9 COPD, VERY SEVERE (HCC): ICD-10-CM

## 2024-07-17 DIAGNOSIS — R35.0 URINARY FREQUENCY: ICD-10-CM

## 2024-07-17 DIAGNOSIS — R35.1 NOCTURIA: ICD-10-CM

## 2024-07-17 DIAGNOSIS — J01.00 ACUTE NON-RECURRENT MAXILLARY SINUSITIS: Primary | ICD-10-CM

## 2024-07-17 DIAGNOSIS — I10 PRIMARY HYPERTENSION: ICD-10-CM

## 2024-07-17 LAB
APPEARANCE UR: CLEAR
BACTERIA URNS QL MICRO: NEGATIVE /HPF
BILIRUB UR QL: NEGATIVE
CASTS URNS QL MICRO: 0 /LPF
COLOR UR: NORMAL
CRYSTALS URNS QL MICRO: 0 /LPF
EPI CELLS #/AREA URNS HPF: NORMAL /HPF (ref 0–5)
GLUCOSE UR STRIP.AUTO-MCNC: NEGATIVE MG/DL
HGB UR QL STRIP: NEGATIVE
HYALINE CASTS URNS QL MICRO: NORMAL /LPF
KETONES UR QL STRIP.AUTO: NEGATIVE MG/DL
LEUKOCYTE ESTERASE UR QL STRIP.AUTO: NEGATIVE
MUCOUS THREADS URNS QL MICRO: 0 /LPF
NITRITE UR QL STRIP.AUTO: NEGATIVE
PH UR STRIP: 5.5 (ref 5–9)
PROT UR STRIP-MCNC: NEGATIVE MG/DL
RBC #/AREA URNS HPF: NORMAL /HPF (ref 0–5)
SP GR UR REFRACTOMETRY: 1.01 (ref 1–1.02)
URINE CULTURE IF INDICATED: NORMAL
UROBILINOGEN UR QL STRIP.AUTO: 0.2 EU/DL (ref 0.2–1)
WBC URNS QL MICRO: NORMAL /HPF (ref 0–4)

## 2024-07-17 PROCEDURE — 3023F SPIROM DOC REV: CPT | Performed by: FAMILY MEDICINE

## 2024-07-17 PROCEDURE — 99214 OFFICE O/P EST MOD 30 MIN: CPT | Performed by: FAMILY MEDICINE

## 2024-07-17 PROCEDURE — 3078F DIAST BP <80 MM HG: CPT | Performed by: FAMILY MEDICINE

## 2024-07-17 PROCEDURE — 1036F TOBACCO NON-USER: CPT | Performed by: FAMILY MEDICINE

## 2024-07-17 PROCEDURE — 3074F SYST BP LT 130 MM HG: CPT | Performed by: FAMILY MEDICINE

## 2024-07-17 PROCEDURE — G8427 DOCREV CUR MEDS BY ELIG CLIN: HCPCS | Performed by: FAMILY MEDICINE

## 2024-07-17 PROCEDURE — 1123F ACP DISCUSS/DSCN MKR DOCD: CPT | Performed by: FAMILY MEDICINE

## 2024-07-17 PROCEDURE — 3017F COLORECTAL CA SCREEN DOC REV: CPT | Performed by: FAMILY MEDICINE

## 2024-07-17 PROCEDURE — G8420 CALC BMI NORM PARAMETERS: HCPCS | Performed by: FAMILY MEDICINE

## 2024-07-17 RX ORDER — TAMSULOSIN HYDROCHLORIDE 0.4 MG/1
0.4 CAPSULE ORAL DAILY
Qty: 90 CAPSULE | Refills: 1 | Status: SHIPPED | OUTPATIENT
Start: 2024-07-17

## 2024-07-17 RX ORDER — CEFUROXIME AXETIL 500 MG/1
500 TABLET ORAL 2 TIMES DAILY
Qty: 14 TABLET | Refills: 0 | Status: SHIPPED | OUTPATIENT
Start: 2024-07-17 | End: 2024-07-24

## 2024-07-17 NOTE — PROGRESS NOTES
Jose Luis Crane DO  Diplomate of the [employer] Board of [employer] Medicine  [facility]      Abram Castellon (: 1955) is a 68 y.o. male, here for evaluation of the following chief complaint(s):  COPD and Sinusitis       ASSESSMENT/PLAN:  1. Acute non-recurrent maxillary sinusitis  -     cefUROXime (CEFTIN) 500 MG tablet; Take 1 tablet by mouth 2 times daily for 7 days, Disp-14 tablet, R-0Normal  2. COPD, very severe (HCC)  3. Primary hypertension  4. Nocturia  -     Urinalysis with Reflex to Culture; Future  -     tamsulosin (FLOMAX) 0.4 MG capsule; Take 1 capsule by mouth daily, Disp-90 capsule, R-1Normal  5. Urinary frequency  -     tamsulosin (FLOMAX) 0.4 MG capsule; Take 1 capsule by mouth daily, Disp-90 capsule, R-1Normal     Symptomatic therapy suggested: gargle salt water for sore throat, use nasal saline mist at bedside for congestion.  Apply facial warm packs for sinus pain.  May use acetaminophen, ibuprofen, antihistamine of choice. Increase fluids and rest.  Instructed on the proper use of antibiotics.  Also stressed the importance of taking the full course to help prevent the risk of resistance.  Advised taking pro-biotics (yogurt, align or equivalent) while on antibiotics to reduce the risk of C. Diff infection.  Tolerating medication well for HTN. Achieving desired therapeutic response with   Hypertension Medications       Calcium Channel Blockers       amLODIPine (NORVASC) 5 MG tablet Take 1 tablet by mouth daily       Beta Blockers Non-Selective       sotalol (BETAPACE) 80 MG tablet TAKE 1 TABLET TWICE DAILY         --will continue. Will periodically review and adjust if needed.  Encourage home monitoring.   Will do trial of tamsulosin and check urine.  Suspect BPH.  PSA this year normal.        SUBJECTIVE/OBJECTIVE:  HPI:  Patient has very severe COPD.  Has chronic cough and shortness of breath due to this.  Using his inhalers as directed and is steroid-dependent.  States he

## 2024-07-19 DIAGNOSIS — J44.9 COPD, VERY SEVERE (HCC): ICD-10-CM

## 2024-07-20 RX ORDER — ALBUTEROL SULFATE 90 UG/1
2 AEROSOL, METERED RESPIRATORY (INHALATION) EVERY 4 HOURS PRN
Qty: 18 G | Refills: 11 | Status: SHIPPED | OUTPATIENT
Start: 2024-07-20

## 2024-07-23 RX ORDER — SOTALOL HYDROCHLORIDE 80 MG/1
TABLET ORAL
Qty: 180 TABLET | Refills: 3 | Status: SHIPPED | OUTPATIENT
Start: 2024-07-23

## 2024-07-23 RX ORDER — ATORVASTATIN CALCIUM 40 MG/1
40 TABLET, FILM COATED ORAL DAILY
Qty: 90 TABLET | Refills: 3 | Status: SHIPPED | OUTPATIENT
Start: 2024-07-23

## 2024-07-23 RX ORDER — ISOSORBIDE MONONITRATE 30 MG/1
TABLET, EXTENDED RELEASE ORAL
Qty: 90 TABLET | Refills: 3 | Status: SHIPPED | OUTPATIENT
Start: 2024-07-23

## 2024-07-23 NOTE — TELEPHONE ENCOUNTER
Requested Prescriptions     Pending Prescriptions Disp Refills    isosorbide mononitrate (IMDUR) 30 MG extended release tablet [Pharmacy Med Name: ISOSORBIDE MONONITRATE ER 30 MG Tablet Extended Release 24 Hour] 90 tablet 3     Sig: TAKE 1 TABLET EVERY MORNING    atorvastatin (LIPITOR) 40 MG tablet [Pharmacy Med Name: ATORVASTATIN CALCIUM 40 MG Tablet] 90 tablet 3     Sig: TAKE 1 TABLET EVERY DAY    sotalol (BETAPACE) 80 MG tablet [Pharmacy Med Name: SOTALOL HCL 80 MG Tablet] 180 tablet 3     Sig: TAKE 1 TABLET TWICE DAILY       Verified rx. Last OV 07/17/2024. Erx to pharm on file.

## 2024-07-28 ENCOUNTER — PATIENT MESSAGE (OUTPATIENT)
Dept: PULMONOLOGY | Age: 69
End: 2024-07-28

## 2024-07-29 RX ORDER — PREDNISONE 20 MG/1
20 TABLET ORAL DAILY
Qty: 30 TABLET | Refills: 3 | Status: SHIPPED | OUTPATIENT
Start: 2024-07-29

## 2024-07-29 NOTE — TELEPHONE ENCOUNTER
From: Abram Castellon  To: Roselia Rizzo  Sent: 7/28/2024 5:38 PM EDT  Subject: Prednisone     I have requested a refill on my medicine for prednisone, I also have requested an increase from 10 to 20mgs per day, reason being I can tell a big difference from one to the other. I have been taking 20 mgs per day for over 3 weeks and can tell a difference when I only take a 10 over 20, I am hoping this will not be a problem.  Thank you

## 2024-08-01 RX ORDER — MECLIZINE HYDROCHLORIDE 25 MG/1
TABLET ORAL
Qty: 90 TABLET | Refills: 11 | Status: SHIPPED | OUTPATIENT
Start: 2024-08-01

## 2024-08-01 RX ORDER — PREDNISONE 10 MG/1
10 TABLET ORAL DAILY
Qty: 90 TABLET | Refills: 1 | OUTPATIENT
Start: 2024-08-01

## 2024-08-07 DIAGNOSIS — R35.1 NOCTURIA: ICD-10-CM

## 2024-08-07 DIAGNOSIS — R35.0 URINARY FREQUENCY: ICD-10-CM

## 2024-08-08 RX ORDER — PREDNISONE 20 MG/1
TABLET ORAL
Refills: 0 | OUTPATIENT
Start: 2024-08-08

## 2024-08-09 RX ORDER — TAMSULOSIN HYDROCHLORIDE 0.4 MG/1
0.4 CAPSULE ORAL DAILY
Qty: 90 CAPSULE | Refills: 1 | Status: SHIPPED | OUTPATIENT
Start: 2024-08-09

## 2024-09-21 DIAGNOSIS — F33.41 RECURRENT MAJOR DEPRESSIVE DISORDER, IN PARTIAL REMISSION (HCC): ICD-10-CM

## 2024-09-23 ENCOUNTER — OFFICE VISIT (OUTPATIENT)
Dept: PULMONOLOGY | Age: 69
End: 2024-09-23
Payer: MEDICARE

## 2024-09-23 VITALS
WEIGHT: 170.3 LBS | HEART RATE: 96 BPM | RESPIRATION RATE: 18 BRPM | HEIGHT: 73 IN | DIASTOLIC BLOOD PRESSURE: 78 MMHG | TEMPERATURE: 97.5 F | OXYGEN SATURATION: 90 % | SYSTOLIC BLOOD PRESSURE: 130 MMHG | BODY MASS INDEX: 22.57 KG/M2

## 2024-09-23 DIAGNOSIS — J44.9 COPD, VERY SEVERE (HCC): Primary | ICD-10-CM

## 2024-09-23 DIAGNOSIS — J96.11 CHRONIC RESPIRATORY FAILURE WITH HYPOXIA: ICD-10-CM

## 2024-09-23 DIAGNOSIS — R91.8 LUNG NODULES: ICD-10-CM

## 2024-09-23 DIAGNOSIS — Z87.891 PERSONAL HISTORY OF TOBACCO USE: ICD-10-CM

## 2024-09-23 PROCEDURE — 1123F ACP DISCUSS/DSCN MKR DOCD: CPT | Performed by: NURSE PRACTITIONER

## 2024-09-23 PROCEDURE — 3075F SYST BP GE 130 - 139MM HG: CPT | Performed by: NURSE PRACTITIONER

## 2024-09-23 PROCEDURE — 3023F SPIROM DOC REV: CPT | Performed by: NURSE PRACTITIONER

## 2024-09-23 PROCEDURE — 3078F DIAST BP <80 MM HG: CPT | Performed by: NURSE PRACTITIONER

## 2024-09-23 PROCEDURE — 99214 OFFICE O/P EST MOD 30 MIN: CPT | Performed by: NURSE PRACTITIONER

## 2024-09-23 PROCEDURE — G8420 CALC BMI NORM PARAMETERS: HCPCS | Performed by: NURSE PRACTITIONER

## 2024-09-23 PROCEDURE — 3017F COLORECTAL CA SCREEN DOC REV: CPT | Performed by: NURSE PRACTITIONER

## 2024-09-23 PROCEDURE — G8427 DOCREV CUR MEDS BY ELIG CLIN: HCPCS | Performed by: NURSE PRACTITIONER

## 2024-09-23 PROCEDURE — G2211 COMPLEX E/M VISIT ADD ON: HCPCS | Performed by: NURSE PRACTITIONER

## 2024-09-23 PROCEDURE — 1036F TOBACCO NON-USER: CPT | Performed by: NURSE PRACTITIONER

## 2024-09-24 RX ORDER — SERTRALINE HYDROCHLORIDE 100 MG/1
100 TABLET, FILM COATED ORAL DAILY
Qty: 90 TABLET | Refills: 3 | Status: SHIPPED | OUTPATIENT
Start: 2024-09-24

## 2024-10-01 ENCOUNTER — TELEPHONE (OUTPATIENT)
Dept: INTERNAL MEDICINE CLINIC | Facility: CLINIC | Age: 69
End: 2024-10-01

## 2024-10-01 DIAGNOSIS — F43.21 GRIEF REACTION: Primary | ICD-10-CM

## 2024-10-01 RX ORDER — LORAZEPAM 0.5 MG/1
0.5 TABLET ORAL EVERY 8 HOURS PRN
Qty: 30 TABLET | Refills: 0 | Status: SHIPPED | OUTPATIENT
Start: 2024-10-01 | End: 2024-10-02 | Stop reason: SDUPTHER

## 2024-10-01 NOTE — TELEPHONE ENCOUNTER
Patient is experiencing anxiety due to the recent loss of his son. States  is tomorrow and \"just needs something to calm me down\"     Please advise.   Payam Meza

## 2024-10-01 NOTE — TELEPHONE ENCOUNTER
Patient called and states that he is having a rapid heart rate and would like to know if he can get something called in for him to help slow his heart rate down. Patient would like to know if he also can get something called in to help him get through his sons  tomorrow as well. Please Advise.

## 2024-10-02 DIAGNOSIS — F43.21 GRIEF REACTION: ICD-10-CM

## 2024-10-02 RX ORDER — LORAZEPAM 0.5 MG/1
0.5 TABLET ORAL EVERY 8 HOURS PRN
Qty: 30 TABLET | Refills: 0 | Status: SHIPPED | OUTPATIENT
Start: 2024-10-02 | End: 2024-11-01

## 2024-10-02 NOTE — TELEPHONE ENCOUNTER
NEEDS THIS RE SENT TO [facility] [place]    LORazepam (ATIVAN) 0.5 MG tablet       Was sent yesterday but [facility] has no computers and they do not know when they will be fixed

## 2024-10-07 ENCOUNTER — TELEPHONE (OUTPATIENT)
Age: 69
End: 2024-10-07
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
## 2024-10-10 ENCOUNTER — OFFICE VISIT (OUTPATIENT)
Age: 69
End: 2024-10-10
Payer: MEDICARE

## 2024-10-10 VITALS
SYSTOLIC BLOOD PRESSURE: 164 MMHG | HEIGHT: 73 IN | WEIGHT: 166 LBS | HEART RATE: 101 BPM | DIASTOLIC BLOOD PRESSURE: 97 MMHG | BODY MASS INDEX: 22 KG/M2

## 2024-10-10 DIAGNOSIS — I25.10 CORONARY ARTERY DISEASE INVOLVING NATIVE CORONARY ARTERY OF NATIVE HEART WITHOUT ANGINA PECTORIS: Primary | ICD-10-CM

## 2024-10-10 DIAGNOSIS — I10 PRIMARY HYPERTENSION: ICD-10-CM

## 2024-10-10 DIAGNOSIS — E78.2 MIXED HYPERLIPIDEMIA: ICD-10-CM

## 2024-10-10 DIAGNOSIS — I48.21 PERMANENT ATRIAL FIBRILLATION (HCC): ICD-10-CM

## 2024-10-10 PROCEDURE — 93000 ELECTROCARDIOGRAM COMPLETE: CPT | Performed by: INTERNAL MEDICINE

## 2024-10-10 PROCEDURE — 3077F SYST BP >= 140 MM HG: CPT | Performed by: INTERNAL MEDICINE

## 2024-10-10 PROCEDURE — 99214 OFFICE O/P EST MOD 30 MIN: CPT | Performed by: INTERNAL MEDICINE

## 2024-10-10 PROCEDURE — G8484 FLU IMMUNIZE NO ADMIN: HCPCS | Performed by: INTERNAL MEDICINE

## 2024-10-10 PROCEDURE — 3080F DIAST BP >= 90 MM HG: CPT | Performed by: INTERNAL MEDICINE

## 2024-10-10 PROCEDURE — G8428 CUR MEDS NOT DOCUMENT: HCPCS | Performed by: INTERNAL MEDICINE

## 2024-10-10 PROCEDURE — 1036F TOBACCO NON-USER: CPT | Performed by: INTERNAL MEDICINE

## 2024-10-10 PROCEDURE — 1123F ACP DISCUSS/DSCN MKR DOCD: CPT | Performed by: INTERNAL MEDICINE

## 2024-10-10 PROCEDURE — 3017F COLORECTAL CA SCREEN DOC REV: CPT | Performed by: INTERNAL MEDICINE

## 2024-10-10 PROCEDURE — G8420 CALC BMI NORM PARAMETERS: HCPCS | Performed by: INTERNAL MEDICINE

## 2024-10-10 RX ORDER — DILTIAZEM HYDROCHLORIDE 240 MG/1
240 CAPSULE, COATED, EXTENDED RELEASE ORAL DAILY
Qty: 30 CAPSULE | Refills: 11 | Status: SHIPPED | OUTPATIENT
Start: 2024-10-10
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
## 2024-11-20 ENCOUNTER — OFFICE VISIT (OUTPATIENT)
Age: 69
End: 2024-11-20
Payer: MEDICARE

## 2024-11-20 VITALS
HEART RATE: 76 BPM | BODY MASS INDEX: 21.87 KG/M2 | SYSTOLIC BLOOD PRESSURE: 124 MMHG | HEIGHT: 73 IN | DIASTOLIC BLOOD PRESSURE: 80 MMHG | WEIGHT: 165 LBS

## 2024-11-20 DIAGNOSIS — I48.21 PERMANENT ATRIAL FIBRILLATION (HCC): ICD-10-CM

## 2024-11-20 DIAGNOSIS — E78.5 DYSLIPIDEMIA: ICD-10-CM

## 2024-11-20 DIAGNOSIS — I25.118 ATHEROSCLEROTIC HEART DISEASE OF NATIVE CORONARY ARTERY WITH OTHER FORMS OF ANGINA PECTORIS (HCC): Primary | ICD-10-CM

## 2024-11-20 DIAGNOSIS — I25.10 CORONARY ARTERY DISEASE INVOLVING NATIVE CORONARY ARTERY OF NATIVE HEART WITHOUT ANGINA PECTORIS: ICD-10-CM

## 2024-11-20 DIAGNOSIS — J44.9 COPD, VERY SEVERE (HCC): ICD-10-CM

## 2024-11-20 DIAGNOSIS — I10 PRIMARY HYPERTENSION: ICD-10-CM

## 2024-11-20 PROCEDURE — G8484 FLU IMMUNIZE NO ADMIN: HCPCS | Performed by: INTERNAL MEDICINE

## 2024-11-20 PROCEDURE — G8428 CUR MEDS NOT DOCUMENT: HCPCS | Performed by: INTERNAL MEDICINE

## 2024-11-20 PROCEDURE — 1036F TOBACCO NON-USER: CPT | Performed by: INTERNAL MEDICINE

## 2024-11-20 PROCEDURE — 3017F COLORECTAL CA SCREEN DOC REV: CPT | Performed by: INTERNAL MEDICINE

## 2024-11-20 PROCEDURE — 3074F SYST BP LT 130 MM HG: CPT | Performed by: INTERNAL MEDICINE

## 2024-11-20 PROCEDURE — 3023F SPIROM DOC REV: CPT | Performed by: INTERNAL MEDICINE

## 2024-11-20 PROCEDURE — 3079F DIAST BP 80-89 MM HG: CPT | Performed by: INTERNAL MEDICINE

## 2024-11-20 PROCEDURE — 1126F AMNT PAIN NOTED NONE PRSNT: CPT | Performed by: INTERNAL MEDICINE

## 2024-11-20 PROCEDURE — 1123F ACP DISCUSS/DSCN MKR DOCD: CPT | Performed by: INTERNAL MEDICINE

## 2024-11-20 PROCEDURE — G8420 CALC BMI NORM PARAMETERS: HCPCS | Performed by: INTERNAL MEDICINE

## 2024-11-20 PROCEDURE — 99214 OFFICE O/P EST MOD 30 MIN: CPT | Performed by: INTERNAL MEDICINE

## 2024-11-20 NOTE — PROGRESS NOTES
[facility] CARDIOLOGY  [address] [place] [address], [place] [address]  PHONE: [phone]      24    NAME:  Abram Castellon  : 1955  MRN: [id number]       SUBJECTIVE:   Abram Castellon is a 69 y.o. male seen for a follow up visit regarding the following:     Chief Complaint   Patient presents with    Atrial Fibrillation         HPI:    No cp or ramirez. No orthopnea or pnd. No palpitations or syncope.      Past Medical History, Past Surgical History, Family history, Social History, and Medications were all reviewed with the patient today and updated as necessary.     Current Outpatient Medications   Medication Sig Dispense Refill    dilTIAZem (CARDIZEM CD) 240 MG extended release capsule Take 1 capsule by mouth daily 30 capsule 11    sertraline (ZOLOFT) 100 MG tablet TAKE 1 TABLET EVERY DAY 90 tablet 3    tamsulosin (FLOMAX) 0.4 MG capsule Take 1 capsule by mouth daily 90 capsule 1    meclizine (ANTIVERT) 25 MG tablet TAKE 1 TABLET THREE TIMES DAILY AS NEEDED FOR DIZZINESS 90 tablet 11    isosorbide mononitrate (IMDUR) 30 MG extended release tablet TAKE 1 TABLET EVERY MORNING 90 tablet 3    atorvastatin (LIPITOR) 40 MG tablet TAKE 1 TABLET EVERY DAY 90 tablet 3    sotalol (BETAPACE) 80 MG tablet TAKE 1 TABLET TWICE DAILY 180 tablet 3    albuterol sulfate HFA (PROVENTIL;VENTOLIN;PROAIR) 108 (90 Base) MCG/ACT inhaler Inhale 2 puffs into the lungs every 4 hours as needed for Wheezing or Shortness of Breath 18 g 11    tiZANidine (ZANAFLEX) 4 MG tablet Take 1 tablet by mouth every 8 hours as needed (Muscle spasm) 45 tablet 2    pantoprazole (PROTONIX) 40 MG tablet TAKE 1 TABLET EVERY DAY 90 tablet 3    ipratropium 0.5 mg-albuterol 2.5 mg (DUONEB) 0.5-2.5 (3) MG/3ML SOLN nebulizer solution INHALE THE CONTENTS OF 1 VIAL VIA NEBULIZER FOUR TIMES DAILY 360 mL 11    Multiple Vitamin (MULTIVITAMIN ADULT PO) Take 1 tablet by mouth daily      aspirin 81 MG chewable tablet Take 1 tablet by mouth daily

## 2024-11-25 ENCOUNTER — OFFICE VISIT (OUTPATIENT)
Dept: INTERNAL MEDICINE CLINIC | Facility: CLINIC | Age: 69
End: 2024-11-25
Payer: MEDICARE

## 2024-11-25 VITALS
WEIGHT: 171 LBS | HEIGHT: 73 IN | OXYGEN SATURATION: 92 % | BODY MASS INDEX: 22.66 KG/M2 | SYSTOLIC BLOOD PRESSURE: 130 MMHG | HEART RATE: 82 BPM | DIASTOLIC BLOOD PRESSURE: 70 MMHG

## 2024-11-25 DIAGNOSIS — J44.9 COPD, VERY SEVERE (HCC): ICD-10-CM

## 2024-11-25 DIAGNOSIS — G89.29 CHRONIC RIGHT-SIDED LOW BACK PAIN WITH RIGHT-SIDED SCIATICA: ICD-10-CM

## 2024-11-25 DIAGNOSIS — R19.5 DARK STOOLS: ICD-10-CM

## 2024-11-25 DIAGNOSIS — M54.41 CHRONIC RIGHT-SIDED LOW BACK PAIN WITH RIGHT-SIDED SCIATICA: ICD-10-CM

## 2024-11-25 DIAGNOSIS — I48.21 PERMANENT ATRIAL FIBRILLATION (HCC): Primary | ICD-10-CM

## 2024-11-25 DIAGNOSIS — Z12.11 COLON CANCER SCREENING: ICD-10-CM

## 2024-11-25 LAB
ERYTHROCYTE [DISTWIDTH] IN BLOOD BY AUTOMATED COUNT: 13 % (ref 11.9–14.6)
HCT VFR BLD AUTO: 46 % (ref 41.1–50.3)
HGB BLD-MCNC: 14.6 G/DL (ref 13.6–17.2)
MCH RBC QN AUTO: 28.6 PG (ref 26.1–32.9)
MCHC RBC AUTO-ENTMCNC: 31.7 G/DL (ref 31.4–35)
MCV RBC AUTO: 90.2 FL (ref 82–102)
NRBC # BLD: 0 K/UL (ref 0–0.2)
PLATELET # BLD AUTO: 324 K/UL (ref 150–450)
PMV BLD AUTO: 9.7 FL (ref 9.4–12.3)
RBC # BLD AUTO: 5.1 M/UL (ref 4.23–5.6)
WBC # BLD AUTO: 26.8 K/UL (ref 4.3–11.1)

## 2024-11-25 PROCEDURE — 3078F DIAST BP <80 MM HG: CPT | Performed by: FAMILY MEDICINE

## 2024-11-25 PROCEDURE — 1036F TOBACCO NON-USER: CPT | Performed by: FAMILY MEDICINE

## 2024-11-25 PROCEDURE — 3023F SPIROM DOC REV: CPT | Performed by: FAMILY MEDICINE

## 2024-11-25 PROCEDURE — 3075F SYST BP GE 130 - 139MM HG: CPT | Performed by: FAMILY MEDICINE

## 2024-11-25 PROCEDURE — 99214 OFFICE O/P EST MOD 30 MIN: CPT | Performed by: FAMILY MEDICINE

## 2024-11-25 PROCEDURE — 3017F COLORECTAL CA SCREEN DOC REV: CPT | Performed by: FAMILY MEDICINE

## 2024-11-25 PROCEDURE — 1123F ACP DISCUSS/DSCN MKR DOCD: CPT | Performed by: FAMILY MEDICINE

## 2024-11-25 PROCEDURE — G8427 DOCREV CUR MEDS BY ELIG CLIN: HCPCS | Performed by: FAMILY MEDICINE

## 2024-11-25 PROCEDURE — G8420 CALC BMI NORM PARAMETERS: HCPCS | Performed by: FAMILY MEDICINE

## 2024-11-25 PROCEDURE — G8484 FLU IMMUNIZE NO ADMIN: HCPCS | Performed by: FAMILY MEDICINE

## 2024-11-25 PROCEDURE — 1159F MED LIST DOCD IN RCRD: CPT | Performed by: FAMILY MEDICINE

## 2024-11-25 NOTE — PROGRESS NOTES
months. He mentions difficulty walking more than 20 feet without becoming breathless. He was supposed to be on Dupixent, but his insurance will not cover it. He was going off prednisone but could not afford the injection, so he is back on prednisone.  Feels like his breathing is stable.    Reports that he has been having darker colored stools than normal over the past couple of weeks.  States foul-smelling.  He denies any abdominal pain.  No gross melena or hematochezia.  He is due for colonoscopy.    He reports that his cardiologist has discontinued his amlodipine and started him on diltiazem, which he takes once daily. He has been on sotalol for many years. His heart condition has been stable, with only a few episodes of fluttering, which his doctor attributes to the adjustment period of the new medication.     Continues to have chronic low back pain.  He does take tizanidine as needed and this does seem to help.    Social History     Tobacco Use    Smoking status: Former     Current packs/day: 0.00     Average packs/day: 1 pack/day for 40.0 years (40.0 ttl pk-yrs)     Types: Cigarettes     Start date: 3/4/1976     Quit date: 3/4/2016     Years since quittin.7     Passive exposure: Never    Smokeless tobacco: Never   Vaping Use    Vaping status: Never Used   Substance Use Topics    Alcohol use: No     Alcohol/week: 0.0 standard drinks of alcohol    Drug use: No     Vitals:    24 1134   BP: 130/70   Site: Left Upper Arm   Position: Sitting   Pulse: 82   SpO2: 92%   Weight: 77.6 kg (171 lb)   Height: 1.854 m (6' 1\")     Body mass index is 22.56 kg/m².  Physical Exam  Vitals reviewed.   Cardiovascular:      Rate and Rhythm: Normal rate and regular rhythm.   Pulmonary:      Effort: Pulmonary effort is normal.      Breath sounds: Normal breath sounds.      Comments: Mildly decreased BS in bases  Abdominal:      Comments: No Abdominal tenderness   Skin:     General: Skin is warm and dry.   Neurological:

## 2024-11-27 DIAGNOSIS — D72.829 LEUKOCYTOSIS, UNSPECIFIED TYPE: Primary | ICD-10-CM

## 2024-12-03 ENCOUNTER — TELEPHONE (OUTPATIENT)
Dept: INTERNAL MEDICINE CLINIC | Facility: CLINIC | Age: 69
End: 2024-12-03

## 2024-12-03 NOTE — TELEPHONE ENCOUNTER
Pt was notified of lab results- will call and schedule follow up lab appointment to recheck WBC level

## 2024-12-04 ENCOUNTER — PATIENT MESSAGE (OUTPATIENT)
Dept: INTERNAL MEDICINE CLINIC | Facility: CLINIC | Age: 69
End: 2024-12-04

## 2024-12-09 ENCOUNTER — OFFICE VISIT (OUTPATIENT)
Dept: INTERNAL MEDICINE CLINIC | Facility: CLINIC | Age: 69
End: 2024-12-09

## 2024-12-09 VITALS
HEIGHT: 73 IN | SYSTOLIC BLOOD PRESSURE: 122 MMHG | DIASTOLIC BLOOD PRESSURE: 84 MMHG | HEART RATE: 91 BPM | BODY MASS INDEX: 22.66 KG/M2 | WEIGHT: 171 LBS | TEMPERATURE: 98.2 F | OXYGEN SATURATION: 93 %

## 2024-12-09 DIAGNOSIS — N64.4 BREAST PAIN, RIGHT: Primary | ICD-10-CM

## 2024-12-09 DIAGNOSIS — R05.9 COUGH, UNSPECIFIED TYPE: ICD-10-CM

## 2024-12-09 DIAGNOSIS — J44.9 COPD, VERY SEVERE (HCC): ICD-10-CM

## 2024-12-09 DIAGNOSIS — Z90.11 HISTORY OF RIGHT MASTECTOMY: ICD-10-CM

## 2024-12-09 LAB
EXP DATE SOLUTION: NORMAL
EXP DATE SWAB: NORMAL
EXPIRATION DATE: NORMAL
INFLUENZA A ANTIGEN, POC: NEGATIVE
INFLUENZA B ANTIGEN, POC: NEGATIVE
LOT NUMBER POC: NORMAL
LOT NUMBER SOLUTION: NORMAL
LOT NUMBER SWAB: NORMAL
SARS-COV-2 RNA, POC: NEGATIVE
VALID INTERNAL CONTROL, POC: YES

## 2024-12-09 RX ORDER — TRAMADOL HYDROCHLORIDE 50 MG/1
50 TABLET ORAL EVERY 6 HOURS PRN
Qty: 28 TABLET | Refills: 0 | Status: SHIPPED | OUTPATIENT
Start: 2024-12-09 | End: 2024-12-16

## 2024-12-09 ASSESSMENT — ENCOUNTER SYMPTOMS
NAUSEA: 0
VOMITING: 0
SINUS PAIN: 1
ABDOMINAL PAIN: 0
DIARRHEA: 0
COUGH: 0
SINUS COMPLAINT: 1
SORE THROAT: 1
BACK PAIN: 0

## 2024-12-09 NOTE — PROGRESS NOTES
Abram Castellon (:  1955) is a 69 y.o. male,Established patient, here for evaluation of the following chief complaint(s):  Chest Pain ([place] pain where he had his mastectomy ) and Sinus Problem (Sore throat, cough, congestion, runny nose all started on Saturday and is going through his house. )         Assessment & Plan  History of right mastectomy     Orders:    US BREAST COMPLETE RIGHT; Future    Breast pain, right     Orders:    US BREAST COMPLETE RIGHT; Future    traMADol (ULTRAM) 50 MG tablet; Take 1 tablet by mouth every 6 hours as needed for Pain for up to 7 days. Intended supply: 7 days. Take lowest dose possible to manage pain Max Daily Amount: 200 mg    Cough, unspecified type       Orders:    AMB POC INFLUENZA A  AND B REAL-TIME RT-PCR    AMB POC COVID-19 COV    COPD, very severe (HCC)   Copd exacerbation, negative covid/flu  Start augmentin         No follow-ups on file.     Chest pain right chest, hx of mastectomy  Tender to touch, tender with movement and breathing  Get US  Continue flexeril and steroid and add tramadol   Discussed med risks/side effects    [place] reviewed    Subjective   Patient is here for chest pain - he is unsure if it is a pulled muscle - or if he is laying on oxygen cord when sleeping but his chest chest tissue is sore. He had a mastectomy last year on right side. The pain started last Tuesday. The pain hurts to touch the chest, hurts to take deep breath - it hurts on right chest.   He hasn't taken anything for it. He is on prednisone 20 daily.       Chest Pain   This is a new problem. The current episode started in the past 7 days. The pain is present in the lateral region. The pain is at a severity of 4/10. Quality: stabbing. Radiates to: right chest. Associated symptoms include headaches. Pertinent negatives include no abdominal pain, back pain, claudication, cough, dizziness, exertional chest pressure, nausea or vomiting. Associated symptoms comments: Moving,

## 2024-12-10 NOTE — TELEPHONE ENCOUNTER
MEDICATION REFILL REQUEST      Name of Medication:  Diltiazem   Dose:  240 mg  Frequency:  daily   Quantity:    Days' supply:  90 day       Pharmacy Name/Location:  [facility]

## 2024-12-10 NOTE — TELEPHONE ENCOUNTER
Requested Prescriptions     Pending Prescriptions Disp Refills    dilTIAZem (CARDIZEM CD) 240 MG extended release capsule 90 capsule 3     Sig: Take 1 capsule by mouth daily

## 2024-12-11 RX ORDER — DILTIAZEM HYDROCHLORIDE 240 MG/1
240 CAPSULE, COATED, EXTENDED RELEASE ORAL DAILY
Qty: 90 CAPSULE | Refills: 3 | Status: SHIPPED | OUTPATIENT
Start: 2024-12-11

## 2024-12-17 ENCOUNTER — PATIENT MESSAGE (OUTPATIENT)
Dept: PULMONOLOGY | Age: 69
End: 2024-12-17

## 2024-12-17 RX ORDER — PREDNISONE 20 MG/1
20 TABLET ORAL DAILY
Qty: 30 TABLET | Refills: 3 | OUTPATIENT
Start: 2024-12-17

## 2024-12-17 RX ORDER — PREDNISONE 20 MG/1
TABLET ORAL
Qty: 777 TABLET | Refills: 0 | OUTPATIENT
Start: 2024-12-17

## 2024-12-23 ENCOUNTER — PATIENT MESSAGE (OUTPATIENT)
Dept: INTERNAL MEDICINE CLINIC | Facility: CLINIC | Age: 69
End: 2024-12-23

## 2024-12-23 DIAGNOSIS — R05.9 COUGH: ICD-10-CM

## 2024-12-23 RX ORDER — HYDROCODONE BITARTRATE AND HOMATROPINE METHYLBROMIDE ORAL SOLUTION 5; 1.5 MG/5ML; MG/5ML
2.5 LIQUID ORAL EVERY 4 HOURS PRN
Qty: 240 ML | Refills: 0 | Status: SHIPPED | OUTPATIENT
Start: 2024-12-23 | End: 2025-01-22

## 2024-12-30 ENCOUNTER — TELEPHONE (OUTPATIENT)
Dept: INTERNAL MEDICINE CLINIC | Facility: CLINIC | Age: 69
End: 2024-12-30

## 2024-12-30 RX ORDER — PREDNISONE 20 MG/1
TABLET ORAL
Refills: 0 | OUTPATIENT
Start: 2024-12-30

## 2024-12-30 NOTE — TELEPHONE ENCOUNTER
Patient and his wife (both patients of Dr. Crane) just found out their grandson tested + for RSV and now they are experiencing problems. Should they go to urgent care to be tested/treated?

## 2024-12-31 DIAGNOSIS — J44.9 COPD, VERY SEVERE (HCC): Primary | ICD-10-CM

## 2024-12-31 RX ORDER — PREDNISONE 20 MG/1
20 TABLET ORAL DAILY
Qty: 90 TABLET | Refills: 3 | Status: SHIPPED | OUTPATIENT
Start: 2024-12-31

## 2024-12-31 NOTE — TELEPHONE ENCOUNTER
Roselia patient pharmacy is requesting predniSONE (DELTASONE) 20 MG tablet. LOV 09/23/2024. RX is already pend. Adia marina

## 2024-12-31 NOTE — TELEPHONE ENCOUNTER
Patient called he states that he needs  predniSONE (DELTASONE) 20 MG tablet. To go to [facility] - [place], [place] - [address] - P [phone] - F [phone]. He can get medication cheaper. LOV 09/23/2024. I PEND RX. Adia marina

## 2025-01-01 RX ORDER — PREDNISONE 20 MG/1
20 TABLET ORAL DAILY
Qty: 30 TABLET | Refills: 3 | Status: SHIPPED | OUTPATIENT
Start: 2025-01-01

## 2025-01-02 ENCOUNTER — HOSPITAL ENCOUNTER (OUTPATIENT)
Dept: MAMMOGRAPHY | Age: 70
Discharge: HOME | End: 2025-01-02
Payer: MEDICARE

## 2025-01-02 DIAGNOSIS — Z90.11 HISTORY OF RIGHT MASTECTOMY: ICD-10-CM

## 2025-01-02 DIAGNOSIS — N64.4 BREAST PAIN, RIGHT: ICD-10-CM

## 2025-01-02 PROCEDURE — 76642 ULTRASOUND BREAST LIMITED: CPT

## 2025-01-13 ENCOUNTER — OFFICE VISIT (OUTPATIENT)
Dept: INTERNAL MEDICINE CLINIC | Facility: CLINIC | Age: 70
End: 2025-01-13

## 2025-01-13 VITALS
BODY MASS INDEX: 22.66 KG/M2 | SYSTOLIC BLOOD PRESSURE: 130 MMHG | WEIGHT: 171 LBS | HEIGHT: 73 IN | HEART RATE: 102 BPM | TEMPERATURE: 100.3 F | OXYGEN SATURATION: 86 % | DIASTOLIC BLOOD PRESSURE: 78 MMHG

## 2025-01-13 DIAGNOSIS — R50.9 FEVER, UNSPECIFIED FEVER CAUSE: ICD-10-CM

## 2025-01-13 DIAGNOSIS — R09.81 NASAL CONGESTION: ICD-10-CM

## 2025-01-13 DIAGNOSIS — R05.1 ACUTE COUGH: ICD-10-CM

## 2025-01-13 DIAGNOSIS — J44.1 COPD WITH EXACERBATION (HCC): Primary | ICD-10-CM

## 2025-01-13 DIAGNOSIS — R84.5: ICD-10-CM

## 2025-01-13 DIAGNOSIS — R06.02 SOB (SHORTNESS OF BREATH): ICD-10-CM

## 2025-01-13 DIAGNOSIS — J21.0 RSV (ACUTE BRONCHIOLITIS DUE TO RESPIRATORY SYNCYTIAL VIRUS): ICD-10-CM

## 2025-01-13 LAB
EXP DATE SOLUTION: NORMAL
EXP DATE SWAB: NORMAL
EXPIRATION DATE: NORMAL
INFLUENZA A ANTIGEN, POC: NEGATIVE
INFLUENZA B ANTIGEN, POC: NEGATIVE
LOT NUMBER POC: NORMAL
LOT NUMBER SOLUTION: NORMAL
LOT NUMBER SWAB: NORMAL
RSV RNA, POC: POSITIVE
SARS-COV-2 RNA, POC: NEGATIVE
VALID INTERNAL CONTROL, POC: YES
VALID INTERNAL CONTROL, POC: YES

## 2025-01-13 RX ORDER — TESTOSTERONE CYPIONATE 200 MG/ML
4 INJECTION INTRAMUSCULAR ONCE
Status: COMPLETED | OUTPATIENT
Start: 2025-01-13 | End: 2025-01-13

## 2025-01-13 RX ORDER — PREDNISONE 20 MG/1
TABLET ORAL
Qty: 21 TABLET | Refills: 0 | Status: SHIPPED | OUTPATIENT
Start: 2025-01-13

## 2025-01-13 RX ORDER — TESTOSTERONE CYPIONATE 200 MG/ML
4 INJECTION INTRAMUSCULAR ONCE
Status: SHIPPED | OUTPATIENT
Start: 2025-01-13

## 2025-01-13 RX ORDER — DOXYCYCLINE HYCLATE 100 MG
100 TABLET ORAL 2 TIMES DAILY
Qty: 14 TABLET | Refills: 0 | Status: SHIPPED | OUTPATIENT
Start: 2025-01-13 | End: 2025-01-20

## 2025-01-13 RX ADMIN — TESTOSTERONE CYPIONATE 4 MG: 200 INJECTION INTRAMUSCULAR at 14:18

## 2025-01-13 SDOH — ECONOMIC STABILITY: FOOD INSECURITY: WITHIN THE PAST 12 MONTHS, THE FOOD YOU BOUGHT JUST DIDN'T LAST AND YOU DIDN'T HAVE MONEY TO GET MORE.: NEVER TRUE

## 2025-01-13 SDOH — ECONOMIC STABILITY: FOOD INSECURITY: WITHIN THE PAST 12 MONTHS, YOU WORRIED THAT YOUR FOOD WOULD RUN OUT BEFORE YOU GOT MONEY TO BUY MORE.: NEVER TRUE

## 2025-01-13 ASSESSMENT — PATIENT HEALTH QUESTIONNAIRE - PHQ9
10. IF YOU CHECKED OFF ANY PROBLEMS, HOW DIFFICULT HAVE THESE PROBLEMS MADE IT FOR YOU TO DO YOUR WORK, TAKE CARE OF THINGS AT HOME, OR GET ALONG WITH OTHER PEOPLE: NOT DIFFICULT AT ALL
SUM OF ALL RESPONSES TO PHQ9 QUESTIONS 1 & 2: 0
SUM OF ALL RESPONSES TO PHQ QUESTIONS 1-9: 0
8. MOVING OR SPEAKING SO SLOWLY THAT OTHER PEOPLE COULD HAVE NOTICED. OR THE OPPOSITE, BEING SO FIGETY OR RESTLESS THAT YOU HAVE BEEN MOVING AROUND A LOT MORE THAN USUAL: NOT AT ALL
2. FEELING DOWN, DEPRESSED OR HOPELESS: NOT AT ALL
3. TROUBLE FALLING OR STAYING ASLEEP: NOT AT ALL
1. LITTLE INTEREST OR PLEASURE IN DOING THINGS: NOT AT ALL
SUM OF ALL RESPONSES TO PHQ QUESTIONS 1-9: 0
SUM OF ALL RESPONSES TO PHQ QUESTIONS 1-9: 0
4. FEELING TIRED OR HAVING LITTLE ENERGY: NOT AT ALL
5. POOR APPETITE OR OVEREATING: NOT AT ALL
7. TROUBLE CONCENTRATING ON THINGS, SUCH AS READING THE NEWSPAPER OR WATCHING TELEVISION: NOT AT ALL
SUM OF ALL RESPONSES TO PHQ QUESTIONS 1-9: 0
9. THOUGHTS THAT YOU WOULD BE BETTER OFF DEAD, OR OF HURTING YOURSELF: NOT AT ALL
6. FEELING BAD ABOUT YOURSELF - OR THAT YOU ARE A FAILURE OR HAVE LET YOURSELF OR YOUR FAMILY DOWN: NOT AT ALL

## 2025-01-13 NOTE — PROGRESS NOTES
dexAMETHasone Sodium Phosphate injection 4 mg; 4 mg, IntraMUSCular, ONCE, 1 dose, On 25 at 1445  5. Acute cough  -     AMB POC COVID-19 COV  -     AMB POC INFLUENZA A  AND B REAL-TIME RT-PCR  -     AMB POC RSV  -     dexAMETHasone Sodium Phosphate injection 4 mg; 4 mg, IntraVENous, ONCE, 1 dose, On 25 at 1445  -     dexAMETHasone Sodium Phosphate injection 4 mg; 4 mg, IntraMUSCular, ONCE, 1 dose, On 25 at 1445  6. SOB (shortness of breath)  -     AMB POC COVID-19 COV  -     AMB POC INFLUENZA A  AND B REAL-TIME RT-PCR  -     AMB POC RSV  -     dexAMETHasone Sodium Phosphate injection 4 mg; 4 mg, IntraVENous, ONCE, 1 dose, On 25 at 1445  -     dexAMETHasone Sodium Phosphate injection 4 mg; 4 mg, IntraMUSCular, ONCE, 1 dose, On 25 at 1445  7. Fever, unspecified fever cause  -     AMB POC COVID-19 COV  -     AMB POC INFLUENZA A  AND B REAL-TIME RT-PCR  -     AMB POC RSV  -     dexAMETHasone Sodium Phosphate injection 4 mg; 4 mg, IntraVENous, ONCE, 1 dose, On 25 at 1445  -     dexAMETHasone Sodium Phosphate injection 4 mg; 4 mg, IntraMUSCular, ONCE, 1 dose, On 25 at 1445    History of Present Illness  The patient presents for evaluation of a persistent cough.    She reports a severe, persistent cough since last night, with difficulty breathing. She is on prednisone and participating in pulmonary rehabilitation. She is concerned about potential hospitalization if her condition does not improve.    MEDICATIONS  Current: Prednisone.  Social History     Tobacco Use    Smoking status: Former     Current packs/day: 0.00     Average packs/day: 1 pack/day for 40.0 years (40.0 ttl pk-yrs)     Types: Cigarettes     Start date: 3/4/1976     Quit date: 3/4/2016     Years since quittin.8     Passive exposure: Never    Smokeless tobacco: Never   Vaping Use    Vaping status: Never Used   Substance Use Topics    Alcohol use: No     Alcohol/week: 0.0 standard drinks of

## 2025-01-22 ENCOUNTER — OFFICE VISIT (OUTPATIENT)
Dept: INTERNAL MEDICINE CLINIC | Facility: CLINIC | Age: 70
End: 2025-01-22
Payer: MEDICARE

## 2025-01-22 VITALS
HEART RATE: 55 BPM | DIASTOLIC BLOOD PRESSURE: 90 MMHG | TEMPERATURE: 97.9 F | WEIGHT: 175 LBS | OXYGEN SATURATION: 92 % | SYSTOLIC BLOOD PRESSURE: 140 MMHG | HEIGHT: 73 IN | BODY MASS INDEX: 23.19 KG/M2

## 2025-01-22 DIAGNOSIS — J20.5 RSV BRONCHITIS: Primary | ICD-10-CM

## 2025-01-22 DIAGNOSIS — J96.11 CHRONIC RESPIRATORY FAILURE WITH HYPOXIA: ICD-10-CM

## 2025-01-22 PROCEDURE — G8427 DOCREV CUR MEDS BY ELIG CLIN: HCPCS | Performed by: NURSE PRACTITIONER

## 2025-01-22 PROCEDURE — 3077F SYST BP >= 140 MM HG: CPT | Performed by: NURSE PRACTITIONER

## 2025-01-22 PROCEDURE — 1123F ACP DISCUSS/DSCN MKR DOCD: CPT | Performed by: NURSE PRACTITIONER

## 2025-01-22 PROCEDURE — 1159F MED LIST DOCD IN RCRD: CPT | Performed by: NURSE PRACTITIONER

## 2025-01-22 PROCEDURE — G8420 CALC BMI NORM PARAMETERS: HCPCS | Performed by: NURSE PRACTITIONER

## 2025-01-22 PROCEDURE — 1036F TOBACCO NON-USER: CPT | Performed by: NURSE PRACTITIONER

## 2025-01-22 PROCEDURE — 1160F RVW MEDS BY RX/DR IN RCRD: CPT | Performed by: NURSE PRACTITIONER

## 2025-01-22 PROCEDURE — 99213 OFFICE O/P EST LOW 20 MIN: CPT | Performed by: NURSE PRACTITIONER

## 2025-01-22 PROCEDURE — 3017F COLORECTAL CA SCREEN DOC REV: CPT | Performed by: NURSE PRACTITIONER

## 2025-01-22 PROCEDURE — 3080F DIAST BP >= 90 MM HG: CPT | Performed by: NURSE PRACTITIONER

## 2025-01-22 ASSESSMENT — ENCOUNTER SYMPTOMS: COUGH: 1

## 2025-01-22 NOTE — PROGRESS NOTES
Abram Castellon (:  1955) is a 69 y.o. male,Established patient, here for evaluation of the following chief complaint(s):  Cough (Bloody nose; headache/)         Assessment & Plan  RSV bronchitis  No acute wheezing on exam  Finishing steroid taper  On abx  Advise to continue duoneb and finish steroid  F/u if worsening  Continue flonase for congestion         Chronic respiratory failure with hypoxia   On 2l nasal cannula and pulse ox 92%, baseline             No follow-ups on file.       Subjective   Patient is here for ER follow up. He was given doxy and steroid shot at last visit here and then went to ER a few days later. He was given doxycycline again and a steroid taper. He is on 2l oxygen at baseline. He takes 3l now when sick and his oxygen is 97% at home.   His worst symptom now is difficulty breathing and nasal congestion. He is using flonase nasal spray.   He is on a steroid taper and breathing trx.     Cough        Review of Systems   Respiratory:  Positive for cough.           Objective   Physical Exam  Vitals reviewed.   Constitutional:       Appearance: Normal appearance.   HENT:      Nose: Congestion present.      Mouth/Throat:      Mouth: Mucous membranes are moist.   Eyes:      Extraocular Movements: Extraocular movements intact.      Pupils: Pupils are equal, round, and reactive to light.   Cardiovascular:      Rate and Rhythm: Normal rate and regular rhythm.   Pulmonary:      Effort: Pulmonary effort is normal.      Breath sounds: No wheezing.   Musculoskeletal:      Cervical back: Neck supple.   Neurological:      General: No focal deficit present.      Mental Status: He is alert.                  An electronic signature was used to authenticate this note.    --Wendy Uribe, APRN - CNP

## 2025-02-05 ENCOUNTER — APPOINTMENT (OUTPATIENT)
Dept: CT IMAGING | Age: 70
End: 2025-02-05
Payer: MEDICARE

## 2025-02-05 ENCOUNTER — APPOINTMENT (OUTPATIENT)
Dept: GENERAL RADIOLOGY | Age: 70
End: 2025-02-05
Payer: MEDICARE

## 2025-02-05 ENCOUNTER — HOSPITAL ENCOUNTER (INPATIENT)
Age: 70
LOS: 2 days | Discharge: HOME | End: 2025-02-07
Attending: EMERGENCY MEDICINE | Admitting: INTERNAL MEDICINE
Payer: MEDICARE

## 2025-02-05 DIAGNOSIS — R07.9 CHEST PAIN: ICD-10-CM

## 2025-02-05 DIAGNOSIS — R35.0 URINARY FREQUENCY: ICD-10-CM

## 2025-02-05 DIAGNOSIS — R07.9 CHEST PAIN, UNSPECIFIED TYPE: ICD-10-CM

## 2025-02-05 DIAGNOSIS — R35.1 NOCTURIA: ICD-10-CM

## 2025-02-05 DIAGNOSIS — J44.1 COPD EXACERBATION (HCC): Primary | ICD-10-CM

## 2025-02-05 LAB
ALBUMIN SERPL-MCNC: 3.9 G/DL (ref 3.2–4.6)
ALBUMIN/GLOB SERPL: 1.3 (ref 1–1.9)
ALP SERPL-CCNC: 93 U/L (ref 40–129)
ALT SERPL-CCNC: 26 U/L (ref 8–55)
ANION GAP SERPL CALC-SCNC: 13 MMOL/L (ref 7–16)
AST SERPL-CCNC: 23 U/L (ref 15–37)
B PERT DNA SPEC QL NAA+PROBE: NOT DETECTED
BASOPHILS # BLD: 0.06 K/UL (ref 0–0.2)
BASOPHILS NFR BLD: 0.3 % (ref 0–2)
BILIRUB SERPL-MCNC: 0.5 MG/DL (ref 0–1.2)
BORDETELLA PARAPERTUSSIS BY PCR: NOT DETECTED
BUN SERPL-MCNC: 17 MG/DL (ref 8–23)
C PNEUM DNA SPEC QL NAA+PROBE: NOT DETECTED
CALCIUM SERPL-MCNC: 10 MG/DL (ref 8.8–10.2)
CHLORIDE SERPL-SCNC: 98 MMOL/L (ref 98–107)
CO2 SERPL-SCNC: 32 MMOL/L (ref 20–29)
CREAT SERPL-MCNC: 1.02 MG/DL (ref 0.8–1.3)
DIFFERENTIAL METHOD BLD: ABNORMAL
EKG ATRIAL RATE: 123 BPM
EKG DIAGNOSIS: NORMAL
EKG P AXIS: 87 DEGREES
EKG P-R INTERVAL: 164 MS
EKG Q-T INTERVAL: 300 MS
EKG QRS DURATION: 68 MS
EKG QTC CALCULATION (BAZETT): 429 MS
EKG R AXIS: 61 DEGREES
EKG T AXIS: 83 DEGREES
EKG VENTRICULAR RATE: 123 BPM
EOSINOPHIL # BLD: 0.13 K/UL (ref 0–0.8)
EOSINOPHIL NFR BLD: 0.7 % (ref 0.5–7.8)
ERYTHROCYTE [DISTWIDTH] IN BLOOD BY AUTOMATED COUNT: 14.4 % (ref 11.9–14.6)
FLUAV RNA SPEC QL NAA+PROBE: NOT DETECTED
FLUAV SUBTYP SPEC NAA+PROBE: NOT DETECTED
FLUBV RNA SPEC QL NAA+PROBE: NOT DETECTED
FLUBV RNA SPEC QL NAA+PROBE: NOT DETECTED
GLOBULIN SER CALC-MCNC: 3.1 G/DL (ref 2.3–3.5)
GLUCOSE SERPL-MCNC: 134 MG/DL (ref 70–99)
HADV DNA SPEC QL NAA+PROBE: NOT DETECTED
HCOV 229E RNA SPEC QL NAA+PROBE: NOT DETECTED
HCOV HKU1 RNA SPEC QL NAA+PROBE: NOT DETECTED
HCOV NL63 RNA SPEC QL NAA+PROBE: NOT DETECTED
HCOV OC43 RNA SPEC QL NAA+PROBE: NOT DETECTED
HCT VFR BLD AUTO: 46.9 % (ref 41.1–50.3)
HGB BLD-MCNC: 14.9 G/DL (ref 13.6–17.2)
HMPV RNA SPEC QL NAA+PROBE: NOT DETECTED
HPIV1 RNA SPEC QL NAA+PROBE: NOT DETECTED
HPIV2 RNA SPEC QL NAA+PROBE: NOT DETECTED
HPIV3 RNA SPEC QL NAA+PROBE: NOT DETECTED
HPIV4 RNA SPEC QL NAA+PROBE: NOT DETECTED
IMM GRANULOCYTES # BLD AUTO: 0.11 K/UL (ref 0–0.5)
IMM GRANULOCYTES NFR BLD AUTO: 0.6 % (ref 0–5)
LACTATE SERPL-SCNC: 1.5 MMOL/L (ref 0.5–2)
LIPASE SERPL-CCNC: 26 U/L (ref 13–60)
LYMPHOCYTES # BLD: 8.27 K/UL (ref 0.5–4.6)
LYMPHOCYTES NFR BLD: 44.2 % (ref 13–44)
M PNEUMO DNA SPEC QL NAA+PROBE: NOT DETECTED
MCH RBC QN AUTO: 27.9 PG (ref 26.1–32.9)
MCHC RBC AUTO-ENTMCNC: 31.8 G/DL (ref 31.4–35)
MCV RBC AUTO: 87.8 FL (ref 82–102)
MONOCYTES # BLD: 0.99 K/UL (ref 0.1–1.3)
MONOCYTES NFR BLD: 5.3 % (ref 4–12)
NEUTS SEG # BLD: 9.14 K/UL (ref 1.7–8.2)
NEUTS SEG NFR BLD: 48.9 % (ref 43–78)
NRBC # BLD: 0 K/UL (ref 0–0.2)
PLATELET # BLD AUTO: 176 K/UL (ref 150–450)
PLATELET COMMENT: ADEQUATE
PMV BLD AUTO: 9.2 FL (ref 9.4–12.3)
POTASSIUM SERPL-SCNC: 4.3 MMOL/L (ref 3.5–5.1)
PROCALCITONIN SERPL-MCNC: 0.06 NG/ML (ref 0–0.1)
PROT SERPL-MCNC: 6.9 G/DL (ref 6.3–8.2)
RBC # BLD AUTO: 5.34 M/UL (ref 4.23–5.6)
RBC MORPH BLD: ABNORMAL
RSV RNA SPEC QL NAA+PROBE: NOT DETECTED
RV+EV RNA SPEC QL NAA+PROBE: NOT DETECTED
SARS-COV-2 RDRP RESP QL NAA+PROBE: NOT DETECTED
SARS-COV-2 RNA RESP QL NAA+PROBE: NOT DETECTED
SODIUM SERPL-SCNC: 143 MMOL/L (ref 136–145)
SOURCE: NORMAL
TROPONIN T SERPL HS-MCNC: 23 NG/L (ref 0–22)
TROPONIN T SERPL HS-MCNC: 24 NG/L (ref 0–22)
TROPONIN T SERPL HS-MCNC: 24 NG/L (ref 0–22)
WBC # BLD AUTO: 18.7 K/UL (ref 4.3–11.1)
WBC MORPH BLD: ABNORMAL

## 2025-02-05 PROCEDURE — 6360000004 HC RX CONTRAST MEDICATION: Performed by: EMERGENCY MEDICINE

## 2025-02-05 PROCEDURE — 1100000003 HC PRIVATE W/ TELEMETRY

## 2025-02-05 PROCEDURE — 83690 ASSAY OF LIPASE: CPT

## 2025-02-05 PROCEDURE — 2500000003 HC RX 250 WO HCPCS: Performed by: INTERNAL MEDICINE

## 2025-02-05 PROCEDURE — 99285 EMERGENCY DEPT VISIT HI MDM: CPT

## 2025-02-05 PROCEDURE — 71046 X-RAY EXAM CHEST 2 VIEWS: CPT

## 2025-02-05 PROCEDURE — 87502 INFLUENZA DNA AMP PROBE: CPT

## 2025-02-05 PROCEDURE — 84484 ASSAY OF TROPONIN QUANT: CPT

## 2025-02-05 PROCEDURE — 85025 COMPLETE CBC W/AUTO DIFF WBC: CPT

## 2025-02-05 PROCEDURE — 84145 PROCALCITONIN (PCT): CPT

## 2025-02-05 PROCEDURE — 0202U NFCT DS 22 TRGT SARS-COV-2: CPT

## 2025-02-05 PROCEDURE — 83605 ASSAY OF LACTIC ACID: CPT

## 2025-02-05 PROCEDURE — 6370000000 HC RX 637 (ALT 250 FOR IP): Performed by: EMERGENCY MEDICINE

## 2025-02-05 PROCEDURE — 87040 BLOOD CULTURE FOR BACTERIA: CPT

## 2025-02-05 PROCEDURE — 2500000003 HC RX 250 WO HCPCS: Performed by: EMERGENCY MEDICINE

## 2025-02-05 PROCEDURE — 6360000002 HC RX W HCPCS: Performed by: INTERNAL MEDICINE

## 2025-02-05 PROCEDURE — 2700000000 HC OXYGEN THERAPY PER DAY

## 2025-02-05 PROCEDURE — 2580000003 HC RX 258: Performed by: EMERGENCY MEDICINE

## 2025-02-05 PROCEDURE — 96366 THER/PROPH/DIAG IV INF ADDON: CPT

## 2025-02-05 PROCEDURE — 94640 AIRWAY INHALATION TREATMENT: CPT

## 2025-02-05 PROCEDURE — 71260 CT THORAX DX C+: CPT

## 2025-02-05 PROCEDURE — 96365 THER/PROPH/DIAG IV INF INIT: CPT

## 2025-02-05 PROCEDURE — 96375 TX/PRO/DX INJ NEW DRUG ADDON: CPT

## 2025-02-05 PROCEDURE — 87635 SARS-COV-2 COVID-19 AMP PRB: CPT

## 2025-02-05 PROCEDURE — 6370000000 HC RX 637 (ALT 250 FOR IP): Performed by: INTERNAL MEDICINE

## 2025-02-05 PROCEDURE — 6360000002 HC RX W HCPCS: Performed by: EMERGENCY MEDICINE

## 2025-02-05 PROCEDURE — 80053 COMPREHEN METABOLIC PANEL: CPT

## 2025-02-05 PROCEDURE — 94760 N-INVAS EAR/PLS OXIMETRY 1: CPT

## 2025-02-05 PROCEDURE — 93005 ELECTROCARDIOGRAM TRACING: CPT | Performed by: EMERGENCY MEDICINE

## 2025-02-05 PROCEDURE — 93010 ELECTROCARDIOGRAM REPORT: CPT | Performed by: INTERNAL MEDICINE

## 2025-02-05 RX ORDER — ARFORMOTEROL TARTRATE 15 UG/2ML
15 SOLUTION RESPIRATORY (INHALATION)
Status: DISCONTINUED | OUTPATIENT
Start: 2025-02-05 | End: 2025-02-07 | Stop reason: HOSPADM

## 2025-02-05 RX ORDER — IOPAMIDOL 755 MG/ML
75 INJECTION, SOLUTION INTRAVASCULAR
Status: COMPLETED | OUTPATIENT
Start: 2025-02-05 | End: 2025-02-05

## 2025-02-05 RX ORDER — POTASSIUM CHLORIDE 7.45 MG/ML
10 INJECTION INTRAVENOUS PRN
Status: DISCONTINUED | OUTPATIENT
Start: 2025-02-05 | End: 2025-02-07 | Stop reason: HOSPADM

## 2025-02-05 RX ORDER — ENOXAPARIN SODIUM 100 MG/ML
40 INJECTION SUBCUTANEOUS DAILY
Status: DISCONTINUED | OUTPATIENT
Start: 2025-02-05 | End: 2025-02-07 | Stop reason: HOSPADM

## 2025-02-05 RX ORDER — ONDANSETRON 2 MG/ML
4 INJECTION INTRAMUSCULAR; INTRAVENOUS EVERY 6 HOURS PRN
Status: DISCONTINUED | OUTPATIENT
Start: 2025-02-05 | End: 2025-02-07 | Stop reason: HOSPADM

## 2025-02-05 RX ORDER — MAGNESIUM SULFATE IN WATER 40 MG/ML
2000 INJECTION, SOLUTION INTRAVENOUS PRN
Status: DISCONTINUED | OUTPATIENT
Start: 2025-02-05 | End: 2025-02-07 | Stop reason: HOSPADM

## 2025-02-05 RX ORDER — IPRATROPIUM BROMIDE AND ALBUTEROL SULFATE 2.5; .5 MG/3ML; MG/3ML
1 SOLUTION RESPIRATORY (INHALATION)
Status: COMPLETED | OUTPATIENT
Start: 2025-02-05 | End: 2025-02-05

## 2025-02-05 RX ORDER — SERTRALINE HYDROCHLORIDE 100 MG/1
100 TABLET, FILM COATED ORAL DAILY
Status: DISCONTINUED | OUTPATIENT
Start: 2025-02-06 | End: 2025-02-07 | Stop reason: HOSPADM

## 2025-02-05 RX ORDER — ASPIRIN 325 MG
325 TABLET ORAL
Status: COMPLETED | OUTPATIENT
Start: 2025-02-05 | End: 2025-02-05

## 2025-02-05 RX ORDER — POTASSIUM CHLORIDE 1500 MG/1
40 TABLET, EXTENDED RELEASE ORAL PRN
Status: DISCONTINUED | OUTPATIENT
Start: 2025-02-05 | End: 2025-02-07 | Stop reason: HOSPADM

## 2025-02-05 RX ORDER — ATORVASTATIN CALCIUM 40 MG/1
40 TABLET, FILM COATED ORAL DAILY
Status: DISCONTINUED | OUTPATIENT
Start: 2025-02-06 | End: 2025-02-07 | Stop reason: HOSPADM

## 2025-02-05 RX ORDER — MAGNESIUM HYDROXIDE/ALUMINUM HYDROXICE/SIMETHICONE 120; 1200; 1200 MG/30ML; MG/30ML; MG/30ML
30 SUSPENSION ORAL EVERY 6 HOURS PRN
Status: DISCONTINUED | OUTPATIENT
Start: 2025-02-05 | End: 2025-02-07 | Stop reason: HOSPADM

## 2025-02-05 RX ORDER — SOTALOL HYDROCHLORIDE 80 MG/1
80 TABLET ORAL 2 TIMES DAILY
Status: DISCONTINUED | OUTPATIENT
Start: 2025-02-05 | End: 2025-02-07 | Stop reason: HOSPADM

## 2025-02-05 RX ORDER — ASPIRIN 81 MG/1
81 TABLET, CHEWABLE ORAL DAILY
Status: DISCONTINUED | OUTPATIENT
Start: 2025-02-06 | End: 2025-02-07 | Stop reason: HOSPADM

## 2025-02-05 RX ORDER — GUAIFENESIN 600 MG/1
600 TABLET, EXTENDED RELEASE ORAL 2 TIMES DAILY
Status: DISCONTINUED | OUTPATIENT
Start: 2025-02-05 | End: 2025-02-06

## 2025-02-05 RX ORDER — FAMOTIDINE 20 MG/1
10 TABLET, FILM COATED ORAL DAILY PRN
Status: DISCONTINUED | OUTPATIENT
Start: 2025-02-05 | End: 2025-02-07 | Stop reason: HOSPADM

## 2025-02-05 RX ORDER — DILTIAZEM HYDROCHLORIDE 120 MG/1
240 CAPSULE, COATED, EXTENDED RELEASE ORAL DAILY
Status: DISCONTINUED | OUTPATIENT
Start: 2025-02-06 | End: 2025-02-07 | Stop reason: HOSPADM

## 2025-02-05 RX ORDER — AZITHROMYCIN 250 MG/1
250 TABLET, FILM COATED ORAL DAILY
Status: DISCONTINUED | OUTPATIENT
Start: 2025-02-06 | End: 2025-02-07 | Stop reason: HOSPADM

## 2025-02-05 RX ORDER — BENZONATATE 100 MG/1
100 CAPSULE ORAL 3 TIMES DAILY PRN
Status: DISCONTINUED | OUTPATIENT
Start: 2025-02-05 | End: 2025-02-07 | Stop reason: HOSPADM

## 2025-02-05 RX ORDER — SODIUM CHLORIDE 0.9 % (FLUSH) 0.9 %
5-40 SYRINGE (ML) INJECTION EVERY 12 HOURS SCHEDULED
Status: DISCONTINUED | OUTPATIENT
Start: 2025-02-05 | End: 2025-02-07 | Stop reason: HOSPADM

## 2025-02-05 RX ORDER — ACETAMINOPHEN 650 MG/1
650 SUPPOSITORY RECTAL EVERY 6 HOURS PRN
Status: DISCONTINUED | OUTPATIENT
Start: 2025-02-05 | End: 2025-02-07 | Stop reason: HOSPADM

## 2025-02-05 RX ORDER — SODIUM CHLORIDE 0.9 % (FLUSH) 0.9 %
5-40 SYRINGE (ML) INJECTION PRN
Status: DISCONTINUED | OUTPATIENT
Start: 2025-02-05 | End: 2025-02-07 | Stop reason: HOSPADM

## 2025-02-05 RX ORDER — ACETAMINOPHEN 325 MG/1
650 TABLET ORAL EVERY 6 HOURS PRN
Status: DISCONTINUED | OUTPATIENT
Start: 2025-02-05 | End: 2025-02-07 | Stop reason: HOSPADM

## 2025-02-05 RX ORDER — BUDESONIDE 0.5 MG/2ML
0.5 INHALANT ORAL
Status: DISCONTINUED | OUTPATIENT
Start: 2025-02-05 | End: 2025-02-07 | Stop reason: HOSPADM

## 2025-02-05 RX ORDER — PANTOPRAZOLE SODIUM 40 MG/1
40 TABLET, DELAYED RELEASE ORAL DAILY
Status: DISCONTINUED | OUTPATIENT
Start: 2025-02-06 | End: 2025-02-07 | Stop reason: HOSPADM

## 2025-02-05 RX ORDER — POLYETHYLENE GLYCOL 3350 17 G/17G
17 POWDER, FOR SOLUTION ORAL DAILY PRN
Status: DISCONTINUED | OUTPATIENT
Start: 2025-02-05 | End: 2025-02-07 | Stop reason: HOSPADM

## 2025-02-05 RX ORDER — IPRATROPIUM BROMIDE 21 UG/1
2 SPRAY, METERED NASAL 2 TIMES DAILY
Status: DISCONTINUED | OUTPATIENT
Start: 2025-02-05 | End: 2025-02-05

## 2025-02-05 RX ORDER — 0.9 % SODIUM CHLORIDE 0.9 %
1000 INTRAVENOUS SOLUTION INTRAVENOUS ONCE
Status: COMPLETED | OUTPATIENT
Start: 2025-02-05 | End: 2025-02-05

## 2025-02-05 RX ORDER — TAMSULOSIN HYDROCHLORIDE 0.4 MG/1
0.4 CAPSULE ORAL DAILY
Status: DISCONTINUED | OUTPATIENT
Start: 2025-02-06 | End: 2025-02-07 | Stop reason: HOSPADM

## 2025-02-05 RX ORDER — LEVALBUTEROL INHALATION SOLUTION 0.63 MG/3ML
0.63 SOLUTION RESPIRATORY (INHALATION)
Status: DISCONTINUED | OUTPATIENT
Start: 2025-02-05 | End: 2025-02-07 | Stop reason: HOSPADM

## 2025-02-05 RX ORDER — BISACODYL 10 MG
10 SUPPOSITORY, RECTAL RECTAL DAILY PRN
Status: DISCONTINUED | OUTPATIENT
Start: 2025-02-05 | End: 2025-02-07 | Stop reason: HOSPADM

## 2025-02-05 RX ORDER — SODIUM CHLORIDE 9 MG/ML
INJECTION, SOLUTION INTRAVENOUS PRN
Status: DISCONTINUED | OUTPATIENT
Start: 2025-02-05 | End: 2025-02-07 | Stop reason: HOSPADM

## 2025-02-05 RX ORDER — IPRATROPIUM BROMIDE 42 UG/1
2 SPRAY, METERED NASAL 2 TIMES DAILY
Status: DISCONTINUED | OUTPATIENT
Start: 2025-02-05 | End: 2025-02-07 | Stop reason: HOSPADM

## 2025-02-05 RX ORDER — ONDANSETRON 4 MG/1
4 TABLET, ORALLY DISINTEGRATING ORAL EVERY 8 HOURS PRN
Status: DISCONTINUED | OUTPATIENT
Start: 2025-02-05 | End: 2025-02-07 | Stop reason: HOSPADM

## 2025-02-05 RX ORDER — MECLIZINE HYDROCHLORIDE 25 MG/1
25 TABLET ORAL 3 TIMES DAILY PRN
Status: DISCONTINUED | OUTPATIENT
Start: 2025-02-05 | End: 2025-02-07 | Stop reason: HOSPADM

## 2025-02-05 RX ADMIN — AZITHROMYCIN MONOHYDRATE 500 MG: 500 INJECTION, POWDER, LYOPHILIZED, FOR SOLUTION INTRAVENOUS at 12:30

## 2025-02-05 RX ADMIN — IPRATROPIUM BROMIDE AND ALBUTEROL SULFATE 1 DOSE: 2.5; .5 SOLUTION RESPIRATORY (INHALATION) at 12:01

## 2025-02-05 RX ADMIN — WATER 125 MG: 1 INJECTION INTRAMUSCULAR; INTRAVENOUS; SUBCUTANEOUS at 13:18

## 2025-02-05 RX ADMIN — SODIUM CHLORIDE, PRESERVATIVE FREE 10 ML: 5 INJECTION INTRAVENOUS at 20:55

## 2025-02-05 RX ADMIN — ARFORMOTEROL TARTRATE 15 MCG: 15 SOLUTION RESPIRATORY (INHALATION) at 20:13

## 2025-02-05 RX ADMIN — SODIUM CHLORIDE 1000 ML: 9 INJECTION, SOLUTION INTRAVENOUS at 12:07

## 2025-02-05 RX ADMIN — GUAIFENESIN 600 MG: 600 TABLET ORAL at 20:55

## 2025-02-05 RX ADMIN — ENOXAPARIN SODIUM 40 MG: 100 INJECTION SUBCUTANEOUS at 17:36

## 2025-02-05 RX ADMIN — WATER 1000 MG: 1 INJECTION INTRAMUSCULAR; INTRAVENOUS; SUBCUTANEOUS at 12:08

## 2025-02-05 RX ADMIN — LEVALBUTEROL HYDROCHLORIDE 0.63 MG: 0.63 SOLUTION RESPIRATORY (INHALATION) at 20:13

## 2025-02-05 RX ADMIN — BUDESONIDE INHALATION 500 MCG: 0.5 SUSPENSION RESPIRATORY (INHALATION) at 20:13

## 2025-02-05 RX ADMIN — SOTALOL HYDROCHLORIDE 80 MG: 80 TABLET ORAL at 20:55

## 2025-02-05 RX ADMIN — IOPAMIDOL 75 ML: 755 INJECTION, SOLUTION INTRAVENOUS at 11:13

## 2025-02-05 RX ADMIN — ASPIRIN 325 MG: 325 TABLET, FILM COATED ORAL at 14:51

## 2025-02-05 ASSESSMENT — ENCOUNTER SYMPTOMS
DIARRHEA: 0
SORE THROAT: 0
SHORTNESS OF BREATH: 1
WHEEZING: 1
BACK PAIN: 0
VOMITING: 0
NAUSEA: 1
COUGH: 1
TROUBLE SWALLOWING: 0
ABDOMINAL PAIN: 0
VOICE CHANGE: 0

## 2025-02-05 ASSESSMENT — PAIN SCALES - GENERAL
PAINLEVEL_OUTOF10: 4
PAINLEVEL_OUTOF10: 0

## 2025-02-05 ASSESSMENT — PAIN DESCRIPTION - DESCRIPTORS: DESCRIPTORS: STABBING

## 2025-02-05 ASSESSMENT — PAIN - FUNCTIONAL ASSESSMENT
PAIN_FUNCTIONAL_ASSESSMENT: 0-10
PAIN_FUNCTIONAL_ASSESSMENT: 0-10

## 2025-02-05 ASSESSMENT — PAIN DESCRIPTION - LOCATION
LOCATION: CHEST
LOCATION: CHEST

## 2025-02-05 NOTE — ED PROVIDER NOTES
Emergency Department Provider Note       PCP: Jose Luis Crane DO   Age: 69 y.o.   Sex: male     DISPOSITION Admitted 02/05/2025 02:53:06 PM    ICD-10-CM    1. COPD exacerbation (HCC)  J44.1       2. Chest pain, unspecified type  R07.9           Medical Decision Making     69-year-old male with history of COPD, chronic respiratory failure on 2 L O2 via nasal cannula, atrial fibrillation status post ablation, chronic lymphocytic leukemia, former tobacco user, CAD status post stent placement, depression presents to ED from home with complaint of worsening substernal sharp nondraining chest discomfort with associated shortness of breath since this morning.  Tachyardic. Temp 99 F.  Differential diagnosis includes but is not limited to ACS, pneumonia, COPD exacerbation, CHF exacerbation, sepsis, viral syndrome, pulmonary embolism, etc.  WBC 18.7. Pt on prednisone chronically.    H&H 14.9/46.9.  Platelet count 176K.  Lactic acid 1.5.  Pro-Herrera 0.06.  Glucose 134.  Given concern for possible pneumonia, patient covered with Rocephin, Zithromax.  Chest x-ray with hyperinflated lungs.  Chronic interstitial changes noted.  Intracardiac metallic device likely representing an intra-atrial septum occlusion device.  Initial and repeat troponin 24.  EKG with sinus tachycardia.  No ST elevation noted.  Given DuoNeb, Solu-Medrol 125 mg IV.  CT chest with no PE.  Moderate centrilobular emphysema.  Heavy LAD and circumflex coronary artery calcification.  Hypodensity in the right kidney.  Patient requiring 3 to 4 L O2.  Hospitalist consulted for admission via [facility]. Dr. Fajardo to admit.    ED Course as of 02/06/25 1108   Wed Feb 05, 2025   1153 CXR    IMPRESSION:  1. Hyperinflated lungs with diminished upper lobe vasculature consistent with  COPD.  2. Probable chronic interstitial changes in the lungs as above.  3. Intracardiac metallic device likely represents an intra-atrial septum  occlusion device.   [DF]   1254 CT Chest  no claudication, leg cramps, prior DVTs,   swelling of calves, legs, or feet, color change, or swelling with   redness or tenderness  Musculoskeletal: no muscle or joint pain/stiffness, joint   swelling, erythema of joints, or back pain  Psychiatric: no depression or excessive stress  Neurological: no sensory or motor loss, seizures, syncope,   tremors, numbness, no dementia  Hematologic: no anemia, easy bruising or bleeding  Endocrine: no thyroid problems, heat or cold intolerance,   excessive sweating, polyuria, polydipsia, +  diabetes.       Physical Exam  Vitals:    02/05/25 2318 02/06/25 0400 02/06/25 0830 02/06/25 0840   BP: (!) 147/90 136/70 (!) 142/79    Pulse: 87 85 80 82   Resp: 18 18 16    Temp: 97.5 °F (36.4 °C) 97.5 °F (36.4 °C) 97.5 °F (36.4 °C)    TempSrc: Oral Axillary Oral    SpO2: 97% 93% 98%    Weight:       Height:           Physical Exam:  General: Well Developed, Well Nourished, No Acute Distress, 3L O2   by NC  Head: normocephalic atraumatic   ENT: pupils equal and round, no abnormalities noted  Neck: supple, no JVD, no carotid bruits  Heart: S1S2 with RRR, lower sternum tender w palpitation   Lungs: Clear throughout auscultation bilaterally without   adventitious sounds  Abd: soft, nontender, nondistended, with good bowel sounds  Ext: warm, no edema  Skin: warm and dry  Psychiatric: Normal mood and affect, A&O x 3  Neurologic: normal muscle tone        Labs:   Recent Labs     02/05/25  1017 02/06/25  0437    140   K 4.3 4.8   BUN 17 19   WBC 18.7* 15.5*   HGB 14.9 13.4*   HCT 46.9 41.3    178     Serum creatinine: 0.79 mg/dL (L) 02/06/25 0437  Estimated creatinine clearance: 94 mL/min (A)     Assessment/Plan:     Assessment:   COPD with acute exacerbation (HCC)- w CAP  - z-max, solu medrol; 2L O2 at home    Chest pain- new x 3 days, long h/o CAD  - npo for possible LHC today     PAF - S/P ablation of atrial fibrillation and watchman- in NSR on   sotalol   - sotalol, cardizem

## 2025-02-05 NOTE — ED NOTES
TRANSFER - OUT REPORT:    Verbal report given to Tia RN on Abram Castellon  being transferred to [facility] for routine progression of patient care       Report consisted of patient's Situation, Background, Assessment and   Recommendations(SBAR).     Information from the following report(s) Nurse Handoff Report, ED Encounter Summary, and Recent Results was reviewed with the receiving nurse.    [place] Fall Assessment:    Presents to emergency department  because of falls (Syncope, seizure, or loss of consciousness): No  Age > 70: Yes  Altered Mental Status, Intoxication with alcohol or substance confusion (Disorientation, impaired judgment, poor safety awaremess, or inability to follow instructions): No  Impaired Mobility: Ambulates or transfers with assistive devices or assistance; Unable to ambulate or transer.: No  Nursing Judgement: No          Lines:   Peripheral IV 02/05/25 Left Antecubital (Active)        Opportunity for questions and clarification was provided.      Patient transported with:  Leila Davila RN  02/05/25 [id number]

## 2025-02-05 NOTE — H&P
Hospitalist History and Physical   Admit Date:  2025 10:15 AM   Name:  Abram Castellon   Age:  69 y.o.  Sex:  male  :  1955   MRN:  [id number]   [facility]    Presenting/Chief Complaint: Chest Pain     Reason(s) for Admission: COPD with acute exacerbation (HCC) [J44.1]     History of Present Illness:   Abram Castellon is a 69 y.o. male with medical history of severe COPD, chronic respiratory failure with hypoxia on 2L O2 NC, HTN, HLD, BPH, depression/anxiety who presented with worsening shortness of breath, cough and weakness. He was tested positive for RSV on  and has not gotten better despite Abx and steroid taper.  Reports chills, fever with productive cough (with clear sputum).  Patient also complaining of chest pain that is midsternal without any radiation.  Workup in the emergency room with procalcitonin 0.06, lactic acid of 1.5, troponin of 24 with repeat of 24, WBC of 18.7.  Chest x-ray with hyperinflated lungs, chronic interstitial changes.  CT chest without any signs of pulmonary embolism, moderate centrilobular emphysema with some linear scarring in the middle lobe and lingula.  Assessment & Plan:     Severe COPD with acute exacerbation  Chronic Respiratory Failure with Hypoxia  CXR and CT chest without infiltrates. Procalc of 0.06.  Elevated WBC but pt was recently on steroid taper and now on 20mg prednisone which is his chronic steroid dose.  - azithromycin for COPD exacerbation  - monitor off abx as no signs of infection   - triple inhaler therapy BID  - xopenex q6h  - IV solumedrol 40mg IV BID  - Mucinex BID and tessalon pearls prn  - check respiratory panel    CP  -r/o acs. Trops 24 >> 24. EKG without ST changes. Check trop later again  -PPI as below as his CP is non- radiating and substernal.  -put on telemetry      CAD s/p Stents  Afib s/p ablation and watchmen procedure  HTN  - resume home Cardizem, imdur, sotalol, lipitor and asa    CLL : noted. On prednisone  BPH: resume  (DUONEB) 0.5-2.5 (3) MG/3ML SOLN nebulizer solution INHALE THE CONTENTS OF 1 VIAL VIA NEBULIZER FOUR TIMES DAILY    isosorbide mononitrate (IMDUR) 30 MG extended release tablet TAKE 1 TABLET EVERY MORNING    meclizine (ANTIVERT) 25 MG tablet TAKE 1 TABLET THREE TIMES DAILY AS NEEDED FOR DIZZINESS    Multiple Vitamin (MULTIVITAMIN ADULT PO) 1 tablet, Oral, DAILY    nitroGLYCERIN (NITROSTAT) 0.4 mg, SubLINGual, EVERY 5 MIN PRN    pantoprazole (PROTONIX) 40 mg, Oral, DAILY    predniSONE (DELTASONE) 20 MG tablet 60mg daily x 3 days, then 40mg x 3 days, then 20mg x 3 days, then 10mg x 3 days, then D/C.    predniSONE (DELTASONE) 20 mg, Oral, DAILY    sertraline (ZOLOFT) 100 mg, Oral, DAILY    sotalol (BETAPACE) 80 MG tablet TAKE 1 TABLET TWICE DAILY    tamsulosin (FLOMAX) 0.4 mg, Oral, DAILY    tiZANidine (ZANAFLEX) 4 mg, Oral, EVERY 8 HOURS PRN    triamcinolone (KENALOG) 0.1 % cream Apply topically 2 times daily.       I have personally reviewed labs and tests:  Recent Results (from the past 24 hour(s))   EKG 12 Lead    Collection Time: 02/05/25 10:15 AM   Result Value Ref Range    Ventricular Rate 123 BPM    Atrial Rate 123 BPM    P-R Interval 164 ms    QRS Duration 68 ms    Q-T Interval 300 ms    QTc Calculation (Bazett) 429 ms    P Axis 87 degrees    R Axis 61 degrees    T Axis 83 degrees    Diagnosis       Sinus tachycardia with Premature ventricular complexes  Nonspecific ST abnormality  Abnormal ECG  When compared with ECG of 01-FEB-2023 07:18,  Aberrant conduction is now Present  Vent. rate has increased BY  58 BPM  Criteria for Septal infarct are no longer Present  ST now depressed in Inferior leads  ST now depressed in Lateral leads  Confirmed by COTY STONE (), KEISHA MADISON ([id number]) on 2/5/2025 11:34:06 AM     CBC with Auto Differential    Collection Time: 02/05/25 10:17 AM   Result Value Ref Range    WBC 18.7 (H) 4.3 - 11.1 K/uL    RBC 5.34 4.23 - 5.6 M/uL    Hemoglobin 14.9 13.6 - 17.2 g/dL    Hematocrit 46.9 41.1  circumflex coronary calcifications are present. The heart is not enlarged no pericardial effusion. THORACIC AORTA: The aorta is normal in caliber. PULMONARY ARTERY: No pulmonary embolus to the segmental level. The main pulmonary artery is normal in caliber. MEDIASTINUM/CHING: No mediastinal mass or lymphadenopathy. CHEST WALL: No mass or axillary lymphadenopathy. UPPER ABDOMEN: Patient is status post cholecystectomy. Calcified granulomas are noted in the spleen and liver with borderline splenomegaly incompletely visualized on this exam. 1.4 cm hypodensity in the lateral portion of the right kidney likely represents a cyst. This was present on prior CT scan 15 July 2024 and appears to be stable. BONY STRUCTURES: Osteopenia and degenerative changes noted in the thoracic and upper lumbar spine.    1.  No pulmonary embolus. 2.  No acute findings within the chest. 3.  Moderate centrilobular emphysema with some linear scarring in the middle lobe and lingula. No lung nodules noted. 4.  Left ventricular hypertrophy. Left atrial appendage closure device in place. Heavy LAD and circumflex coronary arteries calcifications noted. 5.  Patient status post cholecystectomy. 6.  Hypodensity in the right kidney likely represents a cyst and appears stable since prior chest CT. Electronically signed by Marco BARRON CHEST (2 VW)    Result Date: 2/5/2025  EXAM: Chest X-ray INDICATION: Chest Pain COMPARISON: PA and lateral views the chest 28 August 2023 TECHNIQUE: PA and lateral views of the chest were obtained. FINDINGS: The lungs are hyperinflated. Previous density in the right midlung appears to represent the anterior right fifth rib and possibly a healing fracture of this rib. Mild increase in interstitial markings are noted in both lungs with no acute infiltrates suspected. Pulmonary vasculature is reduced in the upper lungs raising question of COPD.. On the lateral view there is thickening of the right major fissure.

## 2025-02-05 NOTE — ACP (ADVANCE CARE PLANNING)
Advanced Care Planning (Initial Encounter)      Name: Abram Castellon            Age: 69 y.o.        Sex: male  : 1955    MRN:     [id number]    Date of ACP Conversation: 25    Conversation Conducted with: Patient with Decision Making Capacity      Patient is AAOx3 and has adequate capacity to make medical decisions.   In the event that he is no longer able to make medical decision, he would like his wife to make medical decisions.         Discussed the current conditions, workup/management plans as well as prognosis. The patient has been informed and is fully aware of the sufficient risks of the active diagnosis and risk for further deterioration due to the underlying condition.      In the event of cardiopulmonary arrest, patient would like us to perform resuscitation including chest compression and intubation.        Time Spent face to face with patient/family: 16 mins (This is addition to time spent for clinical care)        Code Status: FULL  Designated Health Care Decision Maker: Darlyn Fajardo MD

## 2025-02-05 NOTE — PROGRESS NOTES
TRANSFER - IN REPORT:    Verbal report received from Leila BATISTA on Abram Castellon  being received from ER for routine progression of patient care      Report consisted of patient's Situation, Background, Assessment and   Recommendations(SBAR).     Information from the following report(s) Nurse Handoff Report was reviewed with the receiving nurse.    Opportunity for questions and clarification was provided.      Assessment completed upon patient's arrival to unit and care assumed.

## 2025-02-05 NOTE — ED TRIAGE NOTES
Pt to triage in wheelchair. Pt complains of CP and SOB onset this AM. Pt wears 2LNC at baseline. Complains of associated nausea. Denies vomiting. Hx of cardiac stents x2, watchman device, and ablations x2. Most recent stent placement x3 years ago. Followed by Dr. Quique Andrade [facility].

## 2025-02-06 PROBLEM — J44.1 COPD EXACERBATION (HCC): Status: ACTIVE | Noted: 2025-02-06

## 2025-02-06 PROBLEM — J96.21 ACUTE ON CHRONIC RESPIRATORY FAILURE WITH HYPOXIA: Status: ACTIVE | Noted: 2025-02-06

## 2025-02-06 PROBLEM — R07.9 CHEST PAIN: Status: ACTIVE | Noted: 2025-02-06

## 2025-02-06 LAB
ANION GAP SERPL CALC-SCNC: 8 MMOL/L (ref 7–16)
BASOPHILS # BLD: 0.03 K/UL (ref 0–0.2)
BASOPHILS NFR BLD: 0.2 % (ref 0–2)
BUN SERPL-MCNC: 19 MG/DL (ref 8–23)
CALCIUM SERPL-MCNC: 9.1 MG/DL (ref 8.8–10.2)
CHLORIDE SERPL-SCNC: 103 MMOL/L (ref 98–107)
CO2 SERPL-SCNC: 28 MMOL/L (ref 20–29)
CREAT SERPL-MCNC: 0.79 MG/DL (ref 0.8–1.3)
DIFFERENTIAL METHOD BLD: ABNORMAL
ECHO BSA: 1.98 M2
EOSINOPHIL # BLD: 0 K/UL (ref 0–0.8)
EOSINOPHIL NFR BLD: 0 % (ref 0.5–7.8)
ERYTHROCYTE [DISTWIDTH] IN BLOOD BY AUTOMATED COUNT: 14.1 % (ref 11.9–14.6)
GLUCOSE SERPL-MCNC: 165 MG/DL (ref 70–99)
HCT VFR BLD AUTO: 41.3 % (ref 41.1–50.3)
HGB BLD-MCNC: 13.4 G/DL (ref 13.6–17.2)
IMM GRANULOCYTES # BLD AUTO: 0.09 K/UL (ref 0–0.5)
IMM GRANULOCYTES NFR BLD AUTO: 0.6 % (ref 0–5)
LYMPHOCYTES # BLD: 7.8 K/UL (ref 0.5–4.6)
LYMPHOCYTES NFR BLD: 50.3 % (ref 13–44)
MCH RBC QN AUTO: 28.3 PG (ref 26.1–32.9)
MCHC RBC AUTO-ENTMCNC: 32.4 G/DL (ref 31.4–35)
MCV RBC AUTO: 87.1 FL (ref 82–102)
MONOCYTES # BLD: 0.29 K/UL (ref 0.1–1.3)
MONOCYTES NFR BLD: 1.9 % (ref 4–12)
NEUTS SEG # BLD: 7.29 K/UL (ref 1.7–8.2)
NEUTS SEG NFR BLD: 47 % (ref 43–78)
NRBC # BLD: 0 K/UL (ref 0–0.2)
PLATELET # BLD AUTO: 178 K/UL (ref 150–450)
PLATELET COMMENT: ADEQUATE
PMV BLD AUTO: 10.4 FL (ref 9.4–12.3)
POTASSIUM SERPL-SCNC: 4.8 MMOL/L (ref 3.5–5.1)
RBC # BLD AUTO: 4.74 M/UL (ref 4.23–5.6)
RBC MORPH BLD: ABNORMAL
SODIUM SERPL-SCNC: 140 MMOL/L (ref 136–145)
WBC # BLD AUTO: 15.5 K/UL (ref 4.3–11.1)
WBC MORPH BLD: ABNORMAL

## 2025-02-06 PROCEDURE — 36415 COLL VENOUS BLD VENIPUNCTURE: CPT

## 2025-02-06 PROCEDURE — 6360000002 HC RX W HCPCS: Performed by: INTERNAL MEDICINE

## 2025-02-06 PROCEDURE — 99152 MOD SED SAME PHYS/QHP 5/>YRS: CPT | Performed by: INTERNAL MEDICINE

## 2025-02-06 PROCEDURE — 94761 N-INVAS EAR/PLS OXIMETRY MLT: CPT

## 2025-02-06 PROCEDURE — C1760 CLOSURE DEV, VASC: HCPCS | Performed by: INTERNAL MEDICINE

## 2025-02-06 PROCEDURE — 93458 L HRT ARTERY/VENTRICLE ANGIO: CPT | Performed by: INTERNAL MEDICINE

## 2025-02-06 PROCEDURE — 2700000000 HC OXYGEN THERAPY PER DAY

## 2025-02-06 PROCEDURE — 6370000000 HC RX 637 (ALT 250 FOR IP): Performed by: INTERNAL MEDICINE

## 2025-02-06 PROCEDURE — 6370000000 HC RX 637 (ALT 250 FOR IP): Performed by: PHYSICIAN ASSISTANT

## 2025-02-06 PROCEDURE — B2111ZZ FLUOROSCOPY OF MULTIPLE CORONARY ARTERIES USING LOW OSMOLAR CONTRAST: ICD-10-PCS | Performed by: INTERNAL MEDICINE

## 2025-02-06 PROCEDURE — 97530 THERAPEUTIC ACTIVITIES: CPT

## 2025-02-06 PROCEDURE — 94640 AIRWAY INHALATION TREATMENT: CPT

## 2025-02-06 PROCEDURE — 80048 BASIC METABOLIC PNL TOTAL CA: CPT

## 2025-02-06 PROCEDURE — 1100000003 HC PRIVATE W/ TELEMETRY

## 2025-02-06 PROCEDURE — 6360000004 HC RX CONTRAST MEDICATION: Performed by: INTERNAL MEDICINE

## 2025-02-06 PROCEDURE — 2500000003 HC RX 250 WO HCPCS: Performed by: INTERNAL MEDICINE

## 2025-02-06 PROCEDURE — 97161 PT EVAL LOW COMPLEX 20 MIN: CPT

## 2025-02-06 PROCEDURE — 4A023N7 MEASUREMENT OF CARDIAC SAMPLING AND PRESSURE, LEFT HEART, PERCUTANEOUS APPROACH: ICD-10-PCS | Performed by: INTERNAL MEDICINE

## 2025-02-06 PROCEDURE — 97535 SELF CARE MNGMENT TRAINING: CPT

## 2025-02-06 PROCEDURE — C1894 INTRO/SHEATH, NON-LASER: HCPCS | Performed by: INTERNAL MEDICINE

## 2025-02-06 PROCEDURE — 6370000000 HC RX 637 (ALT 250 FOR IP): Performed by: NURSE PRACTITIONER

## 2025-02-06 PROCEDURE — 85025 COMPLETE CBC W/AUTO DIFF WBC: CPT

## 2025-02-06 PROCEDURE — 97165 OT EVAL LOW COMPLEX 30 MIN: CPT

## 2025-02-06 PROCEDURE — 2709999900 HC NON-CHARGEABLE SUPPLY: Performed by: INTERNAL MEDICINE

## 2025-02-06 PROCEDURE — 99223 1ST HOSP IP/OBS HIGH 75: CPT | Performed by: INTERNAL MEDICINE

## 2025-02-06 RX ORDER — MIDAZOLAM HYDROCHLORIDE 1 MG/ML
INJECTION, SOLUTION INTRAMUSCULAR; INTRAVENOUS PRN
Status: DISCONTINUED | OUTPATIENT
Start: 2025-02-06 | End: 2025-02-06 | Stop reason: HOSPADM

## 2025-02-06 RX ORDER — TIZANIDINE 2 MG/1
4 TABLET ORAL ONCE
Status: COMPLETED | OUTPATIENT
Start: 2025-02-06 | End: 2025-02-06

## 2025-02-06 RX ORDER — HEPARIN SODIUM 200 [USP'U]/100ML
INJECTION, SOLUTION INTRAVENOUS CONTINUOUS PRN
Status: DISCONTINUED | OUTPATIENT
Start: 2025-02-06 | End: 2025-02-06 | Stop reason: HOSPADM

## 2025-02-06 RX ORDER — TIZANIDINE 2 MG/1
4 TABLET ORAL EVERY 6 HOURS PRN
Status: DISCONTINUED | OUTPATIENT
Start: 2025-02-06 | End: 2025-02-06

## 2025-02-06 RX ORDER — GUAIFENESIN 600 MG/1
1200 TABLET, EXTENDED RELEASE ORAL 2 TIMES DAILY
Status: DISCONTINUED | OUTPATIENT
Start: 2025-02-06 | End: 2025-02-07 | Stop reason: HOSPADM

## 2025-02-06 RX ORDER — LIDOCAINE HYDROCHLORIDE 10 MG/ML
INJECTION, SOLUTION INFILTRATION; PERINEURAL PRN
Status: DISCONTINUED | OUTPATIENT
Start: 2025-02-06 | End: 2025-02-06 | Stop reason: HOSPADM

## 2025-02-06 RX ORDER — IOPAMIDOL 755 MG/ML
INJECTION, SOLUTION INTRAVASCULAR PRN
Status: DISCONTINUED | OUTPATIENT
Start: 2025-02-06 | End: 2025-02-06 | Stop reason: HOSPADM

## 2025-02-06 RX ADMIN — GUAIFENESIN 600 MG: 600 TABLET ORAL at 08:39

## 2025-02-06 RX ADMIN — WATER 40 MG: 1 INJECTION INTRAMUSCULAR; INTRAVENOUS; SUBCUTANEOUS at 16:07

## 2025-02-06 RX ADMIN — SODIUM CHLORIDE, PRESERVATIVE FREE 10 ML: 5 INJECTION INTRAVENOUS at 08:46

## 2025-02-06 RX ADMIN — TIZANIDINE 4 MG: 2 TABLET ORAL at 22:11

## 2025-02-06 RX ADMIN — BUDESONIDE INHALATION 500 MCG: 0.5 SUSPENSION RESPIRATORY (INHALATION) at 20:35

## 2025-02-06 RX ADMIN — SOTALOL HYDROCHLORIDE 80 MG: 80 TABLET ORAL at 08:40

## 2025-02-06 RX ADMIN — LEVALBUTEROL HYDROCHLORIDE 0.63 MG: 0.63 SOLUTION RESPIRATORY (INHALATION) at 20:35

## 2025-02-06 RX ADMIN — SOTALOL HYDROCHLORIDE 80 MG: 80 TABLET ORAL at 21:43

## 2025-02-06 RX ADMIN — AZITHROMYCIN DIHYDRATE 250 MG: 250 TABLET ORAL at 08:39

## 2025-02-06 RX ADMIN — ASPIRIN 81 MG: 81 TABLET, CHEWABLE ORAL at 08:39

## 2025-02-06 RX ADMIN — GUAIFENESIN 1200 MG: 600 TABLET ORAL at 21:43

## 2025-02-06 RX ADMIN — ARFORMOTEROL TARTRATE 15 MCG: 15 SOLUTION RESPIRATORY (INHALATION) at 20:35

## 2025-02-06 RX ADMIN — PANTOPRAZOLE SODIUM 40 MG: 40 TABLET, DELAYED RELEASE ORAL at 08:39

## 2025-02-06 RX ADMIN — DILTIAZEM HYDROCHLORIDE 240 MG: 120 CAPSULE, EXTENDED RELEASE ORAL at 08:40

## 2025-02-06 RX ADMIN — ATORVASTATIN CALCIUM 40 MG: 40 TABLET, FILM COATED ORAL at 08:39

## 2025-02-06 RX ADMIN — WATER 40 MG: 1 INJECTION INTRAMUSCULAR; INTRAVENOUS; SUBCUTANEOUS at 00:34

## 2025-02-06 RX ADMIN — SODIUM CHLORIDE, PRESERVATIVE FREE 10 ML: 5 INJECTION INTRAVENOUS at 21:45

## 2025-02-06 RX ADMIN — SERTRALINE HYDROCHLORIDE 100 MG: 100 TABLET ORAL at 08:39

## 2025-02-06 RX ADMIN — TAMSULOSIN HYDROCHLORIDE 0.4 MG: 0.4 CAPSULE ORAL at 08:39

## 2025-02-06 ASSESSMENT — PAIN SCALES - GENERAL
PAINLEVEL_OUTOF10: 0
PAINLEVEL_OUTOF10: 0

## 2025-02-06 NOTE — CONSULTS
History and Physical Initial Visit NOTE           2/6/2025    Abram Castellon                        Date of Admission:  2/5/2025    The patient's chart is reviewed and the patient is discussed with the staff.    Subjective:     Patient is a 69 y.o.  male seen and evaluated at the request of Dr. Fish for COPD exacerbation. Pt is known to [facility] with a history of GOLD stage IV COPD, chronic respiratory failure on 2L O2, chronic steroid use of 20mg daily, pulmonary nodules, and prior history of tobacco abuse (last seen 9/23/24). Pt was supposed to wean off of prednisone at that time. He had also been seen 10/21/24 by Dr. Echeverria, Interventional Pulmonary and Critical Care at [facility], for evaluation of endobronchial valve. It was recommended at that time that he be considered for dupixent for eosinophilia.   Pt was diagnosed with RSV on 1/13 by his PCP and was treated with abx/steroids. Pt presented to the ER on 2/5 with persistent dyspnea, cough, and weakness. He had leukocytosis with WBC 18.7. Procalcitonin 0.06, LA 1.5. CXR revealed chronic changes. Chest CTA was negative for PE. There were no acute findings with only chronic emphysema and linear scarring. Pt was admitted for COPD exacerbation. We were consulted to assist.   Pt is on 3L O2. Pt reports that he never really improved from RSV. He has continued to cough but cough has been dry. Yesterday he started coughing up clear phlegm.   He denies fever or chills. He did have sweats overnight. He admits to some wheezing. He has only been using albuterol hfa at home.   Pt reports that he is on 2L O2 at home. He did have 6MWT through [facility] 10/16/24 and required 3L with exertion. Pt has been participating in pulmonary rehab through [facility] but has missed several weeks due to having RSV. Pt has to complete pulmonary rehab before being scheduled for [place] valve placement by [facility].     Review of Systems: Comprehensive ROS negative  72 hours.  Recent Labs     02/05/25  1452   COVID19 NOT DETECTED     Assessment and Plan:  (Medical Decision Making)   Impression: Pt is a 68yo male with GOLD stage IV COPD, chronic respiratory failure on 3L O2, HTN, HLD, CAD, afib, and anxiety/depression. Pt had RSV 1/13 as outpt and was treated with abx and steroids. Pt presented to the ER on 2/5 with persistent dyspnea and was admitted for COPD exacerbation.     Principal Problem:    COPD with acute exacerbation (HCC)  Plan: Pt is on mucinex, nebs, solumedrol, azithromycin. Recommend very quick prednisone taper with plans for pt to wean to 10mg at the most before being seen in the office.   Active Problems:    COPD, very severe (HCC)  Plan: severe, gold stage IV, continue O2, being evaluated for [place] valve by [facility]. Pt is negative for alpha 1 antitrypsin deficiency. MM.      HTN (hypertension)  Plan: controlled       Atherosclerotic heart disease of native coronary artery with other forms of angina pectoris (HCC)  Plan: chronic, troponins trending down. Primary consulting cardiology at pt's request     Mixed hyperlipidemia  Plan: continue home meds     Recurrent major depressive disorder, in partial remission (HCC)  Plan: continue home zoloft       Full Code    Thank you very much for this referral.  We appreciate the opportunity to participate in this patient's care. We will sign off. I will send a message to the office to set pt up for outpt palliative care follow up with [facility]zo.       In this split/shared evaluation I performed performed a medically appropriate history and exam, counseled and educated the patient and/or family member, ordered medications, tests or procedures, documented information in EMR, and coordinated care. which accounted for 30 minutes clinical time.     LORENA Heredia    ATTENDING ADDENDUM:    In this split/shared evaluation I performed reviewed the patients's H&P, available images, labs, cultures., discussed case in  detail with NPP, performed a medically appropriate history and exam, counseled and educated the patient and/or family member, ordered and/or reviewed medications, tests or procedures, documented information in EMR, independently interpreted images, and coordinated care. which accounted for 45 minutes clinical time.     Impression: Pt is a 68yo male with GOLD stage IV COPD, chronic respiratory failure on 3L O2, HTN, HLD, CAD, afib, and anxiety/depression. Pt had RSV 1/13 as outpt and was treated with abx and steroids. Pt presented to the ER on 2/5 with persistent dyspnea and was admitted for COPD exacerbation. Agree with above assessment and plan. He has an appointment with [facility] to consider pulmonary valved on march 20th.  He seems to be back to baseline with no wheezing at time of exam and feeling much better.  Can go home from my stand point and follow up with [facility]log[facility] as scheduled        Erwin Silverman MD

## 2025-02-06 NOTE — PROGRESS NOTES
ACUTE PHYSICAL THERAPY GOALS:   (Developed with and agreed upon by patient and/or caregiver.)  Pt will be INDEPENDENT with all bed mobility with use of external supports as needed within 7 treatment days  Pt will be INDEPENDENT with static seated balance activities without loss of balance and use of external supports as needed within 7 treatment days.   Pt will be INDEPENDENT  with dynamic seated balance activities without loss of balance reaching outside MARIALUISA within 7 treatment days.   Pt will be INDEPENDENT  with STS from all surfaces using LRAD and no loss of balance within 7 treatment days.   Pt will be INDEPENDENT for all dynamic standing balance activities without loss of balance within 7 treatment days.   Pt will ambulate 100 feet INDEPENDENTly with LRAD without LOB or miss steps within 7 treatment days.   Pt will maintain SpO2 >90% on 2L during prolonged functional mobility to improve endurance within 7 treatment days.   Pt will be INDEPENDENT with self-pacing of exertional activities in order to reduce SOB exacerbations.   ALL GOALS MET ON INITIAL EVALUATION (2/6/25)         PHYSICAL THERAPY Initial Assessment, Daily Note, Discharge, and AM  (Link to Caseload Tracking: PT Visit Days : 1  Acknowledge Orders  Time In/Out  PT Charge Capture  Rehab Caseload Tracker    Abram Castellon is a 69 y.o. male   PRIMARY DIAGNOSIS: COPD with acute exacerbation (HCC)  COPD exacerbation (HCC) [J44.1]  COPD with acute exacerbation (HCC) [J44.1]  Chest pain, unspecified type [R07.9]       Reason for Referral: History of falling (Z91.81)  Inpatient: Payor: [employer] MEDICARE / Plan: [employer] GOLD PLUS HMO / Product Type: *No Product type* /     ASSESSMENT:     REHAB RECOMMENDATIONS:   Recommendation to date pending progress:  Setting:  Return to Pulmonary Rehab    Equipment:    None     ASSESSMENT:  Mr. Castellon 69 y.o. male admitted on 2/5/25 for COPD with acute exacerbation and associated worsening shortness of breath, cough and  steps with a railing?: A Little  AM-PAC Inpatient Mobility Raw Score : 23  AM-PAC Inpatient T-Scale Score : 56.93  Mobility Inpatient CMS 0-100% Score: 11.2  Mobility Inpatient CMS G-Code Modifier : CI    SUBJECTIVE:   Mr. Castellon states, \"I slept okay last night\"     Social/Functional Lives With: Spouse  Type of Home: Mobile home  Home Layout: One level  Home Access: Stairs to enter with rails  Entrance Stairs - Number of Steps: 6  Entrance Stairs - Rails: Both  Bathroom Shower/Tub: Tub/Shower unit  Bathroom Toilet: Standard  Bathroom Equipment: None  Bathroom Accessibility: Accessible  Home Equipment: Cane, Walker - Rolling  Receives Help From: Family  Prior Level of Assist for ADLs: Independent  Prior Level of Assist for Homemaking: Independent  Homemaking Responsibilities: Yes  Prior Level of Assist for Transfers: Independent  Active : Yes  Mode of Transportation: Truck  Occupation: Retired    OBJECTIVE:     PAIN: VITALS / O2: PRECAUTION / LINES / DRAINS:   Pre Treatment:      None reported     Post Treatment: None reported  Vitals      See assessment   Oxygen     2L   IV    RESTRICTIONS/PRECAUTIONS:                    GROSS EVALUATION:  Intact Impaired (Comments):   AROM [x]     PROM []    Strength [x]     Balance []  Increased postural sway with narrow MARIALUISA but no significant LOB/miss-steps     Seated Static: Good  Seated Dynamic: Good  Standing Static: Fair (+)  Standing Dynamic:Fair (+)    Posture [] N/A  WFL   Sensation []  NT   Coordination []   NT   Tone []  NT   Edema []    Activity Tolerance []  Slightly decreased from baseline    []      COGNITION/  PERCEPTION: Intact Impaired (Comments):   Orientation [x]     Vision []  Not Formally Assessed but WFL   Hearing []  Not Formally Assessed but WFL   Cognition  []  Conversing and following commands appropriately      MOBILITY: I Mod I S SBA CGA Min Mod Max Total  NT x2 Comments:   Bed Mobility    Rolling [] [] [] [] [] [] [] [] [] [x] []    Supine to Sit

## 2025-02-06 NOTE — PROGRESS NOTES
TRANSFER - OUT REPORT:    Verbal report given to LYNNE Rodriguez on Abram Castellon  being transferred to [facility] for routine post-op       Report consisted of patient’s Situation, Background, Assessment and Recommendations(SBAR).     Information from the following report(s) SBAR, Procedure Summary, MAR, and Cardiac Rhythm SR  was reviewed with the receiving nurse.    Opportunity for questions and clarification was provided.      C by Dr. Andrade  Right femoral access  No interventions- diagnostic  Closed with 6fr Mynx  1mg Versed  Access site soft. Clean, dry, and intact; with no signs of bleeding or hematoma  Pt. Tolerated procedure

## 2025-02-06 NOTE — PLAN OF CARE
Problem: Pain  Goal: Verbalizes/displays adequate comfort level or baseline comfort level  Outcome: Progressing     Problem: Safety - Adult  Goal: Free from fall injury  Outcome: Progressing     Problem: Discharge Planning  Goal: Discharge to home or other facility with appropriate resources  Outcome: Not Progressing

## 2025-02-06 NOTE — CONSULTS
[facility] Cardiology Initial Cardiac Evaluation                 Date of  Admission: 2025 10:15 AM     Primary Care Physician: Jose Luis Crane DO  Primary Cardiologist: Dr Andrade  Referring Physician: Dr Horn  Attending Physician: Dr Andrade    CC: CP      Abram Castellon is a 69 y.o. male admitted for COPD exacerbation (HCC) [J44.1]  COPD with acute exacerbation (HCC) [J44.1]  Chest pain, unspecified type [R07.9].  He has a h/o CLL, PAF s/p watchman  and ablation , CAD w [facility]  w PCI to first OM (2023 [facility] w mild diffuse CAD and very small diagonal), severe COPD on 2L O2 by NC, htn and hld.  10/2021 TTE EF 50-55%. Presented to the ER 25 w CP and SOB w nausea.     In ER , K 4.8, cr .79, glucose 165, trop 24 and 23, LFT's wnl, WBC 18, hgb 14.9, platelets 176, viral panel neg, CT chest mod emphysema, LVH w WENDI closure device in place. EKG ST w rate 123 w inferolateral St depression.  /104. Admitted by hospitalist w COPD exacerbation.  Started on z-max, IV solu medrol.     Pt has had increased GOFF, orthopnea and wheezing x 2-3 weeks. He has had new pain in one spot in the lower part of his sternum, constant, woke from sleep the other day, with nausea, diaphoresis, sharp pain, not radiating, better w akua seltzer, not worse w exertion.  He has a h/o PUD, on BID PPI, scheduled for EGD/colo in a month.  He has been seen by pulmonary, COPD at baseline, epigastric pain has eased.  No edema, weight change, syncope. He had two brief episodes of dizziness w standing.     Home cardiac meds:  ASA 81 mg qd  Lipitor 40 mg qd  Cardizem 240 mg qd  Imdur 30 mg qd  Sotalol 80 mg bid    Past cardiac work up:  2023 [facility] w mild diffuse CAD and very small diagonal  10/2021 TTE EF 50-55%    Soc: quit tobacco , , no alcohol  FH: dad w CABG and brother  of MI late 50's     Patient Active Problem List   Diagnosis    COPD, very severe (HCC)    Recurrent right inguinal hernia    Dizziness     Q-T Interval 300 ms    QTc Calculation (Bazett) 429 ms    P Axis 87 degrees    R Axis 61 degrees    T Axis 83 degrees    Diagnosis       Sinus tachycardia with Premature ventricular complexes  Nonspecific ST abnormality  Abnormal ECG  When compared with ECG of 01-FEB-2023 07:18,  Aberrant conduction is now Present  Vent. rate has increased BY  58 BPM  Criteria for Septal infarct are no longer Present  ST now depressed in Inferior leads  ST now depressed in Lateral leads  Confirmed by COTY STONE (), KEISHA MADISON ([id number]) on 2/5/2025 11:34:06 AM     CBC with Auto Differential    Collection Time: 02/05/25 10:17 AM   Result Value Ref Range    WBC 18.7 (H) 4.3 - 11.1 K/uL    RBC 5.34 4.23 - 5.6 M/uL    Hemoglobin 14.9 13.6 - 17.2 g/dL    Hematocrit 46.9 41.1 - 50.3 %    MCV 87.8 82 - 102 FL    MCH 27.9 26.1 - 32.9 PG    MCHC 31.8 31.4 - 35.0 g/dL    RDW 14.4 11.9 - 14.6 %    Platelets 176 150 - 450 K/uL    MPV 9.2 (L) 9.4 - 12.3 FL    nRBC 0.00 0.0 - 0.2 K/uL    Neutrophils % 48.9 43.0 - 78.0 %    Lymphocytes % 44.2 (H) 13.0 - 44.0 %    Monocytes % 5.3 4.0 - 12.0 %    Eosinophils % 0.7 0.5 - 7.8 %    Basophils % 0.3 0.0 - 2.0 %    Immature Granulocytes % 0.6 0.0 - 5.0 %    Neutrophils Absolute 9.14 (H) 1.70 - 8.20 K/UL    Lymphocytes Absolute 8.27 (H) 0.50 - 4.60 K/UL    Monocytes Absolute 0.99 0.10 - 1.30 K/UL    Eosinophils Absolute 0.13 0.00 - 0.80 K/UL    Basophils Absolute 0.06 0.00 - 0.20 K/UL    Immature Granulocytes Absolute 0.11 0.0 - 0.5 K/UL    RBC Comment NORMOCYTIC/NORMOCHROMIC      WBC Comment OCCASIONAL      Platelet Comment ADEQUATE      Differential Type AUTOMATED     Comprehensive Metabolic Panel w/ Reflex to MG    Collection Time: 02/05/25 10:17 AM   Result Value Ref Range    Sodium 143 136 - 145 mmol/L    Potassium 4.3 3.5 - 5.1 mmol/L    Chloride 98 98 - 107 mmol/L    CO2 32 (H) 20 - 29 mmol/L    Anion Gap 13 7 - 16 mmol/L    Glucose 134 (H) 70 - 99 mg/dL    BUN 17 8 - 23 MG/DL    Creatinine 1.02 0.80 -

## 2025-02-06 NOTE — CARE COORDINATION
MSN, JOAQUÍN:  spoke with patient this AM about discharge planning.  Patient lives with his wife in own home with 6 steps for entrance.  Patient is independent with all ADL's and requires no equipment for ambulation.  Patient does have cane and walker if needed.  Patient denies any HH, rehab, or palliative care.  Patient is current with home oxygen at 2L NC.  Patient is retired but able to drive himself to all appointments.  Patient was admitted for SOB, weakness, cough, chills, and fever.  CXR revealed hyperinflated lungs.  CT revealed chronic emphysema with scarring. Patient started on IV steroids.  PT/OT consulted for evaluation and recommendations.  Case Management will continue to follow for any discharge needs.       02/06/25 4577   Service Assessment   Patient Orientation Alert and Oriented   Cognition Alert   History Provided By Patient   Primary Caregiver Self   Support Systems Spouse/Significant Other;Family Members   Patient's Healthcare Decision Maker is: Legal Next of Kin   PCP Verified by CM Yes   Last Visit to PCP Within last 3 months   Prior Functional Level Independent in ADLs/IADLs   Current Functional Level Other (see comment)  (PT/OT consulted)   Can patient return to prior living arrangement Yes   Ability to make needs known: Good   Family able to assist with home care needs: Yes   Would you like for me to discuss the discharge plan with any other family members/significant others, and if so, who? Yes  (Wife)   Financial Resources Medicare   Community Resources None   Social/Functional History   Lives With Spouse   Type of Home Mobile home   Home Layout One level   Home Access Stairs to enter with rails   Entrance Stairs - Number of Steps 6   Entrance Stairs - Rails Both   Bathroom Shower/Tub Tub/Shower unit   Bathroom Toilet Standard   Bathroom Equipment None   Bathroom Accessibility Accessible   Home Equipment Cane;Walker - Rolling   Receives Help From Family   Prior Level of Assist for ADLs

## 2025-02-06 NOTE — PROGRESS NOTES
Bed exit alarming. Found in bathroom. Assisted back to bed and educate on continued bedrest until 1700.   Assisted with urinal for voiding.   Right groin puncture site is C,D,I without bleeding or hematoma.

## 2025-02-06 NOTE — PROGRESS NOTES
TRANSFER - IN REPORT:    Verbal report received from LYNNE Pham on Abram Castellon being received from [facility] for routine post-op      Report consisted of patient’s Situation, Background, Assessment and Recommendations(SBAR).     Information from the following report(s) Procedure Summary was reviewed with the receiving nurse.    Opportunity for questions and clarification was provided.      Assessment completed upon patient’s arrival to unit and care assumed.

## 2025-02-06 NOTE — PROGRESS NOTES
TRANSFER - OUT REPORT:    Verbal report given to LYNNE Valenzuela on Abram Castellon  being transferred to 9 for routine post-op       Report consisted of patient’s Situation, Background, Assessment and Recommendations(SBAR).     Information from the following report(s) Procedure Summary was reviewed with the receiving nurse.    Opportunity for questions and clarification was provided.      R groin closure device deployed at 1355, HOB elevated at 1455, can ambulate at 1700.

## 2025-02-06 NOTE — PROGRESS NOTES
Returned to room from cath lab. A/O X4. 3 liters O2 in place. Telemetry in place. Denies complaints, pain or SOB. Right groin site is C,D,I with no bleeding or hematoma. HOB elevated 30 degrees. Patient verbalized understanding for bedrest until 1700; bed exit alarm on. Drink and snack provided.

## 2025-02-06 NOTE — PROGRESS NOTES
TRANSFER - IN REPORT:    Verbal report received from Alysia Gong RN(name) on Abram Castellon  being received from Cath lab recovery(unit) for routine progression of patient care      Report consisted of patient’s Situation, Background, Assessment and   Recommendations(SBAR).     Information from the following report(s) Nurse Handoff Report, Index, MAR, Recent Results, and Event Log was reviewed with the receiving nurse.    Opportunity for questions and clarification was provided.      Report given to Tess Miller RN, who will resume primary care of patient on arrival back to unit.

## 2025-02-06 NOTE — PROGRESS NOTES
ACUTE OCCUPATIONAL THERAPY GOALS:   (Developed with and agreed upon by patient and/or caregiver.)  Patient will complete LB dressing tasks with independence. - MET  Patient will complete UB dressing tasks with independence - MET  Patient will complete functional mobility for household distances with supervision. - MET  Time Frame: 1 visit     OCCUPATIONAL THERAPY Initial Assessment, Daily Note, Discharge, and AM       OT Visit Days: 1   Acknowledge Orders  Time  OT Charge Capture  Rehab Caseload Tracker      Abram Castellon is a 69 y.o. male   PRIMARY DIAGNOSIS: COPD with acute exacerbation (HCC)  COPD exacerbation (HCC) [J44.1]  COPD with acute exacerbation (HCC) [J44.1]  Chest pain, unspecified type [R07.9]  Procedure(s) (LRB):  Left heart cath / coronary angiography (N/A)  Day of Surgery  Reason for Referral: Generalized Muscle Weakness (M62.81)  Inpatient: Payor: [employer] MEDICARE / Plan: [employer] GOLD PLUS HMO / Product Type: *No Product type* /     ASSESSMENT:     REHAB RECOMMENDATIONS:   Recommendation to date pending progress:  Setting:  Pulmonary rehab     Equipment:    None     ASSESSMENT:  Mr. Castellon presents for chest pain and COPD exacerbation. Alert and resting on 2L O2 ( baseline requires 2L O2 at night/day). Of note, pt lives with spouse; independent at baseline for ADLs and functional mobility.        Today, BUE are generally decreased but WFL. Independent for LB and UB dressing tasks in preparation for OOB activity. Completed functional mobility for household distances with SBA however multiple standing rest breaks required d/t SOB; Spo2 dropping to high 80s with exertion. Pacing and energy conservation strategies provided. Returned to room to sitting upright in chair with needs met. Respiratory status presents as patient's main limiting factor.  At this time, Abram Castellon is functioning close to baseline for activities of daily living and functional mobility. Will discharge from OT caseload.  balance  fair+ dynamic standing balance   Sensation [x]     Coordination [x]       Tone [x]       Edema [x]    Activity Tolerance [] Generally decreased w/ activity      Hand Dominance R [] L []      COGNITION/  PERCEPTION: INTACT IMPAIRED   (See Comments)   Orientation [x]     Vision [x]     Hearing []     Cognition  [x]     Perception [x]       MOBILITY: I Mod I S SBA CGA Min Mod Max Total  NT x2 Comments:   Bed Mobility    Rolling [] [] [] [] [] [] [] [] [] [] []    Supine to Sit [x] [] [] [] [] [] [] [] [] [] []    Scooting [x] [] [] [] [] [] [] [] [] [] []    Sit to Supine [] [] [] [] [] [] [] [] [] [] []    Transfers    Sit to Stand [x] [] [] [] [] [] [] [] [] [] []    Bed to Chair [x] [] [] [] [] [] [] [] [] [] []    Stand to Sit [x] [] [] [] [] [] [] [] [] [] []    Tub/Shower [] [] [] [] [] [] [] [] [] [] []     Toilet [] [] [] [] [] [] [] [] [] [] []      [] [] [] [] [] [] [] [] [] [] []    I=Independent, Mod I=Modified Independent, S=Supervision/Setup, SBA=Standby Assistance, CGA=Contact Guard Assistance, Min=Minimal Assistance, Mod=Moderate Assistance, Max=Maximal Assistance, Total=Total Assistance, NT=Not Tested    ACTIVITIES OF DAILY LIVING: I Mod I S SBA CGA Min Mod Max Total NT Comments   BASIC ADLs:              Upper Body Bathing  [] [] [] [] [] [] [] [] [] []     Lower Body Bathing [] [] [] [] [] [] [] [] [] []     Toileting [] [] [] [] [] [] [] [] [] []    Upper Body Dressing [x] [] [] [] [] [] [] [] [] []    Lower Body Dressing [x] [] [] [] [] [] [] [] [] []    Feeding [] [] [] [] [] [] [] [] [] []    Grooming [] [] [] [] [] [] [] [] [] []    Personal Device Care [] [] [] [] [] [] [] [] [] []    Functional Mobility [x] [] [] [] [] [] [] [] [] []    I=Independent, Mod I=Modified Independent, S=Supervision/Setup, SBA=Standby Assistance, CGA=Contact Guard Assistance, Min=Minimal Assistance, Mod=Moderate Assistance, Max=Maximal Assistance, Total=Total Assistance, NT=Not Tested    PLAN:

## 2025-02-06 NOTE — PROGRESS NOTES
Hospitalist Progress Note   Admit Date:  2025 10:15 AM   Name:  Abram Castellon   Age:  69 y.o.  Sex:  male  :  1955   MRN:  [id number]   Room:  [facility]/    Presenting/Chief Complaint: Chest Pain     Reason(s) for Admission: COPD exacerbation (HCC) [J44.1]  COPD with acute exacerbation (HCC) [J44.1]  Chest pain, unspecified type [R07.9]     Hospital Course:     Abram Castellon is a 69 y.o. male with medical history of GOLD IV COPD, chronic hypoxic respiratory failure on 2 L NC, pending zephyr endobronchial valve at [facility], CAD, afib s/p ablation and watchman, RSV 25, admitted with COPD exacerbation.       CXR-no acute issues    CTA chest no PE    RVP negative   UA pending   BC pending     S/p rocephin 1x  Placed on azithromcyin x 5 days   On IV solumedrol   Pulmonary consulted     Discharge plans pending to home      Subjective & 24hr Events:     Has intermittent midchest pain  Some GERD- this is not chest pain  Chronic shortness of breath- worse   Eats ok  Has nausea and sweats with his pain in chest   Had recent fall       Assessment & Plan:     Principal Problem:    COPD with acute exacerbation (HCC)  Plan:    Acute on chronic respiratory failure with hypoxia  Plan:   25  I have spoken with pulmonary, ok to discharge   Plan to wean solumedrol back to prednisone 10 mg - wean off per primary pulmonary instructions after followup  Plans for zephyr valve [facility]  Intolerant to MDI/ nebs at home   5 days azithromycin, EOT 25          Active Problems:    Chest pain  Plan:    Atherosclerotic heart disease of native coronary artery with other forms of angina pectoris (HCC)  Plan:     S/P coronary artery stent placement  Plan:     CAD (coronary artery disease)  Plan:   25  Cardiology consult   Asa  Lipitor           S/P ablation of atrial fibrillation  Plan:   Watchman:  25  Not anticoagulated   On betapace/ cardizem             HTN (hypertension)  Plan:   cardizem        CLL (chronic  Meds:  Current Facility-Administered Medications   Medication Dose Route Frequency    guaiFENesin (MUCINEX) extended release tablet 1,200 mg  1,200 mg Oral BID    azithromycin (ZITHROMAX) tablet 250 mg  250 mg Oral Daily    sodium chloride flush 0.9 % injection 5-40 mL  5-40 mL IntraVENous 2 times per day    sodium chloride flush 0.9 % injection 5-40 mL  5-40 mL IntraVENous PRN    0.9 % sodium chloride infusion   IntraVENous PRN    potassium chloride (KLOR-CON M) extended release tablet 40 mEq  40 mEq Oral PRN    Or    potassium bicarb-citric acid (EFFER-K) effervescent tablet 40 mEq  40 mEq Oral PRN    Or    potassium chloride 10 mEq/100 mL IVPB (Peripheral Line)  10 mEq IntraVENous PRN    potassium chloride 10 mEq/100 mL IVPB (Peripheral Line)  10 mEq IntraVENous PRN    magnesium sulfate 2000 mg in 50 mL IVPB premix  2,000 mg IntraVENous PRN    ondansetron (ZOFRAN-ODT) disintegrating tablet 4 mg  4 mg Oral Q8H PRN    Or    ondansetron (ZOFRAN) injection 4 mg  4 mg IntraVENous Q6H PRN    melatonin tablet 3 mg  3 mg Oral Nightly PRN    polyethylene glycol (GLYCOLAX) packet 17 g  17 g Oral Daily PRN    bisacodyl (DULCOLAX) suppository 10 mg  10 mg Rectal Daily PRN    famotidine (PEPCID) tablet 10 mg  10 mg Oral Daily PRN    aluminum & magnesium hydroxide-simethicone (MAALOX) 200-200-20 MG/5ML suspension 30 mL  30 mL Oral Q6H PRN    acetaminophen (TYLENOL) tablet 650 mg  650 mg Oral Q6H PRN    Or    acetaminophen (TYLENOL) suppository 650 mg  650 mg Rectal Q6H PRN    enoxaparin (LOVENOX) injection 40 mg  40 mg SubCUTAneous Daily    aspirin chewable tablet 81 mg  81 mg Oral Daily    atorvastatin (LIPITOR) tablet 40 mg  40 mg Oral Daily    dilTIAZem (CARDIZEM CD) extended release capsule 240 mg  240 mg Oral Daily    meclizine (ANTIVERT) tablet 25 mg  25 mg Oral TID PRN    pantoprazole (PROTONIX) tablet 40 mg  40 mg Oral Daily    sertraline (ZOLOFT) tablet 100 mg  100 mg Oral Daily    sotalol (BETAPACE) tablet 80 mg  80  mg Oral BID    tamsulosin (FLOMAX) capsule 0.4 mg  0.4 mg Oral Daily    levalbuterol (XOPENEX) nebulization 0.63 mg  0.63 mg Nebulization Q6H WA RT    arformoterol tartrate (BROVANA) nebulizer solution 15 mcg  15 mcg Nebulization BID RT    And    budesonide (PULMICORT) nebulizer suspension 500 mcg  0.5 mg Nebulization BID RT    And    ipratropium (ATROVENT) 0.06 % nasal spray 2 spray  2 spray Each Nostril BID    benzonatate (TESSALON) capsule 100 mg  100 mg Oral TID PRN    methylPREDNISolone sodium succ (SOLU-MEDROL) 40 mg in sterile water 1 mL injection  40 mg IntraVENous Q12H    famotidine (PEPCID) 20 mg in sodium chloride (PF) 0.9 % 10 mL injection  20 mg IntraVENous Q12H PRN       Signed:  Mónica Fish MD    Part of this note may have been written by using a voice dictation software.  The note has been proof read but may still contain some grammatical/other typographical errors.

## 2025-02-07 VITALS
TEMPERATURE: 97.6 F | RESPIRATION RATE: 20 BRPM | BODY MASS INDEX: 22.26 KG/M2 | DIASTOLIC BLOOD PRESSURE: 82 MMHG | WEIGHT: 168 LBS | OXYGEN SATURATION: 96 % | HEIGHT: 73 IN | SYSTOLIC BLOOD PRESSURE: 135 MMHG | HEART RATE: 74 BPM

## 2025-02-07 DIAGNOSIS — R35.0 URINARY FREQUENCY: ICD-10-CM

## 2025-02-07 DIAGNOSIS — R35.1 NOCTURIA: ICD-10-CM

## 2025-02-07 LAB
ANION GAP SERPL CALC-SCNC: 8 MMOL/L (ref 7–16)
BASOPHILS # BLD: 0.04 K/UL (ref 0–0.2)
BASOPHILS NFR BLD: 0.2 % (ref 0–2)
BUN SERPL-MCNC: 25 MG/DL (ref 8–23)
CALCIUM SERPL-MCNC: 9.2 MG/DL (ref 8.8–10.2)
CHLORIDE SERPL-SCNC: 104 MMOL/L (ref 98–107)
CO2 SERPL-SCNC: 28 MMOL/L (ref 20–29)
CREAT SERPL-MCNC: 0.7 MG/DL (ref 0.8–1.3)
DIFFERENTIAL METHOD BLD: ABNORMAL
EOSINOPHIL # BLD: 0 K/UL (ref 0–0.8)
EOSINOPHIL NFR BLD: 0 % (ref 0.5–7.8)
ERYTHROCYTE [DISTWIDTH] IN BLOOD BY AUTOMATED COUNT: 14.1 % (ref 11.9–14.6)
GLUCOSE SERPL-MCNC: 151 MG/DL (ref 70–99)
HCT VFR BLD AUTO: 39.3 % (ref 41.1–50.3)
HGB BLD-MCNC: 12.4 G/DL (ref 13.6–17.2)
IMM GRANULOCYTES # BLD AUTO: 0.13 K/UL (ref 0–0.5)
IMM GRANULOCYTES NFR BLD AUTO: 0.7 % (ref 0–5)
LYMPHOCYTES # BLD: 6.59 K/UL (ref 0.5–4.6)
LYMPHOCYTES NFR BLD: 35.6 % (ref 13–44)
MCH RBC QN AUTO: 28.6 PG (ref 26.1–32.9)
MCHC RBC AUTO-ENTMCNC: 31.6 G/DL (ref 31.4–35)
MCV RBC AUTO: 90.6 FL (ref 82–102)
MONOCYTES # BLD: 0.31 K/UL (ref 0.1–1.3)
MONOCYTES NFR BLD: 1.7 % (ref 4–12)
NEUTS SEG # BLD: 11.43 K/UL (ref 1.7–8.2)
NEUTS SEG NFR BLD: 61.8 % (ref 43–78)
NRBC # BLD: 0.02 K/UL (ref 0–0.2)
PLATELET # BLD AUTO: 170 K/UL (ref 150–450)
PLATELET COMMENT: ADEQUATE
PMV BLD AUTO: 10.1 FL (ref 9.4–12.3)
POTASSIUM SERPL-SCNC: 4.7 MMOL/L (ref 3.5–5.1)
RBC # BLD AUTO: 4.34 M/UL (ref 4.23–5.6)
RBC MORPH BLD: ABNORMAL
SODIUM SERPL-SCNC: 140 MMOL/L (ref 136–145)
WBC # BLD AUTO: 18.5 K/UL (ref 4.3–11.1)
WBC MORPH BLD: ABNORMAL

## 2025-02-07 PROCEDURE — 6370000000 HC RX 637 (ALT 250 FOR IP): Performed by: INTERNAL MEDICINE

## 2025-02-07 PROCEDURE — 6370000000 HC RX 637 (ALT 250 FOR IP): Performed by: NURSE PRACTITIONER

## 2025-02-07 PROCEDURE — 36415 COLL VENOUS BLD VENIPUNCTURE: CPT

## 2025-02-07 PROCEDURE — 2700000000 HC OXYGEN THERAPY PER DAY

## 2025-02-07 PROCEDURE — 6360000002 HC RX W HCPCS: Performed by: INTERNAL MEDICINE

## 2025-02-07 PROCEDURE — 6370000000 HC RX 637 (ALT 250 FOR IP): Performed by: PHYSICIAN ASSISTANT

## 2025-02-07 PROCEDURE — 94640 AIRWAY INHALATION TREATMENT: CPT

## 2025-02-07 PROCEDURE — 2500000003 HC RX 250 WO HCPCS: Performed by: INTERNAL MEDICINE

## 2025-02-07 PROCEDURE — 99232 SBSQ HOSP IP/OBS MODERATE 35: CPT | Performed by: INTERNAL MEDICINE

## 2025-02-07 PROCEDURE — 80048 BASIC METABOLIC PNL TOTAL CA: CPT

## 2025-02-07 PROCEDURE — 85025 COMPLETE CBC W/AUTO DIFF WBC: CPT

## 2025-02-07 RX ORDER — TAMSULOSIN HYDROCHLORIDE 0.4 MG/1
0.4 CAPSULE ORAL DAILY
Qty: 90 CAPSULE | Refills: 0 | Status: SHIPPED | OUTPATIENT
Start: 2025-02-07 | End: 2025-02-10 | Stop reason: SDUPTHER

## 2025-02-07 RX ORDER — AZITHROMYCIN 250 MG/1
250 TABLET, FILM COATED ORAL DAILY
Qty: 2 TABLET | Refills: 0 | Status: SHIPPED | OUTPATIENT
Start: 2025-02-08 | End: 2025-02-10

## 2025-02-07 RX ORDER — PREDNISONE 20 MG/1
20 TABLET ORAL DAILY
Status: DISCONTINUED | OUTPATIENT
Start: 2025-02-07 | End: 2025-02-07 | Stop reason: HOSPADM

## 2025-02-07 RX ORDER — TAMSULOSIN HYDROCHLORIDE 0.4 MG/1
CAPSULE ORAL
Qty: 90 CAPSULE | Refills: 0 | OUTPATIENT
Start: 2025-02-07

## 2025-02-07 RX ORDER — BENZONATATE 100 MG/1
100 CAPSULE ORAL 3 TIMES DAILY PRN
Qty: 21 CAPSULE | Refills: 0 | Status: SHIPPED | OUTPATIENT
Start: 2025-02-07 | End: 2025-02-14

## 2025-02-07 RX ORDER — GUAIFENESIN 600 MG/1
1200 TABLET, EXTENDED RELEASE ORAL 2 TIMES DAILY
COMMUNITY
Start: 2025-02-07

## 2025-02-07 RX ADMIN — GUAIFENESIN 1200 MG: 600 TABLET ORAL at 09:03

## 2025-02-07 RX ADMIN — PANTOPRAZOLE SODIUM 40 MG: 40 TABLET, DELAYED RELEASE ORAL at 09:03

## 2025-02-07 RX ADMIN — TAMSULOSIN HYDROCHLORIDE 0.4 MG: 0.4 CAPSULE ORAL at 09:03

## 2025-02-07 RX ADMIN — WATER 40 MG: 1 INJECTION INTRAMUSCULAR; INTRAVENOUS; SUBCUTANEOUS at 01:08

## 2025-02-07 RX ADMIN — SOTALOL HYDROCHLORIDE 80 MG: 80 TABLET ORAL at 09:03

## 2025-02-07 RX ADMIN — SODIUM CHLORIDE, PRESERVATIVE FREE 10 ML: 5 INJECTION INTRAVENOUS at 09:06

## 2025-02-07 RX ADMIN — BUDESONIDE INHALATION 500 MCG: 0.5 SUSPENSION RESPIRATORY (INHALATION) at 07:48

## 2025-02-07 RX ADMIN — DILTIAZEM HYDROCHLORIDE 240 MG: 120 CAPSULE, EXTENDED RELEASE ORAL at 09:03

## 2025-02-07 RX ADMIN — LEVALBUTEROL HYDROCHLORIDE 0.63 MG: 0.63 SOLUTION RESPIRATORY (INHALATION) at 07:48

## 2025-02-07 RX ADMIN — ASPIRIN 81 MG: 81 TABLET, CHEWABLE ORAL at 09:03

## 2025-02-07 RX ADMIN — ARFORMOTEROL TARTRATE 15 MCG: 15 SOLUTION RESPIRATORY (INHALATION) at 07:48

## 2025-02-07 RX ADMIN — ATORVASTATIN CALCIUM 40 MG: 40 TABLET, FILM COATED ORAL at 09:03

## 2025-02-07 RX ADMIN — PREDNISONE 20 MG: 20 TABLET ORAL at 09:05

## 2025-02-07 RX ADMIN — AZITHROMYCIN DIHYDRATE 250 MG: 250 TABLET ORAL at 09:03

## 2025-02-07 RX ADMIN — SERTRALINE HYDROCHLORIDE 100 MG: 100 TABLET ORAL at 09:03

## 2025-02-07 ASSESSMENT — PAIN SCALES - GENERAL: PAINLEVEL_OUTOF10: 0

## 2025-02-07 NOTE — DISCHARGE SUMMARY
Hospitalist Discharge Summary   Admit Date:  2025 10:15 AM   DC Note date: 2025  Name:  Abram Castellon   Age:  69 y.o.  Sex:  male  :  1955   MRN:  [id number]   Room:  [facility]  PCP:  Jose Luis Crane DO    Presenting Complaint: Chest Pain     Initial Admission Diagnosis: COPD exacerbation (HCC) [J44.1]  COPD with acute exacerbation (HCC) [J44.1]  Chest pain, unspecified type [R07.9]     Problem List for this Hospitalization (present on admission):    Principal Problem:    COPD with acute exacerbation (HCC)  Active Problems:    Chest pain    COPD, very severe (HCC)    S/P ablation of atrial fibrillation    HTN (hypertension)    CLL (chronic lymphocytic leukemia) (HCC)    Atherosclerotic heart disease of native coronary artery with other forms of angina pectoris (HCC)    S/P coronary artery stent placement    CAD (coronary artery disease)    Mixed hyperlipidemia    GERD (gastroesophageal reflux disease)    Recurrent major depressive disorder, in partial remission (HCC)    Acute on chronic respiratory failure with hypoxia    COPD exacerbation (HCC)  Resolved Problems:    * No resolved hospital problems. *      Hospital Course:    Abram Castellon is a 69 y.o. male with medical history of GOLD IV COPD, chronic hypoxic respiratory failure on 2 L NC, pending zephyr endobronchial valve at [facility], CAD, afib s/p ablation and watchman, RSV 25, admitted with COPD exacerbation.         CXR-no acute issues     CTA chest no PE     RVP negative   UA pending   BC no growth to date      S/p rocephin 1x  Placed on azithromcyin x 5 days   On IV solumedrol to return to prednisone 20 mg daily - his home dose  Pulmonary consulted and agrees with management       He did have midsternal chest pain that was evaluated by cardiology and s/p left heart cath 25, no PCI or stents needed      Discharge plans  to home           Disposition: Home  Diet: ADULT DIET; Regular  Code Status: Full Code    Follow Ups:  Follow-up     MPV 10.1 9.4 - 12.3 FL    nRBC 0.02 0.0 - 0.2 K/uL    Neutrophils % 61.8 43.0 - 78.0 %    Lymphocytes % 35.6 13.0 - 44.0 %    Monocytes % 1.7 (L) 4.0 - 12.0 %    Eosinophils % 0.0 (L) 0.5 - 7.8 %    Basophils % 0.2 0.0 - 2.0 %    Immature Granulocytes % 0.7 0.0 - 5.0 %    Neutrophils Absolute 11.43 (H) 1.70 - 8.20 K/UL    Lymphocytes Absolute 6.59 (H) 0.50 - 4.60 K/UL    Monocytes Absolute 0.31 0.10 - 1.30 K/UL    Eosinophils Absolute 0.00 0.00 - 0.80 K/UL    Basophils Absolute 0.04 0.00 - 0.20 K/UL    Immature Granulocytes Absolute 0.13 0.0 - 0.5 K/UL    RBC Comment NORMOCYTIC/NORMOCHROMIC      WBC Comment Result Confirmed By Smear      Platelet Comment ADEQUATE      Differential Type AUTOMATED         Recent Labs     02/05/25  1452   COVID19 NOT DETECTED       Recent Vital Data:  Patient Vitals for the past 24 hrs:   Temp Pulse Resp BP SpO2   02/07/25 0755 97.6 °F (36.4 °C) 74 20 135/82 96 %   02/07/25 0748 -- 70 16 -- 98 %   02/07/25 0331 97.3 °F (36.3 °C) 67 17 (!) 113/59 99 %   02/06/25 2307 97.7 °F (36.5 °C) 83 17 (!) 135/57 93 %   02/06/25 2035 -- 94 18 -- 97 %   02/06/25 1931 97.9 °F (36.6 °C) 90 18 138/83 95 %   02/06/25 1525 97.3 °F (36.3 °C) 79 20 (!) 152/84 98 %   02/06/25 1500 -- 81 20 -- 98 %   02/06/25 1445 -- 76 19 121/79 98 %   02/06/25 1430 -- 76 22 (!) 119/92 98 %   02/06/25 1415 -- 72 23 -- 98 %   02/06/25 1400 -- 74 18 125/68 98 %   02/06/25 1144 97.9 °F (36.6 °C) 75 20 116/81 96 %       Oxygen Therapy  SpO2: 96 %  Pulse Oximetry Type: Continuous  Pulse via Oximetry: 81 beats per minute  Pulse Oximeter Device Mode: Intermittent  Pulse Oximeter Device Location: Finger  O2 Device: Nasal cannula  Skin Assessment: Clean, dry, & intact  Skin Protection for O2 Device: No  O2 Flow Rate (L/min): 3 L/min  Oxygen Therapy: Supplemental oxygen    Estimated body mass index is 22.16 kg/m² as calculated from the following:    Height as of this encounter: 1.854 m (6' 1\").    Weight as of this encounter: 76.2  kg (168 lb).    Intake/Output Summary (Last 24 hours) at 2/7/2025 1035  Last data filed at 2/6/2025 1851  Gross per 24 hour   Intake 130 ml   Output 305 ml   Net -175 ml         Physical Exam:    General:    Well nourished.  No overt distress  CV:   RRR.  No m/r/g.  No JVD, no edema   Lungs:   CTAB.  No wheezing, rhonchi, or rales.  Respirations even, unlabored  Abdomen:   Soft, nontender, nondistended.    Extremities: Warm and dry.   No edema.    Skin:     No rashes.  Normal coloration  Neuro:  grossly intact.  Psych:  Normal mood and affect.        Time spent in patient discharge and coordination 35  minutes.      Signed:  Mónica Fish MD    Part of this note may have been written by using a voice dictation software.  The note has been proof read but may still contain some grammatical/other typographical errors.

## 2025-02-07 NOTE — CARE COORDINATION
MSN, JOAQUÍN:  patient to be discharged home today with continued outpatient pulmonary rehab.  Patient and family agree with this discharge plan.  Patient has met all milestones for this admission.  Family to transport patient home.       02/07/25 0954   Service Assessment   Patient Orientation Alert and Oriented   Cognition Alert   Services At/After Discharge   Transition of Care Consult (CM Consult) Other  (out patient pulmonary rehab)   Services At/After Discharge None   Mode of Transport at Discharge Other (see comment)  (family to transport)   Confirm Follow Up Transport Self   Condition of Participation: Discharge Planning   The Plan for Transition of Care is related to the following treatment goals: Outpatient pulmonary rehab   The Patient and/or Patient Representative was provided with a Choice of Provider? Patient   The Patient and/Or Patient Representative agree with the Discharge Plan? Yes   Freedom of Choice list was provided with basic dialogue that supports the patient's individualized plan of care/goals, treatment preferences, and shares the quality data associated with the providers?  Yes

## 2025-02-07 NOTE — DISCHARGE INSTRUCTIONS
DISCHARGE SUMMARY from Nurse    PATIENT INSTRUCTIONS:    What to do at Home:  Recommended activity: activity as tolerated,       *  Please give a list of your current medications to your Primary Care Provider.    *  Please update this list whenever your medications are discontinued, doses are      changed, or new medications (including over-the-counter products) are added.    *  Please carry medication information at all times in case of emergency situations.    These are general instructions for a healthy lifestyle:    No smoking/ No tobacco products/ Avoid exposure to second hand smoke  Surgeon General's Warning:  Quitting smoking now greatly reduces serious risk to your health.    Obesity, smoking, and sedentary lifestyle greatly increases your risk for illness    A healthy diet, regular physical exercise & weight monitoring are important for maintaining a healthy lifestyle    You may be retaining fluid if you have a history of heart failure or if you experience any of the following symptoms:  Weight gain of 3 pounds or more overnight or 5 pounds in a week, increased swelling in our hands or feet or shortness of breath while lying flat in bed.  Please call your doctor as soon as you notice any of these symptoms; do not wait until your next office visit.         Chronic Obstructive Pulmonary Disease (COPD) Flare-Ups: Care Instructions    Overview  Chronic obstructive pulmonary disease (COPD) is a lung disease that makes it hard to breathe. It is caused by damage to the lungs over many years, usually from smoking.  Sometimes your symptoms may get worse over a short time and stay bad. This is called a COPD exacerbation (say \"td-CFC-dh-BAY-shun\") or flare-up. A flare-up can be dangerous, so it's important to know what to do and take action. Your doctor can help you make a plan to manage flare-ups.  Symptoms of a flare-up include:  More shortness of breath than usual.  Coughing more than usual.  A change in the  disease and help you feel better. If you need help quitting, talk to your doctor about stop-smoking programs and medicines. These can increase your chances of quitting for good.  Try to avoid infections such as COVID-19, colds, and the flu. Wash your hands often. You may want to wear a mask when you go to public indoor spaces. Try to avoid sick people.  Stay up to date on vaccines. This includes getting a flu vaccine every year.  Try to avoid things that could make your symptoms worse. These include chemical fumes, factory dust, soot, and air pollution. Talk to your doctor about ways to protect yourself if you are exposed to substances that irritate your lungs at home or at work.  Take care of your teeth and gums. Get regular dental checkups. This can help you stay healthy.  When should you call for help?    Call 911 anytime you think you may need emergency care. For example, call if:  You have severe trouble breathing.  You have severe chest pain, or chest pain is quickly getting worse.  Call your doctor now or seek immediate medical care if:  You have new or worse trouble breathing.  Your coughing or wheezing gets worse.  You cough up dark brown or bloody mucus (sputum).  You have a new or higher fever.  Watch closely for changes in your health, and be sure to contact your doctor if:  You notice more mucus or a change in the color of your mucus.  You need to use your antibiotic or steroid pills.  You do not get better as expected.  Where can you learn more?  Go to https://www.[employer].net/patientEd and enter D989 to learn more about \"Chronic Obstructive Pulmonary Disease (COPD) Flare-Ups: Care Instructions.\"  Current as of: September 25, 2023  Content Version: 14.3  © 2024 [employer].   Care instructions adapted under license by [employer]. If you have questions about a medical condition or this instruction, always ask your healthcare professional. [employer], disclaims any warranty or

## 2025-02-07 NOTE — PROGRESS NOTES
Daily Progress Note: 2/7/2025    Abram Castellon  Admission Date: 2/5/2025     Length of Stay: 2 days      Background:Patient is a 69 y.o.  male seen and evaluated at the request of Dr. Fish for COPD exacerbation. Pt is known to [facility] with a history of GOLD stage IV COPD, chronic respiratory failure on 2L O2, chronic steroid use of 20mg daily, pulmonary nodules, and prior history of tobacco abuse (last seen 9/23/24). Pt was supposed to wean off of prednisone at that time. He had also been seen 10/21/24 by Dr. Echeverria, Interventional Pulmonary and Critical Care at [facility], for evaluation of endobronchial valve. It was recommended at that time that he be considered for dupixent for eosinophilia.   Pt was diagnosed with RSV on 1/13 by his PCP and was treated with abx/steroids. Pt presented to the ER on 2/5 with persistent dyspnea, cough, and weakness. He had leukocytosis with WBC 18.7. Procalcitonin 0.06, LA 1.5. CXR revealed chronic changes. Chest CTA was negative for PE. There were no acute findings with only chronic emphysema and linear scarring. Pt was admitted for COPD exacerbation. We were consulted to assist.   Pt is on 3L O2. Pt reports that he never really improved from RSV. Pt reports that he is on 2L O2 at home. He did have 6MWT through [facility] 10/16/24 and required 3L with exertion. Pt has been participating in pulmonary rehab through [facility] but has missed several weeks due to having RSV. Pt has to complete pulmonary rehab before being scheduled for [place] valve placement by [facility].     Subjective:     C yesterday- mild disease and no PCI. On 3 lpm and on 2 lpm daily  No wheezing. On home oxygen    Review of Systems  Constitutional: negative for fever, chills, sweats  Cardiovascular: negative for chest pain, palpitations, syncope, edema  Gastrointestinal:  negative for dysphagia, reflux, vomiting, diarrhea, abdominal pain, or melena  Neurologic:  negative for focal  (BETAPACE) tablet 80 mg  80 mg Oral BID    tamsulosin (FLOMAX) capsule 0.4 mg  0.4 mg Oral Daily    levalbuterol (XOPENEX) nebulization 0.63 mg  0.63 mg Nebulization Q6H WA RT    arformoterol tartrate (BROVANA) nebulizer solution 15 mcg  15 mcg Nebulization BID RT    And    budesonide (PULMICORT) nebulizer suspension 500 mcg  0.5 mg Nebulization BID RT    And    ipratropium (ATROVENT) 0.06 % nasal spray 2 spray  2 spray Each Nostril BID    benzonatate (TESSALON) capsule 100 mg  100 mg Oral TID PRN    methylPREDNISolone sodium succ (SOLU-MEDROL) 40 mg in sterile water 1 mL injection  40 mg IntraVENous Q12H    famotidine (PEPCID) 20 mg in sodium chloride (PF) 0.9 % 10 mL injection  20 mg IntraVENous Q12H PRN     Objective:     Vitals:    02/06/25 2035 02/06/25 2307 02/07/25 0331 02/07/25 0748   BP:  (!) 135/57 (!) 113/59    Pulse: 94 83 67 70   Resp: 18 17 17 16   Temp:  97.7 °F (36.5 °C) 97.3 °F (36.3 °C)    TempSrc:  Oral Axillary    SpO2: 97% 93% 99% 98%   Weight:       Height:         Intake/Output:    Physical Exam:          GEN: well developed and in no acute distress  HEENT:no alar flaring or epistaxis, oral mucosa moist without cyanosis,   NECK:  no JVD, no retractions, no thyromegaly or masses,   LUNGS:  no wheezes on NC at 2-3 lpm  HEART:  RRR with no M,G,R;  ABDOMEN:  soft with no tenderness; positive bowel sounds present  EXTREMITIES:  warm with no cyanosis, no lower leg edema  SKIN:  no jaundice or ecchymosis   NEURO: A & O X 3    ACTIVITY: ambulates  NUTRITION: regular    IMAGES:  CXR:      CT Chest:      10/16/24             Interpretation:     Flow volume loops:   The flow volume loop is suggestive of an obstructive ventilatory defect.     Spirometry:   Spirometry indicates severe obstruction.     Lung volumes:   Increased total lung capacity suggests hyperinflation.   Increased RV/TLC suggests hyperinflation.   Increased residual volume suggests air trapping.     Diffusing capacity:   Diffusing

## 2025-02-07 NOTE — PLAN OF CARE
Problem: Discharge Planning  Goal: Discharge to home or other facility with appropriate resources  Outcome: Adequate for Discharge     Problem: Pain  Goal: Verbalizes/displays adequate comfort level or baseline comfort level  Outcome: Adequate for Discharge     Problem: Safety - Adult  Goal: Free from fall injury  Outcome: Adequate for Discharge  Flowsheets (Taken 2/6/2025 2035 by Cierra Pak, RN)  Free From Fall Injury: Instruct family/caregiver on patient safety     Problem: Respiratory - Adult  Goal: Achieves optimal ventilation and oxygenation  2/7/2025 1017 by Tess Miller, RN  Outcome: Adequate for Discharge  2/7/2025 0824 by Karlee Langford RCP  Outcome: Progressing

## 2025-02-07 NOTE — PROGRESS NOTES
Discharge per order. IV removed. Telemetry removed and returned. Has all personal belongings with him, including cell phone and . Verbalized understanding for d/c instructions including prescription use and follow ups with PCP, Pulmonary, and Cardiology. Wife will transport home in personal car with portable O2.

## 2025-02-09 LAB
BACTERIA SPEC CULT: NORMAL
BACTERIA SPEC CULT: NORMAL
SERVICE CMNT-IMP: NORMAL
SERVICE CMNT-IMP: NORMAL

## 2025-02-10 ENCOUNTER — CARE COORDINATION (OUTPATIENT)
Dept: CARE COORDINATION | Facility: CLINIC | Age: 70
End: 2025-02-10

## 2025-02-10 ENCOUNTER — PATIENT MESSAGE (OUTPATIENT)
Dept: INTERNAL MEDICINE CLINIC | Facility: CLINIC | Age: 70
End: 2025-02-10

## 2025-02-10 DIAGNOSIS — R35.1 NOCTURIA: ICD-10-CM

## 2025-02-10 DIAGNOSIS — R35.0 URINARY FREQUENCY: ICD-10-CM

## 2025-02-10 NOTE — CARE COORDINATION
Care Transitions Note    Initial Call - Call within 2 business days of discharge: Yes    Attempted to reach patient, spouse/partner  for transitions of care follow up. Unable to reach patient.    Outreach Attempts:   Multiple attempts to contact patient, spouse/partner  at phone numbers on file.   HIPAA compliant voicemail left for patient.     Patient: Abram Castellon   Patient : 1955   MRN: [id number]    Reason for Admission: COPD exacerbation   Discharge Date: 25  RURS: Readmission Risk Score: 12.3    Last Discharge Facility       Date Complaint Diagnosis Description Type Department Provider    25 Chest Pain COPD exacerbation (HCC) ... ED to Hosp-Admission (Discharged) (ADMITTED) SFD8MS Mónica Fish MD; Misty,...            Was this an external facility discharge? No    Follow Up Appointment:   Patient has hospital follow up appointment scheduled within 7 days of discharge.    Future Appointments         Provider Specialty Dept Phone    2025 3:15 PM Quique Andrade MD Cardiology [phone]    2025 2:00 PM Jose Luis Crane,  Internal Medicine [phone]    3/24/2025 2:15 PM (Arrive by 2:00 PM) Roselia Rizzo, APRN - [facility] Pulmonology [phone]    3/25/2025 2:15 PM Quique Andrade MD Cardiology [phone]            Plan for follow-up on next business day.      Penny Parmar RN

## 2025-02-11 ENCOUNTER — CARE COORDINATION (OUTPATIENT)
Dept: CARE COORDINATION | Facility: CLINIC | Age: 70
End: 2025-02-11

## 2025-02-11 RX ORDER — TAMSULOSIN HYDROCHLORIDE 0.4 MG/1
0.4 CAPSULE ORAL DAILY
Qty: 90 CAPSULE | Refills: 3 | Status: SHIPPED | OUTPATIENT
Start: 2025-02-11

## 2025-02-11 NOTE — CARE COORDINATION
Care Transitions Note    Initial Call - Call within 2 business days of discharge: Yes    Attempted to reach patient, spouse/partner  for transitions of care follow up. Unable to reach patient.    Outreach Attempts:   Multiple attempts to contact patient at phone numbers on file.   HIPAA compliant voicemail left for patient.   Amino Appshart message sent.     Patient: Abram Castellon  Patient : 1955   MRN: [id number]    Reason for Admission: COPD exacerbation   Discharge Date: 25  RURS: Readmission Risk Score: 12.3    Last Discharge Facility       Date Complaint Diagnosis Description Type Department Provider    25 Chest Pain COPD exacerbation (HCC) ... ED to Hosp-Admission (Discharged) (ADMITTED) SFD8MS Mónica Fish MD; Misty,...            Was this an external facility discharge? No    Follow Up Appointment:   Patient has hospital follow up appointment scheduled within 7 days of discharge.    Future Appointments         Provider Specialty Dept Phone    2025 3:15 PM Quique Andrade MD Cardiology [phone]    2025 11:15 AM (Arrive by 11:00 AM) Roselia Rizzo APRN - [facility] Pulmonology [phone]    2025 2:00 PM Jose Luis Crane,  Internal Medicine [phone]    3/24/2025 2:15 PM (Arrive by 2:00 PM) Roselia Rizzo APRN - [facility] Pulmonology [phone]    3/25/2025 2:15 PM Quique Andrade MD Cardiology [phone]        Penny Parmar RN

## 2025-02-12 ENCOUNTER — OFFICE VISIT (OUTPATIENT)
Age: 70
End: 2025-02-12
Payer: MEDICARE

## 2025-02-12 VITALS
HEIGHT: 73 IN | DIASTOLIC BLOOD PRESSURE: 68 MMHG | BODY MASS INDEX: 22 KG/M2 | HEART RATE: 60 BPM | WEIGHT: 166 LBS | SYSTOLIC BLOOD PRESSURE: 124 MMHG

## 2025-02-12 DIAGNOSIS — E78.5 DYSLIPIDEMIA: ICD-10-CM

## 2025-02-12 DIAGNOSIS — I25.118 ATHEROSCLEROTIC HEART DISEASE OF NATIVE CORONARY ARTERY WITH OTHER FORMS OF ANGINA PECTORIS (HCC): ICD-10-CM

## 2025-02-12 DIAGNOSIS — I10 PRIMARY HYPERTENSION: ICD-10-CM

## 2025-02-12 DIAGNOSIS — I48.21 PERMANENT ATRIAL FIBRILLATION (HCC): ICD-10-CM

## 2025-02-12 DIAGNOSIS — J44.9 COPD, VERY SEVERE (HCC): Primary | ICD-10-CM

## 2025-02-12 PROCEDURE — G8427 DOCREV CUR MEDS BY ELIG CLIN: HCPCS | Performed by: INTERNAL MEDICINE

## 2025-02-12 PROCEDURE — 3078F DIAST BP <80 MM HG: CPT | Performed by: INTERNAL MEDICINE

## 2025-02-12 PROCEDURE — 1111F DSCHRG MED/CURRENT MED MERGE: CPT | Performed by: INTERNAL MEDICINE

## 2025-02-12 PROCEDURE — G8420 CALC BMI NORM PARAMETERS: HCPCS | Performed by: INTERNAL MEDICINE

## 2025-02-12 PROCEDURE — 1126F AMNT PAIN NOTED NONE PRSNT: CPT | Performed by: INTERNAL MEDICINE

## 2025-02-12 PROCEDURE — 1036F TOBACCO NON-USER: CPT | Performed by: INTERNAL MEDICINE

## 2025-02-12 PROCEDURE — 1123F ACP DISCUSS/DSCN MKR DOCD: CPT | Performed by: INTERNAL MEDICINE

## 2025-02-12 PROCEDURE — 99214 OFFICE O/P EST MOD 30 MIN: CPT | Performed by: INTERNAL MEDICINE

## 2025-02-12 PROCEDURE — 1159F MED LIST DOCD IN RCRD: CPT | Performed by: INTERNAL MEDICINE

## 2025-02-12 PROCEDURE — 3023F SPIROM DOC REV: CPT | Performed by: INTERNAL MEDICINE

## 2025-02-12 PROCEDURE — 3017F COLORECTAL CA SCREEN DOC REV: CPT | Performed by: INTERNAL MEDICINE

## 2025-02-12 PROCEDURE — 3074F SYST BP LT 130 MM HG: CPT | Performed by: INTERNAL MEDICINE

## 2025-02-12 RX ORDER — SOTALOL HYDROCHLORIDE 80 MG/1
80 TABLET ORAL 2 TIMES DAILY
Qty: 180 TABLET | Refills: 3 | Status: SHIPPED | OUTPATIENT
Start: 2025-02-12

## 2025-02-12 RX ORDER — ATORVASTATIN CALCIUM 40 MG/1
40 TABLET, FILM COATED ORAL DAILY
Qty: 90 TABLET | Refills: 3 | Status: SHIPPED | OUTPATIENT
Start: 2025-02-12

## 2025-02-12 NOTE — PROGRESS NOTES
[facility] CARDIOLOGY  [address] [place] [address], [place] [address]  PHONE: [phone]      25    NAME:  Abram Castellon  : 1955  MRN: [id number]       SUBJECTIVE:   Abram Castellon is a 69 y.o. male seen for a follow up visit regarding the following:     Chief Complaint   Patient presents with    Coronary Artery Disease    Atrial Fibrillation         HPI:  GOFF persists and at baseline  No cp. No orthopnea or pnd. No palpitations or syncope.  SOB has improved since dc.    Past Medical History, Past Surgical History, Family history, Social History, and Medications were all reviewed with the patient today and updated as necessary.     Current Outpatient Medications   Medication Sig Dispense Refill    atorvastatin (LIPITOR) 40 MG tablet Take 1 tablet by mouth daily 90 tablet 3    sotalol (BETAPACE) 80 MG tablet Take 1 tablet by mouth 2 times daily 180 tablet 3    tamsulosin (FLOMAX) 0.4 MG capsule Take 1 capsule by mouth daily 90 capsule 3    guaiFENesin (MUCINEX) 600 MG extended release tablet Take 2 tablets by mouth 2 times daily      predniSONE (DELTASONE) 20 MG tablet Take 1 tablet by mouth daily 30 tablet 3    dilTIAZem (CARDIZEM CD) 240 MG extended release capsule Take 1 capsule by mouth daily 90 capsule 3    tiZANidine (ZANAFLEX) 4 MG tablet Take 1 tablet by mouth every 8 hours as needed (Muscle spasm) 45 tablet 2    sertraline (ZOLOFT) 100 MG tablet TAKE 1 TABLET EVERY DAY 90 tablet 3    meclizine (ANTIVERT) 25 MG tablet TAKE 1 TABLET THREE TIMES DAILY AS NEEDED FOR DIZZINESS 90 tablet 11    albuterol sulfate HFA (PROVENTIL;VENTOLIN;PROAIR) 108 (90 Base) MCG/ACT inhaler Inhale 2 puffs into the lungs every 4 hours as needed for Wheezing or Shortness of Breath 18 g 11    pantoprazole (PROTONIX) 40 MG tablet TAKE 1 TABLET EVERY DAY 90 tablet 3    ipratropium 0.5 mg-albuterol 2.5 mg (DUONEB) 0.5-2.5 (3) MG/3ML SOLN nebulizer solution INHALE THE CONTENTS OF 1 VIAL VIA NEBULIZER FOUR TIMES DAILY

## 2025-02-20 NOTE — PROGRESS NOTES
Physician Progress Note      PATIENT:               LORETO HUIZAR  CSN #:                  [id number]  :                       1955  ADMIT DATE:       2025 10:15 AM  DISCH DATE:        2025 9:44 AM  RESPONDING  PROVIDER #:        Mónica Fish MD          QUERY TEXT:    Patient admitted with COPD exacerbation.  H&P states \"Chronic Respiratory   Failure with Hypoxia\".  Cardiology states \"Acute on chronic respiratory   failure with hypoxia\".  IM states \"Acute on chronic respiratory failure   with hypoxia\".  DC Summary states \"Acute on chronic respiratory failure   with hypoxia\".  Care Coordination states \"home oxygen at 2L NC\". Vitals HR   124, RR 23, 94% on 2L, 96% on 3L. No ABGs. In order to support the diagnosis   of acute respiratory failure, please include additional clinical indicators in   your documentation.  Or please document if the diagnosis of acute respiratory   failure has been ruled out after further study.    The medical record reflects the following:  Risk Factors: 68yo, COPD, chronic resp failure, on home oxygen  Clinical Indicators:  H&P states \"Chronic Respiratory Failure with   Hypoxia\".  Cardiology states \"Acute on chronic respiratory failure with   hypoxia\".  IM states \"Acute on chronic respiratory failure with hypoxia\".    DC Summary states \"Acute on chronic respiratory failure with hypoxia\".    Care Coordination states \"home oxygen at 2L NC\". Vitals , RR 23, 94% on   2L, 96% on 3L. No ABGs.  Treatment: labs, imaging, oxygen, cardiology consult, Brovana, Pulmicort, PO   mucinex, duonebs, IV solu medrol, IV antibiotics, PO deltasone, tele    Thank you,  Maria Del Carmen Pritchard ([facility]), RN BSN  Clinical   Options provided:  -- Acute Respiratory Failure as evidenced by, Please document evidence.  -- Acute Respiratory Failure ruled out after study  -- Acute Respiratory Failure ruled out after study and Chronic Respiratory

## 2025-02-24 ENCOUNTER — OFFICE VISIT (OUTPATIENT)
Dept: INTERNAL MEDICINE CLINIC | Facility: CLINIC | Age: 70
End: 2025-02-24

## 2025-02-24 VITALS
BODY MASS INDEX: 22 KG/M2 | WEIGHT: 166 LBS | HEART RATE: 94 BPM | HEIGHT: 73 IN | DIASTOLIC BLOOD PRESSURE: 74 MMHG | SYSTOLIC BLOOD PRESSURE: 128 MMHG | OXYGEN SATURATION: 93 %

## 2025-02-24 DIAGNOSIS — J96.11 CHRONIC RESPIRATORY FAILURE WITH HYPOXIA (HCC): ICD-10-CM

## 2025-02-24 DIAGNOSIS — D64.9 ANEMIA, UNSPECIFIED TYPE: ICD-10-CM

## 2025-02-24 DIAGNOSIS — R07.89 CHRONIC CHEST WALL PAIN: ICD-10-CM

## 2025-02-24 DIAGNOSIS — G89.29 CHRONIC CHEST WALL PAIN: ICD-10-CM

## 2025-02-24 DIAGNOSIS — Z00.00 MEDICARE ANNUAL WELLNESS VISIT, SUBSEQUENT: Primary | ICD-10-CM

## 2025-02-24 DIAGNOSIS — R73.9 HYPERGLYCEMIA: ICD-10-CM

## 2025-02-24 DIAGNOSIS — H61.23 BILATERAL IMPACTED CERUMEN: ICD-10-CM

## 2025-02-24 PROBLEM — J96.21 ACUTE ON CHRONIC RESPIRATORY FAILURE WITH HYPOXIA (HCC): Status: RESOLVED | Noted: 2025-02-06 | Resolved: 2025-02-24

## 2025-02-24 PROBLEM — J44.1 COPD WITH ACUTE EXACERBATION (HCC): Status: RESOLVED | Noted: 2025-02-05 | Resolved: 2025-02-24

## 2025-02-24 PROBLEM — J44.1 COPD EXACERBATION (HCC): Status: RESOLVED | Noted: 2025-02-06 | Resolved: 2025-02-24

## 2025-02-24 LAB
ERYTHROCYTE [DISTWIDTH] IN BLOOD BY AUTOMATED COUNT: 14.7 % (ref 11.9–14.6)
EST. AVERAGE GLUCOSE BLD GHB EST-MCNC: 124 MG/DL
HBA1C MFR BLD: 6 % (ref 0–5.6)
HCT VFR BLD AUTO: 40.5 % (ref 41.1–50.3)
HGB BLD-MCNC: 13 G/DL (ref 13.6–17.2)
IRON SATN MFR SERPL: 11 % (ref 20–50)
IRON SERPL-MCNC: 34 UG/DL (ref 35–100)
MCH RBC QN AUTO: 28.3 PG (ref 26.1–32.9)
MCHC RBC AUTO-ENTMCNC: 32.1 G/DL (ref 31.4–35)
MCV RBC AUTO: 88.2 FL (ref 82–102)
NRBC # BLD: 0 K/UL (ref 0–0.2)
PLATELET # BLD AUTO: 282 K/UL (ref 150–450)
PMV BLD AUTO: 9.3 FL (ref 9.4–12.3)
RBC # BLD AUTO: 4.59 M/UL (ref 4.23–5.6)
TIBC SERPL-MCNC: 312 UG/DL (ref 240–450)
UIBC SERPL-MCNC: 278 UG/DL (ref 112–347)
WBC # BLD AUTO: 17.5 K/UL (ref 4.3–11.1)

## 2025-02-24 RX ORDER — TRAMADOL HYDROCHLORIDE 50 MG/1
50 TABLET ORAL EVERY 8 HOURS PRN
Qty: 45 TABLET | Refills: 2 | Status: SHIPPED | OUTPATIENT
Start: 2025-02-24 | End: 2025-05-25

## 2025-02-24 ASSESSMENT — PATIENT HEALTH QUESTIONNAIRE - PHQ9
5. POOR APPETITE OR OVEREATING: NOT AT ALL
SUM OF ALL RESPONSES TO PHQ QUESTIONS 1-9: 0
SUM OF ALL RESPONSES TO PHQ9 QUESTIONS 1 & 2: 0
7. TROUBLE CONCENTRATING ON THINGS, SUCH AS READING THE NEWSPAPER OR WATCHING TELEVISION: NOT AT ALL
1. LITTLE INTEREST OR PLEASURE IN DOING THINGS: NOT AT ALL
3. TROUBLE FALLING OR STAYING ASLEEP: NOT AT ALL
4. FEELING TIRED OR HAVING LITTLE ENERGY: NOT AT ALL
8. MOVING OR SPEAKING SO SLOWLY THAT OTHER PEOPLE COULD HAVE NOTICED. OR THE OPPOSITE, BEING SO FIGETY OR RESTLESS THAT YOU HAVE BEEN MOVING AROUND A LOT MORE THAN USUAL: NOT AT ALL
9. THOUGHTS THAT YOU WOULD BE BETTER OFF DEAD, OR OF HURTING YOURSELF: NOT AT ALL
SUM OF ALL RESPONSES TO PHQ QUESTIONS 1-9: 0
2. FEELING DOWN, DEPRESSED OR HOPELESS: NOT AT ALL
SUM OF ALL RESPONSES TO PHQ QUESTIONS 1-9: 0
6. FEELING BAD ABOUT YOURSELF - OR THAT YOU ARE A FAILURE OR HAVE LET YOURSELF OR YOUR FAMILY DOWN: NOT AT ALL
SUM OF ALL RESPONSES TO PHQ QUESTIONS 1-9: 0
10. IF YOU CHECKED OFF ANY PROBLEMS, HOW DIFFICULT HAVE THESE PROBLEMS MADE IT FOR YOU TO DO YOUR WORK, TAKE CARE OF THINGS AT HOME, OR GET ALONG WITH OTHER PEOPLE: NOT DIFFICULT AT ALL

## 2025-02-24 NOTE — PROGRESS NOTES
Medicare Annual Wellness Visit    Abram Castellon is here for COPD and Medicare AWV    Assessment & Plan  COPD with hypoxia  - Improved breathing but requires increased oxygen during ambulation  - On 2 liters of oxygen, increases to 6 liters when walking  - Follow-up with pulmonologist on 20/07/2025 to discuss [place] valve procedure  - Continue current oxygen therapy and complete pulmonary rehab  - Will continue with duoneb---has difficulty affording inhalers    Chest wall pain  - Has had previous rib fractures and pain due to falls  - Tramadol prn.  -Advised him how to take medication properly.  Discussed the potential side effects--including addiction--and benefits of the medication.  He is to call with any problems or concerns.  -I have reviewed the patient’s controlled substance prescription history, as maintained in the [place] prescription monitoring program, so that the prescription(s) for a controlled substance can be given.    Anemia  - Recheck CBC, iron, and TIBC today  - Has upcoming Endoscopy    Elevated blood glucose  - Elevated glucose likely due steroid use  - A1c test today to assess for steroid-induced diabetes    Cerumen impaction  - Cerumen impaction was observed. An ear irrigation was performed on both ear(s).  Irrigation proved to be beneficial in clearing a moderate amount of cerumen. Cerumen removed with lavage/currette.  Patient tolerated well.  Ear canal is now visible and clear after the procedure.  The procedure lasted 10 minutes.  Instructions for home care to prevent wax buildup are given.      Medicare annual wellness visit, subsequent  Chronic respiratory failure with hypoxia (HCC)  Chronic chest wall pain  -     traMADol (ULTRAM) 50 MG tablet; Take 1 tablet by mouth every 8 hours as needed for Pain (Dx: Chest wall pain) for up to 90 days. Intended supply: 5 days. Take lowest dose possible to manage pain Max Daily Amount: 150 mg, Disp-45 tablet, R-2Normal  Hyperglycemia  -

## 2025-02-24 NOTE — PATIENT INSTRUCTIONS
disease.  Your doctor has found that you have a chance of having heart disease. A heart-healthy lifestyle can help keep your heart healthy and prevent heart disease. This lifestyle includes eating healthy, being active, staying at a weight that's healthy for you, and not smoking or using tobacco. It also includes taking medicines as directed, managing other health conditions, and trying to get a healthy amount of sleep.  Follow-up care is a key part of your treatment and safety. Be sure to make and go to all appointments, and call your doctor if you are having problems. It's also a good idea to know your test results and keep a list of the medicines you take.  How can you care for yourself at home?  Diet    Use less salt when you cook and eat. This helps lower your blood pressure. Taste food before salting. Add only a little salt when you think you need it. With time, your taste buds will adjust to less salt.     Eat fewer snack items, fast foods, canned soups, and other high-salt, high-fat, processed foods.     Read food labels and try to avoid saturated and trans fats. They increase your risk of heart disease by raising cholesterol levels.     Limit the amount of solid fat--butter, margarine, and shortening--you eat. Use olive, peanut, or canola oil when you cook. Bake, broil, and steam foods instead of frying them.     Eat a variety of fruit and vegetables every day. Dark green, deep orange, red, or yellow fruits and vegetables are especially good for you. Examples include spinach, carrots, peaches, and berries.     Foods high in fiber can reduce your cholesterol and provide important vitamins and minerals. High-fiber foods include whole-grain cereals and breads, oatmeal, beans, brown rice, citrus fruits, and apples.     Eat lean proteins. Heart-healthy proteins include seafood, lean meats and poultry, eggs, beans, peas, nuts, seeds, and soy products.     Limit drinks and foods with added sugar. These include

## 2025-02-25 PROBLEM — R73.01 IFG (IMPAIRED FASTING GLUCOSE): Status: ACTIVE | Noted: 2025-02-25

## 2025-02-25 NOTE — RESULT ENCOUNTER NOTE
Please call pt and let know:  -Iron level is a little low, so will see what upcoming endoscopy shows  -You have an elevated Hgb A1C level that is consistent with pre-diabetes.  Levels less than 5.7 are normal.  Levels between 5.7-6.4 are considered pre diabetic.  Lifestyle changes are indicated and exercise.    Please try to decrease your carbohydrate intake, increase lean protein intake, and exercise.  Walking 30min most days of the week has been shown to be benefical.  The American Diabetic Association has good references available for you online.  We will periodically check your levels.

## 2025-03-28 RX ORDER — PREDNISONE 20 MG/1
20 TABLET ORAL DAILY
Qty: 30 TABLET | Refills: 3 | OUTPATIENT
Start: 2025-03-28

## 2025-03-28 NOTE — TELEPHONE ENCOUNTER
Patient requesting a refill on his predniSONE (DELTASONE) 20 MG tablet. Last seen on 09/23/24. Patient has missed his last 2 appointments with our office. I called the patient and he said he missed our appointments because he is going to [facility] lung center. I told the patient that they will need to fill his medications for him. ROSANA

## 2025-04-06 DIAGNOSIS — G89.29 CHRONIC RIGHT-SIDED LOW BACK PAIN WITH RIGHT-SIDED SCIATICA: ICD-10-CM

## 2025-04-06 DIAGNOSIS — M54.41 CHRONIC RIGHT-SIDED LOW BACK PAIN WITH RIGHT-SIDED SCIATICA: ICD-10-CM

## 2025-04-26 NOTE — ED TRIAGE NOTES
Pt having left chest pain that radiates to left arm for the last 25 minutes. Describes pain as stabbing and rates 3/10. Pt has 2 cardiac stents, afib, a cardiac ablation, and a watchman installed. Also has copd and emphysema. Sees Dr Kirsten Freeman at [facility] cardiology. Is not on a blood thinner. Takes 81mg ASA and betapace daily. Feels shob but used inhaler and states breathing feels easier.
Chronic illness

## 2025-04-30 RX ORDER — PANTOPRAZOLE SODIUM 40 MG/1
40 TABLET, DELAYED RELEASE ORAL DAILY
Qty: 90 TABLET | Refills: 3 | Status: SHIPPED | OUTPATIENT
Start: 2025-04-30

## 2025-05-17 SDOH — ECONOMIC STABILITY: TRANSPORTATION INSECURITY
IN THE PAST 12 MONTHS, HAS THE LACK OF TRANSPORTATION KEPT YOU FROM MEDICAL APPOINTMENTS OR FROM GETTING MEDICATIONS?: NO

## 2025-05-17 SDOH — ECONOMIC STABILITY: FOOD INSECURITY: WITHIN THE PAST 12 MONTHS, THE FOOD YOU BOUGHT JUST DIDN'T LAST AND YOU DIDN'T HAVE MONEY TO GET MORE.: NEVER TRUE

## 2025-05-17 SDOH — ECONOMIC STABILITY: FOOD INSECURITY: WITHIN THE PAST 12 MONTHS, YOU WORRIED THAT YOUR FOOD WOULD RUN OUT BEFORE YOU GOT MONEY TO BUY MORE.: NEVER TRUE

## 2025-05-17 SDOH — ECONOMIC STABILITY: INCOME INSECURITY: IN THE LAST 12 MONTHS, WAS THERE A TIME WHEN YOU WERE NOT ABLE TO PAY THE MORTGAGE OR RENT ON TIME?: NO

## 2025-05-17 ASSESSMENT — PATIENT HEALTH QUESTIONNAIRE - PHQ9
SUM OF ALL RESPONSES TO PHQ9 QUESTIONS 1 & 2: 2
2. FEELING DOWN, DEPRESSED OR HOPELESS: SEVERAL DAYS
1. LITTLE INTEREST OR PLEASURE IN DOING THINGS: SEVERAL DAYS
1. LITTLE INTEREST OR PLEASURE IN DOING THINGS: SEVERAL DAYS
2. FEELING DOWN, DEPRESSED OR HOPELESS: SEVERAL DAYS
SUM OF ALL RESPONSES TO PHQ QUESTIONS 1-9: 2

## 2025-05-19 ENCOUNTER — OFFICE VISIT (OUTPATIENT)
Dept: INTERNAL MEDICINE CLINIC | Facility: CLINIC | Age: 70
End: 2025-05-19
Payer: MEDICARE

## 2025-05-19 VITALS
DIASTOLIC BLOOD PRESSURE: 70 MMHG | BODY MASS INDEX: 22.13 KG/M2 | HEART RATE: 90 BPM | WEIGHT: 167 LBS | SYSTOLIC BLOOD PRESSURE: 120 MMHG | HEIGHT: 73 IN | OXYGEN SATURATION: 95 %

## 2025-05-19 DIAGNOSIS — J96.11 CHRONIC RESPIRATORY FAILURE WITH HYPOXIA (HCC): Primary | ICD-10-CM

## 2025-05-19 DIAGNOSIS — G89.29 CHRONIC CHEST WALL PAIN: ICD-10-CM

## 2025-05-19 DIAGNOSIS — R07.89 CHRONIC CHEST WALL PAIN: ICD-10-CM

## 2025-05-19 DIAGNOSIS — R05.3 CHRONIC COUGH: ICD-10-CM

## 2025-05-19 DIAGNOSIS — J44.9 COPD, VERY SEVERE (HCC): ICD-10-CM

## 2025-05-19 DIAGNOSIS — I10 PRIMARY HYPERTENSION: ICD-10-CM

## 2025-05-19 PROCEDURE — G8427 DOCREV CUR MEDS BY ELIG CLIN: HCPCS | Performed by: FAMILY MEDICINE

## 2025-05-19 PROCEDURE — 1159F MED LIST DOCD IN RCRD: CPT | Performed by: FAMILY MEDICINE

## 2025-05-19 PROCEDURE — 3074F SYST BP LT 130 MM HG: CPT | Performed by: FAMILY MEDICINE

## 2025-05-19 PROCEDURE — 3023F SPIROM DOC REV: CPT | Performed by: FAMILY MEDICINE

## 2025-05-19 PROCEDURE — 1036F TOBACCO NON-USER: CPT | Performed by: FAMILY MEDICINE

## 2025-05-19 PROCEDURE — G8420 CALC BMI NORM PARAMETERS: HCPCS | Performed by: FAMILY MEDICINE

## 2025-05-19 PROCEDURE — 3078F DIAST BP <80 MM HG: CPT | Performed by: FAMILY MEDICINE

## 2025-05-19 PROCEDURE — 99214 OFFICE O/P EST MOD 30 MIN: CPT | Performed by: FAMILY MEDICINE

## 2025-05-19 PROCEDURE — 3017F COLORECTAL CA SCREEN DOC REV: CPT | Performed by: FAMILY MEDICINE

## 2025-05-19 PROCEDURE — 1123F ACP DISCUSS/DSCN MKR DOCD: CPT | Performed by: FAMILY MEDICINE

## 2025-05-19 RX ORDER — PREDNISONE 10 MG/1
10 TABLET ORAL DAILY
Qty: 90 TABLET | Refills: 3 | Status: SHIPPED | OUTPATIENT
Start: 2025-05-19

## 2025-05-19 RX ORDER — TRAMADOL HYDROCHLORIDE 50 MG/1
50 TABLET ORAL EVERY 8 HOURS PRN
Qty: 45 TABLET | Refills: 2 | Status: SHIPPED | OUTPATIENT
Start: 2025-05-28 | End: 2025-08-26

## 2025-05-19 RX ORDER — HYDROCODONE BITARTRATE AND HOMATROPINE METHYLBROMIDE ORAL SOLUTION 5; 1.5 MG/5ML; MG/5ML
2.5 LIQUID ORAL EVERY 4 HOURS PRN
Qty: 240 ML | Refills: 0 | Status: SHIPPED | OUTPATIENT
Start: 2025-05-19 | End: 2025-06-18

## 2025-05-19 RX ORDER — ARFORMOTEROL TARTRATE 15 UG/2ML
1 SOLUTION RESPIRATORY (INHALATION) 2 TIMES DAILY
COMMUNITY

## 2025-05-19 RX ORDER — HYDROCODONE BITARTRATE AND HOMATROPINE METHYLBROMIDE ORAL SOLUTION 5; 1.5 MG/5ML; MG/5ML
2.5 LIQUID ORAL 4 TIMES DAILY PRN
Refills: 0 | Status: CANCELLED | OUTPATIENT
Start: 2025-05-19

## 2025-05-19 NOTE — PROGRESS NOTES
attendance with the patient were advised that Artificial Intelligence will be utilized during this visit to record, process the conversation to generate a clinical note, and support improvement of the AI technology. The patient (or guardian, if applicable) and other individuals in attendance at the appointment consented to the use of AI, including the recording.        An electronic signature was used to authenticate this note.  Jose Luis Crane, DO

## 2025-06-11 ENCOUNTER — TELEPHONE (OUTPATIENT)
Dept: INTERNAL MEDICINE CLINIC | Facility: CLINIC | Age: 70
End: 2025-06-11

## 2025-06-11 ENCOUNTER — PATIENT MESSAGE (OUTPATIENT)
Dept: INTERNAL MEDICINE CLINIC | Facility: CLINIC | Age: 70
End: 2025-06-11

## 2025-06-11 NOTE — TELEPHONE ENCOUNTER
States pharmacy is telling him he can not get this filled states discontinued he ask why this was discontinued?    tiZANidine (ZANAFLEX) 4 MG tablet [[id number]]   Order Details  Dose: 4 mg Route: Oral Frequency: EVERY 8 HOURS PRN for Muscle spasm   Dispense Quantity: 45 tablet Refills: 2          Sig: Take 1 tablet by mouth every 8 hours as needed (Muscle spasm)     Patient would like a call back about not being able to get this medication filled

## 2025-06-12 ENCOUNTER — CARE COORDINATION (OUTPATIENT)
Dept: CARE COORDINATION | Facility: CLINIC | Age: 70
End: 2025-06-12

## 2025-06-12 NOTE — CARE COORDINATION
Care Transitions Note    Initial Call - Call within 2 business days of discharge: Yes    Attempted to reach patient for transitions of care follow up. Unable to reach patient.    Outreach Attempts:   Nivelahart message sent.     Patient: Abram Castellon    Patient : 1955   MRN: [id number]    Reason for Admission: COPD  Discharge Date: 25  RURS: Readmission Risk Score: 12.3    Last Discharge Facility       Date Complaint Diagnosis Description Type Department Provider    25 Chest Pain COPD exacerbation (HCC) ... ED to Hosp-Admission (Discharged) (ADMITTED) SFD8MS Mónica Fish MD; Misty,...            Was this an external facility discharge? Yes. Discharge Date: 2025. Facility Name: [facility] Pulmonary Medicine    Follow Up Appointment:   Patient does not have a follow up appointment scheduled at time of call.  UTR x 2.  Future Appointments         Provider Specialty Dept Phone    2025 2:15 PM Jose Luis Crane DO Internal Medicine [phone]    2025 2:15 PM Quique Andrade MD Cardiology [phone]            Plan for follow-up on next business day.      Brigitte Jones, RN

## 2025-06-16 ENCOUNTER — CARE COORDINATION (OUTPATIENT)
Dept: CARE COORDINATION | Facility: CLINIC | Age: 70
End: 2025-06-16

## 2025-07-09 ENCOUNTER — OFFICE VISIT (OUTPATIENT)
Dept: INTERNAL MEDICINE CLINIC | Facility: CLINIC | Age: 70
End: 2025-07-09
Payer: MEDICARE

## 2025-07-09 VITALS
SYSTOLIC BLOOD PRESSURE: 120 MMHG | TEMPERATURE: 98.2 F | WEIGHT: 164 LBS | OXYGEN SATURATION: 95 % | BODY MASS INDEX: 21.74 KG/M2 | HEIGHT: 73 IN | DIASTOLIC BLOOD PRESSURE: 60 MMHG | HEART RATE: 93 BPM

## 2025-07-09 DIAGNOSIS — F33.41 RECURRENT MAJOR DEPRESSIVE DISORDER, IN PARTIAL REMISSION: ICD-10-CM

## 2025-07-09 DIAGNOSIS — J44.1 COPD EXACERBATION (HCC): ICD-10-CM

## 2025-07-09 DIAGNOSIS — J44.9 COPD, VERY SEVERE (HCC): Primary | ICD-10-CM

## 2025-07-09 PROCEDURE — 3017F COLORECTAL CA SCREEN DOC REV: CPT | Performed by: NURSE PRACTITIONER

## 2025-07-09 PROCEDURE — 3023F SPIROM DOC REV: CPT | Performed by: NURSE PRACTITIONER

## 2025-07-09 PROCEDURE — 1160F RVW MEDS BY RX/DR IN RCRD: CPT | Performed by: NURSE PRACTITIONER

## 2025-07-09 PROCEDURE — 1159F MED LIST DOCD IN RCRD: CPT | Performed by: NURSE PRACTITIONER

## 2025-07-09 PROCEDURE — 99214 OFFICE O/P EST MOD 30 MIN: CPT | Performed by: NURSE PRACTITIONER

## 2025-07-09 PROCEDURE — 3078F DIAST BP <80 MM HG: CPT | Performed by: NURSE PRACTITIONER

## 2025-07-09 PROCEDURE — 3074F SYST BP LT 130 MM HG: CPT | Performed by: NURSE PRACTITIONER

## 2025-07-09 PROCEDURE — 1123F ACP DISCUSS/DSCN MKR DOCD: CPT | Performed by: NURSE PRACTITIONER

## 2025-07-09 PROCEDURE — 1036F TOBACCO NON-USER: CPT | Performed by: NURSE PRACTITIONER

## 2025-07-09 PROCEDURE — G8427 DOCREV CUR MEDS BY ELIG CLIN: HCPCS | Performed by: NURSE PRACTITIONER

## 2025-07-09 PROCEDURE — G8420 CALC BMI NORM PARAMETERS: HCPCS | Performed by: NURSE PRACTITIONER

## 2025-07-09 RX ORDER — DOXYCYCLINE HYCLATE 100 MG
100 TABLET ORAL 2 TIMES DAILY
Qty: 14 TABLET | Refills: 0 | Status: SHIPPED | OUTPATIENT
Start: 2025-07-09 | End: 2025-07-16

## 2025-07-09 RX ORDER — HYDROCODONE BITARTRATE AND HOMATROPINE METHYLBROMIDE ORAL SOLUTION 5; 1.5 MG/5ML; MG/5ML
2.5 LIQUID ORAL EVERY 4 HOURS PRN
Qty: 120 ML | Refills: 0 | Status: SHIPPED | OUTPATIENT
Start: 2025-07-09 | End: 2025-07-17

## 2025-07-09 RX ORDER — METHYLPREDNISOLONE 4 MG/1
TABLET ORAL
Qty: 1 KIT | Refills: 0 | Status: SHIPPED | OUTPATIENT
Start: 2025-07-09 | End: 2025-07-15

## 2025-07-09 ASSESSMENT — PATIENT HEALTH QUESTIONNAIRE - PHQ9
SUM OF ALL RESPONSES TO PHQ9 QUESTIONS 1 & 2: 0
SUM OF ALL RESPONSES TO PHQ QUESTIONS 1-9: 0
1. LITTLE INTEREST OR PLEASURE IN DOING THINGS: NOT AT ALL
2. FEELING DOWN, DEPRESSED OR HOPELESS: NOT AT ALL
2. FEELING DOWN, DEPRESSED OR HOPELESS: NOT AT ALL
SUM OF ALL RESPONSES TO PHQ QUESTIONS 1-9: 0
1. LITTLE INTEREST OR PLEASURE IN DOING THINGS: NOT AT ALL
SUM OF ALL RESPONSES TO PHQ QUESTIONS 1-9: 0
SUM OF ALL RESPONSES TO PHQ QUESTIONS 1-9: 0

## 2025-07-09 NOTE — ASSESSMENT & PLAN NOTE
Orders:    HYDROcodone homatropine (HYCODAN) 5-1.5 MG/5ML solution; Take 2.5 mLs by mouth every 4 hours as needed (cough) for up to 8 days. Max Daily Amount: 15 mLs

## 2025-07-09 NOTE — PROGRESS NOTES
Abram Castellon (:  1955) is a 69 y.o. male,Established patient, here for evaluation of the following chief complaint(s):  Cough (When he can get it up its a light yellow looking, but majority of the time he can not get it up; been off ABX since July 3 and it is getting worse and he is out of cough syrup as well )         Assessment & Plan  COPD, very severe (HCC)     Orders:    HYDROcodone homatropine (HYCODAN) 5-1.5 MG/5ML solution; Take 2.5 mLs by mouth every 4 hours as needed (cough) for up to 8 days. Max Daily Amount: 15 mLs    COPD exacerbation (HCC)       Orders:    HYDROcodone homatropine (HYCODAN) 5-1.5 MG/5ML solution; Take 2.5 mLs by mouth every 4 hours as needed (cough) for up to 8 days. Max Daily Amount: 15 mLs      No follow-ups on file.     Lungs with mild wheezing, overall clear  Increased productive cough since stopping augmentin, recent bronchial valve placement, on oxygen  Discussed with Dr Crane  Will refill his cough syrup, PDMP reviewed  Send in medrol dose pack and doxycycline  Advise patient to call pulmonary and follow up with pulmonary    Subjective   Patient is here for a cough. He had valve put in for COPD on 6/3. He was discharged and ok but the has gotten cough. His pulmonologist gave him a week of augmentin and he felt better after a few days. He finished it and has the cough again worsened. He also finished his cough syrup. The cough worsened again 4 days ago.   He is using his inhalers but feels the cough is trying to strangle him. He has called pulmonary but not heard back. He had chest xray  that looked good. No fever. No body aches or chills. He is sore from coughing so much.         Review of Systems       Objective   Physical Exam  Constitutional:       Appearance: Normal appearance.   HENT:      Head: Normocephalic.      Mouth/Throat:      Pharynx: Posterior oropharyngeal erythema present. No oropharyngeal exudate.   Eyes:      Extraocular Movements: Extraocular

## 2025-07-10 RX ORDER — SERTRALINE HYDROCHLORIDE 100 MG/1
100 TABLET, FILM COATED ORAL DAILY
Qty: 90 TABLET | Refills: 3 | Status: SHIPPED | OUTPATIENT
Start: 2025-07-10

## 2025-08-20 ENCOUNTER — OFFICE VISIT (OUTPATIENT)
Dept: INTERNAL MEDICINE CLINIC | Facility: CLINIC | Age: 70
End: 2025-08-20
Payer: MEDICARE

## 2025-08-20 VITALS
DIASTOLIC BLOOD PRESSURE: 90 MMHG | SYSTOLIC BLOOD PRESSURE: 150 MMHG | WEIGHT: 167 LBS | HEART RATE: 106 BPM | OXYGEN SATURATION: 88 % | HEIGHT: 73 IN | BODY MASS INDEX: 22.13 KG/M2

## 2025-08-20 DIAGNOSIS — J44.9 COPD, VERY SEVERE (HCC): Primary | ICD-10-CM

## 2025-08-20 DIAGNOSIS — I10 PRIMARY HYPERTENSION: ICD-10-CM

## 2025-08-20 DIAGNOSIS — L98.9 NON-HEALING SKIN LESION OF NOSE: ICD-10-CM

## 2025-08-20 DIAGNOSIS — J96.11 CHRONIC RESPIRATORY FAILURE WITH HYPOXIA (HCC): ICD-10-CM

## 2025-08-20 DIAGNOSIS — Z12.5 PROSTATE CANCER SCREENING: ICD-10-CM

## 2025-08-20 DIAGNOSIS — R73.01 IFG (IMPAIRED FASTING GLUCOSE): ICD-10-CM

## 2025-08-20 DIAGNOSIS — D72.829 LEUKOCYTOSIS, UNSPECIFIED TYPE: ICD-10-CM

## 2025-08-20 PROBLEM — R07.9 CHEST PAIN: Status: RESOLVED | Noted: 2025-02-06 | Resolved: 2025-08-20

## 2025-08-20 LAB
ERYTHROCYTE [DISTWIDTH] IN BLOOD BY AUTOMATED COUNT: 19 % (ref 11.9–14.6)
EST. AVERAGE GLUCOSE BLD GHB EST-MCNC: 136 MG/DL
HBA1C MFR BLD: 6.4 % (ref 0–5.6)
HCT VFR BLD AUTO: 35.6 % (ref 41.1–50.3)
HGB BLD-MCNC: 10.1 G/DL (ref 13.6–17.2)
MCH RBC QN AUTO: 21 PG (ref 26.1–32.9)
MCHC RBC AUTO-ENTMCNC: 28.4 G/DL (ref 31.4–35)
MCV RBC AUTO: 73.9 FL (ref 82–102)
NRBC # BLD: 0 K/UL (ref 0–0.2)
PLATELET # BLD AUTO: 375 K/UL (ref 150–450)
PMV BLD AUTO: 9.2 FL (ref 9.4–12.3)
PSA SERPL-MCNC: 1.9 NG/ML (ref 0–4)
RBC # BLD AUTO: 4.82 M/UL (ref 4.23–5.6)
WBC # BLD AUTO: 17.9 K/UL (ref 4.3–11.1)

## 2025-08-20 PROCEDURE — G8427 DOCREV CUR MEDS BY ELIG CLIN: HCPCS | Performed by: FAMILY MEDICINE

## 2025-08-20 PROCEDURE — 1159F MED LIST DOCD IN RCRD: CPT | Performed by: FAMILY MEDICINE

## 2025-08-20 PROCEDURE — 1123F ACP DISCUSS/DSCN MKR DOCD: CPT | Performed by: FAMILY MEDICINE

## 2025-08-20 PROCEDURE — 99214 OFFICE O/P EST MOD 30 MIN: CPT | Performed by: FAMILY MEDICINE

## 2025-08-20 PROCEDURE — 3080F DIAST BP >= 90 MM HG: CPT | Performed by: FAMILY MEDICINE

## 2025-08-20 PROCEDURE — 3077F SYST BP >= 140 MM HG: CPT | Performed by: FAMILY MEDICINE

## 2025-08-20 PROCEDURE — 3023F SPIROM DOC REV: CPT | Performed by: FAMILY MEDICINE

## 2025-08-20 PROCEDURE — 3017F COLORECTAL CA SCREEN DOC REV: CPT | Performed by: FAMILY MEDICINE

## 2025-08-20 PROCEDURE — 1036F TOBACCO NON-USER: CPT | Performed by: FAMILY MEDICINE

## 2025-08-20 PROCEDURE — 1160F RVW MEDS BY RX/DR IN RCRD: CPT | Performed by: FAMILY MEDICINE

## 2025-08-20 PROCEDURE — G8420 CALC BMI NORM PARAMETERS: HCPCS | Performed by: FAMILY MEDICINE

## 2025-08-20 PROCEDURE — G2211 COMPLEX E/M VISIT ADD ON: HCPCS | Performed by: FAMILY MEDICINE

## 2025-08-27 ENCOUNTER — PATIENT MESSAGE (OUTPATIENT)
Dept: INTERNAL MEDICINE CLINIC | Facility: CLINIC | Age: 70
End: 2025-08-27

## 2025-08-27 DIAGNOSIS — D64.9 ANEMIA, UNSPECIFIED TYPE: Primary | ICD-10-CM

## 2025-08-28 ENCOUNTER — LAB (OUTPATIENT)
Dept: INTERNAL MEDICINE CLINIC | Facility: CLINIC | Age: 70
End: 2025-08-28

## 2025-08-28 DIAGNOSIS — D64.9 ANEMIA, UNSPECIFIED TYPE: ICD-10-CM

## 2025-08-28 LAB
FERRITIN SERPL-MCNC: 32 NG/ML (ref 8–388)
IRON SATN MFR SERPL: 5 % (ref 20–50)
IRON SERPL-MCNC: 16 UG/DL (ref 35–100)
TIBC SERPL-MCNC: 319 UG/DL (ref 240–450)
UIBC SERPL-MCNC: 303 UG/DL (ref 112–347)

## 2025-08-29 ENCOUNTER — TELEPHONE (OUTPATIENT)
Dept: ONCOLOGY | Age: 70
End: 2025-08-29

## 2025-08-29 DIAGNOSIS — D64.9 ANEMIA, UNSPECIFIED TYPE: Primary | ICD-10-CM

## (undated) DEVICE — CATHETER EP 7FR L115CM 2-8-2MM SPC TIP 2MM 10 ELECTRD FJ

## (undated) DEVICE — CATHETER COR DIAG PIGTAILS PIG 145 CRV 5FR 110CM 6 SIDE H

## (undated) DEVICE — SUTURE VCRL SZ 3-0 L18IN ABSRB UD STD AND SHT LEN DBL ARMED J644H

## (undated) DEVICE — SYR 3ML LL TIP 1/10ML GRAD --

## (undated) DEVICE — SOLUTION IV 1000ML 0.9% SOD CHL

## (undated) DEVICE — SYR 10ML LUER LOK 1/5ML GRAD --

## (undated) DEVICE — PAD,NON-ADHERENT,3X8,STERILE,LF,1/PK: Brand: MEDLINE

## (undated) DEVICE — 18G NG KIT WITH 96IN PROBE COVER (10 PK): Brand: SITE-RITE

## (undated) DEVICE — CONTAINER PREFIL FRMLN 40ML --

## (undated) DEVICE — PINNACLE INTRODUCER SHEATH: Brand: PINNACLE

## (undated) DEVICE — 3M™ IOBAN™ 2 ANTIMICROBIAL INCISE DRAPE 6650EZ: Brand: IOBAN™ 2

## (undated) DEVICE — MINOR SPLIT GENERAL: Brand: MEDLINE INDUSTRIES, INC.

## (undated) DEVICE — PRESSURE MONITORING SET: Brand: TRUWAVE, VAMP PLUS

## (undated) DEVICE — CATHETER DIAG 6FR L110CM PIGTAILS CRV STYL PIG145 DXTERITY

## (undated) DEVICE — NEEDLE HYPO 18GA L1.5IN PNK S STL HUB POLYPR SHLD REG BVL

## (undated) DEVICE — GARMENT,MEDLINE,DVT,INT,CALF,MED, GEN2: Brand: MEDLINE

## (undated) DEVICE — CATHETER ABLAT 8FR L115CM 1-6-2MM SPC TIP 3.5MM FJ CRV

## (undated) DEVICE — CATHETER DIAG 6FR L100CM LUMN ID0.056IN JL4 CRV 0 SIDE H

## (undated) DEVICE — Device: Brand: NRG TRANSSEPTAL NEEDLE

## (undated) DEVICE — GOWN,REINF,POLY,ECL,PP SLV,XL: Brand: MEDLINE

## (undated) DEVICE — CONNECTOR TBNG OD5-7MM O2 END DISP

## (undated) DEVICE — FORCEPS BX L240CM JAW DIA2.8MM L CAP W/ NDL MIC MESH TOOTH

## (undated) DEVICE — TUBE SET IRR PUMP THERMALCOOL -- SMARTABLATE

## (undated) DEVICE — CATHETER US GE COMPATIBILITY SOUNDSTAR ECO 10FR

## (undated) DEVICE — SUTURE VCRL SZ 0 L27IN ABSRB UD L26MM CT-2 1/2 CIR J270H

## (undated) DEVICE — GAUZE,SPONGE,4"X4",16PLY,STRL,LF,10/TRAY: Brand: MEDLINE

## (undated) DEVICE — SNARE POLYP SM W13MMXL240CM SHTH DIA2.4MM OVL FLX DISP

## (undated) DEVICE — CATH EP MAP 2-6-2 7FR D CRV -- PENTARAY

## (undated) DEVICE — SPONGE: SPECIALTY PEANUT XR 100/CS: Brand: MEDICAL ACTION INDUSTRIES

## (undated) DEVICE — INTRODUCER SHTH CARDIAGUIDE FCL20100

## (undated) DEVICE — CATHETER DIAG 6FR L100CM SPEC 3 DRC CRV SZ DBL BRAID WIRE

## (undated) DEVICE — BLOCK BITE AD 60FR W/ VELC STRP ADDRESSES MOST PT AND

## (undated) DEVICE — KENDALL RADIOLUCENT FOAM MONITORING ELECTRODE RECTANGULAR SHAPE: Brand: KENDALL

## (undated) DEVICE — INTRODUCER SHTH 6FR L11CM 0.038IN STD SIDEPRT EXTN 3 W

## (undated) DEVICE — SUTURE ETHBND EXCEL SZ 0 L30IN NONABSORBABLE GRN CT1 L36MM X424H

## (undated) DEVICE — Device

## (undated) DEVICE — REM POLYHESIVE ADULT PATIENT RETURN ELECTRODE: Brand: VALLEYLAB

## (undated) DEVICE — GLOVE SURG SZ 6.5 L11.2IN THK8.6MIL LT BRN LTX FREE

## (undated) DEVICE — SUTURE VCRL SZ 2-0 L27IN ABSRB UD L26MM SH 1/2 CIR J417H

## (undated) DEVICE — DRAIN SURG PENROSE 0.25X12 IN CLOSED WND DRAINAGE PREM SIL

## (undated) DEVICE — NDL PRT INJ NSAF BLNT 18GX1.5 --

## (undated) DEVICE — SOLUTION IRRIG 1000ML 0.9% SOD CHL USP POUR PLAS BTL

## (undated) DEVICE — GOWN,SIRUS,NONRNF,SETINSLV,XL,20/CS: Brand: MEDLINE

## (undated) DEVICE — NEEDLE HYPO 25GA L1.5IN BLU POLYPR HUB S STL REG BVL STR

## (undated) DEVICE — 3M™ TEGADERM™ TRANSPARENT FILM DRESSING FRAME STYLE, 1628, 6 IN X 8 IN (15 CM X 20 CM), 10/CT 8CT/CASE: Brand: 3M™ TEGADERM™

## (undated) DEVICE — BLADE ASSEMB CLP HAIR FINE --

## (undated) DEVICE — PATCH CARTO 3 EXT REF --

## (undated) DEVICE — GLOVE SURG SZ 65 THK91MIL LTX FREE SYN POLYISOPRENE

## (undated) DEVICE — (D)PREP SKN CHLRAPRP APPL 26ML -- CONVERT TO ITEM 371833

## (undated) DEVICE — PINNACLE TIF INTRODUCER SHEATH: Brand: PINNACLE

## (undated) DEVICE — APPLIER CLP L9.38IN M LIG TI DISP STR RNG HNDL LIGACLP

## (undated) DEVICE — NEEDLE HYPO 21GA L1IN GRN S STL HUB POLYPR SHLD REG BVL

## (undated) DEVICE — SUTURE VCRL SZ 4-0 L27IN ABSRB UD L19MM PS-2 3/8 CIR PRIM J426H

## (undated) DEVICE — CANNULA NSL ORAL AD FOR CAPNOFLEX CO2 O2 AIRLFE

## (undated) DEVICE — STRIP,CLOSURE,WOUND,MEDI-STRIP,1/2X4: Brand: MEDLINE

## (undated) DEVICE — SUTURE VCRL SZ 3-0 L27IN ABSRB UD L26MM SH 1/2 CIR J416H

## (undated) DEVICE — MASTISOL ADHESIVE LIQ 2/3ML

## (undated) DEVICE — SUTURE PERMA-HAND SZ 2-0 L30IN NONABSORBABLE BLK L26MM SH K833H

## (undated) DEVICE — GOWN,BREATHABLE SLV,AURORA,LG,STRL: Brand: MEDLINE

## (undated) DEVICE — SUTURE VCRL SZ 3-0 L18IN ABSRB UD L26MM SH 1/2 CIR J864D

## (undated) DEVICE — CATH DECANAV EP F CURVE 7FR --

## (undated) DEVICE — 2000CC GUARDIAN II: Brand: GUARDIAN

## (undated) DEVICE — GLOVE SURG SZ 7 L12IN FNGR THK79MIL GRN LTX FREE

## (undated) DEVICE — SYR 5ML 1/5 GRAD LL NSAF LF --